# Patient Record
Sex: MALE | Race: BLACK OR AFRICAN AMERICAN | NOT HISPANIC OR LATINO | Employment: OTHER | URBAN - METROPOLITAN AREA
[De-identification: names, ages, dates, MRNs, and addresses within clinical notes are randomized per-mention and may not be internally consistent; named-entity substitution may affect disease eponyms.]

---

## 2023-04-08 ENCOUNTER — OFFICE VISIT (OUTPATIENT)
Dept: URGENT CARE | Facility: CLINIC | Age: 78
End: 2023-04-08
Payer: MEDICARE

## 2023-04-08 ENCOUNTER — TELEPHONE (OUTPATIENT)
Dept: URGENT CARE | Facility: CLINIC | Age: 78
End: 2023-04-08

## 2023-04-08 VITALS
WEIGHT: 208 LBS | OXYGEN SATURATION: 96 % | DIASTOLIC BLOOD PRESSURE: 78 MMHG | SYSTOLIC BLOOD PRESSURE: 167 MMHG | HEART RATE: 88 BPM | BODY MASS INDEX: 27.57 KG/M2 | TEMPERATURE: 98 F | RESPIRATION RATE: 20 BRPM | HEIGHT: 73 IN

## 2023-04-08 DIAGNOSIS — J30.1 SEASONAL ALLERGIC RHINITIS DUE TO POLLEN: Primary | ICD-10-CM

## 2023-04-08 DIAGNOSIS — J06.9 URI, ACUTE: ICD-10-CM

## 2023-04-08 LAB
CTP QC/QA: YES
SARS-COV-2 AG RESP QL IA.RAPID: NEGATIVE

## 2023-04-08 PROCEDURE — 99203 OFFICE O/P NEW LOW 30 MIN: CPT | Mod: S$GLB,,, | Performed by: FAMILY MEDICINE

## 2023-04-08 PROCEDURE — 87811 SARS CORONAVIRUS 2 ANTIGEN POCT, MANUAL READ: ICD-10-PCS | Mod: QW,S$GLB,, | Performed by: FAMILY MEDICINE

## 2023-04-08 PROCEDURE — 87811 SARS-COV-2 COVID19 W/OPTIC: CPT | Mod: QW,S$GLB,, | Performed by: FAMILY MEDICINE

## 2023-04-08 PROCEDURE — 99203 PR OFFICE/OUTPT VISIT, NEW, LEVL III, 30-44 MIN: ICD-10-PCS | Mod: S$GLB,,, | Performed by: FAMILY MEDICINE

## 2023-04-08 RX ORDER — AZITHROMYCIN 250 MG/1
TABLET, FILM COATED ORAL
Qty: 6 TABLET | Refills: 0 | Status: SHIPPED | OUTPATIENT
Start: 2023-04-08

## 2023-04-08 RX ORDER — FINASTERIDE 5 MG/1
TABLET, FILM COATED ORAL
COMMUNITY
Start: 2023-03-22

## 2023-04-08 RX ORDER — LORATADINE 10 MG/1
10 TABLET ORAL DAILY
Qty: 30 TABLET | Refills: 2 | Status: SHIPPED | OUTPATIENT
Start: 2023-04-08 | End: 2024-04-07

## 2023-04-08 RX ORDER — TAMSULOSIN HYDROCHLORIDE 0.4 MG/1
CAPSULE ORAL
COMMUNITY
Start: 2023-03-22

## 2023-04-08 RX ORDER — LOSARTAN POTASSIUM 100 MG/1
TABLET ORAL
COMMUNITY
Start: 2023-03-22

## 2023-04-08 RX ORDER — ROSUVASTATIN CALCIUM 40 MG/1
TABLET, COATED ORAL
COMMUNITY
Start: 2023-02-14

## 2023-04-08 RX ORDER — CALCIUM CITRATE/VITAMIN D3 200MG-6.25
1 TABLET ORAL 2 TIMES DAILY
COMMUNITY
Start: 2023-03-29

## 2023-04-08 RX ORDER — METFORMIN HYDROCHLORIDE 1000 MG/1
TABLET ORAL
COMMUNITY
Start: 2023-03-22

## 2023-04-08 RX ORDER — MELOXICAM 15 MG/1
15 TABLET ORAL NIGHTLY
COMMUNITY
Start: 2023-01-11 | End: 2023-05-27 | Stop reason: ALTCHOICE

## 2023-04-08 RX ORDER — AMLODIPINE BESYLATE 10 MG/1
TABLET ORAL
COMMUNITY
Start: 2023-03-22

## 2023-04-08 RX ORDER — GLIPIZIDE 10 MG/1
TABLET ORAL
COMMUNITY
Start: 2023-03-22

## 2023-04-08 RX ORDER — FLUTICASONE PROPIONATE 50 MCG
SPRAY, SUSPENSION (ML) NASAL
COMMUNITY
Start: 2023-03-29

## 2023-04-08 RX ORDER — CARVEDILOL 6.25 MG/1
TABLET ORAL
COMMUNITY
Start: 2023-01-26

## 2023-04-08 NOTE — PROGRESS NOTES
"Subjective:      Patient ID: Robi Donovan is a 77 y.o. male.    Vitals:  height is 6' 1" (1.854 m) and weight is 94.3 kg (208 lb). His temperature is 98.4 °F (36.9 °C). His blood pressure is 167/78 (abnormal) and his pulse is 88. His respiration is 20 and oxygen saturation is 96%.     Chief Complaint: Cough    77 year old male presents today with sinuses, nasal drainage, cough. Symptoms started 04/06/2023. Treatments at home include Mucinex DM with no relief. No known exposure to anything. Never had COVID before that he knows of. COVID vaccinated. Denies fever or chills      Cough  This is a new problem. The current episode started in the past 7 days. The problem has been unchanged. The cough is Productive of purulent sputum (yellow). Associated symptoms include postnasal drip and rhinorrhea. Pertinent negatives include no chest pain, chills, ear congestion, ear pain, fever, headaches, heartburn, hemoptysis, myalgias, nasal congestion, rash, sore throat, shortness of breath, sweats, weight loss or wheezing. He has tried OTC cough suppressant for the symptoms. There is no history of asthma, bronchiectasis, bronchitis, COPD, emphysema, environmental allergies or pneumonia.     Constitution: Negative for chills and fever.   HENT:  Positive for postnasal drip. Negative for ear pain and sore throat.    Cardiovascular:  Negative for chest pain.   Respiratory:  Positive for cough. Negative for bloody sputum, shortness of breath and wheezing.    Gastrointestinal:  Negative for heartburn.   Musculoskeletal:  Negative for muscle ache.   Skin:  Negative for rash.   Allergic/Immunologic: Negative for environmental allergies.   Neurological:  Negative for headaches.    Objective:     Physical Exam   Constitutional: He is oriented to person, place, and time. He appears well-developed. He is cooperative.   HENT:   Head: Normocephalic and atraumatic.   Ears:   Right Ear: Hearing, tympanic membrane, external ear and ear canal normal. "   Left Ear: Hearing, tympanic membrane, external ear and ear canal normal.   Nose: Nose normal. No mucosal edema or nasal deformity. No epistaxis. Right sinus exhibits no maxillary sinus tenderness and no frontal sinus tenderness. Left sinus exhibits no maxillary sinus tenderness and no frontal sinus tenderness.   Mouth/Throat: Uvula is midline, oropharynx is clear and moist and mucous membranes are normal. Mucous membranes are moist. No trismus in the jaw. Normal dentition. No uvula swelling. No oropharyngeal exudate or posterior oropharyngeal erythema. Oropharynx is clear.   Eyes: Conjunctivae and lids are normal. Pupils are equal, round, and reactive to light. Extraocular movement intact   Neck: Trachea normal and phonation normal. Neck supple.   Cardiovascular: Normal rate, regular rhythm, normal heart sounds and normal pulses.   Pulmonary/Chest: Effort normal and breath sounds normal.   Abdominal: Normal appearance and bowel sounds are normal. Soft.   Musculoskeletal: Normal range of motion.         General: Normal range of motion.   Neurological: He is alert and oriented to person, place, and time. He exhibits normal muscle tone.   Skin: Skin is warm, dry and intact.   Psychiatric: His speech is normal and behavior is normal. Judgment and thought content normal.   Nursing note and vitals reviewed.    Assessment:     1. Seasonal allergic rhinitis due to pollen    2. URI, acute        Plan:       Seasonal allergic rhinitis due to pollen  -     SARS Coronavirus 2 Antigen, POCT Manual Read    URI, acute    Other orders  -     loratadine (CLARITIN) 10 mg tablet; Take 1 tablet (10 mg total) by mouth once daily.  Dispense: 30 tablet; Refill: 2       Please delete the message regarding Pneumonia on cxr            Results for orders placed or performed in visit on 04/08/23   SARS Coronavirus 2 Antigen, POCT Manual Read   Result Value Ref Range    SARS Coronavirus 2 Antigen Negative Negative      Acceptable Yes

## 2023-05-27 ENCOUNTER — HOSPITAL ENCOUNTER (EMERGENCY)
Facility: HOSPITAL | Age: 78
Discharge: HOME | End: 2023-05-27
Attending: EMERGENCY MEDICINE
Payer: MEDICARE

## 2023-05-27 VITALS
WEIGHT: 190 LBS | DIASTOLIC BLOOD PRESSURE: 80 MMHG | RESPIRATION RATE: 18 BRPM | BODY MASS INDEX: 25.07 KG/M2 | SYSTOLIC BLOOD PRESSURE: 154 MMHG | HEART RATE: 82 BPM | OXYGEN SATURATION: 99 %

## 2023-05-27 DIAGNOSIS — S32.020A CLOSED COMPRESSION FRACTURE OF L2 LUMBAR VERTEBRA, INITIAL ENCOUNTER: ICD-10-CM
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
## 2023-05-27 PROCEDURE — 99285 EMERGENCY DEPT VISIT HI MDM: CPT | Mod: 25

## 2023-05-27 PROCEDURE — 25000003 PHARM REV CODE 250: Performed by: EMERGENCY MEDICINE

## 2023-05-27 PROCEDURE — 96372 THER/PROPH/DIAG INJ SC/IM: CPT | Performed by: EMERGENCY MEDICINE

## 2023-05-27 PROCEDURE — 63600175 PHARM REV CODE 636 W HCPCS: Performed by: EMERGENCY MEDICINE

## 2023-05-27 RX ORDER — ACETAMINOPHEN 500 MG
1000 TABLET ORAL
Status: DISCONTINUED | OUTPATIENT
Start: 2023-05-27 | End: 2023-05-27

## 2023-05-27 RX ORDER — HYDROCODONE BITARTRATE AND ACETAMINOPHEN 5; 325 MG/1; MG/1
1 TABLET ORAL EVERY 8 HOURS PRN
Qty: 9 TABLET | Refills: 0 | Status: SHIPPED | OUTPATIENT
Start: 2023-05-27

## 2023-05-27 RX ORDER — MORPHINE SULFATE 4 MG/ML
4 INJECTION, SOLUTION INTRAMUSCULAR; INTRAVENOUS
Status: COMPLETED | OUTPATIENT
Start: 2023-05-27 | End: 2023-05-27

## 2023-05-27 RX ORDER — ONDANSETRON 2 MG/ML
4 INJECTION INTRAMUSCULAR; INTRAVENOUS
Status: DISCONTINUED | OUTPATIENT
Start: 2023-05-27 | End: 2023-05-27

## 2023-05-27 RX ORDER — ONDANSETRON 4 MG/1
4 TABLET, ORALLY DISINTEGRATING ORAL
Status: COMPLETED | OUTPATIENT
Start: 2023-05-27 | End: 2023-05-27

## 2023-05-27 RX ORDER — IBUPROFEN 400 MG/1
800 TABLET ORAL
Status: COMPLETED | OUTPATIENT
Start: 2023-05-27 | End: 2023-05-27

## 2023-05-27 RX ORDER — NAPROXEN 500 MG/1
500 TABLET ORAL 2 TIMES DAILY WITH MEALS
Qty: 12 TABLET | Refills: 0 | Status: SHIPPED | OUTPATIENT
Start: 2023-05-27

## 2023-05-27 RX ADMIN — MORPHINE SULFATE 4 MG: 4 INJECTION INTRAVENOUS at 08:05

## 2023-05-27 RX ADMIN — ONDANSETRON 4 MG: 4 TABLET, ORALLY DISINTEGRATING ORAL at 08:05

## 2023-05-27 RX ADMIN — IBUPROFEN 800 MG: 400 TABLET ORAL at 07:05

## 2023-05-28 NOTE — ED PROVIDER NOTES
Emergency Department Encounter  Provider Note  Encounter Date: 2023    Patient Name: Robi Donovan  MRN: [id number]    History of Present Illness   HPI  History of Present Illness:    Chief Complaint:   Chief Complaint   Patient presents with    Fall     Pt was picking cucumbers and fell on his lower back . Pt is complaining of lumbar back pain.         77 year old male presenting with lower back pain after falling earlier today.  Patient is on Plavix. Pt was pulling on a cucumber and fell over backwards onto his bottom. Unable to get up after, was helped into a chair. No hip pain.     The following PMH/PSH/SocHx/FamHx has been reviewed by myself:    Past Medical History:   Diagnosis Date    Diabetes mellitus type I     Hypertension     Stroke      No past surgical history on file.  Social History     Tobacco Use    Smoking status: Former     Types: Cigarettes     Start date:      Quit date:      Years since quittin.4    Smokeless tobacco: Never   Substance Use Topics    Alcohol use: Not Currently     No family history on file.    Allergies reviewed:  Review of patient's allergies indicates:  No Known Allergies    Medications reviewed:  Discharge Medication List as of 2023 10:28 PM        START taking these medications    Details   HYDROcodone-acetaminophen (NORCO) 5-325 mg per tablet Take 1 tablet by mouth every 8 (eight) hours as needed for Pain., Starting Sat 2023, Normal      naproxen (NAPROSYN) 500 MG tablet Take 1 tablet (500 mg total) by mouth 2 (two) times daily with meals., Starting Sat 2023, Normal           CONTINUE these medications which have NOT CHANGED    Details   amLODIPine (NORVASC) 10 MG tablet Take by mouth., Starting Wed 3/22/2023, Historical Med      azithromycin (ZITHROMAX Z-MIESHA) 250 MG tablet Take 2 tablets (500 mg) on  Day 1,  followed by 1 tablet (250 mg) once daily on Days 2 through 5., Normal      carvediloL (COREG) 6.25 MG tablet Take by mouth., Starting  Thu 1/26/2023, Historical Med      finasteride (PROSCAR) 5 mg tablet Take by mouth., Starting Wed 3/22/2023, Historical Med      fluticasone propionate (FLONASE) 50 mcg/actuation nasal spray by Each Nostril route., Starting Wed 3/29/2023, Historical Med      glipiZIDE (GLUCOTROL) 10 MG tablet Take by mouth., Starting Wed 3/22/2023, Historical Med      loratadine (CLARITIN) 10 mg tablet Take 1 tablet (10 mg total) by mouth once daily., Starting Sat 4/8/2023, Until Sun 4/7/2024, Normal      losartan (COZAAR) 100 MG tablet Take by mouth., Starting Wed 3/22/2023, Historical Med      metFORMIN (GLUCOPHAGE) 1000 MG tablet Take by mouth., Starting Wed 3/22/2023, Historical Med      rosuvastatin (CRESTOR) 40 MG Tab Take by mouth., Starting Tue 2/14/2023, Historical Med      tamsulosin (FLOMAX) 0.4 mg Cap Take by mouth., Starting Wed 3/22/2023, Historical Med      TRUE METRIX GLUCOSE TEST STRIP Strp 1 strip 2 (two) times daily., Starting Wed 3/29/2023, Historical Med             ROS  Review of Systems:    Constitutional:  Negative for fever.   HENT:  Negative for sore throat.    Eyes:  Negative for redness.   Respiratory:  Negative for shortness of breath.    Cardiovascular:  Negative for chest pain.   Gastrointestinal:  Negative for nausea.   Genitourinary:  Negative for dysuria.   Musculoskeletal:  + for back pain.   Skin:  Negative for rash.   Neurological:  Negative for weakness.   Hematological:  Does not bruise/bleed easily.   Psychiatric/Behavioral:  The patient is not nervous/anxious.      Physical Exam   Physical Exam    Initial Vitals [05/27/23 1839]   BP Pulse Resp Temp SpO2   (!) 154/80 82 14 -- 99 %      MAP       --           Triage vital signs reviewed.    Constitutional: Well-nourished, well-developed. Not in acute distress.  HENT: Normocephalic, atraumatic. Moist mucous membranes.  Eyes: No conjunctival injection.  Resp: Normal respiratory effort, breathing unlabored.  Cardio: Regular rate and rhythm.  GI:  Abdomen non-distended.   MSK: midline lumbar spinal ttp. No hip pain. Full ROM LLE.   Skin: Warm and dry. No rashes or lesions noted.  Neuro: Awake and alert. Moves all extremities.    ED Course   Procedures    Medical Decision Making    History Acquisition     The history is provided by the patient.     Review of prior external/non ED notes:  notes    Differential Diagnoses   Based on available information and initial assessment, the working Differential Diagnosis includes, but is not limited to:  Fracture, dislocation, compartment syndrome, nerve injury/palsy, vascular injury, DVT, rhabdomyolysis, hemarthrosis, septic joint, cellulitis, bursitis, muscle strain, ligament tear/sprain, laceration, foreign body, abrasion, soft tissue contusion, osteoarthritis.  .    EKG   EKG ordered and independently reviewed by me:       Labs   Lab tests ordered and independently reviewed by me:    Labs Reviewed - No data to display      Imaging   Imaging ordered and independently reviewed by me:   Imaging Results               CT Lumbar Spine Without Contrast (Final result)  Result time 05/27/23 19:55:29      Final result by Arnoldo Powers MD (05/27/23 19:55:29)                   Impression:      Acute compression fracture of the anterior and superior endplate of L2 without retropulsion or involvement of the posterior cortex.    Severe stenosis in the central canal at L4-5 and L5-S1.    Severe subarticular and foraminal stenosis on the left at L4-5 and on the right at L5-S1.    This report was flagged in Epic as abnormal.      Electronically signed by: Arnoldo Powers  Date:    05/27/2023  Time:    19:55               Narrative:    EXAMINATION:  CT LUMBAR SPINE WITHOUT CONTRAST    CLINICAL HISTORY:  Low back pain, trauma;    TECHNIQUE:  Low-dose axial, sagittal and coronal reformations are obtained through the lumbar spine.  Contrast was not administered.    COMPARISON:  None.    FINDINGS:  Alignment is anatomic.    There is  a compression fracture of the anterior and superior cortex of L2 with mild compression with 10-15% loss of anterior height.  No extension through the posterior cortex is evident.  No extension into the lateral masses.    Generalized spondylosis is most severe at L4-5 and L5-S1 with disc space narrowing and vacuum phenomenon at these 2 levels.    At the L1-L2 level, there is no significant central canal stenosis.  No retropulsion is.    At L2-L3, concentric disc bulge and facet arthropathy with short pedicles cause mild central and lateral recess encroachment without significant stenosis.    At L3-L4, concentric disc bulge, short pedicles and hypertrophic posterior elements result in moderate central and lateral recess stenosis.    At the L4-L5 level there is abnormal soft tissue density in the left lateral recess and foramen suggesting disc protrusion with severe left lateral recess and foraminal stenosis with likely impingement of the left L4 nerve root.  The central canal is similarly narrowed with likely constriction of the cauda equina in the midline.    At the L5-S1 level, concentric disc bulge is asymmetric toward the right causing subarticular and extraforaminal soft tissue density in the region of the exiting right.  L5 nerve root                                           Additional Consideration   Will Zion  presents to the emergency Department today with LBP sp fall. Will obtain CT scan given age and midline ttp. Ibuprofen for pain.     Additional testing considered but not indicated during the course of this workup: further imaging and labwork, not indicated  Co-morbidities/chronic illness/exacerbation of chronic illness considered which impacted clinical decision making: CVA, DM, htn  Procedures done in the ED or plan for the OR:   Social determinants of care considered during development of treatment plan include: Decreased medical literacy  Discussion of management or test interpretation with  external provider:   DNR discussion: No    The patient's list of active medications and allergies as documented per RN staff has been reviewed.  Medications given in the ED and/or prescribed:   Medications   ibuprofen tablet 800 mg (800 mg Oral Given 5/27/23 1915)   morphine injection 4 mg (4 mg Intramuscular Given 5/27/23 2041)   ondansetron disintegrating tablet 4 mg (4 mg Oral Given 5/27/23 2041)             ED Course as of 05/29/23 0602   Sat May 27, 2023   2014 CT Lumbar Spine Without Contrast(!)  Scan is reviewed shows an L2 compression fracture with 10-15% loss of height.  Incidentally it shows severe arthritis and degenerative disc disease.  The patient states that in the past he has had low back pain but denied any bowel or bladder incontinence, saddle anesthesia, radicular symptoms, or any problems walking before falling down while picking cucumbers. [CD]   2208 Patient states that he is not want to stay in the hospital.  A TLSO brace has been placed.  He is able to get up and ambulate slowly while in the emergency department.  He does have a chronic issue with his right lower extremity ever since a CVA in the past.  Otherwise his ambulation is a little slower but he seems steady. [CD]   2218 I will provide patient with a walker.  While he is able to ambulate without it I feel that it will be safer for him to go home with it.  Both pt and wife comfortable with dc. [CD]      ED Course User Index  [CD] Nishant Alva DO       Explanation of disposition: Disposition pending CT results and oncoming physician discretion.     Clinical Impression:     1. Fall, initial encounter    2. Closed compression fracture of L2 lumbar vertebra, initial encounter       ED Disposition Condition    Discharge Stable               Alessandra Castro MD  05/27/23 2004       Alessandra Castro MD  05/29/23 0602

## 2023-05-28 NOTE — ED NOTES
Pt has cervical brade in place, walker provided, pt ambulated and educated. Pt voices no complaints.

## 2023-06-21 ENCOUNTER — TELEPHONE (OUTPATIENT)
Dept: EMERGENCY MEDICINE | Facility: HOSPITAL | Age: 78
End: 2023-06-21
Payer: MEDICARE

## 2023-07-24 ENCOUNTER — PATIENT MESSAGE (OUTPATIENT)
Dept: RESEARCH | Facility: HOSPITAL | Age: 78
End: 2023-07-24
Payer: MEDICARE

## 2023-10-01 ENCOUNTER — OFFICE VISIT (OUTPATIENT)
Dept: URGENT CARE | Facility: CLINIC | Age: 78
End: 2023-10-01
Payer: MEDICARE

## 2023-10-01 VITALS
TEMPERATURE: 100 F | HEIGHT: 73 IN | BODY MASS INDEX: 26.77 KG/M2 | WEIGHT: 202 LBS | RESPIRATION RATE: 19 BRPM | OXYGEN SATURATION: 95 % | DIASTOLIC BLOOD PRESSURE: 82 MMHG | SYSTOLIC BLOOD PRESSURE: 173 MMHG | HEART RATE: 105 BPM

## 2023-10-01 DIAGNOSIS — J06.9 UPPER RESPIRATORY TRACT INFECTION, UNSPECIFIED TYPE: Primary | ICD-10-CM

## 2023-10-01 LAB
CTP QC/QA: YES
CTP QC/QA: YES
POC MOLECULAR INFLUENZA A AGN: NEGATIVE
POC MOLECULAR INFLUENZA B AGN: NEGATIVE
SARS-COV-2 AG RESP QL IA.RAPID: NEGATIVE

## 2023-10-01 PROCEDURE — 87502 POCT INFLUENZA A/B MOLECULAR: ICD-10-PCS | Mod: QW,S$GLB,, | Performed by: NURSE PRACTITIONER

## 2023-10-01 PROCEDURE — 99213 OFFICE O/P EST LOW 20 MIN: CPT | Mod: S$GLB,,, | Performed by: NURSE PRACTITIONER

## 2023-10-01 PROCEDURE — 99213 PR OFFICE/OUTPT VISIT, EST, LEVL III, 20-29 MIN: ICD-10-PCS | Mod: S$GLB,,, | Performed by: NURSE PRACTITIONER

## 2023-10-01 PROCEDURE — 87502 INFLUENZA DNA AMP PROBE: CPT | Mod: QW,S$GLB,, | Performed by: NURSE PRACTITIONER

## 2023-10-01 PROCEDURE — 87811 SARS-COV-2 COVID19 W/OPTIC: CPT | Mod: QW,S$GLB,, | Performed by: NURSE PRACTITIONER

## 2023-10-01 PROCEDURE — 87811 SARS CORONAVIRUS 2 ANTIGEN POCT, MANUAL READ: ICD-10-PCS | Mod: QW,S$GLB,, | Performed by: NURSE PRACTITIONER

## 2023-10-01 RX ORDER — MONTELUKAST SODIUM 10 MG/1
10 TABLET ORAL NIGHTLY
Qty: 30 TABLET | Refills: 0 | Status: SHIPPED | OUTPATIENT
Start: 2023-10-01 | End: 2023-10-31

## 2023-10-01 NOTE — PROGRESS NOTES
"Subjective:      Patient ID: Robi Donovan is a 78 y.o. male.    Vitals:  height is 6' 1" (1.854 m) and weight is 91.6 kg (202 lb). His temperature is 100.3 °F (37.9 °C). His blood pressure is 173/82 (abnormal) and his pulse is 105. His respiration is 19 and oxygen saturation is 95%.     Chief Complaint: Cough    Pt present with symptoms of- Cough, Sneezing, Feeling Cold, Runny Nose and Congestion that started yesterday.     Cough  This is a new problem. The current episode started yesterday. The problem has been gradually worsening. The problem occurs hourly. The cough is Non-productive. Associated symptoms include chills, a fever and nasal congestion. Pertinent negatives include no ear pain, headaches, sore throat or shortness of breath. Nothing aggravates the symptoms. He has tried nothing for the symptoms. There is no history of asthma, bronchitis or COPD.       Constitution: Positive for chills and fever.   HENT:  Negative for ear pain, sinus pain, sinus pressure and sore throat.    Respiratory:  Positive for cough. Negative for shortness of breath.    Neurological:  Negative for headaches.      Objective:     Physical Exam   HENT:   Head: Normocephalic.   Ears:   Right Ear: Tympanic membrane and ear canal normal.   Left Ear: Tympanic membrane and ear canal normal.   Nose: Nose normal. No rhinorrhea.   Mouth/Throat: Mucous membranes are moist. Oropharynx is clear.   Cardiovascular: Normal rate and regular rhythm.   Pulmonary/Chest: Effort normal and breath sounds normal.   Abdominal: Normal appearance.   Neurological: He is alert.       Assessment:     1. Upper respiratory tract infection, unspecified type        Plan:       Upper respiratory tract infection, unspecified type  -     SARS Coronavirus 2 Antigen, POCT Manual Read  -     POCT Influenza A/B MOLECULAR  -     montelukast (SINGULAIR) 10 mg tablet; Take 1 tablet (10 mg total) by mouth every evening.  Dispense: 30 tablet; Refill: 0      Results for orders " placed or performed in visit on 10/01/23   SARS Coronavirus 2 Antigen, POCT Manual Read   Result Value Ref Range    SARS Coronavirus 2 Antigen Negative Negative     Acceptable Yes    POCT Influenza A/B MOLECULAR   Result Value Ref Range    POC Molecular Influenza A Ag Negative Negative, Not Reported    POC Molecular Influenza B Ag Negative Negative, Not Reported     Acceptable Yes

## 2024-03-01 ENCOUNTER — OFFICE VISIT (OUTPATIENT)
Dept: PAIN MEDICINE | Facility: CLINIC | Age: 79
End: 2024-03-01
Payer: MEDICARE

## 2024-03-01 VITALS
TEMPERATURE: 98 F | BODY MASS INDEX: 26 KG/M2 | HEIGHT: 73 IN | RESPIRATION RATE: 18 BRPM | DIASTOLIC BLOOD PRESSURE: 81 MMHG | WEIGHT: 196.19 LBS | OXYGEN SATURATION: 100 % | SYSTOLIC BLOOD PRESSURE: 159 MMHG | HEART RATE: 83 BPM

## 2024-03-01 DIAGNOSIS — S32.020D CLOSED COMPRESSION FRACTURE OF L2 LUMBAR VERTEBRA WITH ROUTINE HEALING, SUBSEQUENT ENCOUNTER: ICD-10-CM

## 2024-03-01 DIAGNOSIS — M48.07 SPINAL STENOSIS, LUMBOSACRAL REGION: Primary | ICD-10-CM

## 2024-03-01 PROCEDURE — 3288F FALL RISK ASSESSMENT DOCD: CPT | Mod: CPTII,S$GLB,, | Performed by: ANESTHESIOLOGY

## 2024-03-01 PROCEDURE — 99204 OFFICE O/P NEW MOD 45 MIN: CPT | Mod: S$GLB,,, | Performed by: ANESTHESIOLOGY

## 2024-03-01 PROCEDURE — 1100F PTFALLS ASSESS-DOCD GE2>/YR: CPT | Mod: CPTII,S$GLB,, | Performed by: ANESTHESIOLOGY

## 2024-03-01 PROCEDURE — 3077F SYST BP >= 140 MM HG: CPT | Mod: CPTII,S$GLB,, | Performed by: ANESTHESIOLOGY

## 2024-03-01 PROCEDURE — 3079F DIAST BP 80-89 MM HG: CPT | Mod: CPTII,S$GLB,, | Performed by: ANESTHESIOLOGY

## 2024-03-01 PROCEDURE — 99999 PR PBB SHADOW E&M-EST. PATIENT-LVL IV: CPT | Mod: PBBFAC,,, | Performed by: ANESTHESIOLOGY

## 2024-03-01 PROCEDURE — 1125F AMNT PAIN NOTED PAIN PRSNT: CPT | Mod: CPTII,S$GLB,, | Performed by: ANESTHESIOLOGY

## 2024-03-01 PROCEDURE — 1159F MED LIST DOCD IN RCRD: CPT | Mod: CPTII,S$GLB,, | Performed by: ANESTHESIOLOGY

## 2024-03-01 NOTE — PROGRESS NOTES
PCP: Yary Wright -    REFERRING PHYSICIAN: Asya Santamaria FNP    CHIEF COMPLAINT: back pain    Original HISTORY OF PRESENT ILLNESS: Robi Donovan is a 78 y.o.  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 The pain started after a fall in May 2023. He went to the emergency room and CT scan revealed L2 vertebral fracture and lumbar spinal stenosis    Original Pain Description:  The pain is located in the low back and radiates to both legs (R>L). The pain is described as aching. Exacerbating factors: Standing, Bending, Walking, Morning, and Lifting. Mitigating factors laying down, physical therapy, and sitting. Symptoms interfere with daily activity and sleeping. The patient feels like symptoms have been improving, though really, he had initial improvement, followed by a worsening, then his PCP gave him a medrol dose pack which has significantly helped. Patient denies night fever/night sweats, bowel incontinence, significant weight loss, and significant motor weakness.  He is able to walk about 1/2 block before he must stop to rest. He is able to walk much further with a shopping cart.    Mr. Donovan has had urinary urgency with incontinence occasionally since his stroke.    Original PAIN SCORES:  Best: Pain is 2  Worst: Pain is 10  Current: Pain is 6        3/1/2024     1:18 PM   Last 3 PDI Scores   Pain Disability Index (PDI) 27       INTERVAL HISTORY: (Newest visit at the bottom)   Interval History (Date):       6 weeks of Conservative therapy:(Must include dates)  PT: 6/2023 - 7/2023   Chiro: no  HEP: Patient reports HEP daily       Treatments / Medications: (Ice/Heat/NSAIDS/APAP/etc):  Medrol dose neena  Tylenol - 1g BID      Interventional Pain Procedures: (Previous injections)  none    Past Medical History:   Diagnosis Date    Diabetes mellitus type I     Hypertension     Stroke 2002     History reviewed. No pertinent surgical  history.  Social History     Socioeconomic History    Marital status:    Tobacco Use    Smoking status: Former     Current packs/day: 0.00     Types: Cigarettes     Start date:      Quit date:      Years since quittin.1    Smokeless tobacco: Never   Substance and Sexual Activity    Alcohol use: Not Currently     History reviewed. No pertinent family history.    Review of patient's allergies indicates:  No Known Allergies    Current Outpatient Medications   Medication Sig    amLODIPine (NORVASC) 10 MG tablet Take by mouth.    carvediloL (COREG) 6.25 MG tablet Take by mouth.    finasteride (PROSCAR) 5 mg tablet Take by mouth.    fluticasone propionate (FLONASE) 50 mcg/actuation nasal spray by Each Nostril route.    glipiZIDE (GLUCOTROL) 10 MG tablet Take by mouth.    HYDROcodone-acetaminophen (NORCO) 5-325 mg per tablet Take 1 tablet by mouth every 8 (eight) hours as needed for Pain.    loratadine (CLARITIN) 10 mg tablet Take 1 tablet (10 mg total) by mouth once daily.    losartan (COZAAR) 100 MG tablet Take by mouth.    metFORMIN (GLUCOPHAGE) 1000 MG tablet Take by mouth.    naproxen (NAPROSYN) 500 MG tablet Take 1 tablet (500 mg total) by mouth 2 (two) times daily with meals.    rosuvastatin (CRESTOR) 40 MG Tab Take by mouth.    tamsulosin (FLOMAX) 0.4 mg Cap Take by mouth.    TRUE METRIX GLUCOSE TEST STRIP Strp 1 strip 2 (two) times daily.    azithromycin (ZITHROMAX Z-MIESHA) 250 MG tablet Take 2 tablets (500 mg) on  Day 1,  followed by 1 tablet (250 mg) once daily on Days 2 through 5. (Patient not taking: Reported on 10/1/2023)     No current facility-administered medications for this visit.       ROS:  GENERAL: No fever. No chills. No fatigue. Denies weight loss. Denies weight gain.  HEENT: Denies headaches. Denies vision change. Denies eye pain. Denies double vision. Denies ear pain.   CV: Denies chest pain.   PULM: Denies of shortness of breath.  GI: Denies constipation. No diarrhea. No  "abdominal pain. Denies nausea. Denies vomiting. No blood in stool.  HEME: Denies bleeding problems.  : Denies urgency. No painful urination. No blood in urine.  MS: Denies joint stiffness. Denies joint swelling.  + back pain.  SKIN: Denies rash.   NEURO: Denies seizures. No weakness.  PSYCH:  Denies difficulty sleeping. No anxiety. Denies depression. No suicidal thoughts.       VITALS:   Vitals:    03/01/24 1320   BP: (!) 159/81   Pulse: 83   Resp: 18   Temp: 98 °F (36.7 °C)   SpO2: 100%   Weight: 89 kg (196 lb 3.4 oz)   Height: 6' 1" (1.854 m)   PainSc:   5   PainLoc: Back         PHYSICAL EXAM:   GENERAL: Well appearing, in no acute distress, alert and oriented x3.  PSYCH:  Mood and affect appropriate. Poor historian.   SKIN: Skin color, texture, turgor normal, no rashes or lesions.  HEENT:  Normocephalic, atraumatic. Cranial nerves grossly intact.  NECK: No pain to palpation over the cervical paraspinous muscles. No pain to palpation over facets. No pain with neck flexion, extension, or lateral flexion.   PULM: No evidence of respiratory difficulty, symmetric chest rise.  GI:  Non-distended  BACK: Reduced range of motion due to pain. Point tenderness at spinous process near L2. No pain to palpation over facet joints. There is no pain with palpation over the sacroiliac joints bilaterally. Negative FADIR, pelvic compression, femoral axial loading and femoral scours. No tenderness over bilateral piriformis and GTB.  +rene bilaterally  +facet loading  EXTREMITIES: No deformities, edema, or skin discoloration.   MUSCULOSKELETAL: Reduced motion in RUE and RLE  NEURO: Sensation is diminished in right medial calf. RLE 3/5 in hip flexion and ankle dorsiflexion, 4/5 in other muscle groups. Left leg strength 4/5 throughout. Bilateral upper and lower extremity coordination and muscle stretch reflexes are physiologic and symmetric. Straight leg raising in the supine position is negative to radicular pain. + fem stretch " "test bilaterally  GAIT: Unsteady, uses single tip cane, right leg slap gait.            LABS:    Chemistry    No results found for: "NA", "K", "CL", "CO2", "BUN", "CREATININE", "GLU" No results found for: "CALCIUM", "ALKPHOS", "AST", "ALT", "BILITOT", "ESTGFRAFRICA", "EGFRNONAA"       IMAGING:    CT LUMBAR SPINE WITHOUT CONTRAST     CLINICAL HISTORY:  Low back pain, trauma;     TECHNIQUE:  Low-dose axial, sagittal and coronal reformations are obtained through the lumbar spine.  Contrast was not administered.     COMPARISON:  None.     FINDINGS:  Alignment is anatomic.     There is a compression fracture of the anterior and superior cortex of L2 with mild compression with 10-15% loss of anterior height.  No extension through the posterior cortex is evident.  No extension into the lateral masses.     Generalized spondylosis is most severe at L4-5 and L5-S1 with disc space narrowing and vacuum phenomenon at these 2 levels.     At the L1-L2 level, there is no significant central canal stenosis.  No retropulsion is.     At L2-L3, concentric disc bulge and facet arthropathy with short pedicles cause mild central and lateral recess encroachment without significant stenosis.     At L3-L4, concentric disc bulge, short pedicles and hypertrophic posterior elements result in moderate central and lateral recess stenosis.     At the L4-L5 level there is abnormal soft tissue density in the left lateral recess and foramen suggesting disc protrusion with severe left lateral recess and foraminal stenosis with likely impingement of the left L4 nerve root.  The central canal is similarly narrowed with likely constriction of the cauda equina in the midline.     At the L5-S1 level, concentric disc bulge is asymmetric toward the right causing subarticular and extraforaminal soft tissue density in the region of the exiting right.  L5 nerve root     Impression:     Acute compression fracture of the anterior and superior endplate of L2 " without retropulsion or involvement of the posterior cortex.     Severe stenosis in the central canal at L4-5 and L5-S1.     Severe subarticular and foraminal stenosis on the left at L4-5 and on the right at L5-S1.       ASSESSMENT: 78 y.o. year old male with pain, consistent with:    Encounter Diagnoses   Name Primary?    Spinal stenosis, lumbosacral region Yes    Closed compression fracture of L2 lumbar vertebra with routine healing, subsequent encounter        DISCUSSION: Mr. Donovan has a history of stroke 20 yrs ago with residual right sided weakness. He is a poor historian who comes to us with low back pain that radiates into both thighs after a fall which is worst with walking and limits him to 1/2 block, and is alleviated with sitting or walking with a shopping cart. Imaging shows an old L2 superior endplate compression fracture, severe DDD at L4/5 and L5/S1, and severe lumbar stenosis a L4/5. Exam shows pain reproduced with lumbar extension.    PLAN:  Continue with HEP. He completed PT after his initial fall  Will order MRI of L spine  Follow up after mri  Consider DEXA scan and referral to endo on follow-up      I would like to thank Asya Santamaria, COLTEN for the opportunity to assist in the care of this patient. We had a very nice visit and I look forward to continuing their care. Please let me know if I can be of further assistance.     Aria Orozco  03/01/2024

## 2024-03-18 ENCOUNTER — HOSPITAL ENCOUNTER (OUTPATIENT)
Dept: RADIOLOGY | Facility: HOSPITAL | Age: 79
Discharge: HOME | End: 2024-03-18
Attending: STUDENT IN AN ORGANIZED HEALTH CARE EDUCATION/TRAINING PROGRAM
Payer: MEDICARE

## 2024-03-18 DIAGNOSIS — M48.07 SPINAL STENOSIS, LUMBOSACRAL REGION: ICD-10-CM

## 2024-03-18 PROCEDURE — 72148 MRI LUMBAR SPINE W/O DYE: CPT | Mod: TC

## 2024-03-18 PROCEDURE — 72148 MRI LUMBAR SPINE W/O DYE: CPT | Mod: 26,,, | Performed by: RADIOLOGY

## 2024-03-28 ENCOUNTER — OFFICE VISIT (OUTPATIENT)
Dept: PAIN MEDICINE | Facility: CLINIC | Age: 79
End: 2024-03-28
Payer: MEDICARE

## 2024-03-28 VITALS
OXYGEN SATURATION: 98 % | SYSTOLIC BLOOD PRESSURE: 170 MMHG | BODY MASS INDEX: 25.89 KG/M2 | TEMPERATURE: 98 F | HEART RATE: 79 BPM | WEIGHT: 196.19 LBS | RESPIRATION RATE: 18 BRPM | DIASTOLIC BLOOD PRESSURE: 84 MMHG

## 2024-03-28 DIAGNOSIS — S32.020D CLOSED COMPRESSION FRACTURE OF L2 LUMBAR VERTEBRA WITH ROUTINE HEALING, SUBSEQUENT ENCOUNTER: ICD-10-CM

## 2024-03-28 DIAGNOSIS — M48.07 SPINAL STENOSIS, LUMBOSACRAL REGION: Primary | ICD-10-CM

## 2024-03-28 PROCEDURE — 3077F SYST BP >= 140 MM HG: CPT | Mod: CPTII,S$GLB,, | Performed by: ANESTHESIOLOGY

## 2024-03-28 PROCEDURE — 1126F AMNT PAIN NOTED NONE PRSNT: CPT | Mod: CPTII,S$GLB,, | Performed by: ANESTHESIOLOGY

## 2024-03-28 PROCEDURE — 3079F DIAST BP 80-89 MM HG: CPT | Mod: CPTII,S$GLB,, | Performed by: ANESTHESIOLOGY

## 2024-03-28 PROCEDURE — 99214 OFFICE O/P EST MOD 30 MIN: CPT | Mod: S$GLB,,, | Performed by: ANESTHESIOLOGY

## 2024-03-28 PROCEDURE — 3288F FALL RISK ASSESSMENT DOCD: CPT | Mod: CPTII,S$GLB,, | Performed by: ANESTHESIOLOGY

## 2024-03-28 PROCEDURE — 1159F MED LIST DOCD IN RCRD: CPT | Mod: CPTII,S$GLB,, | Performed by: ANESTHESIOLOGY

## 2024-03-28 PROCEDURE — 1101F PT FALLS ASSESS-DOCD LE1/YR: CPT | Mod: CPTII,S$GLB,, | Performed by: ANESTHESIOLOGY

## 2024-03-28 PROCEDURE — 1160F RVW MEDS BY RX/DR IN RCRD: CPT | Mod: CPTII,S$GLB,, | Performed by: ANESTHESIOLOGY

## 2024-03-28 PROCEDURE — 99999 PR PBB SHADOW E&M-EST. PATIENT-LVL IV: CPT | Mod: PBBFAC,,, | Performed by: ANESTHESIOLOGY

## 2024-03-28 NOTE — PROGRESS NOTES
PCP: Yary Wright -    REFERRING PHYSICIAN: No ref. provider found    CHIEF COMPLAINT: back pain    Original HISTORY OF PRESENT ILLNESS: Robi Donovan is a 78 y.o.  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 The pain started after a fall in May 2023. He went to the emergency room and CT scan revealed L2 vertebral fracture and lumbar spinal stenosis    Original Pain Description:  The pain is located in the low back and radiates to both legs (R>L). The pain is described as aching. Exacerbating factors: Standing, Bending, Walking, Morning, and Lifting. Mitigating factors laying down, physical therapy, and sitting. Symptoms interfere with daily activity and sleeping. The patient feels like symptoms have been improving, though really, he had initial improvement, followed by a worsening, then his PCP gave him a medrol dose pack which has significantly helped. Patient denies night fever/night sweats, bowel incontinence, significant weight loss, and significant motor weakness.  He is able to walk about 1/2 block before he must stop to rest. He is able to walk much further with a shopping cart.    Mr. Donovan has had urinary urgency with incontinence occasionally since his stroke.    Original PAIN SCORES:  Best: Pain is 2  Worst: Pain is 10  Current: Pain is 6        3/1/2024     1:18 PM   Last 3 PDI Scores   Pain Disability Index (PDI) 27       INTERVAL HISTORY: (Newest visit at the bottom)   Interval History (Date):   Interval History 3/28/24: Robi Donovan returns for follow up. At our last visit he noted ongoing low back pain with walking intolerance so we ordered an MRI which he returns to evaluate today. MRI does in fact reveal mod-severe canal stenosis at L4/5      6 weeks of Conservative therapy:(Must include dates)  PT: 6/2023 - 7/2023   Chiro: no  HEP: Patient reports HEP daily       Treatments / Medications:  (Ice/Heat/NSAIDS/APAP/etc):  Medrol dose miesha  Tylenol - 1g BID      Interventional Pain Procedures: (Previous injections)  none    Past Medical History:   Diagnosis Date    Diabetes mellitus type I     Hypertension     Stroke      History reviewed. No pertinent surgical history.  Social History     Socioeconomic History    Marital status:    Tobacco Use    Smoking status: Former     Current packs/day: 0.00     Types: Cigarettes     Start date:      Quit date:      Years since quittin.2    Smokeless tobacco: Never   Substance and Sexual Activity    Alcohol use: Not Currently     History reviewed. No pertinent family history.    Review of patient's allergies indicates:  No Known Allergies    Current Outpatient Medications   Medication Sig    amLODIPine (NORVASC) 10 MG tablet Take by mouth.    azithromycin (ZITHROMAX Z-MIESHA) 250 MG tablet Take 2 tablets (500 mg) on  Day 1,  followed by 1 tablet (250 mg) once daily on Days 2 through 5. (Patient not taking: Reported on 10/1/2023)    carvediloL (COREG) 6.25 MG tablet Take by mouth.    finasteride (PROSCAR) 5 mg tablet Take by mouth.    fluticasone propionate (FLONASE) 50 mcg/actuation nasal spray by Each Nostril route.    glipiZIDE (GLUCOTROL) 10 MG tablet Take by mouth.    HYDROcodone-acetaminophen (NORCO) 5-325 mg per tablet Take 1 tablet by mouth every 8 (eight) hours as needed for Pain.    loratadine (CLARITIN) 10 mg tablet Take 1 tablet (10 mg total) by mouth once daily.    losartan (COZAAR) 100 MG tablet Take by mouth.    metFORMIN (GLUCOPHAGE) 1000 MG tablet Take by mouth.    naproxen (NAPROSYN) 500 MG tablet Take 1 tablet (500 mg total) by mouth 2 (two) times daily with meals.    rosuvastatin (CRESTOR) 40 MG Tab Take by mouth.    tamsulosin (FLOMAX) 0.4 mg Cap Take by mouth.    TRUE METRIX GLUCOSE TEST STRIP Strp 1 strip 2 (two) times daily.     No current facility-administered medications for this visit.       ROS:  GENERAL: No fever. No  chills. No fatigue. Denies weight loss. Denies weight gain.  HEENT: Denies headaches. Denies vision change. Denies eye pain. Denies double vision. Denies ear pain.   CV: Denies chest pain.   PULM: Denies of shortness of breath.  GI: Denies constipation. No diarrhea. No abdominal pain. Denies nausea. Denies vomiting. No blood in stool.  HEME: Denies bleeding problems.  : Denies urgency. No painful urination. No blood in urine.  MS: Denies joint stiffness. Denies joint swelling.  + back pain.  SKIN: Denies rash.   NEURO: Denies seizures. No weakness.  PSYCH:  Denies difficulty sleeping. No anxiety. Denies depression. No suicidal thoughts.       VITALS:   Vitals:    03/28/24 1138   BP: (!) 170/84   Pulse: 79   Resp: 18   Temp: 98 °F (36.7 °C)   SpO2: 98%   Weight: 89 kg (196 lb 3.4 oz)   PainSc: 0-No pain         PHYSICAL EXAM:   GENERAL: Well appearing, in no acute distress, alert and oriented x3.  PSYCH:  Mood and affect appropriate.   SKIN: Skin color, texture, turgor normal, no rashes or lesions.  HEENT:  Normocephalic, atraumatic. Cranial nerves grossly intact.  NECK: No pain to palpation over the cervical paraspinous muscles. No pain to palpation over facets. No pain with neck flexion, extension, or lateral flexion.   PULM: No evidence of respiratory difficulty, symmetric chest rise.  GI:  Non-distended  BACK: Reduced range of motion due to pain. Point tenderness at spinous process near L2. No pain to palpation over facet joints. There is no pain with palpation over the sacroiliac joints bilaterally. Negative FADIR, pelvic compression, femoral axial loading and femoral scours. No tenderness over bilateral piriformis and GTB.  No deformities, edema, or skin discoloration.   MUSCULOSKELETAL: Reduced motion in RUE and RLE  NEURO: Sensation is diminished in right medial calf. RLE 3/5 in hip flexion and ankle dorsiflexion, 4/5 in other muscle groups. Left leg strength 4/5 throughout. Bilateral upper and lower  "extremity coordination and muscle stretch reflexes are physiologic and symmetric. Straight leg raising in the supine position is negative to radicular pain. + fem stretch test bilaterally  GAIT: Unsteady, uses single tip cane.            LABS:    Chemistry    No results found for: "NA", "K", "CL", "CO2", "BUN", "CREATININE", "GLU" No results found for: "CALCIUM", "ALKPHOS", "AST", "ALT", "BILITOT", "ESTGFRAFRICA", "EGFRNONAA"       IMAGING:    MRI LUMBAR SPINE WITHOUT CONTRAST     CLINICAL HISTORY:  Spinal stenosis, lumbar; Spinal stenosis, lumbosacral region     TECHNIQUE:  Multiplanar, multisequence MR images were acquired from the thoracolumbar junction to the sacrum without the administration of contrast.     COMPARISON:  CT lumbar spine 05/27/2023     FINDINGS:  Alignment: Normal.     Vertebrae: 5 lumbar-type vertebral bodies. No aggressive marrow replacement process or acute fracture.Nobone marrow edema .  There is chronic compression deformity of the anterosuperior aspect of L2 without evidence for extension to the posterior cortical margins, stable in appearance from prior CT examination of 05/27/2023.  Minimal loss of stature of superior cortical endplate L3 similar in appearance.     Discs: Vacuum phenomenon present L4-L5 and L5-S1.     Cord: Normal. Conus terminates at L1.     Degenerative findings:     T12-L1: There is no focal disc herniation. No significant central canal narrowing . No significant neural foraminal narrowing.     L1-L2: There is no focal disc herniation.Mild facet hypertrophy predominantly LEFT-sided with mild encroachment upon the posterior lateral aspect of the thecal sac.  No significant central canal narrowing . Mild LEFT neural foraminal narrowing.     L2-L3: Broad based mild diffuse bulging of disc material .Mild facet hypertrophy.  Mild central canal narrowing and encroachment upon the lateral recesses.  Mild bilateral neural foraminal narrowing.     L3-L4: Broad-based disc " bulge, facet degenerative change and ligamentum flavum thickening which contribute to  mild central canal narrowing . Mild LEFT neural foraminal narrowing.     L4-L5: Broad-based disc protrusion asymmetric LEFT, facet degenerative change and ligamentum flavum thickening which contribute to  moderate-severe central canal narrowing . Severe LEFT and moderate RIGHT neural foraminal narrowing.     L5-S1: Broad-based asymmetric RIGHT disc protrusion, facet degenerative change and ligamentum flavum thickening which contribute to  moderate central canal narrowing . Severe RIGHT moderate LEFT neural foraminal narrowing.     Paraspinal muscles & soft tissues: Large LEFT renal cyst incompletely visualized.     Impression:     Moderate-severe central canal narrowing L4-L5, moderate L5-S1 with associated neural foraminal encroachment is noted above.     Chronic compression deformity of the anterior superior cortical endplate L2.  No evidence for acute fracture.     Level by level details as above.        Electronically signed by: Marvin Duque MD  Date:                                            03/18/2024  Time:                                           08:57  ASSESSMENT: 78 y.o. year old male with pain, consistent with:    Encounter Diagnoses   Name Primary?    Spinal stenosis, lumbosacral region Yes    Closed compression fracture of L2 lumbar vertebra with routine healing, subsequent encounter        DISCUSSION: Mr. Donovan has a history of stroke 20 yrs ago with residual right sided weakness. He is a poor historian who comes to us with low back pain that radiates into both thighs after a fall which is worst with walking and limits him to 1/2 block, and is alleviated with sitting or walking with a shopping cart. Imaging shows an old L2 superior endplate compression fracture, severe DDD at L4/5 and L5/S1, and mod-severe lumbar stenosis a L4/5.     PLAN:  Continue with HEP.   Evaluated MRI with patient  Referred to [facility] for  bone health  Schedule b/l L4/5 TFESI  Follow up after procedure to consider additional options. Consider Vertiflex.       Aria Orozco  03/28/2024

## 2024-04-03 ENCOUNTER — TELEPHONE (OUTPATIENT)
Dept: PAIN MEDICINE | Facility: CLINIC | Age: 79
End: 2024-04-03
Payer: MEDICARE

## 2024-04-03 NOTE — TELEPHONE ENCOUNTER
----- Message from Mabel Hurt sent at 4/3/2024  1:11 PM CDT -----  Contact: Jef  Type:  Patient Call          Who Called: patient         Does the patient know what this is regarding?: Requesting a call back to discuss arrival ; pt said that coming in at 1:00 is a long time without eating being that the pt is a diabetic ; please advise           Would the patient rather a call back or a response via [facility]? Call           Best Call Back Number:[phone] (home)             Additional Information:

## 2024-04-09 ENCOUNTER — HOSPITAL ENCOUNTER (OUTPATIENT)
Facility: OTHER | Age: 79
Discharge: HOME | End: 2024-04-09
Attending: ANESTHESIOLOGY | Admitting: ANESTHESIOLOGY
Payer: MEDICARE

## 2024-04-09 VITALS
WEIGHT: 195 LBS | SYSTOLIC BLOOD PRESSURE: 183 MMHG | HEIGHT: 73 IN | BODY MASS INDEX: 25.84 KG/M2 | OXYGEN SATURATION: 99 % | DIASTOLIC BLOOD PRESSURE: 87 MMHG | HEART RATE: 62 BPM | RESPIRATION RATE: 16 BRPM | TEMPERATURE: 98 F

## 2024-04-09 DIAGNOSIS — M48.061 SPINAL STENOSIS OF LUMBAR REGION, UNSPECIFIED WHETHER NEUROGENIC CLAUDICATION PRESENT: Primary | ICD-10-CM

## 2024-04-09 DIAGNOSIS — G89.29 CHRONIC PAIN: ICD-10-CM

## 2024-04-09 DIAGNOSIS — M51.36 DDD (DEGENERATIVE DISC DISEASE), LUMBAR: ICD-10-CM

## 2024-04-09 PROBLEM — M51.369 DDD (DEGENERATIVE DISC DISEASE), LUMBAR: Status: ACTIVE | Noted: 2024-04-09

## 2024-04-09 LAB — POCT GLUCOSE: 135 MG/DL (ref 70–110)

## 2024-04-09 PROCEDURE — 64483 NJX AA&/STRD TFRM EPI L/S 1: CPT | Mod: 50,,, | Performed by: ANESTHESIOLOGY

## 2024-04-09 PROCEDURE — 25500020 PHARM REV CODE 255: Performed by: ANESTHESIOLOGY

## 2024-04-09 PROCEDURE — 82947 ASSAY GLUCOSE BLOOD QUANT: CPT | Performed by: ANESTHESIOLOGY

## 2024-04-09 PROCEDURE — 63600175 PHARM REV CODE 636 W HCPCS: Performed by: ANESTHESIOLOGY

## 2024-04-09 PROCEDURE — 64483 NJX AA&/STRD TFRM EPI L/S 1: CPT | Mod: 50 | Performed by: ANESTHESIOLOGY

## 2024-04-09 PROCEDURE — 25000003 PHARM REV CODE 250: Performed by: STUDENT IN AN ORGANIZED HEALTH CARE EDUCATION/TRAINING PROGRAM

## 2024-04-09 PROCEDURE — 25000003 PHARM REV CODE 250: Performed by: ANESTHESIOLOGY

## 2024-04-09 PROCEDURE — 99152 MOD SED SAME PHYS/QHP 5/>YRS: CPT | Performed by: ANESTHESIOLOGY

## 2024-04-09 RX ORDER — LIDOCAINE HYDROCHLORIDE 20 MG/ML
INJECTION, SOLUTION INFILTRATION; PERINEURAL
Status: DISCONTINUED | OUTPATIENT
Start: 2024-04-09 | End: 2024-04-09 | Stop reason: HOSPADM

## 2024-04-09 RX ORDER — DEXAMETHASONE SODIUM PHOSPHATE 10 MG/ML
INJECTION INTRAMUSCULAR; INTRAVENOUS
Status: DISCONTINUED | OUTPATIENT
Start: 2024-04-09 | End: 2024-04-09 | Stop reason: HOSPADM

## 2024-04-09 RX ORDER — LIDOCAINE HYDROCHLORIDE 10 MG/ML
INJECTION, SOLUTION EPIDURAL; INFILTRATION; INTRACAUDAL; PERINEURAL
Status: DISCONTINUED | OUTPATIENT
Start: 2024-04-09 | End: 2024-04-09 | Stop reason: HOSPADM

## 2024-04-09 RX ORDER — MIDAZOLAM HYDROCHLORIDE 1 MG/ML
INJECTION INTRAMUSCULAR; INTRAVENOUS
Status: DISCONTINUED | OUTPATIENT
Start: 2024-04-09 | End: 2024-04-09 | Stop reason: HOSPADM

## 2024-04-09 RX ORDER — FENTANYL CITRATE 50 UG/ML
INJECTION, SOLUTION INTRAMUSCULAR; INTRAVENOUS
Status: DISCONTINUED | OUTPATIENT
Start: 2024-04-09 | End: 2024-04-09 | Stop reason: HOSPADM

## 2024-04-09 RX ORDER — SODIUM CHLORIDE 9 MG/ML
INJECTION, SOLUTION INTRAVENOUS CONTINUOUS
Status: DISCONTINUED | OUTPATIENT
Start: 2024-04-09 | End: 2024-04-09 | Stop reason: HOSPADM

## 2024-04-09 NOTE — INTERVAL H&P NOTE
The patient has been examined and the H&P has been reviewed:    I concur with the findings and no changes have occurred since H&P was written.    Procedure risks, benefits and alternative options discussed and understood by patient/family.          Active Hospital Problems    Diagnosis  POA    DDD (degenerative disc disease), lumbar [M51.36]  Yes    Lumbar stenosis [M48.061]  Yes      Resolved Hospital Problems   No resolved problems to display.

## 2024-04-09 NOTE — OP NOTE
Lumbar Transforaminal Epidural Steroid Injection under Fluoroscopic Guidance    The procedure, risks, benefits, and options were discussed with the patient. There are no contraindications to the procedure. The patent expressed understanding and agreed to the procedure. Informed written consent was obtained prior to the start of the procedure and can be found in the patient's chart.    PATIENT NAME: Robi Donovan   MRN: [id number]     DATE OF PROCEDURE: 04/09/2024    PROCEDURE:  Bilateral  L4/5 Lumbar Transforaminal Epidural Steroid Injection under Fluoroscopic Guidance    PRE-OP DIAGNOSIS: Spinal stenosis, lumbosacral region [M48.07] Lumbar radiculopathy [M54.16]    POST-OP DIAGNOSIS: Same    PHYSICIAN: Aria Orozco MD    ASSISTANTS: Rudy Howard M.D. ([facility] Pain Fellow), Digna West M.D. ([facility] Anesthesia Resident)     MEDICATIONS INJECTED: Preservative-free Decadron 10mg with 5cc of Lidocaine 1% MPF     LOCAL ANESTHETIC INJECTED: Xylocaine 2%     SEDATION: Versed 1mg and Fentanyl 25mcg                                                                                                                                                                                     Conscious sedation ordered by M.D. Patient re-evaluation prior to administration of conscious sedation. No changes noted in patient's status from initial evaluation. The patient's vital signs were monitored by RN and patient remained hemodynamically stable throughout the procedure.    Event Time In   Sedation Start 1419   Sedation End 1430       ESTIMATED BLOOD LOSS: None    COMPLICATIONS: None    TECHNIQUE: Time-out was performed to identify the patient and procedure to be performed. With the patient laying in a prone position, the surgical area was prepped and draped in the usual sterile fashion using ChloraPrep and a fenestrated drape.The levels were determined under fluoroscopy guidance. Skin anesthesia was achieved by injecting Lidocaine 2%  over the injection sites. The transforaminal spaces were then approached with a 25 gauge, 3.5 inch spinal quinke needle that was introduced under fluoroscopic guidance in the AP and Lateral views. Once the needle tip was in the area of the transforaminal space, and there was no blood, CSF or paraesthesias, contrast dye Omnipaque (300mg/mL) was injected to confirm placement and there was no vascular runoff. Fluoroscopic imaging in the AP and lateral views revealed a clear outline of the spinal nerve with proximal spread of agent through the neural foramen into the epidural space. 3 mL of the medication mixture listed above was injected slowly at each site. Displacement of the radio opaque contrast after injection of the medication confirmed that the medication went into the area of the transforaminal spaces. The needles were removed and bleeding was nil. A sterile dressing was applied. No specimens collected. The patient tolerated the procedure well.     The patient was monitored after the procedure in the recovery area. They were given post-procedure and discharge instructions to follow at home. The patient was discharged in a stable condition.    I reviewed and edited the fellow's note. I conducted my own interview and physical examination. I agree with the findings. I was present and supervising all critical portions of the procedure.    Rudy Howard MD

## 2024-04-09 NOTE — DISCHARGE INSTRUCTIONS

## 2024-04-09 NOTE — DISCHARGE SUMMARY
Discharge Note  Short Stay      SUMMARY     Admit Date: 4/9/2024    Attending Physician: Aria Orozco MD    Discharge Physician: Rudy Howard MD      Discharge Date: 4/9/2024 2:30 PM    Procedure(s) (LRB):  LUMBAR TRANSFORAMINAL BILATERAL L4/5 (Bilateral)    Final Diagnosis: Spinal stenosis, lumbosacral region [M48.07]    Disposition: Home or self care    Patient Instructions:   Current Discharge Medication List        CONTINUE these medications which have NOT CHANGED    Details   amLODIPine (NORVASC) 10 MG tablet Take by mouth.      azithromycin (ZITHROMAX Z-MIESHA) 250 MG tablet Take 2 tablets (500 mg) on  Day 1,  followed by 1 tablet (250 mg) once daily on Days 2 through 5.  Qty: 6 tablet, Refills: 0      carvediloL (COREG) 6.25 MG tablet Take by mouth.      finasteride (PROSCAR) 5 mg tablet Take by mouth.      fluticasone propionate (FLONASE) 50 mcg/actuation nasal spray by Each Nostril route.      glipiZIDE (GLUCOTROL) 10 MG tablet Take by mouth.      HYDROcodone-acetaminophen (NORCO) 5-325 mg per tablet Take 1 tablet by mouth every 8 (eight) hours as needed for Pain.  Qty: 9 tablet, Refills: 0    Comments: Quantity prescribed more than 7 day supply? No      loratadine (CLARITIN) 10 mg tablet Take 1 tablet (10 mg total) by mouth once daily.  Qty: 30 tablet, Refills: 2      losartan (COZAAR) 100 MG tablet Take by mouth.      metFORMIN (GLUCOPHAGE) 1000 MG tablet Take by mouth.      naproxen (NAPROSYN) 500 MG tablet Take 1 tablet (500 mg total) by mouth 2 (two) times daily with meals.  Qty: 12 tablet, Refills: 0      rosuvastatin (CRESTOR) 40 MG Tab Take by mouth.      tamsulosin (FLOMAX) 0.4 mg Cap Take by mouth.      TRUE METRIX GLUCOSE TEST STRIP Strp 1 strip 2 (two) times daily.                 Discharge Diagnosis: Spinal stenosis, lumbosacral region [M48.07]  Condition on Discharge: Stable with no complications to procedure   Diet on Discharge: Same as before.  Activity: as per instruction  sheet.  Discharge to: Home with a responsible adult.  Follow up: 2-4 weeks       Please call my office or pager at [phone] if experienced any weakness or loss of sensation, fever > 101.5, pain uncontrolled with oral medications, persistent nausea/vomiting/or diarrhea, redness or drainage from the incisions, or any other worrisome concerns. If physician on call was not reached or could not communicate with our office for any reason please go to the nearest emergency department      Rudy Howard M.D.  PGY-5  Interventional Pain Management Fellow  [facility]  Pager: [phone]    04/09/2024

## 2024-04-24 ENCOUNTER — TELEPHONE (OUTPATIENT)
Dept: PAIN MEDICINE | Facility: CLINIC | Age: 79
End: 2024-04-24
Payer: MEDICARE

## 2024-04-26 ENCOUNTER — TELEPHONE (OUTPATIENT)
Dept: PAIN MEDICINE | Facility: CLINIC | Age: 79
End: 2024-04-26
Payer: MEDICARE

## 2024-04-26 NOTE — TELEPHONE ENCOUNTER
Goal Outcome Evaluation:  Plan of Care Reviewed With: patient        Progress: improving  Outcome Evaluation: Pt seen by PT this PM for treatment. Pt sat up to EOB req CGA / SV. Pt then stood w/ rocking motion req mod / max A and use of fww. Pt reseated to use urinal then stood again. Pt amb fwd then to BR req min A and use of fww. Pt unsteady w/ fww too far from pt w/ pt cued to correct. Pt stood from toilet w/ mod A and req CGA for brief redonning w/ use of fww. Pt amb to chair w/ alarm set. PT will prog as pt kathryn.    Patient was not wearing a face mask during this therapy encounter. Therapist used appropriate personal protective equipment including mask and gloves.  Mask used was standard procedure mask. Appropriate PPE was worn during the entire therapy session. Hand hygiene was completed before and after therapy session. Patient is not in enhanced droplet precautions.     Staff spoke with patient. Patient states he couldn't log on into his virtual and wants to reschedule his appointment. Staff rescheduled his appointment for 5/1. Patient verbalized understanding.

## 2024-04-26 NOTE — TELEPHONE ENCOUNTER
----- Message from Mariluz Galaviz sent at 4/26/2024 11:05 AM CDT -----  Regarding: appt  Type:  Patient Returning Call      Name of who is calling:will        What is request in detail:Patient is requesting a call back, he had a virtual appt today and was unable to get on. He had a procedure done on 04/09 and is still in pain.         Can clinic reply by [facility]:no        What number to call back if not in [facility]: [phone]

## 2024-04-30 ENCOUNTER — TELEPHONE (OUTPATIENT)
Dept: PAIN MEDICINE | Facility: CLINIC | Age: 79
End: 2024-04-30
Payer: MEDICARE

## 2024-04-30 NOTE — TELEPHONE ENCOUNTER
----- Message from Betty Sheets sent at 4/29/2024  1:56 PM CDT -----  Regarding: call back  Type: Patient Call Back    Who called: Jeimy, daughter     What is the request in detail: requesting a call to get a copy of pt medical records, states she was told provider can give her a copy of full record     Can the clinic reply by [facility]?no    Would the patient rather a call back or a response via [facility]? call    Best call back number: [phone]    Additional Information:

## 2024-04-30 NOTE — TELEPHONE ENCOUNTER
Staff spoke with patients daughter and informed her that she would have to contact medical records or go to the medical records office for her fathers records to be released. Patients daughter is in agreement to the above and verbalized understanding.

## 2024-05-13 ENCOUNTER — OFFICE VISIT (OUTPATIENT)
Dept: PAIN MEDICINE | Facility: CLINIC | Age: 79
End: 2024-05-13
Payer: MEDICARE

## 2024-05-13 DIAGNOSIS — M48.061 SPINAL STENOSIS OF LUMBAR REGION, UNSPECIFIED WHETHER NEUROGENIC CLAUDICATION PRESENT: ICD-10-CM

## 2024-05-13 DIAGNOSIS — M48.07 SPINAL STENOSIS, LUMBOSACRAL REGION: ICD-10-CM

## 2024-05-13 DIAGNOSIS — S32.020D CLOSED COMPRESSION FRACTURE OF L2 LUMBAR VERTEBRA WITH ROUTINE HEALING, SUBSEQUENT ENCOUNTER: ICD-10-CM

## 2024-05-13 DIAGNOSIS — G89.4 CHRONIC PAIN SYNDROME: ICD-10-CM

## 2024-05-13 DIAGNOSIS — M51.36 DDD (DEGENERATIVE DISC DISEASE), LUMBAR: Primary | ICD-10-CM

## 2024-05-13 PROCEDURE — 1160F RVW MEDS BY RX/DR IN RCRD: CPT | Mod: CPTII,95,,

## 2024-05-13 PROCEDURE — 1159F MED LIST DOCD IN RCRD: CPT | Mod: CPTII,95,,

## 2024-05-13 PROCEDURE — 99213 OFFICE O/P EST LOW 20 MIN: CPT | Mod: 95,,,

## 2024-05-13 PROCEDURE — 1125F AMNT PAIN NOTED PAIN PRSNT: CPT | Mod: CPTII,95,,

## 2024-05-13 NOTE — PROGRESS NOTES
Chronic Pain-Tele-Medicine-Established Note (Follow up visit)        The patient location is: Home  The chief complaint leading to consultation is: lower back pain  Visit type: Virtual visit with synchronous audio and video  Total time spent with patient: 18 min  Each patient to whom he or she provides medical services by telemedicine is:  (1) informed of the relationship between the physician and patient and the respective role of any other health care provider with respect to management of the patient; and (2) notified that he or she may decline to receive medical services by telemedicine and may withdraw from such care at any time.      PCP: Yary Wright -    REFERRING PHYSICIAN: No ref. provider found    CHIEF COMPLAINT: back pain    Original HISTORY OF PRESENT ILLNESS: Robi Donovan is a 78 y.o.  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 The pain started after a fall in May 2023. He went to the emergency room and CT scan revealed L2 vertebral fracture and lumbar spinal stenosis    Original Pain Description:  The pain is located in the low back and radiates to both legs (R>L). The pain is described as aching. Exacerbating factors: Standing, Bending, Walking, Morning, and Lifting. Mitigating factors laying down, physical therapy, and sitting. Symptoms interfere with daily activity and sleeping. The patient feels like symptoms have been improving, though really, he had initial improvement, followed by a worsening, then his PCP gave him a medrol dose pack which has significantly helped. Patient denies night fever/night sweats, bowel incontinence, significant weight loss, and significant motor weakness.  He is able to walk about 1/2 block before he must stop to rest. He is able to walk much further with a shopping cart.    Mr. Donovan has had urinary urgency with incontinence occasionally since his stroke.    Original PAIN  SCORES:  Best: Pain is 2  Worst: Pain is 10  Current: Pain is 6        3/1/2024     1:18 PM   Last 3 PDI Scores   Pain Disability Index (PDI) 27       INTERVAL HISTORY: (Newest visit at the bottom)   Interval History (Date):   Interval History 3/28/24: Robi Donovan returns for follow up. At our last visit he noted ongoing low back pain with walking intolerance so we ordered an MRI which he returns to evaluate today. MRI does in fact reveal mod-severe canal stenosis at L4/5    Interval History 5/13/2024:  Robi Donovan returns virtually s/p Bilateral L4/5 TFESI on 4/9/2024.  He reports minimal relief of leg pain from this procedure. He reports his symptoms remain unchanged from his last visit.  He reports lower back pain with radiation to the right ankle to the top of his foot. He states the pain in his left leg radiates from his left hip to below his knee into the calf.  He continues tylenol as needed with good relied.   He has not seen endocrinology as this point to be evaluated for osteoporosis. The patient denies fever/night sweats, urinary incontinence, bowel incontinence, significant weight changes, significant motor weakness or changes, or loss of sensations. His pain is 9/10.        6 weeks of Conservative therapy:(Must include dates)  PT: 6/2023 - 7/2023   Chiro: no  HEP: Patient reports HEP daily       Treatments / Medications: (Ice/Heat/NSAIDS/APAP/etc):  Medrol dose neena  Tylenol - 1g BID      Interventional Pain Procedures: (Previous injections)  4/9/2024 - Bilateral L4/5 TFESI - no relief    Past Medical History:   Diagnosis Date    Diabetes mellitus type I     Hypertension     Stroke 2002     Past Surgical History:   Procedure Laterality Date    TRANSFORAMINAL EPIDURAL INJECTION OF STEROID Bilateral 4/9/2024    Procedure: LUMBAR TRANSFORAMINAL BILATERAL L4/5;  Surgeon: Aria Orozco MD;  Location: [facility] [place];  Service: Pain Management;  Laterality: Bilateral;  [phone]  2 WK F/U LM     Social  History     Socioeconomic History    Marital status:    Tobacco Use    Smoking status: Former     Current packs/day: 0.00     Types: Cigarettes     Start date:      Quit date:      Years since quittin.3    Smokeless tobacco: Never   Substance and Sexual Activity    Alcohol use: Not Currently     No family history on file.    Review of patient's allergies indicates:  No Known Allergies    Current Outpatient Medications   Medication Sig    amLODIPine (NORVASC) 10 MG tablet Take by mouth.    azithromycin (ZITHROMAX Z-MIESHA) 250 MG tablet Take 2 tablets (500 mg) on  Day 1,  followed by 1 tablet (250 mg) once daily on Days 2 through 5. (Patient not taking: Reported on 10/1/2023)    carvediloL (COREG) 6.25 MG tablet Take by mouth.    finasteride (PROSCAR) 5 mg tablet Take by mouth.    fluticasone propionate (FLONASE) 50 mcg/actuation nasal spray by Each Nostril route.    glipiZIDE (GLUCOTROL) 10 MG tablet Take by mouth.    HYDROcodone-acetaminophen (NORCO) 5-325 mg per tablet Take 1 tablet by mouth every 8 (eight) hours as needed for Pain.    loratadine (CLARITIN) 10 mg tablet Take 1 tablet (10 mg total) by mouth once daily.    losartan (COZAAR) 100 MG tablet Take by mouth.    metFORMIN (GLUCOPHAGE) 1000 MG tablet Take by mouth.    naproxen (NAPROSYN) 500 MG tablet Take 1 tablet (500 mg total) by mouth 2 (two) times daily with meals.    rosuvastatin (CRESTOR) 40 MG Tab Take by mouth.    tamsulosin (FLOMAX) 0.4 mg Cap Take by mouth.    TRUE METRIX GLUCOSE TEST STRIP Strp 1 strip 2 (two) times daily.     No current facility-administered medications for this visit.       ROS:  GENERAL: No fever. No chills. No fatigue. Denies weight loss. Denies weight gain.  HEENT: Denies headaches. Denies vision change. Denies eye pain. Denies double vision. Denies ear pain.   CV: Denies chest pain.   PULM: Denies of shortness of breath.  GI: Denies constipation. No diarrhea. No abdominal pain. Denies nausea. Denies  vomiting. No blood in stool.  HEME: Denies bleeding problems.  : Denies urgency. No painful urination. No blood in urine.  MS: Denies joint stiffness. Denies joint swelling.  + back pain.  SKIN: Denies rash.   NEURO: Denies seizures. No weakness.  PSYCH:  Denies difficulty sleeping. No anxiety. Denies depression. No suicidal thoughts.       VITALS:   Vitals:    05/13/24 1328   PainSc:   9   PainLoc: Back      VIRTUAL PHYSICAL EXAMINATION:    GENERAL APPEARANCE: Well appearing, in no acute distress.  PSYCH:  Mood and affect appropriate.  SKIN: Skin color, texture, turgor normal, no rashes or lesions to visible areas.  HEAD/FACE:  Normocephalic, atraumatic.  PULM: Bilateral chest rise. No apparent respiratory distress.         PREVIOUS PHYSICAL EXAM 3/28/2024:  GENERAL: Well appearing, in no acute distress, alert and oriented x3.  PSYCH:  Mood and affect appropriate.   SKIN: Skin color, texture, turgor normal, no rashes or lesions.  HEENT:  Normocephalic, atraumatic. Cranial nerves grossly intact.  NECK: No pain to palpation over the cervical paraspinous muscles. No pain to palpation over facets. No pain with neck flexion, extension, or lateral flexion.   PULM: No evidence of respiratory difficulty, symmetric chest rise.  GI:  Non-distended  BACK: Reduced range of motion due to pain. Point tenderness at spinous process near L2. No pain to palpation over facet joints. There is no pain with palpation over the sacroiliac joints bilaterally. Negative FADIR, pelvic compression, femoral axial loading and femoral scours. No tenderness over bilateral piriformis and GTB.  No deformities, edema, or skin discoloration.   MUSCULOSKELETAL: Reduced motion in RUE and RLE  NEURO: Sensation is diminished in right medial calf. RLE 3/5 in hip flexion and ankle dorsiflexion, 4/5 in other muscle groups. Left leg strength 4/5 throughout. Bilateral upper and lower extremity coordination and muscle stretch reflexes are physiologic and  "symmetric. Straight leg raising in the supine position is negative to radicular pain. + fem stretch test bilaterally  GAIT: Unsteady, uses single tip cane.            LABS:    Chemistry    No results found for: "NA", "K", "CL", "CO2", "BUN", "CREATININE", "GLU" No results found for: "CALCIUM", "ALKPHOS", "AST", "ALT", "BILITOT", "ESTGFRAFRICA", "EGFRNONAA"       IMAGING:    MRI LUMBAR SPINE WITHOUT CONTRAST     CLINICAL HISTORY:  Spinal stenosis, lumbar; Spinal stenosis, lumbosacral region     TECHNIQUE:  Multiplanar, multisequence MR images were acquired from the thoracolumbar junction to the sacrum without the administration of contrast.     COMPARISON:  CT lumbar spine 05/27/2023     FINDINGS:  Alignment: Normal.     Vertebrae: 5 lumbar-type vertebral bodies. No aggressive marrow replacement process or acute fracture.Nobone marrow edema .  There is chronic compression deformity of the anterosuperior aspect of L2 without evidence for extension to the posterior cortical margins, stable in appearance from prior CT examination of 05/27/2023.  Minimal loss of stature of superior cortical endplate L3 similar in appearance.     Discs: Vacuum phenomenon present L4-L5 and L5-S1.     Cord: Normal. Conus terminates at L1.     Degenerative findings:     T12-L1: There is no focal disc herniation. No significant central canal narrowing . No significant neural foraminal narrowing.     L1-L2: There is no focal disc herniation.Mild facet hypertrophy predominantly LEFT-sided with mild encroachment upon the posterior lateral aspect of the thecal sac.  No significant central canal narrowing . Mild LEFT neural foraminal narrowing.     L2-L3: Broad based mild diffuse bulging of disc material .Mild facet hypertrophy.  Mild central canal narrowing and encroachment upon the lateral recesses.  Mild bilateral neural foraminal narrowing.     L3-L4: Broad-based disc bulge, facet degenerative change and ligamentum flavum thickening which " contribute to  mild central canal narrowing . Mild LEFT neural foraminal narrowing.     L4-L5: Broad-based disc protrusion asymmetric LEFT, facet degenerative change and ligamentum flavum thickening which contribute to  moderate-severe central canal narrowing . Severe LEFT and moderate RIGHT neural foraminal narrowing.     L5-S1: Broad-based asymmetric RIGHT disc protrusion, facet degenerative change and ligamentum flavum thickening which contribute to  moderate central canal narrowing . Severe RIGHT moderate LEFT neural foraminal narrowing.     Paraspinal muscles & soft tissues: Large LEFT renal cyst incompletely visualized.     Impression:     Moderate-severe central canal narrowing L4-L5, moderate L5-S1 with associated neural foraminal encroachment is noted above.     Chronic compression deformity of the anterior superior cortical endplate L2.  No evidence for acute fracture.     Level by level details as above.        Electronically signed by: Marvin Duque MD  Date:                                            03/18/2024  Time:                                           08:57    ASSESSMENT: 78 y.o. year old male with pain, consistent with:    Encounter Diagnoses   Name Primary?    DDD (degenerative disc disease), lumbar Yes    Spinal stenosis of lumbar region, unspecified whether neurogenic claudication present     Chronic pain syndrome     Spinal stenosis, lumbosacral region     Closed compression fracture of L2 lumbar vertebra with routine healing, subsequent encounter          DISCUSSION: Mr. Donovan has a history of stroke 20 yrs ago with residual right sided weakness. He is a poor historian who comes to us with low back pain that radiates into both thighs after a fall which is worst with walking and limits him to 1/2 block, and is alleviated with sitting or walking with a shopping cart. Imaging shows an old L2 superior endplate compression fracture, severe DDD at L4/5 and L5/S1, and mod-severe lumbar  stenosis a L4/5.     PLAN:  Previous imaging was reviewed.  Continue with HEP.   Referred to Endo for bone health-resent referral today and asked patient to look out for phone call to schedule first visit  He is s/p b/l L4/5 TFESI with limited relief.   Exam limited by Virtual Visit-would like to see patient in-person to perform more thorough physical exam.   He can continue Tylenol at night when needed.  Follow up after endocrinology visit and testing to consider additional options. Consider Vertiflex.       Jossy Siegel NP   05/13/2024

## 2024-05-15 ENCOUNTER — OFFICE VISIT (OUTPATIENT)
Dept: ENDOCRINOLOGY | Facility: CLINIC | Age: 79
End: 2024-05-15
Payer: MEDICARE

## 2024-05-15 VITALS
WEIGHT: 194.44 LBS | HEART RATE: 88 BPM | DIASTOLIC BLOOD PRESSURE: 90 MMHG | HEIGHT: 73 IN | SYSTOLIC BLOOD PRESSURE: 160 MMHG | BODY MASS INDEX: 25.77 KG/M2

## 2024-05-15 DIAGNOSIS — E55.9 VITAMIN D DEFICIENCY: ICD-10-CM

## 2024-05-15 DIAGNOSIS — R93.89 ABNORMAL FINDINGS ON DIAGNOSTIC IMAGING OF OTHER SPECIFIED BODY STRUCTURES: ICD-10-CM

## 2024-05-15 DIAGNOSIS — M81.0 OSTEOPOROSIS, UNSPECIFIED OSTEOPOROSIS TYPE, UNSPECIFIED PATHOLOGICAL FRACTURE PRESENCE: Primary | ICD-10-CM

## 2024-05-15 DIAGNOSIS — S32.020D CLOSED COMPRESSION FRACTURE OF L2 LUMBAR VERTEBRA WITH ROUTINE HEALING, SUBSEQUENT ENCOUNTER: ICD-10-CM

## 2024-05-15 DIAGNOSIS — E11.36 TYPE 2 DIABETES MELLITUS WITH DIABETIC CATARACT, WITHOUT LONG-TERM CURRENT USE OF INSULIN: ICD-10-CM

## 2024-05-15 DIAGNOSIS — E11.65 TYPE 2 DIABETES MELLITUS WITH HYPERGLYCEMIA, WITHOUT LONG-TERM CURRENT USE OF INSULIN: ICD-10-CM

## 2024-05-15 PROBLEM — S32.020A CLOSED COMPRESSION FRACTURE OF SECOND LUMBAR VERTEBRA: Status: ACTIVE | Noted: 2024-05-15

## 2024-05-15 PROCEDURE — 1159F MED LIST DOCD IN RCRD: CPT | Mod: CPTII,S$GLB,, | Performed by: INTERNAL MEDICINE

## 2024-05-15 PROCEDURE — 1125F AMNT PAIN NOTED PAIN PRSNT: CPT | Mod: CPTII,S$GLB,, | Performed by: INTERNAL MEDICINE

## 2024-05-15 PROCEDURE — 3288F FALL RISK ASSESSMENT DOCD: CPT | Mod: CPTII,S$GLB,, | Performed by: INTERNAL MEDICINE

## 2024-05-15 PROCEDURE — 99999 PR PBB SHADOW E&M-EST. PATIENT-LVL V: CPT | Mod: PBBFAC,,, | Performed by: INTERNAL MEDICINE

## 2024-05-15 PROCEDURE — 1101F PT FALLS ASSESS-DOCD LE1/YR: CPT | Mod: CPTII,S$GLB,, | Performed by: INTERNAL MEDICINE

## 2024-05-15 PROCEDURE — 3080F DIAST BP >= 90 MM HG: CPT | Mod: CPTII,S$GLB,, | Performed by: INTERNAL MEDICINE

## 2024-05-15 PROCEDURE — 3077F SYST BP >= 140 MM HG: CPT | Mod: CPTII,S$GLB,, | Performed by: INTERNAL MEDICINE

## 2024-05-15 PROCEDURE — G2211 COMPLEX E/M VISIT ADD ON: HCPCS | Mod: S$GLB,,, | Performed by: INTERNAL MEDICINE

## 2024-05-15 PROCEDURE — 1160F RVW MEDS BY RX/DR IN RCRD: CPT | Mod: CPTII,S$GLB,, | Performed by: INTERNAL MEDICINE

## 2024-05-15 PROCEDURE — 99204 OFFICE O/P NEW MOD 45 MIN: CPT | Mod: S$GLB,,, | Performed by: INTERNAL MEDICINE

## 2024-05-15 NOTE — ASSESSMENT & PLAN NOTE
Check DXA and will perform secondary work-up under assumption he has osteoporosis on basis of compression fracture  Discussed plan for treatment pending results  Given compression fracture would use anabolic ideally, either forteo or tymlos  If unable to use anabolic recommend reclast  Side effects including post-infusion myalgias, ONJ and association with subtrochanteric fractures reviewed.  Advise to see the dentist regularly, notify dentist of using this medication and avoid non-emergent dental implants and extractions.  Also advise to contact us if develops new, persistent thigh or groin pain.    Full dentures, no planned procedures  Reviewed fall precautions    Discussed that treatment of osteoporosis is intended to reduce risk of fracture but would not be expected to help pain he is currently having so should continue to follow with pain management

## 2024-05-15 NOTE — PATIENT INSTRUCTIONS
Osteoporosis Treatment    Regardless if you are on a prescription medication for osteoporosis or osteopenia, one should still do all of the following. All of these things combined help to reduce your fracture risk. The goal of all these treatments is to reduce your risk of fracture.     Take Calcium and Vitamin D- It is important to get a minimum amount of 1200 mg of calcium daily. That can be in the diet or in the form of a supplement. Also a minimum of 800 IU of Vitamin D should be taken a day. Taking a prescription medication does not take the place of taking Calcium plus vitamin D. Some trials show a reduced fracture risk with taking calcium and vitamin D.   Weight bearing exercise- this is extremely important to reduce fracture risk. Trials have shown that weight bearing exercise can reduce the risk of a hip fracture. It may also help with your bone density. At minimum one should do 30 minutes of weight bearing exercise 3 times a week. Yoga and Vincenzo Chi can often also help with balance.   Fall Precautions- the 1st goal in preventing a fracture is not falling. Always wear good shoes and avoid heals. Make sure you check your house to make sure there is nothing that could be a fall risk (sukhi, cords). Make sure if you get up at night that there is some lighting. Be mindful with pets as they can be common reasons for a fall. We often times feel safe in our own home, but that is a common area that one can have a fracture. If you usually use a walker or a cane, make sure you use it in the home as well.     Bone Density- once a prescription medication is started, we check your bone density every 2 years. It usually does not need to be checked more than that as your bone changes very slowly. Always keep in mind the goal of therapy is to reduce your fracture risk and not normalize your bone density. Sometimes you may see a slight improvement in your bone density with treatment or no change at all. That is ok. One of the  main reasons to do a bone density is to make sure there is not a decrease in your bone density      Risks of Medications for Osteoporosis  Most patients tolerate these medications without any problems. The following do not include all the side effects of these medications  Rarely patients can develop pains with these medications. They usually will go away. However, if they are severe you should let us know immediately  Osteonecrosis of the Jaw (ONJ)- this is a rare complication of many bone medications. It is diagnosed by a dentist or oral surgeon. If you develop pain in your jaw let us know and we have you see your dentist for an evaluation. Most cases are in patients with cancer who get much larger doses of these medications. The overall risk is 1 in 10,000 to 1 in 100,000 patient years. It is always important to get regular dental cleanings. If you are planning an invasive dental procedure we may ask that you complete that prior to starting treatment.   Atypical Femur Fractures- This is also a rare side effect of these medications. The risk increases the longer you are on these medications. This is one reason we sometimes do drug holidays. This can usually present with thigh or groin pain. The overall risk is 3.2 to 50 cases per 100,000 patient years

## 2024-05-15 NOTE — PROGRESS NOTES
Robi Donovan is a 78 y.o. male with T2DM referred by Dr. Aria Orozco for evaluation of compression fracture    History of Present Illness  Found to have compression fracture on imaging in 5/2023. Has been seen by pain management and referred here to discuss possible treatment osteoporosis  Has not had DXA    Treatment history:  none    Fractures:    L2 compression fracture 5/2023    DXA: none      Diet:   Calcium intake: lots of cheese; some vegetables    Supplements:   Calcium: denies  Vitamin D: 500 units daily  MVI: denies    Exercise: a few minutes of stationary bike      Glucocorticoid History: denies  Personal history of kidney stones: denies  Family history of bone disease or fracture: denies    T2DM  Managed by PCP  No recent A1c  Taking metformin and glipizide  Checking sometimes and usually 116-130  Some lows in 60's          Current Outpatient Medications:     amLODIPine (NORVASC) 10 MG tablet, Take by mouth., Disp: , Rfl:     carvediloL (COREG) 6.25 MG tablet, Take by mouth., Disp: , Rfl:     finasteride (PROSCAR) 5 mg tablet, Take by mouth., Disp: , Rfl:     fluticasone propionate (FLONASE) 50 mcg/actuation nasal spray, by Each Nostril route., Disp: , Rfl:     glipiZIDE (GLUCOTROL) 10 MG tablet, Take by mouth., Disp: , Rfl:     losartan (COZAAR) 100 MG tablet, Take by mouth., Disp: , Rfl:     metFORMIN (GLUCOPHAGE) 1000 MG tablet, Take by mouth., Disp: , Rfl:     rosuvastatin (CRESTOR) 40 MG Tab, Take by mouth., Disp: , Rfl:     tamsulosin (FLOMAX) 0.4 mg Cap, Take by mouth., Disp: , Rfl:     TRUE METRIX GLUCOSE TEST STRIP Strp, 1 strip 2 (two) times daily., Disp: , Rfl:     azithromycin (ZITHROMAX Z-MIESHA) 250 MG tablet, Take 2 tablets (500 mg) on  Day 1,  followed by 1 tablet (250 mg) once daily on Days 2 through 5. (Patient not taking: Reported on 5/15/2024), Disp: 6 tablet, Rfl: 0    HYDROcodone-acetaminophen (NORCO) 5-325 mg per tablet, Take 1 tablet by mouth every 8 (eight) hours as needed  "for Pain. (Patient not taking: Reported on 5/15/2024), Disp: 9 tablet, Rfl: 0    loratadine (CLARITIN) 10 mg tablet, Take 1 tablet (10 mg total) by mouth once daily., Disp: 30 tablet, Rfl: 2    naproxen (NAPROSYN) 500 MG tablet, Take 1 tablet (500 mg total) by mouth 2 (two) times daily with meals. (Patient not taking: Reported on 5/15/2024), Disp: 12 tablet, Rfl: 0    ROS as above    Objective:     Vitals:    05/15/24 1450   BP: (!) 160/90   Pulse: 88     Wt Readings from Last 3 Encounters:   05/15/24 1450 88.2 kg (194 lb 7.1 oz)   04/09/24 1302 88.5 kg (195 lb)   03/28/24 1138 89 kg (196 lb 3.4 oz)     Body mass index is 25.65 kg/m².  Physical Exam  Constitutional:       Appearance: He is well-developed.   HENT:      Head: Normocephalic.   Eyes:      Conjunctiva/sclera: Conjunctivae normal.   Pulmonary:      Effort: Pulmonary effort is normal.   Musculoskeletal:         General: Normal range of motion.   Skin:     General: Skin is warm.      Findings: No rash.   Neurological:      Mental Status: He is alert and oriented to person, place, and time.         Labs    Chemistry    No results found for: "NA", "K", "CL", "CO2", "BUN", "CREATININE", "GLU" No results found for: "CALCIUM", "ALKPHOS", "AST", "ALT", "BILITOT", "ESTGFRAFRICA", "EGFRNONAA"           Assessment and Plan     Closed compression fracture of second lumbar vertebra  Check DXA and will perform secondary work-up under assumption he has osteoporosis on basis of compression fracture  Discussed plan for treatment pending results  Given compression fracture would use anabolic ideally, either forteo or tymlos  If unable to use anabolic recommend reclast  Side effects including post-infusion myalgias, ONJ and association with subtrochanteric fractures reviewed.  Advise to see the dentist regularly, notify dentist of using this medication and avoid non-emergent dental implants and extractions.  Also advise to contact us if develops new, persistent thigh or " groin pain.    Full dentures, no planned procedures  Reviewed fall precautions    Discussed that treatment of osteoporosis is intended to reduce risk of fracture but would not be expected to help pain he is currently having so should continue to follow with pain management    Type 2 diabetes mellitus with diabetic cataract, without long-term current use of insulin  Check A1c    Vitamin D deficiency  Check level with labs, recommendations pending result        RTC 6 months        Danielle Ellison MD    Visit today included increased complexity associated with the care of the problems addressed and managing the longitudinal care of the patient due to the serious and/or complex managed problems

## 2024-06-05 ENCOUNTER — HOSPITAL ENCOUNTER (OUTPATIENT)
Dept: RADIOLOGY | Facility: HOSPITAL | Age: 79
Discharge: HOME | End: 2024-06-05
Attending: INTERNAL MEDICINE
Payer: MEDICARE

## 2024-06-05 DIAGNOSIS — M81.0 OSTEOPOROSIS, UNSPECIFIED OSTEOPOROSIS TYPE, UNSPECIFIED PATHOLOGICAL FRACTURE PRESENCE: ICD-10-CM

## 2024-06-05 DIAGNOSIS — S32.020D CLOSED COMPRESSION FRACTURE OF L2 LUMBAR VERTEBRA WITH ROUTINE HEALING, SUBSEQUENT ENCOUNTER: ICD-10-CM

## 2024-06-05 PROCEDURE — 77080 DXA BONE DENSITY AXIAL: CPT | Mod: 26,,, | Performed by: RADIOLOGY

## 2024-06-05 PROCEDURE — 77080 DXA BONE DENSITY AXIAL: CPT | Mod: TC

## 2024-06-12 ENCOUNTER — OFFICE VISIT (OUTPATIENT)
Dept: PAIN MEDICINE | Facility: CLINIC | Age: 79
End: 2024-06-12
Payer: MEDICARE

## 2024-06-12 VITALS
WEIGHT: 195.31 LBS | RESPIRATION RATE: 18 BRPM | OXYGEN SATURATION: 98 % | SYSTOLIC BLOOD PRESSURE: 160 MMHG | DIASTOLIC BLOOD PRESSURE: 80 MMHG | BODY MASS INDEX: 25.77 KG/M2 | TEMPERATURE: 99 F | HEART RATE: 86 BPM

## 2024-06-12 DIAGNOSIS — M54.16 LUMBAR RADICULOPATHY: ICD-10-CM

## 2024-06-12 DIAGNOSIS — M48.062 SPINAL STENOSIS OF LUMBAR REGION WITH NEUROGENIC CLAUDICATION: Primary | ICD-10-CM

## 2024-06-12 PROCEDURE — 99214 OFFICE O/P EST MOD 30 MIN: CPT | Mod: S$GLB,,, | Performed by: ANESTHESIOLOGY

## 2024-06-12 PROCEDURE — 1125F AMNT PAIN NOTED PAIN PRSNT: CPT | Mod: CPTII,S$GLB,, | Performed by: ANESTHESIOLOGY

## 2024-06-12 PROCEDURE — 3077F SYST BP >= 140 MM HG: CPT | Mod: CPTII,S$GLB,, | Performed by: ANESTHESIOLOGY

## 2024-06-12 PROCEDURE — 99999 PR PBB SHADOW E&M-EST. PATIENT-LVL IV: CPT | Mod: PBBFAC,,, | Performed by: ANESTHESIOLOGY

## 2024-06-12 PROCEDURE — 3288F FALL RISK ASSESSMENT DOCD: CPT | Mod: CPTII,S$GLB,, | Performed by: ANESTHESIOLOGY

## 2024-06-12 PROCEDURE — 3079F DIAST BP 80-89 MM HG: CPT | Mod: CPTII,S$GLB,, | Performed by: ANESTHESIOLOGY

## 2024-06-12 PROCEDURE — 1159F MED LIST DOCD IN RCRD: CPT | Mod: CPTII,S$GLB,, | Performed by: ANESTHESIOLOGY

## 2024-06-12 PROCEDURE — 1101F PT FALLS ASSESS-DOCD LE1/YR: CPT | Mod: CPTII,S$GLB,, | Performed by: ANESTHESIOLOGY

## 2024-06-12 RX ORDER — PREGABALIN 50 MG/1
50 CAPSULE ORAL 2 TIMES DAILY
Qty: 60 CAPSULE | Refills: 1 | Status: CANCELLED | OUTPATIENT
Start: 2024-06-12 | End: 2024-12-11

## 2024-06-20 DIAGNOSIS — M48.061 SPINAL STENOSIS OF LUMBAR REGION, UNSPECIFIED WHETHER NEUROGENIC CLAUDICATION PRESENT: Primary | ICD-10-CM

## 2024-07-08 ENCOUNTER — TELEPHONE (OUTPATIENT)
Dept: NEUROSURGERY | Facility: CLINIC | Age: 79
End: 2024-07-08
Payer: MEDICARE

## 2024-07-08 NOTE — TELEPHONE ENCOUNTER
----- Message from Taylor Byrne RN sent at 7/5/2024  9:06 AM CDT -----  Contact: [phone]    ----- Message -----  From: Justin Muro  Sent: 7/2/2024  10:30 AM CDT  To: Rae HORTA Staff    Will Zion calling regarding Appointment Access  (message) Daughter asking for a call back she states her dad needs to be seen sooner then 07/18 Pt daughter states her father is getting worst she states he had a sooner appt and it was cancel by the provider Daughter states she would like to speak with the provider.

## 2024-07-12 ENCOUNTER — TELEPHONE (OUTPATIENT)
Dept: ENDOCRINOLOGY | Facility: CLINIC | Age: 79
End: 2024-07-12
Payer: MEDICARE

## 2024-07-12 DIAGNOSIS — R79.89 LOW TESTOSTERONE: Primary | ICD-10-CM

## 2024-07-12 NOTE — TELEPHONE ENCOUNTER
Call attempted several times. Unable to reach pt. Phone lines are currently not dialing out.     Msg from pt has been documented and dr love has been informed. Dr love states that the alendronate medication is what she was going to recommend anyway so pt should just continue to take that medication.

## 2024-07-12 NOTE — TELEPHONE ENCOUNTER
----- Message from Danielle Ellison MD sent at 7/12/2024  1:56 PM CDT -----  Regarding: RE: Call requested  Contact: [phone]  Perfect, please let him know that is what I was going to recommend so he should continue that  ----- Message -----  From: Rosanne Blackwood MA  Sent: 7/12/2024   1:53 PM CDT  To: Danielle Ellison MD  Subject: FW: Call requested                                 ----- Message -----  From: Nasir Ayon  Sent: 7/12/2024   1:47 PM CDT  To: Terra Santos Staff  Subject: Call requested                                   Hi, pt called back to request a call to let the doctor know the name of the medication he is taking for his back. Alendronate Sodium 70mg once a week .  Pls call the pt at [phone] to discuss.  Thank you.

## 2024-07-12 NOTE — TELEPHONE ENCOUNTER
Spoke with patient about results of labs and bone density scan.  The labs are largely normal with mild elevation in PTH but low with urine calcium, normal serum calcium.  Testosterone is also low.  Bone density scan is normal but suspect spine falsely elevated due to arthritis changes.  When I spoke with him today he tells me that he has been taking a once weekly pill for bone density for the past year.  This is prescribed outside our system and I do not have record of it but he thinks that maybe alendronate.  Recommend he continue this but asked that he let me know if that is not the medication he is taking  Will also further evaluate for hypogonadism and he would be interested in treatment if confirmed

## 2024-07-15 ENCOUNTER — LAB VISIT (OUTPATIENT)
Dept: LAB | Facility: HOSPITAL | Age: 79
End: 2024-07-15
Attending: INTERNAL MEDICINE
Payer: MEDICARE

## 2024-07-15 DIAGNOSIS — R79.89 LOW TESTOSTERONE: ICD-10-CM

## 2024-07-15 LAB
BASOPHILS # BLD AUTO: 0.02 K/UL (ref 0–0.2)
BASOPHILS NFR BLD: 0.3 % (ref 0–1.9)
DIFFERENTIAL METHOD BLD: ABNORMAL
EOSINOPHIL # BLD AUTO: 0.1 K/UL (ref 0–0.5)
EOSINOPHIL NFR BLD: 2.1 % (ref 0–8)
ERYTHROCYTE [DISTWIDTH] IN BLOOD BY AUTOMATED COUNT: 13.1 % (ref 11.5–14.5)
FSH SERPL-ACNC: 6.22 MIU/ML (ref 0.95–11.95)
HCT VFR BLD AUTO: 41.2 % (ref 40–54)
HGB BLD-MCNC: 14.2 G/DL (ref 14–18)
IMM GRANULOCYTES # BLD AUTO: 0.01 K/UL (ref 0–0.04)
IMM GRANULOCYTES NFR BLD AUTO: 0.2 % (ref 0–0.5)
LH SERPL-ACNC: 3.7 MIU/ML (ref 0.6–12.1)
LYMPHOCYTES # BLD AUTO: 2.3 K/UL (ref 1–4.8)
LYMPHOCYTES NFR BLD: 35.7 % (ref 18–48)
MCH RBC QN AUTO: 31.1 PG (ref 27–31)
MCHC RBC AUTO-ENTMCNC: 34.5 G/DL (ref 32–36)
MCV RBC AUTO: 90 FL (ref 82–98)
MONOCYTES # BLD AUTO: 0.5 K/UL (ref 0.3–1)
MONOCYTES NFR BLD: 8.6 % (ref 4–15)
NEUTROPHILS # BLD AUTO: 3.4 K/UL (ref 1.8–7.7)
NEUTROPHILS NFR BLD: 53.1 % (ref 38–73)
NRBC BLD-RTO: 0 /100 WBC
PLATELET # BLD AUTO: 184 K/UL (ref 150–450)
PMV BLD AUTO: 10.6 FL (ref 9.2–12.9)
RBC # BLD AUTO: 4.57 M/UL (ref 4.6–6.2)
WBC # BLD AUTO: 6.3 K/UL (ref 3.9–12.7)

## 2024-07-15 PROCEDURE — 36415 COLL VENOUS BLD VENIPUNCTURE: CPT | Performed by: INTERNAL MEDICINE

## 2024-07-15 PROCEDURE — 83001 ASSAY OF GONADOTROPIN (FSH): CPT | Performed by: INTERNAL MEDICINE

## 2024-07-15 PROCEDURE — 84403 ASSAY OF TOTAL TESTOSTERONE: CPT | Performed by: INTERNAL MEDICINE

## 2024-07-15 PROCEDURE — 83002 ASSAY OF GONADOTROPIN (LH): CPT | Performed by: INTERNAL MEDICINE

## 2024-07-15 PROCEDURE — 85025 COMPLETE CBC W/AUTO DIFF WBC: CPT | Performed by: INTERNAL MEDICINE

## 2024-07-18 ENCOUNTER — OFFICE VISIT (OUTPATIENT)
Dept: NEUROSURGERY | Facility: CLINIC | Age: 79
End: 2024-07-18
Payer: MEDICARE

## 2024-07-18 ENCOUNTER — HOSPITAL ENCOUNTER (OUTPATIENT)
Dept: RADIOLOGY | Facility: HOSPITAL | Age: 79
Discharge: HOME | End: 2024-07-18
Attending: STUDENT IN AN ORGANIZED HEALTH CARE EDUCATION/TRAINING PROGRAM
Payer: MEDICARE

## 2024-07-18 VITALS — DIASTOLIC BLOOD PRESSURE: 75 MMHG | HEART RATE: 81 BPM | SYSTOLIC BLOOD PRESSURE: 159 MMHG

## 2024-07-18 DIAGNOSIS — M48.061 SPINAL STENOSIS OF LUMBAR REGION, UNSPECIFIED WHETHER NEUROGENIC CLAUDICATION PRESENT: Primary | ICD-10-CM

## 2024-07-18 DIAGNOSIS — M48.061 SPINAL STENOSIS OF LUMBAR REGION, UNSPECIFIED WHETHER NEUROGENIC CLAUDICATION PRESENT: ICD-10-CM

## 2024-07-18 DIAGNOSIS — R53.1 WEAKNESS: ICD-10-CM

## 2024-07-18 PROCEDURE — 72114 X-RAY EXAM L-S SPINE BENDING: CPT | Mod: 26,,, | Performed by: RADIOLOGY

## 2024-07-18 PROCEDURE — 1125F AMNT PAIN NOTED PAIN PRSNT: CPT | Mod: CPTII,S$GLB,, | Performed by: STUDENT IN AN ORGANIZED HEALTH CARE EDUCATION/TRAINING PROGRAM

## 2024-07-18 PROCEDURE — 99203 OFFICE O/P NEW LOW 30 MIN: CPT | Mod: S$GLB,,, | Performed by: STUDENT IN AN ORGANIZED HEALTH CARE EDUCATION/TRAINING PROGRAM

## 2024-07-18 PROCEDURE — 3288F FALL RISK ASSESSMENT DOCD: CPT | Mod: CPTII,S$GLB,, | Performed by: STUDENT IN AN ORGANIZED HEALTH CARE EDUCATION/TRAINING PROGRAM

## 2024-07-18 PROCEDURE — 3077F SYST BP >= 140 MM HG: CPT | Mod: CPTII,S$GLB,, | Performed by: STUDENT IN AN ORGANIZED HEALTH CARE EDUCATION/TRAINING PROGRAM

## 2024-07-18 PROCEDURE — 1101F PT FALLS ASSESS-DOCD LE1/YR: CPT | Mod: CPTII,S$GLB,, | Performed by: STUDENT IN AN ORGANIZED HEALTH CARE EDUCATION/TRAINING PROGRAM

## 2024-07-18 PROCEDURE — 3078F DIAST BP <80 MM HG: CPT | Mod: CPTII,S$GLB,, | Performed by: STUDENT IN AN ORGANIZED HEALTH CARE EDUCATION/TRAINING PROGRAM

## 2024-07-18 PROCEDURE — 72114 X-RAY EXAM L-S SPINE BENDING: CPT | Mod: TC

## 2024-07-18 PROCEDURE — 72082 X-RAY EXAM ENTIRE SPI 2/3 VW: CPT | Mod: TC

## 2024-07-18 PROCEDURE — 1159F MED LIST DOCD IN RCRD: CPT | Mod: CPTII,S$GLB,, | Performed by: STUDENT IN AN ORGANIZED HEALTH CARE EDUCATION/TRAINING PROGRAM

## 2024-07-18 PROCEDURE — 72082 X-RAY EXAM ENTIRE SPI 2/3 VW: CPT | Mod: 26,,, | Performed by: RADIOLOGY

## 2024-07-18 NOTE — PROGRESS NOTES
Neurosurgery  History & Physical    SUBJECTIVE:     Chief Complaint: Low back pain, BLE radicular pain, right foot drop    History of Present Illness:  78 M with hx of prior CVA with residual right sided weakness presents for eval of worsening low back pain and BLE radicular leg pain.  He endorses pain which starts in upper lumbar area and then radiates down to lower lumbar area down his legs right greater than left into his feet.  He endorses right foot weakness and has a right foot drop which has been present for many months.  He has difficulty walking.  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  He has been to pain mgmt for injections without significant relief.    Review of patient's allergies indicates:  No Known Allergies    Current Outpatient Medications   Medication Sig Dispense Refill    amLODIPine (NORVASC) 10 MG tablet Take by mouth.      azithromycin (ZITHROMAX Z-MIESHA) 250 MG tablet Take 2 tablets (500 mg) on  Day 1,  followed by 1 tablet (250 mg) once daily on Days 2 through 5. 6 tablet 0    carvediloL (COREG) 6.25 MG tablet Take by mouth.      finasteride (PROSCAR) 5 mg tablet Take by mouth.      fluticasone propionate (FLONASE) 50 mcg/actuation nasal spray by Each Nostril route.      glipiZIDE (GLUCOTROL) 10 MG tablet Take by mouth.      HYDROcodone-acetaminophen (NORCO) 5-325 mg per tablet Take 1 tablet by mouth every 8 (eight) hours as needed for Pain. 9 tablet 0    losartan (COZAAR) 100 MG tablet Take by mouth.      metFORMIN (GLUCOPHAGE) 1000 MG tablet Take by mouth.      naproxen (NAPROSYN) 500 MG tablet Take 1 tablet (500 mg total) by mouth 2 (two) times daily with meals. 12 tablet 0    rosuvastatin (CRESTOR) 40 MG Tab Take by mouth.      tamsulosin (FLOMAX) 0.4 mg Cap Take by mouth.      TRUE METRIX GLUCOSE TEST STRIP Strp 1 strip 2 (two) times daily.      loratadine (CLARITIN) 10 mg tablet Take 1 tablet (10 mg total) by mouth  once daily. 30 tablet 2     No current facility-administered medications for this visit.       Past Medical History:   Diagnosis Date    Diabetes mellitus type I     Hypertension     Stroke      Past Surgical History:   Procedure Laterality Date    TRANSFORAMINAL EPIDURAL INJECTION OF STEROID Bilateral 2024    Procedure: LUMBAR TRANSFORAMINAL BILATERAL L4/5;  Surgeon: Aria Orozco MD;  Location: [facility] PAIN [facility];  Service: Pain Management;  Laterality: Bilateral;  [phone]  2 WK F/U LM     Family History    None       Social History     Socioeconomic History    Marital status:    Tobacco Use    Smoking status: Former     Current packs/day: 0.00     Types: Cigarettes     Start date:      Quit date:      Years since quittin.5    Smokeless tobacco: Never   Substance and Sexual Activity    Alcohol use: Not Currently     Social Determinants of Health     Financial Resource Strain: Medium Risk (2024)    Overall Financial Resource Strain (CARDIA)     Difficulty of Paying Living Expenses: Somewhat hard   Food Insecurity: Food Insecurity Present (2024)    Hunger Vital Sign     Worried About Running Out of Food in the Last Year: Sometimes true     Ran Out of Food in the Last Year: Never true   Transportation Needs: No Transportation Needs (2024)    PRAPARE - Transportation     Lack of Transportation (Medical): No     Lack of Transportation (Non-Medical): No   Physical Activity: Unknown (2024)    Exercise Vital Sign     Days of Exercise per Week: 0 days   Stress: No Stress Concern Present (2024)    [employer] - Occupational Stress Questionnaire     Feeling of Stress : Not at all       Review of Systems  14 point ROS was negative    OBJECTIVE:     Vital Signs  Pulse: 81  BP: (!) 159/75  Pain Score:   5  There is no height or weight on file to calculate BMI.      Physical Exam:    Constitutional: He appears well-developed and  well-nourished.     Eyes: Pupils are equal, round, and reactive to light.     Cardiovascular: Normal rate and regular rhythm.     Abdominal: Soft.     Psych/Behavior: He is alert. He is oriented to person, place, and time. He has a normal mood and affect.     Musculoskeletal: Gait is abnormal.        Neck: Range of motion is limited.        Back: Range of motion is limited.     Neurological:        Coordination: He has an abnormal Romberg Test and abnormal tandem walking coordination.        Sensory: There is no sensory deficit in the trunk. There is no sensory deficit in the extremities.        DTRs: DTRs are DTRS NORMAL AND SYMMETRICnormal and symmetric.        Cranial nerves: Cranial nerve(s) II, III, IV, V, VI, VII, VIII, IX, X, XI and XII are intact.     4/5 in right hemibody except 1/5 in right DF/EHL/PF  5/5 in LUE except 4/5 in left HI, difficulty with left hand dexterity  5/5 in LLE    Unable to elicit bear's on either side d/t contractions of his hands.    +TTP at upper/lower lumbar spine    Diagnostic Results:  CT L spine 5/2023: L2 compression deformity without significant height loss, no extension into posterior elements.  Vacuum disc at L4/5, 5/S1.  Multilevel facet arthropathy L2/3-5/S1.  MRI L spine 3/2024: chronic L2 compression deformity.  Grade I spondy at L4/5.  Severe stenosis at L4/5 centrally with bilateral facet effusions.  Right sided L5/S1 facet effusion with right greater than left severe foraminal stenosis.  Flex/ex L spine: instability at L4/5  Scoli: no sagittal plane imbalance or mismatch.  DEXA: normal BMD.  Reviewed    ASSESSMENT/PLAN:     78 M with hx of prior CVA and residual right sided weakness, chronic L2 compression fracture and multilevel lumbar stenosis in the setting of dynamic instability at L4/5.  I discussed that his foot drop is unlikely to improve significantly as it has been present for many months.  I believe that he is mostly symptomatic from the instability and  severe lumbar stenosis from L4-S1.  I question the utility of a kyphoplasty at L2 as he is so far out from the initial injury and it appears healed on updated imaging.  In addition, he has no kyphosis associated from the fracture.  I do plan to get MRI c spine to assess for any significant stenosis d/t his difficulty with left hand dexterity and gait difficulties.  Will plan to get updated CT L spine as well prior to making final surgical recommendations but I feel that he would ultimately benefit from L4-S1 decompression/fusion for his low back pain and radiculopathy.

## 2024-07-23 LAB
ALBUMIN SERPL-MCNC: 4.1 G/DL (ref 3.6–5.1)
SHBG SERPL-SCNC: 17 NMOL/L (ref 22–77)
TESTOST SERPL-MCNC: 205 NG/DL (ref 250–1100)

## 2024-07-25 ENCOUNTER — TELEPHONE (OUTPATIENT)
Dept: NEUROSURGERY | Facility: CLINIC | Age: 79
End: 2024-07-25
Payer: MEDICARE

## 2024-07-25 NOTE — TELEPHONE ENCOUNTER
----- Message from Seble Dutton MA sent at 7/24/2024  3:51 PM CDT -----  Regarding: FW: appt  Contact: darrel Bah @[phone]    ----- Message -----  From: Elizabeth Edge  Sent: 7/24/2024  12:54 PM CDT  To: Rae HORTA Staff  Subject: appt                                             Caller asking to speak to Seble in regards to some test that pt needs prior to surgery. She said someone was supposed to contact them but didn't. Pls call to discuss.

## 2024-07-26 ENCOUNTER — TELEPHONE (OUTPATIENT)
Dept: ENDOCRINOLOGY | Facility: CLINIC | Age: 79
End: 2024-07-26
Payer: MEDICARE

## 2024-07-26 ENCOUNTER — HOSPITAL ENCOUNTER (OUTPATIENT)
Dept: RADIOLOGY | Facility: HOSPITAL | Age: 79
Discharge: HOME | End: 2024-07-26
Attending: STUDENT IN AN ORGANIZED HEALTH CARE EDUCATION/TRAINING PROGRAM
Payer: MEDICARE

## 2024-07-26 DIAGNOSIS — R53.1 WEAKNESS: ICD-10-CM

## 2024-07-26 PROCEDURE — 72141 MRI NECK SPINE W/O DYE: CPT | Mod: TC

## 2024-07-26 PROCEDURE — 72141 MRI NECK SPINE W/O DYE: CPT | Mod: 26,,, | Performed by: RADIOLOGY

## 2024-07-26 NOTE — TELEPHONE ENCOUNTER
----- Message from Danielle Ellison MD sent at 7/26/2024  9:46 AM CDT -----  Please let him know that the repeat testosterone test shows that his body has enough testosterone and he does not need to be on treatment. He should continue treatment with fosamax for his osteoporosis

## 2024-08-08 ENCOUNTER — HOSPITAL ENCOUNTER (OUTPATIENT)
Dept: RADIOLOGY | Facility: HOSPITAL | Age: 79
Discharge: HOME | End: 2024-08-08
Attending: STUDENT IN AN ORGANIZED HEALTH CARE EDUCATION/TRAINING PROGRAM
Payer: MEDICARE

## 2024-08-08 ENCOUNTER — OFFICE VISIT (OUTPATIENT)
Dept: NEUROSURGERY | Facility: CLINIC | Age: 79
End: 2024-08-08
Payer: MEDICARE

## 2024-08-08 DIAGNOSIS — R26.81 GAIT INSTABILITY: Primary | ICD-10-CM

## 2024-08-08 DIAGNOSIS — R26.81 GAIT INSTABILITY: ICD-10-CM

## 2024-08-08 DIAGNOSIS — M48.061 SPINAL STENOSIS OF LUMBAR REGION, UNSPECIFIED WHETHER NEUROGENIC CLAUDICATION PRESENT: ICD-10-CM

## 2024-08-08 PROCEDURE — 1159F MED LIST DOCD IN RCRD: CPT | Mod: CPTII,S$GLB,, | Performed by: STUDENT IN AN ORGANIZED HEALTH CARE EDUCATION/TRAINING PROGRAM

## 2024-08-08 PROCEDURE — 1125F AMNT PAIN NOTED PAIN PRSNT: CPT | Mod: CPTII,S$GLB,, | Performed by: STUDENT IN AN ORGANIZED HEALTH CARE EDUCATION/TRAINING PROGRAM

## 2024-08-08 PROCEDURE — 72131 CT LUMBAR SPINE W/O DYE: CPT | Mod: 26,,, | Performed by: RADIOLOGY

## 2024-08-08 PROCEDURE — 1101F PT FALLS ASSESS-DOCD LE1/YR: CPT | Mod: CPTII,S$GLB,, | Performed by: STUDENT IN AN ORGANIZED HEALTH CARE EDUCATION/TRAINING PROGRAM

## 2024-08-08 PROCEDURE — 99214 OFFICE O/P EST MOD 30 MIN: CPT | Mod: S$GLB,,, | Performed by: STUDENT IN AN ORGANIZED HEALTH CARE EDUCATION/TRAINING PROGRAM

## 2024-08-08 PROCEDURE — 72131 CT LUMBAR SPINE W/O DYE: CPT | Mod: TC

## 2024-08-08 PROCEDURE — 3288F FALL RISK ASSESSMENT DOCD: CPT | Mod: CPTII,S$GLB,, | Performed by: STUDENT IN AN ORGANIZED HEALTH CARE EDUCATION/TRAINING PROGRAM

## 2024-08-14 ENCOUNTER — HOSPITAL ENCOUNTER (OUTPATIENT)
Dept: RADIOLOGY | Facility: HOSPITAL | Age: 79
Discharge: HOME | End: 2024-08-14
Attending: STUDENT IN AN ORGANIZED HEALTH CARE EDUCATION/TRAINING PROGRAM
Payer: MEDICARE

## 2024-08-14 DIAGNOSIS — R26.81 GAIT INSTABILITY: ICD-10-CM

## 2024-08-14 PROCEDURE — 72146 MRI CHEST SPINE W/O DYE: CPT | Mod: 26,,, | Performed by: RADIOLOGY

## 2024-08-14 PROCEDURE — 72146 MRI CHEST SPINE W/O DYE: CPT | Mod: TC

## 2024-08-29 ENCOUNTER — OFFICE VISIT (OUTPATIENT)
Dept: NEUROSURGERY | Facility: CLINIC | Age: 79
End: 2024-08-29
Payer: MEDICARE

## 2024-08-29 ENCOUNTER — TELEPHONE (OUTPATIENT)
Dept: NEUROSURGERY | Facility: CLINIC | Age: 79
End: 2024-08-29
Payer: MEDICARE

## 2024-08-29 DIAGNOSIS — M48.061 SPINAL STENOSIS OF LUMBAR REGION, UNSPECIFIED WHETHER NEUROGENIC CLAUDICATION PRESENT: Primary | ICD-10-CM

## 2024-08-29 DIAGNOSIS — R26.81 GAIT INSTABILITY: ICD-10-CM

## 2024-08-29 PROCEDURE — 99213 OFFICE O/P EST LOW 20 MIN: CPT | Mod: S$GLB,,, | Performed by: STUDENT IN AN ORGANIZED HEALTH CARE EDUCATION/TRAINING PROGRAM

## 2024-08-29 PROCEDURE — 1159F MED LIST DOCD IN RCRD: CPT | Mod: CPTII,S$GLB,, | Performed by: STUDENT IN AN ORGANIZED HEALTH CARE EDUCATION/TRAINING PROGRAM

## 2024-08-29 PROCEDURE — 1101F PT FALLS ASSESS-DOCD LE1/YR: CPT | Mod: CPTII,S$GLB,, | Performed by: STUDENT IN AN ORGANIZED HEALTH CARE EDUCATION/TRAINING PROGRAM

## 2024-08-29 PROCEDURE — 3288F FALL RISK ASSESSMENT DOCD: CPT | Mod: CPTII,S$GLB,, | Performed by: STUDENT IN AN ORGANIZED HEALTH CARE EDUCATION/TRAINING PROGRAM

## 2024-08-29 PROCEDURE — 1125F AMNT PAIN NOTED PAIN PRSNT: CPT | Mod: CPTII,S$GLB,, | Performed by: STUDENT IN AN ORGANIZED HEALTH CARE EDUCATION/TRAINING PROGRAM

## 2024-08-29 NOTE — H&P (VIEW-ONLY)
Neurosurgery  Established Patient    SUBJECTIVE:     History of Present Illness:  78 M with hx of prior CVA with residual right sided weakness presents for eval of worsening low back pain and BLE radicular leg pain. He endorses pain which starts in upper lumbar area and then radiates down to lower lumbar area down his legs right greater than left into his feet. He endorses right foot weakness and has a right foot drop which has been present for many months. He has difficulty walking. 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 He has been to pain mgmt for injections without significant relief.      Interval fu 8/8/24:  Pt presents in fu.  No significant changes in symptoms.  Today he is most concerned with his balance difficulties.    Interval fu 8/29/24: No changes in symptoms.    Review of patient's allergies indicates:  No Known Allergies    Current Outpatient Medications   Medication Sig Dispense Refill    amLODIPine (NORVASC) 10 MG tablet Take by mouth.      azithromycin (ZITHROMAX Z-MIESHA) 250 MG tablet Take 2 tablets (500 mg) on  Day 1,  followed by 1 tablet (250 mg) once daily on Days 2 through 5. 6 tablet 0    carvediloL (COREG) 6.25 MG tablet Take by mouth.      finasteride (PROSCAR) 5 mg tablet Take by mouth.      fluticasone propionate (FLONASE) 50 mcg/actuation nasal spray by Each Nostril route.      glipiZIDE (GLUCOTROL) 10 MG tablet Take by mouth.      HYDROcodone-acetaminophen (NORCO) 5-325 mg per tablet Take 1 tablet by mouth every 8 (eight) hours as needed for Pain. 9 tablet 0    losartan (COZAAR) 100 MG tablet Take by mouth.      metFORMIN (GLUCOPHAGE) 1000 MG tablet Take by mouth.      naproxen (NAPROSYN) 500 MG tablet Take 1 tablet (500 mg total) by mouth 2 (two) times daily with meals. 12 tablet 0    rosuvastatin (CRESTOR) 40 MG Tab Take by mouth.      tamsulosin (FLOMAX) 0.4 mg Cap Take by mouth.      TRUE METRIX GLUCOSE TEST  STRIP Strp 1 strip 2 (two) times daily.      loratadine (CLARITIN) 10 mg tablet Take 1 tablet (10 mg total) by mouth once daily. 30 tablet 2     No current facility-administered medications for this visit.       Past Medical History:   Diagnosis Date    Diabetes mellitus type I     Hypertension     Stroke      Past Surgical History:   Procedure Laterality Date    TRANSFORAMINAL EPIDURAL INJECTION OF STEROID Bilateral 2024    Procedure: LUMBAR TRANSFORAMINAL BILATERAL L4/5;  Surgeon: Aria Orozco MD;  Location: [facility] PAIN [facility];  Service: Pain Management;  Laterality: Bilateral;  [phone]  2 WK F/U LM     Family History    None       Social History     Socioeconomic History    Marital status:    Tobacco Use    Smoking status: Former     Current packs/day: 0.00     Types: Cigarettes     Start date:      Quit date:      Years since quittin.6    Smokeless tobacco: Never   Substance and Sexual Activity    Alcohol use: Not Currently     Social Determinants of Health     Financial Resource Strain: Medium Risk (2024)    Overall Financial Resource Strain (CARDIA)     Difficulty of Paying Living Expenses: Somewhat hard   Food Insecurity: Food Insecurity Present (2024)    Hunger Vital Sign     Worried About Running Out of Food in the Last Year: Sometimes true     Ran Out of Food in the Last Year: Never true   Transportation Needs: No Transportation Needs (2024)    PRAPARE - Transportation     Lack of Transportation (Medical): No     Lack of Transportation (Non-Medical): No   Physical Activity: Unknown (2024)    Exercise Vital Sign     Days of Exercise per Week: 0 days   Stress: No Stress Concern Present (2024)    [employer] - Occupational Stress Questionnaire     Feeling of Stress : Not at all       Review of Systems  14 point ROS was negative    OBJECTIVE:     Vital Signs  Pain Score:   4  There is no height or weight on file to  calculate BMI.    Neurosurgery Physical Exam  Constitutional: He appears well-developed and well-nourished.      Eyes: Pupils are equal, round, and reactive to light.      Cardiovascular: Normal rate and regular rhythm.      Abdominal: Soft.      Psych/Behavior: He is alert. He is oriented to person, place, and time. He has a normal mood and affect.      Musculoskeletal: Gait is abnormal.        Neck: Range of motion is limited.        Back: Range of motion is limited.      Neurological:        Coordination: He has an abnormal Romberg Test and abnormal tandem walking coordination.        Sensory: There is no sensory deficit in the trunk. There is no sensory deficit in the extremities.        DTRs: DTRs are DTRS NORMAL AND SYMMETRICnormal and symmetric.        Cranial nerves: Cranial nerve(s) II, III, IV, V, VI, VII, VIII, IX, X, XI and XII are intact.      4/5 in right hemibody except 1/5 in right DF/EHL/PF  5/5 in LUE except 4/5 in left HI, difficulty with left hand dexterity  5/5 in LLE     Unable to elicit bear's on either side d/t contractions of his hands.     +TTP at upper/lower lumbar spine    Diagnostic Results:  Flex/ex c spine: no instability  MRI T spine: no high grade central stenosis  Reviewed    Scoli: no mismatch or imbalance.  Stepwise anterolisthesis of C3/4, 4/5.  Flex/ex L: instability at L4/5  MRI c spine: multilevel degenerative changes without high grade central canal stenosis or cord signal change.  CT L: multilevel degenerative changes with severe vacuum disc at L4/5, 5/S1.  Multilevel facet arthropathy.  Chronic L2,3 compression deformities.    ASSESSMENT/PLAN:     78 M with hx of prior CVA and residual right sided weakness, chronic L2 compression fracture and multilevel lumbar stenosis in the setting of dynamic instability at L4/5. I discussed that his foot drop is unlikely to improve significantly as it has been present for many months. I believe that he is mostly symptomatic from the  instability and severe lumbar stenosis from L4-S1. I question the utility of a kyphoplasty at L2 as he is so far out from the initial injury and it appears healed on updated imaging. In addition, he has no kyphosis associated from the fracture.  Will plan for L4-S1 robotic TLIFs 9/20/24.

## 2024-08-29 NOTE — PROGRESS NOTES
Neurosurgery  Established Patient    SUBJECTIVE:     History of Present Illness:  78 M with hx of prior CVA with residual right sided weakness presents for eval of worsening low back pain and BLE radicular leg pain. He endorses pain which starts in upper lumbar area and then radiates down to lower lumbar area down his legs right greater than left into his feet. He endorses right foot weakness and has a right foot drop which has been present for many months. He has difficulty walking. 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 He has been to pain mgmt for injections without significant relief.      Interval fu 8/8/24:  Pt presents in fu.  No significant changes in symptoms.  Today he is most concerned with his balance difficulties.    Interval fu 8/29/24: No changes in symptoms.    Review of patient's allergies indicates:  No Known Allergies    Current Outpatient Medications   Medication Sig Dispense Refill    amLODIPine (NORVASC) 10 MG tablet Take by mouth.      azithromycin (ZITHROMAX Z-MIESHA) 250 MG tablet Take 2 tablets (500 mg) on  Day 1,  followed by 1 tablet (250 mg) once daily on Days 2 through 5. 6 tablet 0    carvediloL (COREG) 6.25 MG tablet Take by mouth.      finasteride (PROSCAR) 5 mg tablet Take by mouth.      fluticasone propionate (FLONASE) 50 mcg/actuation nasal spray by Each Nostril route.      glipiZIDE (GLUCOTROL) 10 MG tablet Take by mouth.      HYDROcodone-acetaminophen (NORCO) 5-325 mg per tablet Take 1 tablet by mouth every 8 (eight) hours as needed for Pain. 9 tablet 0    losartan (COZAAR) 100 MG tablet Take by mouth.      metFORMIN (GLUCOPHAGE) 1000 MG tablet Take by mouth.      naproxen (NAPROSYN) 500 MG tablet Take 1 tablet (500 mg total) by mouth 2 (two) times daily with meals. 12 tablet 0    rosuvastatin (CRESTOR) 40 MG Tab Take by mouth.      tamsulosin (FLOMAX) 0.4 mg Cap Take by mouth.      TRUE METRIX GLUCOSE TEST  STRIP Strp 1 strip 2 (two) times daily.      loratadine (CLARITIN) 10 mg tablet Take 1 tablet (10 mg total) by mouth once daily. 30 tablet 2     No current facility-administered medications for this visit.       Past Medical History:   Diagnosis Date    Diabetes mellitus type I     Hypertension     Stroke      Past Surgical History:   Procedure Laterality Date    TRANSFORAMINAL EPIDURAL INJECTION OF STEROID Bilateral 2024    Procedure: LUMBAR TRANSFORAMINAL BILATERAL L4/5;  Surgeon: Aria Orozco MD;  Location: [facility];  Service: Pain Management;  Laterality: Bilateral;  [phone]  2 WK F/U LM     Family History    None       Social History     Socioeconomic History    Marital status:    Tobacco Use    Smoking status: Former     Current packs/day: 0.00     Types: Cigarettes     Start date:      Quit date:      Years since quittin.6    Smokeless tobacco: Never   Substance and Sexual Activity    Alcohol use: Not Currently     Social Determinants of Health     Financial Resource Strain: Medium Risk (2024)    Overall Financial Resource Strain (CARDIA)     Difficulty of Paying Living Expenses: Somewhat hard   Food Insecurity: Food Insecurity Present (2024)    Hunger Vital Sign     Worried About Running Out of Food in the Last Year: Sometimes true     Ran Out of Food in the Last Year: Never true   Transportation Needs: No Transportation Needs (2024)    PRAPARE - Transportation     Lack of Transportation (Medical): No     Lack of Transportation (Non-Medical): No   Physical Activity: Unknown (2024)    Exercise Vital Sign     Days of Exercise per Week: 0 days   Stress: No Stress Concern Present (2024)    [place] citizen [place] [employer] - Occupational Stress Questionnaire     Feeling of Stress : Not at all       Review of Systems  14 point ROS was negative    OBJECTIVE:     Vital Signs  Pain Score:   4  There is no height or weight on file to  calculate BMI.    Neurosurgery Physical Exam  Constitutional: He appears well-developed and well-nourished.      Eyes: Pupils are equal, round, and reactive to light.      Cardiovascular: Normal rate and regular rhythm.      Abdominal: Soft.      Psych/Behavior: He is alert. He is oriented to person, place, and time. He has a normal mood and affect.      Musculoskeletal: Gait is abnormal.        Neck: Range of motion is limited.        Back: Range of motion is limited.      Neurological:        Coordination: He has an abnormal Romberg Test and abnormal tandem walking coordination.        Sensory: There is no sensory deficit in the trunk. There is no sensory deficit in the extremities.        DTRs: DTRs are DTRS NORMAL AND SYMMETRICnormal and symmetric.        Cranial nerves: Cranial nerve(s) II, III, IV, V, VI, VII, VIII, IX, X, XI and XII are intact.      4/5 in right hemibody except 1/5 in right DF/EHL/PF  5/5 in LUE except 4/5 in left HI, difficulty with left hand dexterity  5/5 in LLE     Unable to elicit bear's on either side d/t contractions of his hands.     +TTP at upper/lower lumbar spine    Diagnostic Results:  Flex/ex c spine: no instability  MRI T spine: no high grade central stenosis  Reviewed    Scoli: no mismatch or imbalance.  Stepwise anterolisthesis of C3/4, 4/5.  Flex/ex L: instability at L4/5  MRI c spine: multilevel degenerative changes without high grade central canal stenosis or cord signal change.  CT L: multilevel degenerative changes with severe vacuum disc at L4/5, 5/S1.  Multilevel facet arthropathy.  Chronic L2,3 compression deformities.    ASSESSMENT/PLAN:     78 M with hx of prior CVA and residual right sided weakness, chronic L2 compression fracture and multilevel lumbar stenosis in the setting of dynamic instability at L4/5. I discussed that his foot drop is unlikely to improve significantly as it has been present for many months. I believe that he is mostly symptomatic from the  instability and severe lumbar stenosis from L4-S1. I question the utility of a kyphoplasty at L2 as he is so far out from the initial injury and it appears healed on updated imaging. In addition, he has no kyphosis associated from the fracture.  Will plan for L4-S1 robotic TLIFs 9/20/24.

## 2024-09-03 ENCOUNTER — TELEPHONE (OUTPATIENT)
Dept: PREADMISSION TESTING | Facility: HOSPITAL | Age: 79
End: 2024-09-03

## 2024-09-03 DIAGNOSIS — Z01.818 PREOPERATIVE TESTING: Primary | ICD-10-CM

## 2024-09-03 DIAGNOSIS — M79.604 PAIN IN BOTH LOWER EXTREMITIES: ICD-10-CM

## 2024-09-03 DIAGNOSIS — M79.605 PAIN IN BOTH LOWER EXTREMITIES: ICD-10-CM

## 2024-09-03 DIAGNOSIS — E11.9 DIABETES MELLITUS WITHOUT COMPLICATION: ICD-10-CM

## 2024-09-03 RX ORDER — TRAMADOL HYDROCHLORIDE 50 MG/1
50 TABLET ORAL EVERY 12 HOURS PRN
COMMUNITY

## 2024-09-03 RX ORDER — ASPIRIN 81 MG/1
81 TABLET ORAL DAILY
COMMUNITY

## 2024-09-03 NOTE — TELEPHONE ENCOUNTER
----- Message from Sarah Arriaza RN sent at 9/3/2024 10:14 AM CDT -----  Surgery 9/20  Please schedule poc, labs, ua, and EKG.  Thanks!

## 2024-09-03 NOTE — PRE-PROCEDURE INSTRUCTIONS
Patient stated has not had any problem with anesthesia in the past but has only had teeth extracted and colonoscopy. This will be his first surgery, general anesthesia. Will need medical clearance from your PCP, Asya Santamaria NP at [facility] in [place]. He has an appt. On 9/6 with her. I will ask NANCY Santamaria for ASA 81 instructions. Will need poc appt, labs, ua, and EKG. Our  will call to set up these appts.       Preop instructions given. Hold other aspirin containing products, nsaids( Aleve, Advil, Motrin, Ibuprofen, Naprosyn, Naproxen, Voltaren, Diclofenac, Mobic, Meloxicam, Celebrex, Celecoxib), vitamins and supplements one week prior to surgery. May take Tylenol. Await instructions for Aspirin 81 mg from Asya Santamaria NP.( Also mailed graham patient)   Verbalizes understanding.

## 2024-09-03 NOTE — ANESTHESIA PAT ROS NOTE
9/8/2024   Robi Donovan is a 79 y.o., male, with   DDD (degenerative disc disease), lumbar  Lumbar stenosis  Closed compression fracture of second lumbar vertebra  Arrives for anesthesia assessment and preop instructions.       Pre-op Assessment    I have reviewed the Patient Summary Reports.     I have reviewed the Nursing Notes. I have reviewed the NPO Status.      Review of Systems  Anesthesia Hx:    **THIS WILL BE HIS FIRST SURGERY**  Had cataract surgery    **PATIENT SAID HE HAS HAD TEETH EXTRACTED AND COLONOSCOPY WITH NO PROBLEM WITH THE ANESTHESIA*           Denies Family Hx of Anesthesia complications.     Social:  Former Smoker Smoking Status  Former  Started  1983 - 2003 Quit  Types  Cigarettes from 1983 to 2003  Pack Year History  0 packs/day, Started 2003; 1983 - 2003 (20.0 years)        Hematology/Oncology:  Hematology Normal   Oncology Normal                                   EENT/Dental:  chronic allergic rhinitis  Eyes: Visual Impairment   Has Left and S/P Extraction - Left Catarract                  Cardiovascular:  Exercise tolerance: poor   Denies Pacemaker. Hypertension, well controlled       Denies Angina.          Functional Capacity 3 METS                         Pulmonary:    Denies COPD.  Denies Asthma.   Denies Shortness of breath.  Denies Recent URI.  Denies Sleep Apnea.                Renal/:   Denies Chronic Renal Disease. no renal calculi               Hepatic/GI:   Denies PUD.   Denies GERD. Denies Liver Disease.  Denies Hepatitis.           Musculoskeletal:  Arthritis    Musculoskeletal General/Symptoms: low back pain, sciatica.    Joint Disease:  Arthritis, Osteoarthritis, spine     Spine Disorders: lumbar Disc disease, Degenerative disease and Chronic Pain Degenerative Joint Disease        Lumbar Spine Disorders, Lumbar Disc Disease 4/9/2024  Transforaminal epidural  injection of steroid (Bilateral)     Neurological:   CVA, residual symptoms Neuromuscular Disease,   Denies Headaches. Denies Seizures.     Neuro Symptoms of pain, tingling      Chronic Pain Syndrome Pain Etiology/Diagnosis, Arthritis, Osteoarthritis, spine, Low Back Pain, Lumbar Radiculopathy  Sciatica          Neurofibromatosis: 2003.   CVA - Cerebrovasular Accident , Most recent CVA was on 2003  , residual deficits are hemiparesis - left.              Endocrine:  Diabetes, type 2 Denies Hypothyroidism.  Denies Hyperthyroidism.       Denies Obesity / BMI > 30, Denies Morbid Obesity / BMI > 40  Dermatological:  Skin Normal    Psych:  Psychiatric Normal                    Physical Exam  General: Well nourished, Cooperative, Alert and Oriented    Airway:  Mouth Opening: Normal  Tongue: Large  Neck ROM: Normal ROM    Dental:  Dentures  Upper and lower denture  Chest/Lungs:  Clear to auscultation, Normal Respiratory Rate, Respiratory Distress    Heart:  Rate: Normal  Rhythm: Regular Rhythm  Sounds: Normal          Anesthesia Assessment: Preoperative EQUATION    Planned Procedure: Procedure(s) (LRB):  OPEN ROBOTIC L5 -S1 TILF (N/A)  Requested Anesthesia Type:General  Surgeon: Juan Luis Mcbride DO  Service: Neurosurgery  Known or anticipated Date of Surgery:9/20/2024    Surgeon notes: reviewed    Electronic QUestionnaire Assessment completed via nurse interview with patient.        Triage considerations:       Previous anesthesia records:Not available  **THIS WILL BE HIS FIRST SURGERY**    **PATIENT SAID HE HAS HAD TEETH EXTRACTED AND COLONOSCOPY WITH NO PROBLEM WITH THE ANESTHESIA**    4/9/2024   L4/5   Transforaminal epidural injection of steroid (Bilateral)      [facility]   Subspecialty notes: Endocrinology, Neurosurgery, Pain Management    Other important co-morbidities: PER EPIC: DM2, HTN, and LUMBAR SPINAL STENOSIS, H/O STROKE, OSTEOPOROSIS       Tests already available:  Available tests,   within 3 months , 3-6 months ago , 6-12 months ago , within [facility] .   8/8/2024 CT LUMBAR SPINE W/O CONTRAST, 7/18/2024 XRAY LUMBAR COMPLETE INCLUDING F&E, XRAY SCOLIOSIS COMPLETE, 7/15/2024 CBC, 6/5/2024 TSH, 3/18/2024 MRI LUMBAR SPINE  W/O CONTRAST           Instructions given. (See in Nurse's note)    Optimization:  Anesthesia Preop Clinic Assessment  Indicated    Medical Opinion Indicated          Plan:    Testing:  A1C, BMP, EKG, PT/INR, PTT, T&S, and UA   Pre-anesthesia  visit       Visit focus: concerns in complex and/or prolonged anesthesia, position other than supine, COMORBIDITIES     Consultation:Patient's PCP for re-evaluation     Patient  has previously scheduled Medical Appointment:NONE    Navigation: Tests Scheduled.TBD              Consults scheduled.TBD             Results will be tracked by Preop Clinic.  Sarah Arriaza RN BSN

## 2024-09-06 NOTE — DISCHARGE INSTRUCTIONS
Your surgery has been scheduled for:_9/20/2024_    You should report to:  X____[facility], located on the [facility] side of the  Second floor of the [facility] ([phone])    Please Note   Tell your doctor if you take Aspirin, products containing Aspirin, herbal medications  or blood thinners, such as Coumadin, Ticlid, or Plavix.  (Consult your provider regarding holding or stopping before surgery).  Arrange for someone to drive you home following surgery.  You will not be allowed to leave the surgical facility alone or drive yourself home following sedation and anesthesia.    Before Surgery  Stop taking all herbal medications, vitamins, and supplements 7 days prior to surgery  No Motrin/Advil (Ibuprofen) 7 days before surgery  No Aleve (Naproxen) 7 days before surgery  Stop Taking Asprin, products containing Asprin 5 days before surgery: LAST DOSE 9/14/2024   Stop taking blood thinners_______days before surgery  No Goody's/BC  Powder 7 days before surgery  Refrain from drinking alcoholic beverages for 24hours before and after surgery  Stop or limit smoking _________days before surgery  You may take Tylenol for pain    Night before Surgery  Do not eat or drink after midnight  Take a shower or bath (shower is recommended).  Bathe with Hibiclens soap or an antibacterial soap from the neck down.  If not supplied by your surgeon, hibiclens soap will need to be purchased over the counter in pharmacy.  Rinse soap off thoroughly.  Shampoo your hair with your regular shampoo    The Day of Surgery  Take another bath or shower with hibiclens or any antibacterial soap, to reduce the chance of infection.  Take heart and blood pressure medications with a small sip of water, as advised by the perioperative team.  Do not take fluid pills  You may brush your teeth and rinse your mouth, but do not swall any additional water.   Do not apply perfumes, powder, body lotions or deodorant on the  day of surgery.  Nail polish should be removed.  Do not wear makeup or moisturizer  Wear comfortable clothes, such as a button front shirt and loose fitting pants.  Leave all jewelry, including body piercings, and valuables at home.    Bring any devices you will neeed after surgery such as crutches or canes.  If you have sleep apnea, please bring your CPAP machine  In the event that your physical condition changes including the onset of a cold or respiratory illness, or if you have to delay or cancel your surgery, please notify your surgeon.       Anesthesia: General Anesthesia     You are watched continuously during your procedure by your anesthesia provider.     Youre due to have surgery. During surgery, youll be given medicine called anesthesia or anesthetic. This will keep you comfortable and pain-free. Your anesthesia provider will use general anesthesia.  What is general anesthesia?  General anesthesia puts you into a state like deep sleep. It goes into the bloodstream (IV anesthetics), into the lungs (gas anesthetics), or both. You feel nothing during the procedure. You will not remember it. During the procedure, the anesthesia provider monitors you continuously. He or she checks your heart rate and rhythm, blood pressure, breathing, and blood oxygen.  IV anesthetics. IV anesthetics are given through an IV line in your arm. Theyre often given first. This is so you are asleep before a gas anesthetic is started. Some kinds of IV anesthetics relieve pain. Others relax you. Your doctor will decide which kind is best in your case.  Gas anesthetics. Gas anesthetics are breathed into the lungs. They are often used to keep you asleep. They can be given through a facemask or a tube placed in your larynx or trachea (breathing tube).  If you have a facemask, your anesthesia provider will most likely place it over your nose and mouth while youre still awake. Youll breathe oxygen through the mask as your IV  anesthetic is started. Gas anesthetic may be added through the mask.  If you have a tube in the larynx or trachea, it will be inserted into your throat after youre asleep.  Anesthesia tools and medicines  You will likely have:  IV anesthetics. These are put into an IV line into your bloodstream.  Gas anesthetics. You breathe these anesthetics into your lungs, where they pass into your bloodstream.  Pulse oximeter. This is a small clip that is attached to the end of your finger. This measures your blood oxygen level.  Electrocardiography leads (electrodes). These are small sticky pads that are placed on your chest. They record your heart rate and rhythm.  Blood pressure cuff. This reads your blood pressure.  Risks and possible complications  General anesthesia has some risks. These include:  Breathing problems  Nausea and vomiting  Sore throat or hoarseness (usually temporary)  Allergic reaction to the anesthetic  Irregular heartbeat (rare)  Cardiac arrest (rare)   Anesthesia safety  Follow all instructions you are given for how long not to eat or drink before your procedure.  Be sure your doctor knows what medicines and drugs you take. This includes over-the-counter medicines, herbs, supplements, alcohol or other drugs. You will be asked when those were last taken.  Have an adult family member or friend drive you home after the procedure.  For the first 24 hours after your surgery:  Do not drive or use heavy equipment.  Do not make important decisions or sign legal documents. If important decisions or signing legal documents is necessary during the first 24 hours after surgery, have a trusted family member or spouse act on your behalf.  Avoid alcohol.  Have a responsible adult stay with you. He or she can watch for problems and help keep you safe.  Date Last Reviewed: 12/1/2016 © 2000-2017 [employer]. [address] [place] [address]. All rights reserved. This information is not intended as  a substitute for professional medical care. Always follow your healthcare professional's instructions.

## 2024-09-09 ENCOUNTER — ANESTHESIA EVENT (OUTPATIENT)
Dept: SURGERY | Facility: HOSPITAL | Age: 79
End: 2024-09-09
Payer: MEDICARE

## 2024-09-09 ENCOUNTER — HOSPITAL ENCOUNTER (OUTPATIENT)
Dept: PREADMISSION TESTING | Facility: HOSPITAL | Age: 79
Discharge: HOME | End: 2024-09-09
Attending: STUDENT IN AN ORGANIZED HEALTH CARE EDUCATION/TRAINING PROGRAM
Payer: MEDICARE

## 2024-09-09 ENCOUNTER — HOSPITAL ENCOUNTER (OUTPATIENT)
Dept: CARDIOLOGY | Facility: CLINIC | Age: 79
Discharge: HOME | End: 2024-09-09
Payer: MEDICARE

## 2024-09-09 VITALS
HEIGHT: 71 IN | DIASTOLIC BLOOD PRESSURE: 78 MMHG | BODY MASS INDEX: 27.3 KG/M2 | SYSTOLIC BLOOD PRESSURE: 145 MMHG | WEIGHT: 195 LBS | HEART RATE: 79 BPM | OXYGEN SATURATION: 97 % | TEMPERATURE: 98 F

## 2024-09-09 DIAGNOSIS — Z01.818 PREOPERATIVE TESTING: ICD-10-CM

## 2024-09-09 LAB
OHS QRS DURATION: 96 MS
OHS QTC CALCULATION: 447 MS

## 2024-09-09 PROCEDURE — 93005 ELECTROCARDIOGRAM TRACING: CPT | Mod: S$GLB,,, | Performed by: ANESTHESIOLOGY

## 2024-09-09 PROCEDURE — 93010 ELECTROCARDIOGRAM REPORT: CPT | Mod: S$GLB,,, | Performed by: INTERNAL MEDICINE

## 2024-09-19 ENCOUNTER — TELEPHONE (OUTPATIENT)
Dept: NEUROSURGERY | Facility: CLINIC | Age: 79
End: 2024-09-19
Payer: MEDICARE

## 2024-09-20 ENCOUNTER — HOSPITAL ENCOUNTER (INPATIENT)
Facility: HOSPITAL | Age: 79
LOS: 11 days | Discharge: HOME | DRG: 426 | End: 2024-10-01
Attending: STUDENT IN AN ORGANIZED HEALTH CARE EDUCATION/TRAINING PROGRAM | Admitting: STUDENT IN AN ORGANIZED HEALTH CARE EDUCATION/TRAINING PROGRAM
Payer: MEDICARE

## 2024-09-20 ENCOUNTER — ANESTHESIA (OUTPATIENT)
Dept: SURGERY | Facility: HOSPITAL | Age: 79
End: 2024-09-20
Payer: MEDICARE

## 2024-09-20 DIAGNOSIS — E11.36 TYPE 2 DIABETES MELLITUS WITH DIABETIC CATARACT, WITHOUT LONG-TERM CURRENT USE OF INSULIN: ICD-10-CM

## 2024-09-20 DIAGNOSIS — Z98.890 STATUS POST LUMBAR SURGERY: ICD-10-CM

## 2024-09-20 DIAGNOSIS — Z98.890 STATUS POST LUMBAR SURGERY: Primary | ICD-10-CM

## 2024-09-20 DIAGNOSIS — R94.31 PROLONGED QT INTERVAL: ICD-10-CM

## 2024-09-20 DIAGNOSIS — M48.061 SPINAL STENOSIS, LUMBAR: ICD-10-CM

## 2024-09-20 DIAGNOSIS — T14.8XXA SURGICAL WOUND PRESENT: ICD-10-CM

## 2024-09-20 LAB
ABO + RH BLD: NORMAL
BLD GP AB SCN CELLS X3 SERPL QL: NORMAL
POCT GLUCOSE: 178 MG/DL (ref 70–110)
POCT GLUCOSE: 189 MG/DL (ref 70–110)
SPECIMEN OUTDATE: NORMAL

## 2024-09-20 PROCEDURE — 63600175 PHARM REV CODE 636 W HCPCS: Performed by: ANESTHESIOLOGY

## 2024-09-20 PROCEDURE — 27201423 OPTIME MED/SURG SUP & DEVICES STERILE SUPPLY: Performed by: STUDENT IN AN ORGANIZED HEALTH CARE EDUCATION/TRAINING PROGRAM

## 2024-09-20 PROCEDURE — 20936 SP BONE AGRFT LOCAL ADD-ON: CPT | Mod: ,,, | Performed by: STUDENT IN AN ORGANIZED HEALTH CARE EDUCATION/TRAINING PROGRAM

## 2024-09-20 PROCEDURE — 22842 INSERT SPINE FIXATION DEVICE: CPT | Mod: ,,, | Performed by: STUDENT IN AN ORGANIZED HEALTH CARE EDUCATION/TRAINING PROGRAM

## 2024-09-20 PROCEDURE — 71000033 HC RECOVERY, INTIAL HOUR: Performed by: STUDENT IN AN ORGANIZED HEALTH CARE EDUCATION/TRAINING PROGRAM

## 2024-09-20 PROCEDURE — 27800903 OPTIME MED/SURG SUP & DEVICES OTHER IMPLANTS: Performed by: STUDENT IN AN ORGANIZED HEALTH CARE EDUCATION/TRAINING PROGRAM

## 2024-09-20 PROCEDURE — 36000713 HC OR TIME LEV V EA ADD 15 MIN: Performed by: STUDENT IN AN ORGANIZED HEALTH CARE EDUCATION/TRAINING PROGRAM

## 2024-09-20 PROCEDURE — 63600175 PHARM REV CODE 636 W HCPCS: Performed by: NURSE ANESTHETIST, CERTIFIED REGISTERED

## 2024-09-20 PROCEDURE — 11000001 HC ACUTE MED/SURG PRIVATE ROOM

## 2024-09-20 PROCEDURE — 86850 RBC ANTIBODY SCREEN: CPT | Performed by: STUDENT IN AN ORGANIZED HEALTH CARE EDUCATION/TRAINING PROGRAM

## 2024-09-20 PROCEDURE — 71000015 HC POSTOP RECOV 1ST HR: Performed by: STUDENT IN AN ORGANIZED HEALTH CARE EDUCATION/TRAINING PROGRAM

## 2024-09-20 PROCEDURE — 61783 SCAN PROC SPINAL: CPT | Mod: ,,, | Performed by: STUDENT IN AN ORGANIZED HEALTH CARE EDUCATION/TRAINING PROGRAM

## 2024-09-20 PROCEDURE — 37000009 HC ANESTHESIA EA ADD 15 MINS: Performed by: STUDENT IN AN ORGANIZED HEALTH CARE EDUCATION/TRAINING PROGRAM

## 2024-09-20 PROCEDURE — 63600175 PHARM REV CODE 636 W HCPCS: Performed by: STUDENT IN AN ORGANIZED HEALTH CARE EDUCATION/TRAINING PROGRAM

## 2024-09-20 PROCEDURE — 01NR0ZZ RELEASE SACRAL NERVE, OPEN APPROACH: ICD-10-PCS | Performed by: STUDENT IN AN ORGANIZED HEALTH CARE EDUCATION/TRAINING PROGRAM

## 2024-09-20 PROCEDURE — 20930 SP BONE ALGRFT MORSEL ADD-ON: CPT | Mod: ,,, | Performed by: STUDENT IN AN ORGANIZED HEALTH CARE EDUCATION/TRAINING PROGRAM

## 2024-09-20 PROCEDURE — 82962 GLUCOSE BLOOD TEST: CPT | Performed by: STUDENT IN AN ORGANIZED HEALTH CARE EDUCATION/TRAINING PROGRAM

## 2024-09-20 PROCEDURE — 25000003 PHARM REV CODE 250

## 2024-09-20 PROCEDURE — 71000016 HC POSTOP RECOV ADDL HR: Performed by: STUDENT IN AN ORGANIZED HEALTH CARE EDUCATION/TRAINING PROGRAM

## 2024-09-20 PROCEDURE — 0SG3071 FUSION OF LUMBOSACRAL JOINT WITH AUTOLOGOUS TISSUE SUBSTITUTE, POSTERIOR APPROACH, POSTERIOR COLUMN, OPEN APPROACH: ICD-10-PCS | Performed by: STUDENT IN AN ORGANIZED HEALTH CARE EDUCATION/TRAINING PROGRAM

## 2024-09-20 PROCEDURE — 0SG00A0 FUSION OF LUMBAR VERTEBRAL JOINT WITH INTERBODY FUSION DEVICE, ANTERIOR APPROACH, ANTERIOR COLUMN, OPEN APPROACH: ICD-10-PCS | Performed by: STUDENT IN AN ORGANIZED HEALTH CARE EDUCATION/TRAINING PROGRAM

## 2024-09-20 PROCEDURE — 86901 BLOOD TYPING SEROLOGIC RH(D): CPT | Performed by: STUDENT IN AN ORGANIZED HEALTH CARE EDUCATION/TRAINING PROGRAM

## 2024-09-20 PROCEDURE — 8E0W0CZ ROBOTIC ASSISTED PROCEDURE OF TRUNK REGION, OPEN APPROACH: ICD-10-PCS | Performed by: STUDENT IN AN ORGANIZED HEALTH CARE EDUCATION/TRAINING PROGRAM

## 2024-09-20 PROCEDURE — 8E0WXBF COMPUTER ASSISTED PROCEDURE OF TRUNK REGION, WITH FLUOROSCOPY: ICD-10-PCS | Performed by: STUDENT IN AN ORGANIZED HEALTH CARE EDUCATION/TRAINING PROGRAM

## 2024-09-20 PROCEDURE — 22853 INSJ BIOMECHANICAL DEVICE: CPT | Mod: ,,, | Performed by: STUDENT IN AN ORGANIZED HEALTH CARE EDUCATION/TRAINING PROGRAM

## 2024-09-20 PROCEDURE — 25000003 PHARM REV CODE 250: Performed by: STUDENT IN AN ORGANIZED HEALTH CARE EDUCATION/TRAINING PROGRAM

## 2024-09-20 PROCEDURE — 01NB0ZZ RELEASE LUMBAR NERVE, OPEN APPROACH: ICD-10-PCS | Performed by: STUDENT IN AN ORGANIZED HEALTH CARE EDUCATION/TRAINING PROGRAM

## 2024-09-20 PROCEDURE — 37000008 HC ANESTHESIA 1ST 15 MINUTES: Performed by: STUDENT IN AN ORGANIZED HEALTH CARE EDUCATION/TRAINING PROGRAM

## 2024-09-20 PROCEDURE — C1729 CATH, DRAINAGE: HCPCS | Performed by: STUDENT IN AN ORGANIZED HEALTH CARE EDUCATION/TRAINING PROGRAM

## 2024-09-20 PROCEDURE — C1713 ANCHOR/SCREW BN/BN,TIS/BN: HCPCS | Performed by: STUDENT IN AN ORGANIZED HEALTH CARE EDUCATION/TRAINING PROGRAM

## 2024-09-20 PROCEDURE — 22214 INCIS 1 VERTEBRAL SEG LUMBAR: CPT | Mod: 51,,, | Performed by: STUDENT IN AN ORGANIZED HEALTH CARE EDUCATION/TRAINING PROGRAM

## 2024-09-20 PROCEDURE — 86920 COMPATIBILITY TEST SPIN: CPT | Performed by: STUDENT IN AN ORGANIZED HEALTH CARE EDUCATION/TRAINING PROGRAM

## 2024-09-20 PROCEDURE — 0SG0071 FUSION OF LUMBAR VERTEBRAL JOINT WITH AUTOLOGOUS TISSUE SUBSTITUTE, POSTERIOR APPROACH, POSTERIOR COLUMN, OPEN APPROACH: ICD-10-PCS | Performed by: STUDENT IN AN ORGANIZED HEALTH CARE EDUCATION/TRAINING PROGRAM

## 2024-09-20 PROCEDURE — 36000712 HC OR TIME LEV V 1ST 15 MIN: Performed by: STUDENT IN AN ORGANIZED HEALTH CARE EDUCATION/TRAINING PROGRAM

## 2024-09-20 PROCEDURE — 22216 INCIS ADDL SPINE SEGMENT: CPT | Mod: ,,, | Performed by: STUDENT IN AN ORGANIZED HEALTH CARE EDUCATION/TRAINING PROGRAM

## 2024-09-20 PROCEDURE — 22633 ARTHRD CMBN 1NTRSPC LUMBAR: CPT | Mod: ,,, | Performed by: STUDENT IN AN ORGANIZED HEALTH CARE EDUCATION/TRAINING PROGRAM

## 2024-09-20 PROCEDURE — 22634 ARTHRD CMBN 1NTRSPC EA ADDL: CPT | Mod: ,,, | Performed by: STUDENT IN AN ORGANIZED HEALTH CARE EDUCATION/TRAINING PROGRAM

## 2024-09-20 PROCEDURE — 00NY0ZZ RELEASE LUMBAR SPINAL CORD, OPEN APPROACH: ICD-10-PCS | Performed by: STUDENT IN AN ORGANIZED HEALTH CARE EDUCATION/TRAINING PROGRAM

## 2024-09-20 PROCEDURE — 0SG30A0 FUSION OF LUMBOSACRAL JOINT WITH INTERBODY FUSION DEVICE, ANTERIOR APPROACH, ANTERIOR COLUMN, OPEN APPROACH: ICD-10-PCS | Performed by: STUDENT IN AN ORGANIZED HEALTH CARE EDUCATION/TRAINING PROGRAM

## 2024-09-20 PROCEDURE — 86900 BLOOD TYPING SEROLOGIC ABO: CPT | Performed by: STUDENT IN AN ORGANIZED HEALTH CARE EDUCATION/TRAINING PROGRAM

## 2024-09-20 DEVICE — SCREW BONE SPINAL CREO 6.5X45: Type: IMPLANTABLE DEVICE | Site: SPINE LUMBAR | Status: FUNCTIONAL

## 2024-09-20 DEVICE — CAP SPINAL LOCK THRD CREO 5.5: Type: IMPLANTABLE DEVICE | Site: SPINE LUMBAR | Status: FUNCTIONAL

## 2024-09-20 DEVICE — SCREW BONE SPINAL 5.5X45MM: Type: IMPLANTABLE DEVICE | Site: SPINE LUMBAR | Status: FUNCTIONAL

## 2024-09-20 DEVICE — IMPLANTABLE DEVICE: Type: IMPLANTABLE DEVICE | Site: SPINE LUMBAR | Status: FUNCTIONAL

## 2024-09-20 DEVICE — SCREW BONE CREO 5.5X50: Type: IMPLANTABLE DEVICE | Site: SPINE LUMBAR | Status: FUNCTIONAL

## 2024-09-20 DEVICE — PUTTY OSTEOSELECT DBM SYR 10CC: Type: IMPLANTABLE DEVICE | Site: SPINE LUMBAR | Status: FUNCTIONAL

## 2024-09-20 DEVICE — SCREW BONE SPINAL 6.5X40MM: Type: IMPLANTABLE DEVICE | Site: SPINE LUMBAR | Status: FUNCTIONAL

## 2024-09-20 RX ORDER — FENTANYL CITRATE 50 UG/ML
INJECTION, SOLUTION INTRAMUSCULAR; INTRAVENOUS
Status: DISCONTINUED | OUTPATIENT
Start: 2024-09-20 | End: 2024-09-20

## 2024-09-20 RX ORDER — PROPOFOL 10 MG/ML
VIAL (ML) INTRAVENOUS
Status: DISCONTINUED | OUTPATIENT
Start: 2024-09-20 | End: 2024-09-20

## 2024-09-20 RX ORDER — ALUMINUM HYDROXIDE, MAGNESIUM HYDROXIDE, AND SIMETHICONE 1200; 120; 1200 MG/30ML; MG/30ML; MG/30ML
30 SUSPENSION ORAL EVERY 4 HOURS PRN
Status: DISCONTINUED | OUTPATIENT
Start: 2024-09-20 | End: 2024-10-01 | Stop reason: HOSPADM

## 2024-09-20 RX ORDER — CALCIUM CARBONATE 200(500)MG
500 TABLET,CHEWABLE ORAL 2 TIMES DAILY
Status: DISCONTINUED | OUTPATIENT
Start: 2024-09-20 | End: 2024-10-01 | Stop reason: HOSPADM

## 2024-09-20 RX ORDER — SODIUM CHLORIDE 9 MG/ML
INJECTION, SOLUTION INTRAVENOUS CONTINUOUS
Status: ACTIVE | OUTPATIENT
Start: 2024-09-20 | End: 2024-09-22

## 2024-09-20 RX ORDER — LOSARTAN POTASSIUM 25 MG/1
100 TABLET ORAL DAILY
Status: DISCONTINUED | OUTPATIENT
Start: 2024-09-21 | End: 2024-10-01 | Stop reason: HOSPADM

## 2024-09-20 RX ORDER — VANCOMYCIN HYDROCHLORIDE 1 G/20ML
INJECTION, POWDER, LYOPHILIZED, FOR SOLUTION INTRAVENOUS
Status: DISCONTINUED | OUTPATIENT
Start: 2024-09-20 | End: 2024-09-20 | Stop reason: HOSPADM

## 2024-09-20 RX ORDER — LIDOCAINE HYDROCHLORIDE 20 MG/ML
INJECTION, SOLUTION EPIDURAL; INFILTRATION; INTRACAUDAL; PERINEURAL
Status: DISCONTINUED | OUTPATIENT
Start: 2024-09-20 | End: 2024-09-20

## 2024-09-20 RX ORDER — INSULIN ASPART 100 [IU]/ML
0-5 INJECTION, SOLUTION INTRAVENOUS; SUBCUTANEOUS
Status: DISCONTINUED | OUTPATIENT
Start: 2024-09-20 | End: 2024-09-28

## 2024-09-20 RX ORDER — IBUPROFEN 200 MG
16 TABLET ORAL
Status: DISCONTINUED | OUTPATIENT
Start: 2024-09-20 | End: 2024-10-01 | Stop reason: HOSPADM

## 2024-09-20 RX ORDER — AMLODIPINE BESYLATE 10 MG/1
10 TABLET ORAL DAILY
Status: DISCONTINUED | OUTPATIENT
Start: 2024-09-21 | End: 2024-10-01 | Stop reason: HOSPADM

## 2024-09-20 RX ORDER — SODIUM CHLORIDE 9 MG/ML
INJECTION, SOLUTION INTRAVENOUS CONTINUOUS
Status: DISCONTINUED | OUTPATIENT
Start: 2024-09-20 | End: 2024-09-24

## 2024-09-20 RX ORDER — OXYCODONE HYDROCHLORIDE 5 MG/1
5 TABLET ORAL EVERY 4 HOURS PRN
Status: DISCONTINUED | OUTPATIENT
Start: 2024-09-20 | End: 2024-09-30

## 2024-09-20 RX ORDER — GLUCAGON 1 MG
1 KIT INJECTION
Status: DISCONTINUED | OUTPATIENT
Start: 2024-09-20 | End: 2024-10-01 | Stop reason: HOSPADM

## 2024-09-20 RX ORDER — HYDROMORPHONE HYDROCHLORIDE 1 MG/ML
0.2 INJECTION, SOLUTION INTRAMUSCULAR; INTRAVENOUS; SUBCUTANEOUS EVERY 5 MIN PRN
Status: DISCONTINUED | OUTPATIENT
Start: 2024-09-20 | End: 2024-09-21 | Stop reason: HOSPADM

## 2024-09-20 RX ORDER — HYDROMORPHONE HYDROCHLORIDE 1 MG/ML
1 INJECTION, SOLUTION INTRAMUSCULAR; INTRAVENOUS; SUBCUTANEOUS EVERY 4 HOURS PRN
Status: DISCONTINUED | OUTPATIENT
Start: 2024-09-20 | End: 2024-09-23

## 2024-09-20 RX ORDER — MUPIROCIN 20 MG/G
OINTMENT TOPICAL
Status: COMPLETED
Start: 2024-09-20 | End: 2024-09-20

## 2024-09-20 RX ORDER — ONDANSETRON HYDROCHLORIDE 2 MG/ML
4 INJECTION, SOLUTION INTRAVENOUS ONCE AS NEEDED
Status: DISCONTINUED | OUTPATIENT
Start: 2024-09-20 | End: 2024-09-21 | Stop reason: HOSPADM

## 2024-09-20 RX ORDER — SUCCINYLCHOLINE CHLORIDE 20 MG/ML
INJECTION INTRAMUSCULAR; INTRAVENOUS
Status: DISCONTINUED | OUTPATIENT
Start: 2024-09-20 | End: 2024-09-20

## 2024-09-20 RX ORDER — PRAVASTATIN SODIUM 40 MG/1
40 TABLET ORAL NIGHTLY
Status: DISCONTINUED | OUTPATIENT
Start: 2024-09-20 | End: 2024-10-01 | Stop reason: HOSPADM

## 2024-09-20 RX ORDER — TALC
6 POWDER (GRAM) TOPICAL NIGHTLY PRN
Status: DISCONTINUED | OUTPATIENT
Start: 2024-09-20 | End: 2024-10-01 | Stop reason: HOSPADM

## 2024-09-20 RX ORDER — DEXAMETHASONE SODIUM PHOSPHATE 4 MG/ML
INJECTION, SOLUTION INTRA-ARTICULAR; INTRALESIONAL; INTRAMUSCULAR; INTRAVENOUS; SOFT TISSUE
Status: DISCONTINUED | OUTPATIENT
Start: 2024-09-20 | End: 2024-09-20

## 2024-09-20 RX ORDER — FENTANYL CITRATE 50 UG/ML
25 INJECTION, SOLUTION INTRAMUSCULAR; INTRAVENOUS EVERY 5 MIN PRN
Status: COMPLETED | OUTPATIENT
Start: 2024-09-20 | End: 2024-09-21

## 2024-09-20 RX ORDER — DIPHENHYDRAMINE HYDROCHLORIDE 50 MG/ML
25 INJECTION INTRAMUSCULAR; INTRAVENOUS EVERY 6 HOURS PRN
Status: DISCONTINUED | OUTPATIENT
Start: 2024-09-20 | End: 2024-09-21 | Stop reason: HOSPADM

## 2024-09-20 RX ORDER — MUPIROCIN 20 MG/G
OINTMENT TOPICAL 2 TIMES DAILY
Status: DISPENSED | OUTPATIENT
Start: 2024-09-20 | End: 2024-09-23

## 2024-09-20 RX ORDER — KETAMINE HCL IN 0.9 % NACL 50 MG/5 ML
SYRINGE (ML) INTRAVENOUS
Status: DISCONTINUED | OUTPATIENT
Start: 2024-09-20 | End: 2024-09-20

## 2024-09-20 RX ORDER — METHOCARBAMOL 750 MG/1
750 TABLET, FILM COATED ORAL 4 TIMES DAILY
Status: DISCONTINUED | OUTPATIENT
Start: 2024-09-21 | End: 2024-09-26

## 2024-09-20 RX ORDER — MUPIROCIN 20 MG/G
OINTMENT TOPICAL
Status: DISCONTINUED | OUTPATIENT
Start: 2024-09-20 | End: 2024-09-21 | Stop reason: HOSPADM

## 2024-09-20 RX ORDER — OXYCODONE HYDROCHLORIDE 10 MG/1
10 TABLET ORAL EVERY 4 HOURS PRN
Status: DISCONTINUED | OUTPATIENT
Start: 2024-09-20 | End: 2024-09-30

## 2024-09-20 RX ORDER — ONDANSETRON 8 MG/1
8 TABLET, ORALLY DISINTEGRATING ORAL EVERY 6 HOURS PRN
Status: DISCONTINUED | OUTPATIENT
Start: 2024-09-20 | End: 2024-10-01 | Stop reason: HOSPADM

## 2024-09-20 RX ORDER — PROCHLORPERAZINE EDISYLATE 5 MG/ML
5 INJECTION INTRAMUSCULAR; INTRAVENOUS EVERY 30 MIN PRN
Status: DISCONTINUED | OUTPATIENT
Start: 2024-09-20 | End: 2024-09-21 | Stop reason: HOSPADM

## 2024-09-20 RX ORDER — LIDOCAINE HYDROCHLORIDE AND EPINEPHRINE 10; 10 MG/ML; UG/ML
INJECTION, SOLUTION INFILTRATION; PERINEURAL
Status: DISCONTINUED | OUTPATIENT
Start: 2024-09-20 | End: 2024-09-20 | Stop reason: HOSPADM

## 2024-09-20 RX ORDER — DEXMEDETOMIDINE HYDROCHLORIDE 100 UG/ML
INJECTION, SOLUTION INTRAVENOUS
Status: DISCONTINUED | OUTPATIENT
Start: 2024-09-20 | End: 2024-09-20

## 2024-09-20 RX ORDER — AMOXICILLIN 250 MG
2 CAPSULE ORAL 2 TIMES DAILY
Status: DISCONTINUED | OUTPATIENT
Start: 2024-09-20 | End: 2024-10-01 | Stop reason: HOSPADM

## 2024-09-20 RX ORDER — ONDANSETRON HYDROCHLORIDE 2 MG/ML
INJECTION, SOLUTION INTRAVENOUS
Status: DISCONTINUED | OUTPATIENT
Start: 2024-09-20 | End: 2024-09-20

## 2024-09-20 RX ORDER — ROCURONIUM BROMIDE 10 MG/ML
INJECTION, SOLUTION INTRAVENOUS
Status: DISCONTINUED | OUTPATIENT
Start: 2024-09-20 | End: 2024-09-20

## 2024-09-20 RX ORDER — MUPIROCIN 20 MG/G
1 OINTMENT TOPICAL 2 TIMES DAILY
Status: ACTIVE | OUTPATIENT
Start: 2024-09-20 | End: 2024-09-21

## 2024-09-20 RX ORDER — IBUPROFEN 200 MG
24 TABLET ORAL
Status: DISCONTINUED | OUTPATIENT
Start: 2024-09-20 | End: 2024-10-01 | Stop reason: HOSPADM

## 2024-09-20 RX ORDER — PROCHLORPERAZINE EDISYLATE 5 MG/ML
5 INJECTION INTRAMUSCULAR; INTRAVENOUS EVERY 6 HOURS PRN
Status: DISCONTINUED | OUTPATIENT
Start: 2024-09-20 | End: 2024-09-23

## 2024-09-20 RX ORDER — ACETAMINOPHEN 500 MG
1000 TABLET ORAL EVERY 8 HOURS
Status: DISCONTINUED | OUTPATIENT
Start: 2024-09-20 | End: 2024-09-24

## 2024-09-20 RX ORDER — CARVEDILOL 6.25 MG/1
6.25 TABLET ORAL 2 TIMES DAILY
Status: DISCONTINUED | OUTPATIENT
Start: 2024-09-20 | End: 2024-10-01 | Stop reason: HOSPADM

## 2024-09-20 RX ORDER — TAMSULOSIN HYDROCHLORIDE 0.4 MG/1
0.4 CAPSULE ORAL DAILY
Status: DISCONTINUED | OUTPATIENT
Start: 2024-09-21 | End: 2024-10-01 | Stop reason: HOSPADM

## 2024-09-20 RX ADMIN — ACETAMINOPHEN 1000 MG: 500 TABLET ORAL at 11:09

## 2024-09-20 RX ADMIN — DEXMEDETOMIDINE 8 MCG: 100 INJECTION, SOLUTION, CONCENTRATE INTRAVENOUS at 07:09

## 2024-09-20 RX ADMIN — SODIUM CHLORIDE: 9 INJECTION, SOLUTION INTRAVENOUS at 11:09

## 2024-09-20 RX ADMIN — SODIUM CHLORIDE: 0.9 INJECTION, SOLUTION INTRAVENOUS at 06:09

## 2024-09-20 RX ADMIN — Medication 10 MG: at 09:09

## 2024-09-20 RX ADMIN — PRAVASTATIN SODIUM 40 MG: 20 TABLET ORAL at 11:09

## 2024-09-20 RX ADMIN — Medication 10 MG: at 08:09

## 2024-09-20 RX ADMIN — LIDOCAINE HYDROCHLORIDE 100 MG: 20 INJECTION, SOLUTION EPIDURAL; INFILTRATION; INTRACAUDAL; PERINEURAL at 06:09

## 2024-09-20 RX ADMIN — CARVEDILOL 6.25 MG: 6.25 TABLET, FILM COATED ORAL at 11:09

## 2024-09-20 RX ADMIN — SODIUM CHLORIDE 0.2 MCG/KG/MIN: 9 INJECTION, SOLUTION INTRAVENOUS at 06:09

## 2024-09-20 RX ADMIN — PHENYLEPHRINE HYDROCHLORIDE 0.2 MCG/KG/MIN: 10 INJECTION INTRAVENOUS at 06:09

## 2024-09-20 RX ADMIN — PROPOFOL 100 MG: 10 INJECTION, EMULSION INTRAVENOUS at 06:09

## 2024-09-20 RX ADMIN — MUPIROCIN: 20 OINTMENT TOPICAL at 11:09

## 2024-09-20 RX ADMIN — SUGAMMADEX 200 MG: 100 INJECTION, SOLUTION INTRAVENOUS at 07:09

## 2024-09-20 RX ADMIN — OXYCODONE HYDROCHLORIDE 10 MG: 10 TABLET ORAL at 11:09

## 2024-09-20 RX ADMIN — FENTANYL CITRATE 100 MCG: 50 INJECTION, SOLUTION INTRAMUSCULAR; INTRAVENOUS at 06:09

## 2024-09-20 RX ADMIN — SENNOSIDES AND DOCUSATE SODIUM 2 TABLET: 50; 8.6 TABLET ORAL at 11:09

## 2024-09-20 RX ADMIN — Medication 30 MG: at 07:09

## 2024-09-20 RX ADMIN — FENTANYL CITRATE 25 MCG: 50 INJECTION INTRAMUSCULAR; INTRAVENOUS at 11:09

## 2024-09-20 RX ADMIN — ROCURONIUM BROMIDE 5 MG: 10 INJECTION, SOLUTION INTRAVENOUS at 06:09

## 2024-09-20 RX ADMIN — DEXAMETHASONE SODIUM PHOSPHATE 4 MG: 4 INJECTION, SOLUTION INTRAMUSCULAR; INTRAVENOUS at 06:09

## 2024-09-20 RX ADMIN — SUCCINYLCHOLINE 100 MG: 20 INJECTION, SOLUTION INTRAMUSCULAR; INTRAVENOUS at 06:09

## 2024-09-20 RX ADMIN — ONDANSETRON 4 MG: 2 INJECTION INTRAMUSCULAR; INTRAVENOUS at 08:09

## 2024-09-20 RX ADMIN — CEFAZOLIN 2 G: 2 INJECTION, POWDER, FOR SOLUTION INTRAMUSCULAR; INTRAVENOUS at 07:09

## 2024-09-20 RX ADMIN — ROCURONIUM BROMIDE 30 MG: 10 INJECTION, SOLUTION INTRAVENOUS at 07:09

## 2024-09-20 RX ADMIN — CALCIUM CARBONATE (ANTACID) CHEW TAB 500 MG 500 MG: 500 CHEW TAB at 11:09

## 2024-09-20 RX ADMIN — PROPOFOL 180 MG: 10 INJECTION, EMULSION INTRAVENOUS at 06:09

## 2024-09-20 RX ADMIN — SODIUM CHLORIDE, SODIUM GLUCONATE, SODIUM ACETATE, POTASSIUM CHLORIDE, MAGNESIUM CHLORIDE, SODIUM PHOSPHATE, DIBASIC, AND POTASSIUM PHOSPHATE: .53; .5; .37; .037; .03; .012; .00082 INJECTION, SOLUTION INTRAVENOUS at 06:09

## 2024-09-20 RX ADMIN — PROPOFOL 50 MG: 10 INJECTION, EMULSION INTRAVENOUS at 06:09

## 2024-09-20 NOTE — PROGRESS NOTES
PreOp complete. Anesthesia consent needed. Type and screen and ABO sent to blood bank. Belongings given to daughter, Lyla.

## 2024-09-21 PROBLEM — Z98.890 STATUS POST LUMBAR SURGERY: Status: ACTIVE | Noted: 2024-09-21

## 2024-09-21 LAB
ANION GAP SERPL CALC-SCNC: 10 MMOL/L (ref 8–16)
BASOPHILS # BLD AUTO: 0.01 K/UL (ref 0–0.2)
BASOPHILS NFR BLD: 0.1 % (ref 0–1.9)
BUN SERPL-MCNC: 11 MG/DL (ref 8–23)
CALCIUM SERPL-MCNC: 8.2 MG/DL (ref 8.7–10.5)
CHLORIDE SERPL-SCNC: 105 MMOL/L (ref 95–110)
CO2 SERPL-SCNC: 22 MMOL/L (ref 23–29)
CREAT SERPL-MCNC: 1.1 MG/DL (ref 0.5–1.4)
DIFFERENTIAL METHOD BLD: ABNORMAL
EOSINOPHIL # BLD AUTO: 0 K/UL (ref 0–0.5)
EOSINOPHIL NFR BLD: 0 % (ref 0–8)
ERYTHROCYTE [DISTWIDTH] IN BLOOD BY AUTOMATED COUNT: 13.1 % (ref 11.5–14.5)
EST. GFR  (NO RACE VARIABLE): >60 ML/MIN/1.73 M^2
GLUCOSE SERPL-MCNC: 268 MG/DL (ref 70–110)
HCT VFR BLD AUTO: 38.1 % (ref 40–54)
HGB BLD-MCNC: 12.8 G/DL (ref 14–18)
IMM GRANULOCYTES # BLD AUTO: 0.05 K/UL (ref 0–0.04)
IMM GRANULOCYTES NFR BLD AUTO: 0.5 % (ref 0–0.5)
LYMPHOCYTES # BLD AUTO: 0.8 K/UL (ref 1–4.8)
LYMPHOCYTES NFR BLD: 8.9 % (ref 18–48)
MCH RBC QN AUTO: 30 PG (ref 27–31)
MCHC RBC AUTO-ENTMCNC: 33.6 G/DL (ref 32–36)
MCV RBC AUTO: 89 FL (ref 82–98)
MONOCYTES # BLD AUTO: 0.5 K/UL (ref 0.3–1)
MONOCYTES NFR BLD: 5.4 % (ref 4–15)
NEUTROPHILS # BLD AUTO: 8 K/UL (ref 1.8–7.7)
NEUTROPHILS NFR BLD: 85.1 % (ref 38–73)
NRBC BLD-RTO: 0 /100 WBC
PLATELET # BLD AUTO: 158 K/UL (ref 150–450)
PMV BLD AUTO: 11.1 FL (ref 9.2–12.9)
POCT GLUCOSE: 239 MG/DL (ref 70–110)
POCT GLUCOSE: 263 MG/DL (ref 70–110)
POCT GLUCOSE: 284 MG/DL (ref 70–110)
POCT GLUCOSE: 324 MG/DL (ref 70–110)
POTASSIUM SERPL-SCNC: 3.7 MMOL/L (ref 3.5–5.1)
RBC # BLD AUTO: 4.27 M/UL (ref 4.6–6.2)
SODIUM SERPL-SCNC: 137 MMOL/L (ref 136–145)
WBC # BLD AUTO: 9.34 K/UL (ref 3.9–12.7)

## 2024-09-21 PROCEDURE — 36415 COLL VENOUS BLD VENIPUNCTURE: CPT | Performed by: STUDENT IN AN ORGANIZED HEALTH CARE EDUCATION/TRAINING PROGRAM

## 2024-09-21 PROCEDURE — 25000003 PHARM REV CODE 250: Performed by: STUDENT IN AN ORGANIZED HEALTH CARE EDUCATION/TRAINING PROGRAM

## 2024-09-21 PROCEDURE — 11000001 HC ACUTE MED/SURG PRIVATE ROOM

## 2024-09-21 PROCEDURE — 80048 BASIC METABOLIC PNL TOTAL CA: CPT | Performed by: STUDENT IN AN ORGANIZED HEALTH CARE EDUCATION/TRAINING PROGRAM

## 2024-09-21 PROCEDURE — 63600175 PHARM REV CODE 636 W HCPCS: Performed by: STUDENT IN AN ORGANIZED HEALTH CARE EDUCATION/TRAINING PROGRAM

## 2024-09-21 PROCEDURE — 97112 NEUROMUSCULAR REEDUCATION: CPT

## 2024-09-21 PROCEDURE — 97165 OT EVAL LOW COMPLEX 30 MIN: CPT

## 2024-09-21 PROCEDURE — 97530 THERAPEUTIC ACTIVITIES: CPT

## 2024-09-21 PROCEDURE — 97535 SELF CARE MNGMENT TRAINING: CPT

## 2024-09-21 PROCEDURE — 63600175 PHARM REV CODE 636 W HCPCS: Performed by: ANESTHESIOLOGY

## 2024-09-21 PROCEDURE — 97162 PT EVAL MOD COMPLEX 30 MIN: CPT

## 2024-09-21 PROCEDURE — 97116 GAIT TRAINING THERAPY: CPT

## 2024-09-21 PROCEDURE — 85025 COMPLETE CBC W/AUTO DIFF WBC: CPT | Performed by: STUDENT IN AN ORGANIZED HEALTH CARE EDUCATION/TRAINING PROGRAM

## 2024-09-21 RX ADMIN — INSULIN ASPART 3 UNITS: 100 INJECTION, SOLUTION INTRAVENOUS; SUBCUTANEOUS at 05:09

## 2024-09-21 RX ADMIN — SENNOSIDES AND DOCUSATE SODIUM 2 TABLET: 50; 8.6 TABLET ORAL at 08:09

## 2024-09-21 RX ADMIN — FENTANYL CITRATE 25 MCG: 50 INJECTION INTRAMUSCULAR; INTRAVENOUS at 12:09

## 2024-09-21 RX ADMIN — SENNOSIDES AND DOCUSATE SODIUM 2 TABLET: 50; 8.6 TABLET ORAL at 09:09

## 2024-09-21 RX ADMIN — CEFAZOLIN 2 G: 2 INJECTION, POWDER, FOR SOLUTION INTRAMUSCULAR; INTRAVENOUS at 06:09

## 2024-09-21 RX ADMIN — MUPIROCIN: 20 OINTMENT TOPICAL at 09:09

## 2024-09-21 RX ADMIN — SODIUM CHLORIDE: 9 INJECTION, SOLUTION INTRAVENOUS at 10:09

## 2024-09-21 RX ADMIN — OXYCODONE HYDROCHLORIDE 10 MG: 10 TABLET ORAL at 03:09

## 2024-09-21 RX ADMIN — CARVEDILOL 6.25 MG: 6.25 TABLET, FILM COATED ORAL at 08:09

## 2024-09-21 RX ADMIN — ACETAMINOPHEN 1000 MG: 500 TABLET ORAL at 02:09

## 2024-09-21 RX ADMIN — HYDROMORPHONE HYDROCHLORIDE 1 MG: 1 INJECTION, SOLUTION INTRAMUSCULAR; INTRAVENOUS; SUBCUTANEOUS at 02:09

## 2024-09-21 RX ADMIN — OXYCODONE HYDROCHLORIDE 10 MG: 10 TABLET ORAL at 10:09

## 2024-09-21 RX ADMIN — CALCIUM CARBONATE (ANTACID) CHEW TAB 500 MG 500 MG: 500 CHEW TAB at 09:09

## 2024-09-21 RX ADMIN — OXYCODONE HYDROCHLORIDE 10 MG: 10 TABLET ORAL at 08:09

## 2024-09-21 RX ADMIN — CARVEDILOL 6.25 MG: 6.25 TABLET, FILM COATED ORAL at 09:09

## 2024-09-21 RX ADMIN — LOSARTAN POTASSIUM 100 MG: 25 TABLET, FILM COATED ORAL at 09:09

## 2024-09-21 RX ADMIN — AMLODIPINE BESYLATE 10 MG: 10 TABLET ORAL at 09:09

## 2024-09-21 RX ADMIN — METHOCARBAMOL 750 MG: 750 TABLET ORAL at 08:09

## 2024-09-21 RX ADMIN — CALCIUM CARBONATE (ANTACID) CHEW TAB 500 MG 500 MG: 500 CHEW TAB at 08:09

## 2024-09-21 RX ADMIN — ACETAMINOPHEN 1000 MG: 500 TABLET ORAL at 08:09

## 2024-09-21 RX ADMIN — METHOCARBAMOL 750 MG: 750 TABLET ORAL at 02:09

## 2024-09-21 RX ADMIN — CEFAZOLIN 2 G: 2 INJECTION, POWDER, FOR SOLUTION INTRAMUSCULAR; INTRAVENOUS at 10:09

## 2024-09-21 RX ADMIN — TAMSULOSIN HYDROCHLORIDE 0.4 MG: 0.4 CAPSULE ORAL at 09:09

## 2024-09-21 RX ADMIN — ACETAMINOPHEN 1000 MG: 500 TABLET ORAL at 05:09

## 2024-09-21 RX ADMIN — METHOCARBAMOL 750 MG: 750 TABLET ORAL at 09:09

## 2024-09-21 RX ADMIN — CEFAZOLIN 2 G: 2 INJECTION, POWDER, FOR SOLUTION INTRAMUSCULAR; INTRAVENOUS at 03:09

## 2024-09-21 RX ADMIN — METHOCARBAMOL 750 MG: 750 TABLET ORAL at 05:09

## 2024-09-21 RX ADMIN — INSULIN ASPART 2 UNITS: 100 INJECTION, SOLUTION INTRAVENOUS; SUBCUTANEOUS at 08:09

## 2024-09-21 RX ADMIN — INSULIN ASPART 2 UNITS: 100 INJECTION, SOLUTION INTRAVENOUS; SUBCUTANEOUS at 09:09

## 2024-09-21 RX ADMIN — PRAVASTATIN SODIUM 40 MG: 20 TABLET ORAL at 08:09

## 2024-09-21 NOTE — NURSING
Nurses Note -- 4 Eyes      9/21/2024   7:58 AM      Skin assessed during: Daily Assessment      [x] No Altered Skin Integrity Present    []Prevention Measures Documented      [] Yes- Altered Skin Integrity Present or Discovered   [] LDA Added if Not in Epic (Describe Wound)   [] New Altered Skin Integrity was Present on Admit and Documented in LDA   [] Wound Image Taken    Wound Care Consulted? No    Attending Nurse:  RASHMI Fink    Second RN/Staff Member:   KATHY Harley

## 2024-09-21 NOTE — TRANSFER OF CARE
"Anesthesia Transfer of Care Note    Patient: Robi Donovan    Procedure(s) Performed: Procedure(s) (LRB):  OPEN ROBOTIC L4 -S1 TLIF (N/A)    Patient location: PACU    Anesthesia Type: general    Transport from OR: Transported from OR on 6-10 L/min O2 by face mask with adequate spontaneous ventilation    Post pain: adequate analgesia    Post assessment: no apparent anesthetic complications    Post vital signs: stable    Level of consciousness: awake    Nausea/Vomiting: no nausea/vomiting    Complications: none    Transfer of care protocol was followed      Last vitals: Visit Vitals  BP (!) 173/87 (BP Location: Right arm, Patient Position: Lying)   Pulse 70   Temp 36.6 °C (97.9 °F) (Temporal)   Resp 17   Ht 6' 1" (1.854 m)   Wt 88.5 kg (195 lb)   SpO2 100%   BMI 25.73 kg/m²     "

## 2024-09-21 NOTE — PT/OT/SLP EVAL
Physical Therapy  Co-Evaluation (OT) and Treatment    Robi Donovan   [id number]    Time Tracking:     PT Received On: 09/21/24   PT Start Time: 1124   PT Stop Time: 1158   PT Total Time (min): 34 min    Billable Minutes: Evaluation 10, Gait Training 14, and Therapeutic Activity 10  minutes    *This session was completed as a co-evaluation with OT secondary to patient's first time out of bed post-op*    Recommendations:     Therapy Intensity Recommendations at Discharge: Low Intensity Therapy     Equipment Needed After Discharge: none (has a rollator, safe to use s/p lumbar fusion)    Barriers to Discharge: Inaccessible home environment (5 GETACHEW with L HR)    Patient Information:     Recent Surgery: Procedure(s) (LRB):  OPEN ROBOTIC L4 -S1 TLIF (N/A) 1 Day Post-Op    Length of Stay: 1 days    General Precautions: Standard, fall  Orthopedic Precautions: spinal precautions  Brace: R AFO, LSO (spoke with neurosurgery after session; encourage wearing LSO when out of bed moving forward)    Assessment:     Robi Donovan is a 79 y.o. male admitted to [facility] on 9/20/2024 for scheduled L4-S1 robotic TLIF. Robi Donovan tolerated evaluation well today. Educated pt and daughter on post-op orthopedic precautions (avoid bending, lifting, twisting). Of note: at time of PT/OT evaluation, no documentation in Epic re: needing an LSO brace for mobility so today's session took place without brace. Spoke with neurosurgery after session, moving forward encourage wearing his LSO brace for out of bed activity. 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 He does not have his R AFO present today but family to bring for tomorrow. He's able to stand from edge of bed with min (A) using a rolling walker. Ambulates 50 ft in hallways with rolling walker and contact-guard assistance at gait belt; ambulates with 3-pt gait sequence leading with weaker RLE, obvious R drop foot (foot slapping  "floor, sock falling off often). Reports 7-8/10 lower back pain with activity. Sitting up in chair at end of session, comfortable, feeding himself lunch. Patient currently demonstrates a need for low intensity therapy on a scheduled basis secondary to a decline in functional status due to surgical procedure. Discussed PT role, POC, and goals with patient and family; verbalized understanding. Will [place] would benefit from acute PT services to promote mobility during this admission and improve return to PLOF.    Problem List: weakness, impaired endurance, impaired self care skills, impaired functional mobility, gait instability, impaired balance, pain, decreased lower extremity function, decreased upper extremity function, decreased ROM, orthopedic precautions, impaired cardiopulmonary response to activity    Rehab Prognosis: Good; patient would benefit from acute skilled PT services to address these deficits and reach maximum level of function.    Plan:     Patient to be seen 5 x/week to address the above listed problems via gait training, therapeutic activities, therapeutic exercises, neuromuscular re-education    Plan of Care Expires: 10/21/24  Plan of Care reviewed with: patient, daughter    Subjective:     Communicated with RASHMI Fink prior to evaluation, appropriate to see for evaluation.    Pt found supine in bed (HOB elevated) upon PT entry to room, agreeable to evaluation.    Patient commenting: "I had a stroke in 2003 and I didn't get enough therapy. So I have right foot drop, I call my right foot 'slappy'."    Does this patient have any cultural, spiritual, Religion conflicts given the current situation? Patient has no barriers to learning. Patient verbalizes understanding of his/her program and goals and demonstrates them correctly. No cultural, spiritual, or educational needs identified.    Past Medical History:   Diagnosis Date    Diabetes mellitus type I     Hypertension     Stroke 2002      Past " Surgical History:   Procedure Laterality Date    TRANSFORAMINAL EPIDURAL INJECTION OF STEROID Bilateral 4/9/2024    Procedure: LUMBAR TRANSFORAMINAL BILATERAL L4/5;  Surgeon: Aria Orozco MD;  Location: [facility];  Service: Pain Management;  Laterality: Bilateral;  [phone]  2 WK F/U LM       Living Environment:  Pt lives with his son in a 1  with 5 GETACHEW, L HR available; uses a tub/shower combo with grab bar present (on L side) but no seat/bench.    PLOF:  Prior to admission, patient was ambulatory for household distances without a device but reports often holding onto furniture or walls within his home. Uses a rollator when leaving his home, wears his R AFO when leaving the house. Requires (A) for lower body dressing but otherwise can shower, toilet, dress (upper body) independently. Does not drive or work.    DME:  Patient owns or has access to the following DME: rollator, cane, straight, raised toilet, grab bar    Upon discharge, patient will have assistance from family.    Objective:     Patient found with: bed alarm, peripheral IV, hemovac    Pain:  Pain Rating 1: 7/10  Location - Orientation 1: lower  Location 1: back  Pain Addressed 1: Pre-medicate for activity, Reposition, Distraction, Cessation of Activity  Pain Rating Post-Intervention 1: 8/10    Cognitive Exam:  Patient is oriented to Person, Place, Time, and Situation.  Patient follows 100% of single-step commands.    Sensation:   Intact at BLE to light touch    Lower Extremity Range of Motion:  Right Lower Extremity: WFL actively except absent ROM at ankle (intact passive)  Left Lower Extremity: WFL actively    Lower Extremity Strength:  Right Lower Extremity:  hip flex 4-/5, knee ext 4-/5, ankle DF and PF 0/5  Left Lower Extremity: grossly 4/5 via MMT    Functional Mobility:    Bed Mobility:  Rolling to L: Min Assist  Supine to Sitting: Mod Assist via L SL  Scooting towards EOB in sitting: Contact-Guard Assist    Transfers:  Sit to  Stand: Min Assist from edge of bed with Rolling walker x 1 trial(s)    Gait:  50 feet in hallways with rolling walker and contact-guard assistance at gait belt; ambulates with 3-pt gait sequence leading with weaker RLE, obvious R drop foot (foot slapping floor, sock falling off often).  Reports 7-8/10 lower back pain with activity    Assist level: Contact-Guard Assist  Device: Rolling walker, no LSO or R AFO available today    Balance:  Static Sit: Supervision at EOB    Static Stand:  SBA-CGA  with Rolling walker    Additional Therapeutic Activity/Exercises:     1. Educated pt and daughter on post-op orthopedic precautions (avoid bending, lifting, twisting). Of note: at time of PT/OT evaluation, no documentation in Epic re: needing an LSO brace for mobility so today's session took place without brace. Spoke with neurosurgery after session, moving forward encourage wearing his LSO brace for out of bed activity.    2. 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 He does not have his R AFO present today but family to bring for tomorrow.    3. He's able to stand from edge of bed with min (A) using a rolling walker. Ambulates 50 ft in hallways with rolling walker and contact-guard assistance at gait belt; ambulates with 3-pt gait sequence leading with weaker RLE, obvious R drop foot (foot slapping floor, sock falling off often). Reports 7-8/10 lower back pain with activity.    4. Sitting up in chair at end of session, comfortable, feeding himself lunch. Patient currently demonstrates a need for low intensity therapy on a scheduled basis secondary to a decline in functional status due to surgical procedure.    5. Discussed PT role, POC, and goals with patient and family; verbalized understanding    AM-PAC 6 CLICK MOBILITY  Turning over in bed (including adjusting bedclothes, sheets and blankets)?: 3  Sitting down on and standing up from a chair with arms  (e.g., wheelchair, bedside commode, etc.): 3  Moving from lying on back to sitting on the side of the bed?: 3  Moving to and from a bed to a chair (including a wheelchair)?: 3  Need to walk in hospital room?: 3  Climbing 3-5 steps with a railing?: 2  Basic Mobility Total Score: 17    Patient was left reclined in bedside chair with all lines intact, call button in reach, RN notified, and family present.    Clinical Decision Making for Evaluation Complexity:  1. Body System(s) Examination: 3  2. Clinical Presentation: Evolving  3. Evaluation Complexity: Moderate    GOALS:   Multidisciplinary Problems       Physical Therapy Goals          Problem: Physical Therapy    Goal Priority Disciplines Outcome Goal Variances Interventions   Physical Therapy Goal     PT, PT/OT      Description: Goals to be met by: 10/5/24     Patient will increase functional independence with mobility by performin. Supine to sit with Stand-by Assistance - Not met  2. Rolling to Left or Right with Modified [place] - Not met  3. Sit to stand transfer with Stand-by Assistance with RW - Not met  4. Gait  x 125 feet with Stand-by Assistance using Rolling Walker - Not met  5. Ascend/descend 5 stair with left Handrail with Contact Guard Assistance using No Assistive Device - Not met   6. Pt will verbalize 3/3 spinal precautions without cueing - Not met                     Nadir Noland, PT  2024

## 2024-09-21 NOTE — PT/OT/SLP EVAL
Occupational Therapy   Co-Evaluation  Co-treatment performed due to patient's multiple deficits requiring two skilled therapists to appropriately and safely assess patient's strength and endurance while facilitating functional tasks in addition to accommodating for patient's activity tolerance.      Name: Robi Donovan  MRN: [id number]  Admitting Diagnosis: <principal problem not specified>  Recent Surgery: Procedure(s) (LRB):  OPEN ROBOTIC L4 -S1 TLIF (N/A) 1 Day Post-Op    Recommendations:     Discharge Recommendations: Low Intensity Therapy  Discharge Equipment Recommendations:  walker, rolling  Barriers to discharge:  None    Assessment:     Robi Donovan is a 79 y.o. male with a medical diagnosis of <principal problem not specified>.  He presents with the following performance deficits affecting function: weakness, impaired endurance, impaired self care skills, impaired functional mobility, gait instability, impaired balance, orthopedic precautions, impaired cardiopulmonary response to activity, decreased lower extremity function, pain, impaired coordination, decreased safety awareness, decreased upper extremity function.  Pt presents s/p TLIF L4-S1 POD1. PTA, pt was Mod (I) with ADLs and fx'l mobility using rollator for community mobility. He reports a history of falls as well as residual RSW d/t CVA at baseline. Pt required moderate-stand by (A) for all functional activity and mobility performed this day without incident. Pt is currently limited in ADLs, functional mobility, and functional transfers and is not performing tasks at PLOF. Pt would continue to benefit from skilled OT services to maximize functional independence with ADLs and functional mobility, reduce caregiver burden, and facilitate safe discharge in the least restrictive environment.   Patient continues to demonstrate the need for low intensity therapy on a scheduled basis exhibited by decreased independence with self-care and functional mobility  "    Rehab Prognosis: Good; patient would benefit from acute skilled OT services to address these deficits and reach maximum level of function.       Plan:     Patient to be seen 4 x/week to address the above listed problems via self-care/home management, therapeutic activities, therapeutic exercises, neuromuscular re-education  Plan of Care Expires: 10/21/24  Plan of Care Reviewed with: patient, significant other, family    Subjective     Chief Complaint: pain  Patient/Family Comments/goals: to return to [facility]    Occupational Profile:  Living Environment: Pt lives with his son in a H with 5 GETACHEW and (L) HR. T/s combo with grab bar and raised toilet.  Previous level of function: Mod (I) with ADLs and fx'l mobility using no AD but "wall surfing" for HH mobility and rollator for community mobility  Roles and Routines: hx of falls reported  Equipment Used at Home: grab bar, raised toilet, rollator  Assistance upon Discharge: family    Pain/Comfort:  Pain Rating 1: 7/10  Location - Orientation 1: lower  Location 1: back  Pain Addressed 1: Pre-medicate for activity, Reposition, Distraction  Pain Rating Post-Intervention 1: 8/10    Patients cultural, spiritual, Mosque conflicts given the current situation: no    Objective:   Additional staff present:  MUSA Schmitz    Communicated with: RN prior to session.  Patient found HOB elevated with bed alarm, hemovac, peripheral IV upon OT entry to room.    General Precautions: Standard, fall  Orthopedic Precautions: spinal precautions  Braces: AFO (R AFO worn at baseline-  not present in room)  Respiratory Status: Room air    Occupational Performance:    Bed Mobility:    Patient completed Rolling/Turning to Left with  contact guard assistance  Patient completed Supine to Sit with moderate assistance    Functional Mobility/Transfers:  Patient completed Sit <> Stand Transfer with minimum assistance  with  rolling walker   Functional Mobility: Pt engaged in functional mobility to " simulate household/community distances ~50 ft with CGA and RW in order to maximize functional endurance and standing balance required for engagement in occupations of choice   No LOB or SOB noted    Activities of Daily Living:  Feeding:  independence eating lunch sitting upright in bed  Upper Body Dressing: set up (A) to don hospital gown over back and (B) UE sitting edge of bed  Lower Body Dressing: moderate assistance required to don (R) sock; pt able to pull (L) sock above heel EOB with (L) hip/knee flexion    Cognitive/Visual Perceptual:  Cognitive/Psychosocial Skills:     -       Oriented to: Person, Place, Time, and Situation   -       Follows Commands/attention:Follows multistep  commands  -       Safety awareness/insight to disability: impaired   -       Mood/Affect/Coping skills/emotional control: Cooperative    Physical Exam:  Sensation:    -       Intact  Upper Extremity Range of Motion:     -       Right Upper Extremity: WFL  -       Left Upper Extremity: WFL  Upper Extremity Strength:    -       Right Upper Extremity: WFL  -       Left Upper Extremity: WFL   Strength:    -       Right Upper Extremity: mildly decreased  -       Left Upper Extremity: WFL  Fine Motor Coordination:    -       Impaired  Right hand, manipulation of objects mildly decreased- hx LSW    AMPAC 6 Click ADL:  AMPAC Total Score: 16    Treatment & Education:  -Education provided regarding spinal precautions for use during functional tasks/mobility/transfers   -Education on energy conservation and task modification to maximize safety and (I) during ADLs and mobility  -Education on importance of OOB activity to improve overall activity tolerance and promote recovery  -Pt educated to call for assistance and to transfer with hospital staff only  -Provided education regarding role of OT, POC, & discharge recommendations with pt and family verbalizing understanding.  Pt had no further questions & when asked whether there were any  concerns pt reported none.     Patient left up in chair with all lines intact, call button in reach, RN notified, and family present    GOALS:   Multidisciplinary Problems       Occupational Therapy Goals          Problem: Occupational Therapy    Goal Priority Disciplines Outcome Interventions   Occupational Therapy Goal     OT, PT/OT Progressing    Description: Goals to be met by: 10/21/24     Patient will increase functional independence with ADLs by performing:    LE Dressing with Modified [place] and AE prn.  Grooming while standing at sink with Modified [place].  Toileting from toilet/bedside commode with Modified [place] for hygiene and clothing management.   Supine to sit with Supervision.  Toilet transfer to toilet/bedside commode with Supervision and LRAD.    Patient demonstrates a mobility limitation that significantly impairs their ability to participate in one or more mobility related activities of daily living. Patient's mobility limitation cannot be sufficiently resolved with the use of a cane, but can be sufficiently resolved with the use of a rolling walker.The use of a rolling walker will considerably improve their ability to participate in MRADLs. Patient will use the walker on a regular basis at home.                           History:     Past Medical History:   Diagnosis Date    Diabetes mellitus type I     Hypertension     Stroke 2002         Past Surgical History:   Procedure Laterality Date    TRANSFORAMINAL EPIDURAL INJECTION OF STEROID Bilateral 4/9/2024    Procedure: LUMBAR TRANSFORAMINAL BILATERAL L4/5;  Surgeon: Aria Orozco MD;  Location: [facility];  Service: Pain Management;  Laterality: Bilateral;  [phone]  2 WK F/U LM       Time Tracking:     OT Date of Treatment: 09/21/24  OT Start Time: 1124  OT Stop Time: 1158  OT Total Time (min): 34 min    Billable Minutes:Evaluation 11  Self Care/Home Management 10  Therapeutic Activity 13    9/21/2024

## 2024-09-21 NOTE — NURSING TRANSFER
Nursing Transfer Note      9/21/2024   1:25 AM    Nurse giving handoff:RASHMI Jones PACU  Nurse receiving handoff:RASHMI Benton POSS    Reason patient is being transferred: post op    Transfer To: 543 from PACU    Transfer via bed    Transfer with IV pole    Transported by Transport    Transfer Vital Signs: see epic  Additional Lines: Fung Catheter    Medicines sent: NS infusing    Any special needs or follow-up needed: per orders    Patient belongings transferred with patient: None in PACU    Chart send with patient: Yes    Notified: daughter    Patient reassessed at: 9/21/24 01:00

## 2024-09-21 NOTE — SUBJECTIVE & OBJECTIVE
Interval History: POD1. NAEON. Pt tolerating diet. Reports new onset R great toe pain, otherwise pain well controlled with no numbness/tingling. No bowel movement but passing gas. Fung removed this morning. Incision c/d/I. Drains putting out 340 and 33 SS. Postop x-rays pending.    Medications:  Continuous Infusions:   0.9% NaCl   Intravenous Continuous        0.9% NaCl   Intravenous Continuous 100 mL/hr at 09/20/24 2341 New Bag at 09/20/24 2341     Scheduled Meds:   acetaminophen  1,000 mg Oral Q8H    amLODIPine  10 mg Oral Daily    calcium carbonate  500 mg Oral BID    carvediloL  6.25 mg Oral BID    ceFAZolin (Ancef) IV (PEDS and ADULTS)  2 g Intravenous Q8H    losartan  100 mg Oral Daily    methocarbamoL  750 mg Oral QID    mupirocin  1 g Nasal BID    mupirocin   Nasal BID    pravastatin  40 mg Oral QHS    senna-docusate 8.6-50 mg  2 tablet Oral BID    tamsulosin  0.4 mg Oral Daily     PRN Meds:  Current Facility-Administered Medications:     aluminum-magnesium hydroxide-simethicone, 30 mL, Oral, Q4H PRN    dextrose 10%, 12.5 g, Intravenous, PRN    dextrose 10%, 25 g, Intravenous, PRN    glucagon (human recombinant), 1 mg, Intramuscular, PRN    glucose, 16 g, Oral, PRN    glucose, 24 g, Oral, PRN    HYDROmorphone, 1 mg, Intravenous, Q4H PRN    insulin aspart U-100, 0-5 Units, Subcutaneous, QID (AC + HS) PRN    melatonin, 6 mg, Oral, Nightly PRN    ondansetron, 8 mg, Oral, Q6H PRN    oxyCODONE, 5 mg, Oral, Q4H PRN    oxyCODONE, 10 mg, Oral, Q4H PRN    prochlorperazine, 5 mg, Intravenous, Q6H PRN     Review of Systems  Objective:     Weight: 86.9 kg (191 lb 9.3 oz)  Body mass index is 25.28 kg/m².  Vital Signs (Most Recent):  Temp: 98.3 °F (36.8 °C) (09/21/24 0758)  Pulse: 92 (09/21/24 0758)  Resp: 14 (09/21/24 0758)  BP: (!) 164/77 (09/21/24 0758)  SpO2: 97 % (09/21/24 0758) Vital Signs (24h Range):  Temp:  [97.9 °F (36.6 °C)-98.8 °F (37.1 °C)] 98.3 °F (36.8 °C)  Pulse:  [70-92] 92  Resp:  [14-23] 14  SpO2:  [96  "%-100 %] 97 %  BP: (137-173)/(64-87) 164/77                              Closed/Suction Drain 09/20/24 2140 Left;Posterior Back Accordion 10 Fr. (Active)   Site Description Healing 09/21/24 0130   Dressing Type No CHG impregnated dressing/sponge (patient < 2 mos) 09/21/24 0130   Drainage Bloody 09/21/24 0130   Status To bulb suction 09/21/24 0130   Output (mL) 210 mL 09/21/24 0533            Closed/Suction Drain 09/20/24 2206 Right;Posterior Back Accordion 10 Fr. (Active)   Site Description Healing 09/21/24 0130   Dressing Type No CHG impregnated dressing/sponge (patient < 2 mos) 09/21/24 0130   Drainage Serosanguineous 09/21/24 0130   Status To bulb suction 09/21/24 0130   Output (mL) 25 mL 09/21/24 0533            Urethral Catheter 09/20/24 1900 Non-latex;Straight-tip;Silicone 16 Fr. (Active)   Site Assessment Clean;Intact;Dry 09/21/24 0130   Collection Container Urimeter 09/21/24 0130   Securement Method secured to top of thigh w/ adhesive device 09/21/24 0130   Reason for Continuing Urinary Catheterization Post operative 09/21/24 0130   CAUTI Prevention Bundle Securement Device in place with 1" slack 09/21/24 0130   Output (mL) 550 mL 09/21/24 0533          Physical Exam         Neurosurgery Physical Exam  GENERAL: resting comfortably  HEENT: NCAT, PERRL, mucous membranes moist  NECK: supple, trachea midline  CV: normal capillary refill  PULM: aerating well, symmetric expansion, no distress  ABD: soft, NT, ND  EXT: no c/c/e  2 HV drains to FS  Incision c/d/i     NEURO:     GCS 15 E4V5M6  AAO x 3  CN II-XII grossly intact  Fc x 4 antigravity  RUE 4  RLE 4 prox, 1 df/pf/ehl  LUE 5 except 4 in HI  LLE 5  SILT         Significant Labs:  Recent Labs   Lab 09/21/24  0344   *      K 3.7      CO2 22*   BUN 11   CREATININE 1.1   CALCIUM 8.2*     Recent Labs   Lab 09/21/24  0344   WBC 9.34   HGB 12.8*   HCT 38.1*        No results for input(s): "LABPT", "INR", "APTT" in the last 48 " hours.  Microbiology Results (last 7 days)       ** No results found for the last 168 hours. **          Recent Lab Results         09/21/24  0344   09/20/24  2311   09/20/24  1113   09/20/24  1100        Unit Blood Type Code       6200  [P]              6200  [P]       Unit Expiration       [id number]  [P]              [id number]  [P]       Unit Blood Type       A POS  [P]              A POS  [P]       Anion Gap 10             Antibody Screen       NEG       Baso # 0.01             Basophil % 0.1             BUN 11             Calcium 8.2             Chloride 105             CO2 22             CODING SYSTEM       [id number]  [P]              [id number]  [P]       Creatinine 1.1             Crossmatch Interpretation       Compatible  [P]              Compatible  [P]       Differential Method Automated             DISPENSE STATUS       CROSSMATCHED  [P]              CROSSMATCHED  [P]       eGFR >60.0             Eos # 0.0             Eos % 0.0             Glucose 268             Gran # (ANC) 8.0             Gran % 85.1             Group & Rh       A POS       Hematocrit 38.1             Hemoglobin 12.8             Immature Grans (Abs) 0.05  Comment: Mild elevation in immature granulocytes is non specific and   can be seen in a variety of conditions including stress response,   acute inflammation, trauma and pregnancy. Correlation with other   laboratory and clinical findings is essential.               Immature Granulocytes 0.5             Lymph # 0.8             Lymph % 8.9             MCH 30.0             MCHC 33.6             MCV 89             Mono # 0.5             Mono % 5.4             MPV 11.1             nRBC 0             Platelet Count 158             POCT Glucose   178   189         Potassium 3.7             Product Code       [id number]  [P]              [id number]  [P]       RBC 4.27             RDW 13.1             Sodium 137             Specimen Outdate       09/23/2024 23:59       UNIT NUMBER        [id number]  [P]              [id number]  [P]       WBC 9.34                      [P] - Preliminary Result             All pertinent labs from the last 24 hours have been reviewed.    Significant Diagnostics:  CT: No results found in the last 24 hours.  MRI: No results found in the last 24 hours.  I have reviewed all pertinent imaging results/findings within the past 24 hours.  I have reviewed and interpreted all pertinent imaging results/findings within the past 24 hours.

## 2024-09-21 NOTE — PLAN OF CARE
OT eval complete. OT POC and goals established.   Problem: Occupational Therapy  Goal: Occupational Therapy Goal  Description: Goals to be met by: 10/21/24     Patient will increase functional independence with ADLs by performing:    LE Dressing with Modified [place] and AE prn.  Grooming while standing at sink with Modified [place].  Toileting from toilet/bedside commode with Modified [place] for hygiene and clothing management.   Supine to sit with Supervision.  Toilet transfer to toilet/bedside commode with Supervision and LRAD.    Patient demonstrates a mobility limitation that significantly impairs their ability to participate in one or more mobility related activities of daily living. Patient's mobility limitation cannot be sufficiently resolved with the use of a cane, but can be sufficiently resolved with the use of a rolling walker.The use of a rolling walker will considerably improve their ability to participate in MRADLs. Patient will use the walker on a regular basis at home.      Outcome: Progressing

## 2024-09-21 NOTE — HOSPITAL COURSE
09/21/2024: POD1. NAEON. Pt tolerating diet. Reports new onset R great toe pain, otherwise pain well controlled with no numbness/tingling. No bowel movement but passing gas. Ross removed this morning. Incision c/d/I. Drains putting out 340 and 33 SS. Postop x-rays pending.  9/22: doing well POD 2. More back pain today. Drains still high output but slowing down overnight. Xrays pending. Brace in place. Patient with improvement in leg pain, eating, passing gas, and voiding fine.   9/23: POD 3. Febrile to 101.1 yesterday afternoon. CXR, UA, BLED all within normal limits. No subsequent episodes of fever. Patient asymptomatic. Will continue to monitor. HV x 2 with output of 100, will plan to leave in place today. Exam stable.   9/24: NAEON. Tmax 100.4 at 7 pm yesterday. Bowel regimen advanced. Continue HV drains. Postop XR today. Home pending drain removal, fever resolution.   9/25: NAEON. Neuro stable. Pain controlled. OOB/up in chair and ambulating down hallways with therapy. Febrile again to 101 at 1935 yesterday. No SOB or infectious symptoms. UA negative. Patient still having difficulties urinating. Continue bladder scans, possible ross placement today. HV drains with low output, removed. +BM yesterday. Endocrine following for hypergylcemia.  9/26: Continued fevers to 102 last night and 100.5 this AM. Denies SOB, chest pain. Does report intermittent cough. Other vitals stable, no white count. Flu/covid and blood cultures ordered. Encouraged IS use. Inhalation tx ordered. Medicine consulted for assistance, repeat CXR ordered. Remains neuro stable. Ross placed yesterday for urinary retention. Will arrange for voiding trial in 1 week.   9/27: Febrile to 101.6 overnight with subsequent afebrile readings. Discussed with Hospital Medicine in the setting of possible consolidation on CXR. Medicine considering starting abx. Recs to follow. Denies any new complaints. Exam stable.   9/28: NAEO. Afebrile. Reports abdominal  "discomfort, describes "fullness" but has been passing gas and had a documented BM yesterday. KUB ordered given distention on exam as well as NPO and Mg/Phos.  9/29: NAEO. Afebrile. WBC 7.5. Had large bowel movement and is no longer distended. Fung remains in. Pending SNF.  9/30: NAEON. Fever to 100.5 overnight. Medicine repeating BLE US and CXR. On Levoquin PO. Incision is c/d/I. Fung in place. SNF accepted patient, pending afebrile x 24 hrs.  10/1: NAEON. Remained afebrile overnight. BLE US w/o DVT. CXR w/o significant change from prior, shows atelectasis. Fung removed this AM, will f/u voiding. Pending SNF.  "

## 2024-09-21 NOTE — BRIEF OP NOTE
[facility] - [facility] (2nd Fl)  Brief Operative Note    SUMMARY     Surgery Date: 9/20/2024     Surgeons and Role:     * Juan Luis Mcbride, DO - Primary     * Chuyita Davison MD - Resident - Assisting        Pre-op Diagnosis:  Gait instability [R26.81]  Spinal stenosis of lumbar region, unspecified whether neurogenic claudication present [M48.061]    Post-op Diagnosis:  Post-Op Diagnosis Codes:     * Gait instability [R26.81]     * Spinal stenosis of lumbar region, unspecified whether neurogenic claudication present [M48.061]    Procedure(s) (LRB):  OPEN ROBOTIC L4 -S1 TLIF (N/A)    Anesthesia: General    Implants:  Implant Name Type Inv. Item Serial No.  Lot No. LRB No. Used Action   SCREW BONE CREO 5.5X50 - [id number]  SCREW BONE CREO 5.5X50  GLOBUS  N/A 1 Implanted   SCREW BONE SPINAL 5.5X45MM - [id number]  SCREW BONE SPINAL 5.5X45MM  GLOBUS  N/A 1 Implanted   SCREW BONE SPINAL 6.5X40MM - [id number]  SCREW BONE SPINAL 6.5X40MM  GLOBUS  N/A 2 Implanted   SCREW BONE SPINAL CREO 6.5X45 - [id number]  SCREW BONE SPINAL CREO 6.5X45  GLOBUS  N/A 2 Implanted   CAP SPINAL LOCK THRD CREO 5.5 - [id number]  CAP SPINAL LOCK THRD CREO 5.5  GLOBUS  N/A 6 Implanted   5.5mm CURVED KIMBERLY, TITANIUM ALLOY, 70MM LENGTH    GLOBUS  N/A 2 Implanted   SPACER CHRISTOPH [id number] 9-13MM - [id number]  SPACER CHRISTOPH [id number] 9-13MM  GLOBUS  N/A 2 Implanted   PUTTY OSTEOSELECT DBM SYR 10CC - [id number]  PUTTY OSTEOSELECT DBM SYR 10CC [id number] BACTERIN  N/A 1 Implanted       Operative Findings: L4-S1 TLIF    Estimated Blood Loss: * No values recorded between 9/20/2024  7:20 PM and 9/20/2024 10:33 PM *    Estimated Blood Loss has been documented.         Specimens:   Specimen (24h ago, onward)      None            [id number]

## 2024-09-21 NOTE — PROGRESS NOTES
[facility]  Neurosurgery  Progress Note    Subjective:     History of Present Illness: 78 M with hx of prior CVA with residual right sided weakness presents for eval of worsening low back pain and BLE radicular leg pain. He endorses pain which starts in upper lumbar area and then radiates down to lower lumbar area down his legs right greater than left into his feet. He endorses right foot weakness and has a right foot drop which has been present for many months. He has difficulty walking. 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 He has been to pain mgmt for injections without significant relief.      Interval fu 8/8/24:  Pt presents in fu.  No significant changes in symptoms.  Today he is most concerned with his balance difficulties.     Interval fu 8/29/24: No changes in symptoms.    Post-Op Info:  Procedure(s) (LRB):  OPEN ROBOTIC L4 -S1 TLIF (N/A)   1 Day Post-Op   Interval History: POD1. NAEON. Pt tolerating diet. Reports new onset R great toe pain, otherwise pain well controlled with no numbness/tingling. No bowel movement but passing gas. Fung removed this morning. Incision c/d/I. Drains putting out 340 and 33 SS. Postop x-rays pending.    Medications:  Continuous Infusions:   0.9% NaCl   Intravenous Continuous        0.9% NaCl   Intravenous Continuous 100 mL/hr at 09/20/24 2341 New Bag at 09/20/24 2341     Scheduled Meds:   acetaminophen  1,000 mg Oral Q8H    amLODIPine  10 mg Oral Daily    calcium carbonate  500 mg Oral BID    carvediloL  6.25 mg Oral BID    ceFAZolin (Ancef) IV (PEDS and ADULTS)  2 g Intravenous Q8H    losartan  100 mg Oral Daily    methocarbamoL  750 mg Oral QID    mupirocin  1 g Nasal BID    mupirocin   Nasal BID    pravastatin  40 mg Oral QHS    senna-docusate 8.6-50 mg  2 tablet Oral BID    tamsulosin  0.4 mg Oral Daily     PRN Meds:  Current Facility-Administered Medications:     aluminum-magnesium  hydroxide-simethicone, 30 mL, Oral, Q4H PRN    dextrose 10%, 12.5 g, Intravenous, PRN    dextrose 10%, 25 g, Intravenous, PRN    glucagon (human recombinant), 1 mg, Intramuscular, PRN    glucose, 16 g, Oral, PRN    glucose, 24 g, Oral, PRN    HYDROmorphone, 1 mg, Intravenous, Q4H PRN    insulin aspart U-100, 0-5 Units, Subcutaneous, QID (AC + HS) PRN    melatonin, 6 mg, Oral, Nightly PRN    ondansetron, 8 mg, Oral, Q6H PRN    oxyCODONE, 5 mg, Oral, Q4H PRN    oxyCODONE, 10 mg, Oral, Q4H PRN    prochlorperazine, 5 mg, Intravenous, Q6H PRN     Review of Systems  Objective:     Weight: 86.9 kg (191 lb 9.3 oz)  Body mass index is 25.28 kg/m².  Vital Signs (Most Recent):  Temp: 98.3 °F (36.8 °C) (09/21/24 0758)  Pulse: 92 (09/21/24 0758)  Resp: 14 (09/21/24 0758)  BP: (!) 164/77 (09/21/24 0758)  SpO2: 97 % (09/21/24 0758) Vital Signs (24h Range):  Temp:  [97.9 °F (36.6 °C)-98.8 °F (37.1 °C)] 98.3 °F (36.8 °C)  Pulse:  [70-92] 92  Resp:  [14-23] 14  SpO2:  [96 %-100 %] 97 %  BP: (137-173)/(64-87) 164/77                              Closed/Suction Drain 09/20/24 2140 Left;Posterior Back Accordion 10 Fr. (Active)   Site Description Healing 09/21/24 0130   Dressing Type No CHG impregnated dressing/sponge (patient < 2 mos) 09/21/24 0130   Drainage Bloody 09/21/24 0130   Status To bulb suction 09/21/24 0130   Output (mL) 210 mL 09/21/24 0533            Closed/Suction Drain 09/20/24 2206 Right;Posterior Back Accordion 10 Fr. (Active)   Site Description Healing 09/21/24 0130   Dressing Type No CHG impregnated dressing/sponge (patient < 2 mos) 09/21/24 0130   Drainage Serosanguineous 09/21/24 0130   Status To bulb suction 09/21/24 0130   Output (mL) 25 mL 09/21/24 0533            Urethral Catheter 09/20/24 1900 Non-latex;Straight-tip;Silicone 16 Fr. (Active)   Site Assessment Clean;Intact;Dry 09/21/24 0130   Collection Container Urimeter 09/21/24 0130   Securement Method secured to top of thigh w/ adhesive device 09/21/24 0130  "  Reason for Continuing Urinary Catheterization Post operative 09/21/24 0130   CAUTI Prevention Bundle Securement Device in place with 1" slack 09/21/24 0130   Output (mL) 550 mL 09/21/24 0533          Physical Exam         Neurosurgery Physical Exam  GENERAL: resting comfortably  HEENT: NCAT, PERRL, mucous membranes moist  NECK: supple, trachea midline  CV: normal capillary refill  PULM: aerating well, symmetric expansion, no distress  ABD: soft, NT, ND  EXT: no c/c/e  2 HV drains to FS  Incision c/d/i     NEURO:     GCS 15 E4V5M6  AAO x 3  CN II-XII grossly intact  Fc x 4 antigravity  RUE 4  RLE 4 prox, 1 df/pf/ehl  LUE 5 except 4 in HI  LLE 5  SILT         Significant Labs:  Recent Labs   Lab 09/21/24  0344   *      K 3.7      CO2 22*   BUN 11   CREATININE 1.1   CALCIUM 8.2*     Recent Labs   Lab 09/21/24  0344   WBC 9.34   HGB 12.8*   HCT 38.1*        No results for input(s): "LABPT", "INR", "APTT" in the last 48 hours.  Microbiology Results (last 7 days)       ** No results found for the last 168 hours. **          Recent Lab Results         09/21/24  0344   09/20/24  2311   09/20/24  1113   09/20/24  1100        Unit Blood Type Code       6200  [P]              6200  [P]       Unit Expiration       [id number]  [P]              [id number]  [P]       Unit Blood Type       A POS  [P]              A POS  [P]       Anion Gap 10             Antibody Screen       NEG       Baso # 0.01             Basophil % 0.1             BUN 11             Calcium 8.2             Chloride 105             CO2 22             CODING SYSTEM       [id number]  [P]              [id number]  [P]       Creatinine 1.1             Crossmatch Interpretation       Compatible  [P]              Compatible  [P]       Differential Method Automated             DISPENSE STATUS       CROSSMATCHED  [P]              CROSSMATCHED  [P]       eGFR >60.0             Eos # 0.0             Eos % 0.0             Glucose 268             " Gran # (ANC) 8.0             Gran % 85.1             Group & Rh       A POS       Hematocrit 38.1             Hemoglobin 12.8             Immature Grans (Abs) 0.05  Comment: Mild elevation in immature granulocytes is non specific and   can be seen in a variety of conditions including stress response,   acute inflammation, trauma and pregnancy. Correlation with other   laboratory and clinical findings is essential.               Immature Granulocytes 0.5             Lymph # 0.8             Lymph % 8.9             MCH 30.0             MCHC 33.6             MCV 89             Mono # 0.5             Mono % 5.4             MPV 11.1             nRBC 0             Platelet Count 158             POCT Glucose   178   189         Potassium 3.7             Product Code       [id number]  [P]              [id number]  [P]       RBC 4.27             RDW 13.1             Sodium 137             Specimen Outdate       09/23/2024 23:59       UNIT NUMBER       [id number]  [P]              [id number]  [P]       WBC 9.34                      [P] - Preliminary Result             All pertinent labs from the last 24 hours have been reviewed.    Significant Diagnostics:  CT: No results found in the last 24 hours.  MRI: No results found in the last 24 hours.  I have reviewed all pertinent imaging results/findings within the past 24 hours.  I have reviewed and interpreted all pertinent imaging results/findings within the past 24 hours.  Assessment/Plan:     Status post lumbar surgery (L4-S1 TLIF)  78 M with hx of prior CVA with residual right sided weakness presents for elective surgery for worsening low back pain and BLE radicular leg pain now s/p L4-S1 TLIF on 9/20/2024:    Plan:  Patient admitted to [facility]  All labs and diagnostics reviewed  Follow-up post-op XRs  2x HV drains to remain at this time on full suction; please record hourly outputs  PT/OT/OOB  ADAT  Multi-Modal Pain Control  Maintain LSO brace while out of bed for  comfort  TEDs/SCDs/SQH  Recommend weekly BLEUS for DVT surveillance  Continue to monitor clinically, notify NSGY immediately with any changes in neuro status    Dispo: ongoing        Juan Luis Laurent MD  Neurosurgery  [facility] - Surgery

## 2024-09-21 NOTE — NURSING
Nurses Note -- 4 Eyes      9/21/2024   1:45 AM      Skin assessed during: Admit      [x] No Altered Skin Integrity Present    []Prevention Measures Documented      [] Yes- Altered Skin Integrity Present or Discovered   [] LDA Added if Not in Epic (Describe Wound)   [] New Altered Skin Integrity was Present on Admit and Documented in LDA   [] Wound Image Taken    Wound Care Consulted? No    Attending Nurse:  Milagros Dawkins RN/Staff Member:  Yesol

## 2024-09-21 NOTE — PLAN OF CARE
Robi Donovan is a 79 y.o. male admitted to [facility] on 2024 for scheduled L4-S1 robotic TLIF. Robi Donovan tolerated evaluation well today. Educated pt and daughter on post-op orthopedic precautions (avoid bending, lifting, twisting). Of note: at time of PT/OT evaluation, no documentation in Epic re: needing an LSO brace for mobility so today's session took place without brace. Spoke with neurosurgery after session, moving forward encourage wearing his LSO brace for out of bed activity. Pt with old CVA () resulting in R sided distal weakness at hand/foot (baseline R drop foot, R hand weakness). He does not have his R AFO present today but family to bring for tomorrow. He's able to stand from edge of bed with min (A) using a rolling walker. Ambulates 50 ft in hallways with rolling walker and contact-guard assistance at gait belt; ambulates with 3-pt gait sequence leading with weaker RLE, obvious R drop foot (foot slapping floor, sock falling off often). Reports 7-8/10 lower back pain with activity. Sitting up in chair at end of session, comfortable, feeding himself lunch. Patient currently demonstrates a need for low intensity therapy on a scheduled basis secondary to a decline in functional status due to surgical procedure. Discussed PT role, POC, and goals with patient and family; verbalized understanding. Robi Donovan would benefit from acute PT services to promote mobility during this admission and improve return to PLOF.    *Left C&D rolling walker in room. Safe for patient to be up in room with nursing assistance, using walker, encourage LSO brace on when out of bed*    Problem: Physical Therapy  Goal: Physical Therapy Goal  Description: Goals to be met by: 10/5/24     Patient will increase functional independence with mobility by performin. Supine to sit with Stand-by Assistance - Not met  2. Rolling to Left or Right with Modified [place] - Not met  3. Sit to stand transfer with Stand-by  Assistance with RW - Not met  4. Gait  x 125 feet with Stand-by Assistance using Rolling Walker - Not met  5. Ascend/descend 5 stair with left Handrail with Contact Guard Assistance using No Assistive Device - Not met   6. Pt will verbalize 3/3 spinal precautions without cueing - Not met  Outcome: Progressing    Nadir Noland, PT  9/21/2024

## 2024-09-21 NOTE — ANESTHESIA PROCEDURE NOTES
Intubation    Date/Time: 9/20/2024 6:34 PM    Performed by: Zac Sher CRNA  Authorized by: Pacheco Guerrero MD    Intubation:     Induction:  Intravenous    Intubated:  Postinduction    Mask Ventilation:  Easy with oral airway    Attempts:  2    Attempted By:  Student    Method of Intubation:  Direct    Blade:  [place] 3    Laryngeal View Grade: Grade IIb - only the arytenoids and epiglottis seen      Attempted By (2nd Attempt):  Student    Method of Intubation (2nd Attempt):  Video laryngoscopy    Blade (2nd Attempt):  [place] 3    Laryngeal View Grade (2nd Attempt): Grade I - full view of cords      Difficult Airway Encountered?: No      Complications:  None    Airway Device:  Oral endotracheal tube    Airway Device Size:  7.5    Style/Cuff Inflation:  Cuffed (inflated to minimal occlusive pressure)    Tube secured:  22    Secured at:  The lips    Placement Verified By:  Capnometry    Complicating Factors:  None    Findings Post-Intubation:  BS equal bilateral

## 2024-09-21 NOTE — HPI
78 M with hx of prior CVA with residual right sided weakness presents for eval of worsening low back pain and BLE radicular leg pain. He endorses pain which starts in upper lumbar area and then radiates down to lower lumbar area down his legs right greater than left into his feet. He endorses right foot weakness and has a right foot drop which has been present for many months. He has difficulty walking. 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 He has been to pain mgmt for injections without significant relief.      Interval fu 8/8/24:  Pt presents in fu.  No significant changes in symptoms.  Today he is most concerned with his balance difficulties.     Interval fu 8/29/24: No changes in symptoms.

## 2024-09-21 NOTE — PLAN OF CARE
Problem: Adult Inpatient Plan of Care  Goal: Plan of Care Review  Outcome: Progressing  Goal: Patient-Specific Goal (Individualized)  Outcome: Progressing  Goal: Absence of Hospital-Acquired Illness or Injury  Outcome: Progressing  Goal: Optimal Comfort and Wellbeing  Outcome: Progressing  Goal: Readiness for Transition of Care  Outcome: Progressing     Problem: Diabetes Comorbidity  Goal: Blood Glucose Level Within Targeted Range  Outcome: Progressing     Problem: Infection  Goal: Absence of Infection Signs and Symptoms  Outcome: Progressing     Problem: Wound  Goal: Optimal Coping  Outcome: Progressing  Goal: Optimal Functional Ability  Outcome: Progressing  Goal: Absence of Infection Signs and Symptoms  Outcome: Progressing  Goal: Improved Oral Intake  Outcome: Progressing  Goal: Optimal Pain Control and Function  Outcome: Progressing  Goal: Skin Health and Integrity  Outcome: Progressing  Goal: Optimal Wound Healing  Outcome: Progressing     Problem: Skin Injury Risk Increased  Goal: Skin Health and Integrity  Outcome: Progressing   Pt aaox4, Pt ambulated  in  the hallways with PT/OT and the use of a walker. Pt reported pain 8/10 and chills after working with PT/OT, with relief achieved through PRN pain medications. Pt able to void via urinal after ross catheter removal. Pt is currently resting with no signs of injury or distress. Left Hemovac drain is draining between 55-60 cc's of bloody drainage and Right Hemovac drain is draining between 10-20 cc's. Safety measures remain in place, bed in the lowest position with wheels locked, side rails up x2, call light and personal belongings within reach. Wife present at the bedside. Continue plan of care.

## 2024-09-21 NOTE — NURSING
Pt arrived to unit via bed. AAOx4.Vital charted in  flowsheets. NAD. Able to answer admission questions appropriately Fung in place. Incision site clean,dry ,and intact. Daughter at bedside. 4 eyes done.

## 2024-09-21 NOTE — ASSESSMENT & PLAN NOTE
78 M with hx of prior CVA with residual right sided weakness presents for elective surgery for worsening low back pain and BLE radicular leg pain now s/p L4-S1 TLIF on 9/20/2024:    Plan:  Patient admitted to POSS  All labs and diagnostics reviewed  Follow-up post-op XRs  2x HV drains to remain at this time on full suction; please record hourly outputs  PT/OT/OOB  ADAT  Multi-Modal Pain Control  Maintain LSO brace while out of bed for comfort  TEDs/SCDs/SQH  Recommend weekly BLEUS for DVT surveillance  Continue to monitor clinically, notify NSGY immediately with any changes in neuro status    Dispo: ongoing

## 2024-09-22 LAB
ANION GAP SERPL CALC-SCNC: 10 MMOL/L (ref 8–16)
BACTERIA #/AREA URNS AUTO: ABNORMAL /HPF
BASOPHILS # BLD AUTO: 0.02 K/UL (ref 0–0.2)
BASOPHILS NFR BLD: 0.2 % (ref 0–1.9)
BILIRUB UR QL STRIP: NEGATIVE
BUN SERPL-MCNC: 14 MG/DL (ref 8–23)
CALCIUM SERPL-MCNC: 8.1 MG/DL (ref 8.7–10.5)
CHLORIDE SERPL-SCNC: 104 MMOL/L (ref 95–110)
CLARITY UR REFRACT.AUTO: CLEAR
CO2 SERPL-SCNC: 21 MMOL/L (ref 23–29)
COLOR UR AUTO: YELLOW
CREAT SERPL-MCNC: 1.2 MG/DL (ref 0.5–1.4)
DIFFERENTIAL METHOD BLD: ABNORMAL
EOSINOPHIL # BLD AUTO: 0.1 K/UL (ref 0–0.5)
EOSINOPHIL NFR BLD: 1.4 % (ref 0–8)
ERYTHROCYTE [DISTWIDTH] IN BLOOD BY AUTOMATED COUNT: 13.4 % (ref 11.5–14.5)
EST. GFR  (NO RACE VARIABLE): >60 ML/MIN/1.73 M^2
GLUCOSE SERPL-MCNC: 261 MG/DL (ref 70–110)
GLUCOSE UR QL STRIP: ABNORMAL
HCT VFR BLD AUTO: 30.7 % (ref 40–54)
HGB BLD-MCNC: 10.7 G/DL (ref 14–18)
HGB UR QL STRIP: ABNORMAL
HYALINE CASTS UR QL AUTO: 0 /LPF
IMM GRANULOCYTES # BLD AUTO: 0.03 K/UL (ref 0–0.04)
IMM GRANULOCYTES NFR BLD AUTO: 0.4 % (ref 0–0.5)
KETONES UR QL STRIP: ABNORMAL
LEUKOCYTE ESTERASE UR QL STRIP: NEGATIVE
LYMPHOCYTES # BLD AUTO: 2 K/UL (ref 1–4.8)
LYMPHOCYTES NFR BLD: 24.8 % (ref 18–48)
MCH RBC QN AUTO: 30.7 PG (ref 27–31)
MCHC RBC AUTO-ENTMCNC: 34.9 G/DL (ref 32–36)
MCV RBC AUTO: 88 FL (ref 82–98)
MICROSCOPIC COMMENT: ABNORMAL
MONOCYTES # BLD AUTO: 1.1 K/UL (ref 0.3–1)
MONOCYTES NFR BLD: 12.9 % (ref 4–15)
NEUTROPHILS # BLD AUTO: 4.9 K/UL (ref 1.8–7.7)
NEUTROPHILS NFR BLD: 60.3 % (ref 38–73)
NITRITE UR QL STRIP: NEGATIVE
NRBC BLD-RTO: 0 /100 WBC
PH UR STRIP: 6 [PH] (ref 5–8)
PLATELET # BLD AUTO: 152 K/UL (ref 150–450)
PMV BLD AUTO: 10.7 FL (ref 9.2–12.9)
POCT GLUCOSE: 123 MG/DL (ref 70–110)
POCT GLUCOSE: 207 MG/DL (ref 70–110)
POCT GLUCOSE: 264 MG/DL (ref 70–110)
POCT GLUCOSE: 301 MG/DL (ref 70–110)
POTASSIUM SERPL-SCNC: 3.5 MMOL/L (ref 3.5–5.1)
PROT UR QL STRIP: ABNORMAL
RBC # BLD AUTO: 3.48 M/UL (ref 4.6–6.2)
RBC #/AREA URNS AUTO: 6 /HPF (ref 0–4)
SODIUM SERPL-SCNC: 135 MMOL/L (ref 136–145)
SP GR UR STRIP: 1.03 (ref 1–1.03)
SQUAMOUS #/AREA URNS AUTO: 1 /HPF
URN SPEC COLLECT METH UR: ABNORMAL
WBC # BLD AUTO: 8.11 K/UL (ref 3.9–12.7)
WBC #/AREA URNS AUTO: 3 /HPF (ref 0–5)
YEAST UR QL AUTO: ABNORMAL

## 2024-09-22 PROCEDURE — 63600175 PHARM REV CODE 636 W HCPCS: Performed by: STUDENT IN AN ORGANIZED HEALTH CARE EDUCATION/TRAINING PROGRAM

## 2024-09-22 PROCEDURE — 81001 URINALYSIS AUTO W/SCOPE: CPT | Performed by: STUDENT IN AN ORGANIZED HEALTH CARE EDUCATION/TRAINING PROGRAM

## 2024-09-22 PROCEDURE — 25000003 PHARM REV CODE 250: Performed by: STUDENT IN AN ORGANIZED HEALTH CARE EDUCATION/TRAINING PROGRAM

## 2024-09-22 PROCEDURE — 36415 COLL VENOUS BLD VENIPUNCTURE: CPT | Performed by: STUDENT IN AN ORGANIZED HEALTH CARE EDUCATION/TRAINING PROGRAM

## 2024-09-22 PROCEDURE — 11000001 HC ACUTE MED/SURG PRIVATE ROOM

## 2024-09-22 PROCEDURE — 97530 THERAPEUTIC ACTIVITIES: CPT | Mod: CQ

## 2024-09-22 PROCEDURE — 85025 COMPLETE CBC W/AUTO DIFF WBC: CPT | Performed by: STUDENT IN AN ORGANIZED HEALTH CARE EDUCATION/TRAINING PROGRAM

## 2024-09-22 PROCEDURE — 80048 BASIC METABOLIC PNL TOTAL CA: CPT | Performed by: STUDENT IN AN ORGANIZED HEALTH CARE EDUCATION/TRAINING PROGRAM

## 2024-09-22 PROCEDURE — 97116 GAIT TRAINING THERAPY: CPT | Mod: CQ

## 2024-09-22 RX ORDER — HEPARIN SODIUM 5000 [USP'U]/ML
5000 INJECTION, SOLUTION INTRAVENOUS; SUBCUTANEOUS EVERY 8 HOURS
Status: DISCONTINUED | OUTPATIENT
Start: 2024-09-22 | End: 2024-10-01 | Stop reason: HOSPADM

## 2024-09-22 RX ADMIN — INSULIN ASPART 4 UNITS: 100 INJECTION, SOLUTION INTRAVENOUS; SUBCUTANEOUS at 04:09

## 2024-09-22 RX ADMIN — CEFAZOLIN 2 G: 2 INJECTION, POWDER, FOR SOLUTION INTRAMUSCULAR; INTRAVENOUS at 06:09

## 2024-09-22 RX ADMIN — METHOCARBAMOL 750 MG: 750 TABLET ORAL at 08:09

## 2024-09-22 RX ADMIN — OXYCODONE HYDROCHLORIDE 10 MG: 10 TABLET ORAL at 05:09

## 2024-09-22 RX ADMIN — OXYCODONE HYDROCHLORIDE 10 MG: 10 TABLET ORAL at 12:09

## 2024-09-22 RX ADMIN — INSULIN ASPART 2 UNITS: 100 INJECTION, SOLUTION INTRAVENOUS; SUBCUTANEOUS at 07:09

## 2024-09-22 RX ADMIN — SENNOSIDES AND DOCUSATE SODIUM 2 TABLET: 50; 8.6 TABLET ORAL at 08:09

## 2024-09-22 RX ADMIN — OXYCODONE HYDROCHLORIDE 10 MG: 10 TABLET ORAL at 07:09

## 2024-09-22 RX ADMIN — OXYCODONE HYDROCHLORIDE 5 MG: 5 TABLET ORAL at 09:09

## 2024-09-22 RX ADMIN — HYDROMORPHONE HYDROCHLORIDE 1 MG: 1 INJECTION, SOLUTION INTRAMUSCULAR; INTRAVENOUS; SUBCUTANEOUS at 10:09

## 2024-09-22 RX ADMIN — MUPIROCIN: 20 OINTMENT TOPICAL at 08:09

## 2024-09-22 RX ADMIN — OXYCODONE HYDROCHLORIDE 10 MG: 10 TABLET ORAL at 02:09

## 2024-09-22 RX ADMIN — ACETAMINOPHEN 1000 MG: 500 TABLET ORAL at 02:09

## 2024-09-22 RX ADMIN — METHOCARBAMOL 750 MG: 750 TABLET ORAL at 04:09

## 2024-09-22 RX ADMIN — ACETAMINOPHEN 1000 MG: 500 TABLET ORAL at 09:09

## 2024-09-22 RX ADMIN — CEFAZOLIN 2 G: 2 INJECTION, POWDER, FOR SOLUTION INTRAMUSCULAR; INTRAVENOUS at 02:09

## 2024-09-22 RX ADMIN — CEFAZOLIN 2 G: 2 INJECTION, POWDER, FOR SOLUTION INTRAMUSCULAR; INTRAVENOUS at 11:09

## 2024-09-22 RX ADMIN — CALCIUM CARBONATE (ANTACID) CHEW TAB 500 MG 500 MG: 500 CHEW TAB at 08:09

## 2024-09-22 RX ADMIN — AMLODIPINE BESYLATE 10 MG: 10 TABLET ORAL at 08:09

## 2024-09-22 RX ADMIN — HEPARIN SODIUM 5000 UNITS: 5000 INJECTION INTRAVENOUS; SUBCUTANEOUS at 02:09

## 2024-09-22 RX ADMIN — CARVEDILOL 6.25 MG: 6.25 TABLET, FILM COATED ORAL at 08:09

## 2024-09-22 RX ADMIN — ACETAMINOPHEN 1000 MG: 500 TABLET ORAL at 05:09

## 2024-09-22 RX ADMIN — HEPARIN SODIUM 5000 UNITS: 5000 INJECTION INTRAVENOUS; SUBCUTANEOUS at 09:09

## 2024-09-22 RX ADMIN — INSULIN ASPART 1 UNITS: 100 INJECTION, SOLUTION INTRAVENOUS; SUBCUTANEOUS at 09:09

## 2024-09-22 RX ADMIN — TAMSULOSIN HYDROCHLORIDE 0.4 MG: 0.4 CAPSULE ORAL at 08:09

## 2024-09-22 RX ADMIN — PRAVASTATIN SODIUM 40 MG: 20 TABLET ORAL at 08:09

## 2024-09-22 RX ADMIN — METHOCARBAMOL 750 MG: 750 TABLET ORAL at 12:09

## 2024-09-22 NOTE — ASSESSMENT & PLAN NOTE
78 M with hx of prior CVA with residual right sided weakness presents for elective surgery for worsening low back pain and BLE radicular leg pain now s/p L4-S1 TLIF on 9/20/2024:    Plan:  Admitted to nsgy, q4h neuro vital checks   Follow-up post-op XRs  2x HV drains to remain at this time on full suction; please record hourly outputs  LSO brace when OOB  Voiding spontaneously  Passing gas, no BM yet  PT/OT/OOB  TEDs/SCDs/SQH  Recommend weekly BLEUS for DVT surveillance  Continue to monitor clinically, notify NSGY immediately with any changes in neuro status    Dispo: ongoing, anticipate IPR

## 2024-09-22 NOTE — ANESTHESIA POSTPROCEDURE EVALUATION
Anesthesia Post Evaluation    Patient: Robi Donovan    Procedure(s) Performed: Procedure(s) (LRB):  OPEN ROBOTIC L4 -S1 TLIF (N/A)    Final Anesthesia Type: general      Patient location during evaluation: PACU  Patient participation: Yes- Able to Participate  Level of consciousness: awake and alert  Post-procedure vital signs: reviewed and stable  Pain management: adequate  Airway patency: patent    PONV status at discharge: No PONV  Anesthetic complications: no      Cardiovascular status: hemodynamically stable  Respiratory status: unassisted  Hydration status: euvolemic  Follow-up not needed.              Vitals Value Taken Time   /71 09/21/24 1619   Temp 37.5 °C (99.5 °F) 09/21/24 1619   Pulse 83 09/21/24 1619   Resp 18 09/21/24 1619   SpO2 95 % 09/21/24 1619         Event Time   Out of Recovery 09/20/2024 23:15:00         Pain/Rachell Score: Pain Rating Prior to Med Admin: 10 (9/21/2024  2:35 PM)  Pain Rating Post Med Admin: 5 (9/21/2024  3:05 PM)  Rachell Score: 9 (9/20/2024 11:15 PM)

## 2024-09-22 NOTE — PT/OT/SLP PROGRESS
Physical Therapy Treatment    Patient Name:  Robi Donovan   MRN:  [id number]    Recommendations:     Discharge Recommendations: Low Intensity Therapy  Discharge Equipment Recommendations: none  Barriers to discharge: Inaccessible home    Assessment:     Robi Donovan is a 79 y.o. male admitted with a medical diagnosis of <principal problem not specified>.  He presents with the following impairments/functional limitations: weakness, impaired endurance, impaired functional mobility, gait instability, decreased coordination, pain, decreased lower extremity function.  Pt was agreeable and tolerated fairly today. Pt reported feeling more tired and more pain requiring more assistance with activities, but tolerated 2 gait trials with Sugey and RW. Pt continues to require re-education on spinal precautions. Patient continues to demonstrate the need for low intensity therapy on a scheduled basis exhibited by decreased independence with functional mobility.    Rehab Prognosis: Good; patient would benefit from acute skilled PT services to address these deficits and reach maximum level of function.    Recent Surgery: Procedure(s) (LRB):  OPEN ROBOTIC L4 -S1 TLIF (N/A) 2 Days Post-Op    Plan:     During this hospitalization, patient to be seen 5 x/week to address the identified rehab impairments via gait training, therapeutic activities, therapeutic exercises, neuromuscular re-education and progress toward the following goals:    Plan of Care Expires:  10/21/24    Subjective     Chief Complaint: fatigue and pain   Patient/Family Comments/goals: to improve   Pain/Comfort:  Pain Rating 1: 8/10  Location - Orientation 1: lower  Location 1: back  Pain Addressed 1: Reposition, Pre-medicate for activity, Distraction  Pain Rating Post-Intervention 1: 9/10      Objective:     Communicated with nurse prior to session.  Patient found HOB elevated with peripheral IV, hemovac upon PT entry to room.     General Precautions: Standard,  fall  Orthopedic Precautions: spinal precautions  Braces: AFO, LSO  Respiratory Status: Room air    Functional Mobility:  Additional staff present: N/A  Bed Mobility:   Rolling/Turning to Left/Right: moderate assistance  Scooting to EOB: moderate assistance  Supine to Sit: moderate assistance; HOB flat and bed rails  Completed via log rolling. Max cues for sequencing   Transfers:   Sit <> Stand Transfer: moderate assistance with rolling walker   Cues to scoot to edge and push up from bed/chair   1x from EOB and 1x from bedside chair   Gait:  Pt ambulated 2x ~50 ft with moderate assistance and rolling walker. Family following with bedside chair   1 seated rest break and no overt LOB  All lines remained intact throughout ambulation trial, gait belt utilized.  LSO and R AFO donned   Gait Deviation(s): step-to gait with decrease alondra, decrease step length, downward gaze, increase swing phase on RLE,  Verbal/tactile cues for more upright posture, keep RW close and keep LE within RW especially when turning  Stairs:  Pt decline  Balance:   Static/Dynamic sitting EOB balance: CGA-SBA  Donned LSO and shoes+R AFO    AM-PAC 6 CLICK MOBILITY  Turning over in bed (including adjusting bedclothes, sheets and blankets)?: 3  Sitting down on and standing up from a chair with arms (e.g., wheelchair, bedside commode, etc.): 2  Moving from lying on back to sitting on the side of the bed?: 3  Moving to and from a bed to a chair (including a wheelchair)?: 3  Need to walk in hospital room?: 3  Climbing 3-5 steps with a railing?: 2  Basic Mobility Total Score: 16       Treatment & Education:  Pt able to recall 0/3 spinal precautions. Pt required re-educated to maintain.   Patient educated on role of therapy, goals of session, and benefits of out of bed mobility.   Instructed on use of call button and importance of calling nursing staff for assistance with mobility   Questions/concerns addressed within PTA scope of practice  Pt verbalized  understanding.  Whiteboard Updated      Patient left up in chair with all lines intact, call button in reach, and nurse and family present..    GOALS:   Multidisciplinary Problems       Physical Therapy Goals          Problem: Physical Therapy    Goal Priority Disciplines Outcome Goal Variances Interventions   Physical Therapy Goal     PT, PT/OT Progressing     Description: Goals to be met by: 10/5/24     Patient will increase functional independence with mobility by performin. Supine to sit with Stand-by Assistance - Not met  2. Rolling to Left or Right with Modified [place] - Not met  3. Sit to stand transfer with Stand-by Assistance with RW - Not met  4. Gait  x 125 feet with Stand-by Assistance using Rolling Walker - Not met  5. Ascend/descend 5 stair with left Handrail with Contact Guard Assistance using No Assistive Device - Not met   6. Pt will verbalize 3/3 spinal precautions without cueing - Not met                       Time Tracking:     PT Received On: 24  PT Start Time: 1123     PT Stop Time: 1150  PT Total Time (min): 27 min     Billable Minutes: Gait Training 17 and Therapeutic Activity 10    Treatment Type: Treatment  PT/PTA: PTA     Number of PTA visits since last PT visit: 2024

## 2024-09-22 NOTE — OP NOTE
DATE OF SURGERY: 9/20/24              PREOPERATIVE DIAGNOSIS:  1. Severe stenosis L4-S1  2. 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 L4-S1 facet arthropathy     POSTOPERATIVE DIAGNOSIS:  Same.     PROCEDURE PERFORMED:  1. Posterior spinal fusion, L4-S1  2. Posterior nonsegmental spinal fixation, L4-S1 (Globus)  3. Transforaminal lumbar interbody fusion L4/5, L5/S1  4.  Application of PEEK expandable interbody spacer L4/5, L5/S1 Globus  5.  Decompression of thecal sac and nerve roots via laminectomy, bilateral foraminotomies, and medial facetectomies, L4-S1  6. Sumeet osteotomies, L4-S1  7.  Use of robotic navigation   8.  Use of intraoperative fluoroscopy  9. Use of intraoperative neuro-monitoring with stimulation  10. Synthetic and autologous bone grafting     PRIMARY SURGEON: Juan Luis Mcbride DO     ASSISTANT: Chuyita Davison MD     ANESTHESIA: GETA     ESTIMATED BLOOD LOSS: 200mL     COMPLICATIONS: None     DRAINS: Deep Hemovac x 1, superficial HV     SPECIMENS SENT: None     FINDINGS: None     INDICATIONS:     79 M with hx of prior CVA with residual right sided weakness presents for eval of worsening low back pain and BLE radicular leg pain. He endorses pain which starts in upper lumbar area and then radiates down to lower lumbar area down his legs right greater than left into his feet. He endorses right foot weakness and has a right foot drop which has been present for many months. He has difficulty walking. 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 He has been to pain mgmt for injections without significant relief.     Imaging showed severe stenosis from L4-S1 in the setting of dynamic instability at L4/5.  In addition, there was L4-S1 facet arthropathy.  He was offered a L4-S1 decompression/fusion in order to decompress his neural elements and to stabilize the spine following.       Risks, benefits, alternatives, indications and methods were reviewed  in detail and the patient wished to proceed. All questions were answered. No guarantees about the results of the procedure were made.      PROCEDURE:     The patient was brought to the operating room where he was intubated and placed under general anesthesia without difficulty.  All lines were placed.  GW tongs were placed bitemporally and he was placed prone onto a Gideon table with appropriate padding of all pressure points with his head suspended in 15 lbs of traction.  The lumbar region was marked, prepped and draped in the usual fashion. Lateral xrays were used for localization.  A timeout was performed prior to the procedure.  10 mL of lidocaine with epinephrine were injected into the skin.     A linear midline incision was made from L3-S1 using a #10 blade.  Midline suprafascial dissection was carried out with Bovie electrocautery.     A midline fascial incision was made with Bovie electrocautery.  Subperiosteal dissection was carried out with Bovie electrocautery and Soriano elevators to expose the posterior elements from L4-S1.   AP fluoro confirmed levels.  The robotic navigation array was then placed just lateral into the right PSIS.  We then took AP/Lateral images to merge onto the robotic navigation platform where we had preplanned our pedicle screws.   The merge was satisfactory.  The robotic navigation arm was brought into the field and we placed bilateral L4-S1 pedicle screws.  AP and lateral x-ray confirmed excellent position of the hardware. The screws were also stimulated with the neuro monitoring probe, and found to stimulate above a threshold of 20 milliamperes.  We then removed the navigation array from the right PSIS.     Attention was turned to performance of decompressive laminectomies, medial facetectomies and foraminotomies from L4-S1 using a combination of the Le[place]ell rongeur, Kerrison punches, and curettes. Sumeet osteotomies were performed from L4-S1 in standard fashion to widen the neural  decompression and to promote lumbar lordosis. At the end of these maneuvers the thecal sac and nerve roots from L4-S1 were found to be well decompressed using a [place] probe.  The amount of bone and ligamentum resected is greater than normal in order to adequately access the disc space for interbody arthrodesis.     Attention was turned to performance of a transforaminal lumbar interbody fusion at L5/S1 from a right-sided approach.  A nerve root retractor was used to retract the thecal sac medially and expose the L5/S1 disc space.  Epidural veins were cauterized and divided.  An annulotomy was performed with a 15 blade.  A pituitary rongeur was used to remove disc material.  The endplates were prepared with disc villa, rasps, and curettes.  An expandable trial was then placed into the disc space.  The disc space was then packed with autograft.  We then countersunk our expandable PEEK cage packed with synthetic bone into the defect and expanded it until it was flush with the endplates and had a snug fit.  AP and lateral x-ray showed excellent position of all hardware.  Surgiflo was then used for hemostasis.    Attention was turned to performance of a transforaminal lumbar interbody fusion at L4/5 from a left-sided approach.  A nerve root retractor was used to retract the thecal sac medially and expose the L4/5 disc space.  Epidural veins were cauterized and divided.  An annulotomy was performed with a 15 blade.  A pituitary rongeur was used to remove disc material.  The endplates were prepared with disc villa, rasps, and curettes.  An expandable trial was then placed into the disc space.  The disc space was then packed with autograft.  We then countersunk our expandable PEEK cage packed with synthetic bone into the defect and expanded it until it was flush with the endplates and had a snug fit.  AP and lateral x-ray showed excellent position of all hardware.  Surgiflo was then used for hemostasis.     Titanium  rods were then sized, contoured and reduced into the tulip heads. Set screws were placed. Gentle screw compression across the Sumeet osteotomy was performed to promote lordosis, and the set screws were tightened.  The wound was copiously irrigated with sterile normal saline and a dilute Betadine solution.  The posterior spinal elements from L4-S1 were decorticated bilaterally with a high-speed drill.  Autologous and synthetic bone was placed posteriorly for arthrodesis from L4-S1. One deep Hemovac drain was placed and tunneled out of the wound superiorly where it was secured with vicryl suture.  2 g of vancomycin powder was placed in the subfascial space.  A watertight fascial closure was achieved with interrupted 0 Vicryl sutures and a running Stratafix suture.  The suprafascial layer was then undermined with bovie to create a tension free closure and to enhance wound healing.  A superficial HV drain was left in this space and tunneled out of the wound superiorly where it was secured with vicryl suture.  The soft tissues were closed in layers with 2/0 vicryl and 4/0 running monocryl for the skin.  Preneo tape covered with dermabond was then placed over the incision.     The patient appeared to tolerate the procedure well from a hemodynamic and neuro monitoring standpoint.  I was present for all critical portions of the case, and at the end of the case all counts are correct.  He was removed from  tongs and repositioned supine onto the hospital bed where he was extubated and allowed to emerge from anesthesia without difficulty. He was sent to the PACU in stable condition for recovery.

## 2024-09-22 NOTE — NURSING
Nurses Note -- 4 Eyes      9/22/2024   2:02 PM      Skin assessed during: Q Shift Change      [x] No Altered Skin Integrity Present    []Prevention Measures Documented      [] Yes- Altered Skin Integrity Present or Discovered   [] LDA Added if Not in Epic (Describe Wound)   [] New Altered Skin Integrity was Present on Admit and Documented in LDA   [] Wound Image Taken    Wound Care Consulted? No    Attending Nurse:  Carola Jeffrey lpn    Second RN/Staff Member:   gregory CARABALLO

## 2024-09-22 NOTE — NURSING
Nurses Note -- 4 Eyes      9/21/2024   10:42 PM      Skin assessed during: Q Shift Change      [x] No Altered Skin Integrity Present    []Prevention Measures Documented      [] Yes- Altered Skin Integrity Present or Discovered   [] LDA Added if Not in Epic (Describe Wound)   [] New Altered Skin Integrity was Present on Admit and Documented in LDA   [] Wound Image Taken    Wound Care Consulted? No    Attending Nurse:  RASHMI Linares       Second RN/Staff Member:  RASHMI Fink

## 2024-09-22 NOTE — SUBJECTIVE & OBJECTIVE
Interval History: 9/21: doing well POD 2. More back pain today. Drains still high output but slowing down overnight. Xrays pending. Brace in place. Patient with improvement in leg pain, eating, passing gas, and voiding fine.     Medications:  Continuous Infusions:   0.9% NaCl   Intravenous Continuous        0.9% NaCl   Intravenous Continuous 100 mL/hr at 09/21/24 1053 New Bag at 09/21/24 1053     Scheduled Meds:   acetaminophen  1,000 mg Oral Q8H    amLODIPine  10 mg Oral Daily    calcium carbonate  500 mg Oral BID    carvediloL  6.25 mg Oral BID    ceFAZolin (Ancef) IV (PEDS and ADULTS)  2 g Intravenous Q8H    losartan  100 mg Oral Daily    methocarbamoL  750 mg Oral QID    mupirocin   Nasal BID    pravastatin  40 mg Oral QHS    senna-docusate 8.6-50 mg  2 tablet Oral BID    tamsulosin  0.4 mg Oral Daily     PRN Meds:  Current Facility-Administered Medications:     aluminum-magnesium hydroxide-simethicone, 30 mL, Oral, Q4H PRN    dextrose 10%, 12.5 g, Intravenous, PRN    dextrose 10%, 25 g, Intravenous, PRN    glucagon (human recombinant), 1 mg, Intramuscular, PRN    glucose, 16 g, Oral, PRN    glucose, 24 g, Oral, PRN    HYDROmorphone, 1 mg, Intravenous, Q4H PRN    insulin aspart U-100, 0-5 Units, Subcutaneous, QID (AC + HS) PRN    melatonin, 6 mg, Oral, Nightly PRN    ondansetron, 8 mg, Oral, Q6H PRN    oxyCODONE, 5 mg, Oral, Q4H PRN    oxyCODONE, 10 mg, Oral, Q4H PRN    prochlorperazine, 5 mg, Intravenous, Q6H PRN     Review of Systems  Objective:     Weight: 86.9 kg (191 lb 9.3 oz)  Body mass index is 25.28 kg/m².  Vital Signs (Most Recent):  Temp: 99.2 °F (37.3 °C) (09/22/24 1103)  Pulse: 84 (09/22/24 1103)  Resp: 17 (09/22/24 1209)  BP: (!) 160/74 (09/22/24 1103)  SpO2: (!) 92 % (09/22/24 1103) Vital Signs (24h Range):  Temp:  [99 °F (37.2 °C)-99.8 °F (37.7 °C)] 99.2 °F (37.3 °C)  Pulse:  [78-91] 84  Resp:  [16-18] 17  SpO2:  [92 %-96 %] 92 %  BP: (111-160)/(58-74) 160/74     Date 09/22/24 0700 - 09/23/24  "0659   Shift 3156-2429 6287-6358 6797-2822 24 Hour Total   INTAKE   Shift Total(mL/kg)       OUTPUT   Drains 130   130   Shift Total(mL/kg) 130(1.5)   130(1.5)   Weight (kg) 86.9 86.9 86.9 86.9                            Closed/Suction Drain 09/20/24 2140 Left;Posterior Back Accordion 10 Fr. (Active)   Site Description Healing 09/21/24 0130   Dressing Type No CHG impregnated dressing/sponge (patient < 2 mos) 09/21/24 0130   Drainage Bloody 09/21/24 0130   Status To bulb suction 09/21/24 0130   Output (mL) 210 mL 09/21/24 0533            Closed/Suction Drain 09/20/24 2206 Right;Posterior Back Accordion 10 Fr. (Active)   Site Description Healing 09/21/24 0130   Dressing Type No CHG impregnated dressing/sponge (patient < 2 mos) 09/21/24 0130   Drainage Serosanguineous 09/21/24 0130   Status To bulb suction 09/21/24 0130   Output (mL) 25 mL 09/21/24 0533            Urethral Catheter 09/20/24 1900 Non-latex;Straight-tip;Silicone 16 Fr. (Active)   Site Assessment Clean;Intact;Dry 09/21/24 0130   Collection Container Urimeter 09/21/24 0130   Securement Method secured to top of thigh w/ adhesive device 09/21/24 0130   Reason for Continuing Urinary Catheterization Post operative 09/21/24 0130   CAUTI Prevention Bundle Securement Device in place with 1" slack 09/21/24 0130   Output (mL) 550 mL 09/21/24 0533          Physical Exam         Neurosurgery Physical Exam  GENERAL: resting comfortably  HEENT: NCAT, PERRL, mucous membranes moist  NECK: supple, trachea midline  CV: normal capillary refill  PULM: aerating well, symmetric expansion, no distress  ABD: soft, NT, ND  EXT: no c/c/e  2 HV drains to FS  Incision c/d/i     NEURO:     GCS 15 E4V5M6  AAO x 3  CN II-XII grossly intact  Fc x 4 antigravity  RUE 4  RLE 4 prox, 1 df/pf/ehl  LUE 5 except 4 in HI  LLE 5  Some pain limitation in hip flexion bilaterally  SILT         Significant Labs:  Recent Labs   Lab 09/21/24  0344 09/22/24  0212   * 261*    135*   K 3.7 " "3.5    104   CO2 22* 21*   BUN 11 14   CREATININE 1.1 1.2   CALCIUM 8.2* 8.1*     Recent Labs   Lab 09/21/24  0344 09/22/24  0212   WBC 9.34 8.11   HGB 12.8* 10.7*   HCT 38.1* 30.7*    152     No results for input(s): "LABPT", "INR", "APTT" in the last 48 hours.  Microbiology Results (last 7 days)       ** No results found for the last 168 hours. **          Recent Lab Results  (Last 5 results in the past 24 hours)        09/22/24  1106   09/22/24  0740   09/22/24  0212   09/21/24  2101   09/21/24  1612        Anion Gap     10           Baso #     0.02           Basophil %     0.2           BUN     14           Calcium     8.1           Chloride     104           CO2     21           Creatinine     1.2           Differential Method     Automated           eGFR     >60.0           Eos #     0.1           Eos %     1.4           Glucose     261           Gran # (ANC)     4.9           Gran %     60.3           Hematocrit     30.7           Hemoglobin     10.7           Immature Grans (Abs)     0.03  Comment: Mild elevation in immature granulocytes is non specific and   can be seen in a variety of conditions including stress response,   acute inflammation, trauma and pregnancy. Correlation with other   laboratory and clinical findings is essential.             Immature Granulocytes     0.4           Lymph #     2.0           Lymph %     24.8           MCH     30.7           MCHC     34.9           MCV     88           Mono #     1.1           Mono %     12.9           MPV     10.7           nRBC     0           Platelet Count     152           POCT Glucose 123   207     324   263       Potassium     3.5           RBC     3.48           RDW     13.4           Sodium     135           WBC     8.11                                All pertinent labs from the last 24 hours have been reviewed.    Significant Diagnostics:  CT: No results found in the last 24 hours.  MRI: No results found in the last 24 hours.  I " have reviewed all pertinent imaging results/findings within the past 24 hours.  I have reviewed and interpreted all pertinent imaging results/findings within the past 24 hours.

## 2024-09-22 NOTE — PROGRESS NOTES
[facility]  Neurosurgery  Progress Note    Subjective:     History of Present Illness: 78 M with hx of prior CVA with residual right sided weakness presents for eval of worsening low back pain and BLE radicular leg pain. He endorses pain which starts in upper lumbar area and then radiates down to lower lumbar area down his legs right greater than left into his feet. He endorses right foot weakness and has a right foot drop which has been present for many months. He has difficulty walking. 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 He has been to pain mgmt for injections without significant relief.      Interval fu 8/8/24:  Pt presents in fu.  No significant changes in symptoms.  Today he is most concerned with his balance difficulties.     Interval fu 8/29/24: No changes in symptoms.    Post-Op Info:  Procedure(s) (LRB):  OPEN ROBOTIC L4 -S1 TLIF (N/A)   2 Days Post-Op   Interval History: 9/21: doing well POD 2. More back pain today. Drains still high output but slowing down overnight. Xrays pending. Brace in place. Patient with improvement in leg pain, eating, passing gas, and voiding fine.     Medications:  Continuous Infusions:   0.9% NaCl   Intravenous Continuous        0.9% NaCl   Intravenous Continuous 100 mL/hr at 09/21/24 1053 New Bag at 09/21/24 1053     Scheduled Meds:   acetaminophen  1,000 mg Oral Q8H    amLODIPine  10 mg Oral Daily    calcium carbonate  500 mg Oral BID    carvediloL  6.25 mg Oral BID    ceFAZolin (Ancef) IV (PEDS and ADULTS)  2 g Intravenous Q8H    losartan  100 mg Oral Daily    methocarbamoL  750 mg Oral QID    mupirocin   Nasal BID    pravastatin  40 mg Oral QHS    senna-docusate 8.6-50 mg  2 tablet Oral BID    tamsulosin  0.4 mg Oral Daily     PRN Meds:  Current Facility-Administered Medications:     aluminum-magnesium hydroxide-simethicone, 30 mL, Oral, Q4H PRN    dextrose 10%, 12.5 g, Intravenous, PRN     dextrose 10%, 25 g, Intravenous, PRN    glucagon (human recombinant), 1 mg, Intramuscular, PRN    glucose, 16 g, Oral, PRN    glucose, 24 g, Oral, PRN    HYDROmorphone, 1 mg, Intravenous, Q4H PRN    insulin aspart U-100, 0-5 Units, Subcutaneous, QID (AC + HS) PRN    melatonin, 6 mg, Oral, Nightly PRN    ondansetron, 8 mg, Oral, Q6H PRN    oxyCODONE, 5 mg, Oral, Q4H PRN    oxyCODONE, 10 mg, Oral, Q4H PRN    prochlorperazine, 5 mg, Intravenous, Q6H PRN     Review of Systems  Objective:     Weight: 86.9 kg (191 lb 9.3 oz)  Body mass index is 25.28 kg/m².  Vital Signs (Most Recent):  Temp: 99.2 °F (37.3 °C) (09/22/24 1103)  Pulse: 84 (09/22/24 1103)  Resp: 17 (09/22/24 1209)  BP: (!) 160/74 (09/22/24 1103)  SpO2: (!) 92 % (09/22/24 1103) Vital Signs (24h Range):  Temp:  [99 °F (37.2 °C)-99.8 °F (37.7 °C)] 99.2 °F (37.3 °C)  Pulse:  [78-91] 84  Resp:  [16-18] 17  SpO2:  [92 %-96 %] 92 %  BP: (111-160)/(58-74) 160/74     Date 09/22/24 0700 - 09/23/24 0659   Shift 3349-4369 4348-0421 2706-0109 24 Hour Total   INTAKE   Shift Total(mL/kg)       OUTPUT   Drains 130   130   Shift Total(mL/kg) 130(1.5)   130(1.5)   Weight (kg) 86.9 86.9 86.9 86.9                            Closed/Suction Drain 09/20/24 2140 Left;Posterior Back Accordion 10 Fr. (Active)   Site Description Healing 09/21/24 0130   Dressing Type No CHG impregnated dressing/sponge (patient < 2 mos) 09/21/24 0130   Drainage Bloody 09/21/24 0130   Status To bulb suction 09/21/24 0130   Output (mL) 210 mL 09/21/24 0533            Closed/Suction Drain 09/20/24 2206 Right;Posterior Back Accordion 10 Fr. (Active)   Site Description Healing 09/21/24 0130   Dressing Type No CHG impregnated dressing/sponge (patient < 2 mos) 09/21/24 0130   Drainage Serosanguineous 09/21/24 0130   Status To bulb suction 09/21/24 0130   Output (mL) 25 mL 09/21/24 0533            Urethral Catheter 09/20/24 1900 Non-latex;Straight-tip;Silicone 16 Fr. (Active)   Site Assessment Clean;Intact;Dry  "09/21/24 0130   Collection Container Urimeter 09/21/24 0130   Securement Method secured to top of thigh w/ adhesive device 09/21/24 0130   Reason for Continuing Urinary Catheterization Post operative 09/21/24 0130   CAUTI Prevention Bundle Securement Device in place with 1" slack 09/21/24 0130   Output (mL) 550 mL 09/21/24 0533          Physical Exam         Neurosurgery Physical Exam  GENERAL: resting comfortably  HEENT: NCAT, PERRL, mucous membranes moist  NECK: supple, trachea midline  CV: normal capillary refill  PULM: aerating well, symmetric expansion, no distress  ABD: soft, NT, ND  EXT: no c/c/e  2 HV drains to FS  Incision c/d/i     NEURO:     GCS 15 E4V5M6  AAO x 3  CN II-XII grossly intact  Fc x 4 antigravity  RUE 4  RLE 4 prox, 1 df/pf/ehl  LUE 5 except 4 in HI  LLE 5  Some pain limitation in hip flexion bilaterally  SILT         Significant Labs:  Recent Labs   Lab 09/21/24  0344 09/22/24  0212   * 261*    135*   K 3.7 3.5    104   CO2 22* 21*   BUN 11 14   CREATININE 1.1 1.2   CALCIUM 8.2* 8.1*     Recent Labs   Lab 09/21/24  0344 09/22/24  0212   WBC 9.34 8.11   HGB 12.8* 10.7*   HCT 38.1* 30.7*    152     No results for input(s): "LABPT", "INR", "APTT" in the last 48 hours.  Microbiology Results (last 7 days)       ** No results found for the last 168 hours. **          Recent Lab Results  (Last 5 results in the past 24 hours)        09/22/24  1106   09/22/24  0740   09/22/24  0212   09/21/24  2101   09/21/24  1612        Anion Gap     10           Baso #     0.02           Basophil %     0.2           BUN     14           Calcium     8.1           Chloride     104           CO2     21           Creatinine     1.2           Differential Method     Automated           eGFR     >60.0           Eos #     0.1           Eos %     1.4           Glucose     261           Gran # (ANC)     4.9           Gran %     60.3           Hematocrit     30.7           Hemoglobin     10.7       "     Immature Grans (Abs)     0.03  Comment: Mild elevation in immature granulocytes is non specific and   can be seen in a variety of conditions including stress response,   acute inflammation, trauma and pregnancy. Correlation with other   laboratory and clinical findings is essential.             Immature Granulocytes     0.4           Lymph #     2.0           Lymph %     24.8           MCH     30.7           MCHC     34.9           MCV     88           Mono #     1.1           Mono %     12.9           MPV     10.7           nRBC     0           Platelet Count     152           POCT Glucose 123   207     324   263       Potassium     3.5           RBC     3.48           RDW     13.4           Sodium     135           WBC     8.11                                All pertinent labs from the last 24 hours have been reviewed.    Significant Diagnostics:  CT: No results found in the last 24 hours.  MRI: No results found in the last 24 hours.  I have reviewed all pertinent imaging results/findings within the past 24 hours.  I have reviewed and interpreted all pertinent imaging results/findings within the past 24 hours.  Assessment/Plan:     Status post lumbar surgery (L4-S1 TLIF)  78 M with hx of prior CVA with residual right sided weakness presents for elective surgery for worsening low back pain and BLE radicular leg pain now s/p L4-S1 TLIF on 9/20/2024:    Plan:  Admitted to nsgy, q4h neuro vital checks   Follow-up post-op XRs  2x HV drains to remain at this time on full suction; please record hourly outputs  LSO brace when OOB  Voiding spontaneously  Passing gas, no BM yet  PT/OT/OOB  TEDs/SCDs/SQH  Recommend weekly BLEUS for DVT surveillance  Continue to monitor clinically, notify NSGY immediately with any changes in neuro status    Dispo: ongoing, anticipate IPR        Chuyita Davison MD  Neurosurgery  [facility] - Surgery

## 2024-09-23 LAB
ANION GAP SERPL CALC-SCNC: 7 MMOL/L (ref 8–16)
BASOPHILS # BLD AUTO: 0.03 K/UL (ref 0–0.2)
BASOPHILS NFR BLD: 0.4 % (ref 0–1.9)
BUN SERPL-MCNC: 12 MG/DL (ref 8–23)
CALCIUM SERPL-MCNC: 8.1 MG/DL (ref 8.7–10.5)
CHLORIDE SERPL-SCNC: 101 MMOL/L (ref 95–110)
CO2 SERPL-SCNC: 25 MMOL/L (ref 23–29)
CREAT SERPL-MCNC: 1.2 MG/DL (ref 0.5–1.4)
DIFFERENTIAL METHOD BLD: ABNORMAL
EOSINOPHIL # BLD AUTO: 0.2 K/UL (ref 0–0.5)
EOSINOPHIL NFR BLD: 2 % (ref 0–8)
ERYTHROCYTE [DISTWIDTH] IN BLOOD BY AUTOMATED COUNT: 13.5 % (ref 11.5–14.5)
EST. GFR  (NO RACE VARIABLE): >60 ML/MIN/1.73 M^2
GLUCOSE SERPL-MCNC: 222 MG/DL (ref 70–110)
HCT VFR BLD AUTO: 32.1 % (ref 40–54)
HGB BLD-MCNC: 10.8 G/DL (ref 14–18)
IMM GRANULOCYTES # BLD AUTO: 0.03 K/UL (ref 0–0.04)
IMM GRANULOCYTES NFR BLD AUTO: 0.4 % (ref 0–0.5)
LYMPHOCYTES # BLD AUTO: 2.1 K/UL (ref 1–4.8)
LYMPHOCYTES NFR BLD: 28.1 % (ref 18–48)
MCH RBC QN AUTO: 30.7 PG (ref 27–31)
MCHC RBC AUTO-ENTMCNC: 33.6 G/DL (ref 32–36)
MCV RBC AUTO: 91 FL (ref 82–98)
MONOCYTES # BLD AUTO: 0.9 K/UL (ref 0.3–1)
MONOCYTES NFR BLD: 12.1 % (ref 4–15)
NEUTROPHILS # BLD AUTO: 4.4 K/UL (ref 1.8–7.7)
NEUTROPHILS NFR BLD: 57 % (ref 38–73)
NRBC BLD-RTO: 0 /100 WBC
PLATELET # BLD AUTO: 142 K/UL (ref 150–450)
PMV BLD AUTO: 11.1 FL (ref 9.2–12.9)
POCT GLUCOSE: 213 MG/DL (ref 70–110)
POCT GLUCOSE: 251 MG/DL (ref 70–110)
POCT GLUCOSE: 257 MG/DL (ref 70–110)
POTASSIUM SERPL-SCNC: 3.7 MMOL/L (ref 3.5–5.1)
RBC # BLD AUTO: 3.52 M/UL (ref 4.6–6.2)
SODIUM SERPL-SCNC: 133 MMOL/L (ref 136–145)
WBC # BLD AUTO: 7.62 K/UL (ref 3.9–12.7)

## 2024-09-23 PROCEDURE — 85025 COMPLETE CBC W/AUTO DIFF WBC: CPT | Performed by: STUDENT IN AN ORGANIZED HEALTH CARE EDUCATION/TRAINING PROGRAM

## 2024-09-23 PROCEDURE — 80048 BASIC METABOLIC PNL TOTAL CA: CPT | Performed by: STUDENT IN AN ORGANIZED HEALTH CARE EDUCATION/TRAINING PROGRAM

## 2024-09-23 PROCEDURE — 63600175 PHARM REV CODE 636 W HCPCS: Performed by: STUDENT IN AN ORGANIZED HEALTH CARE EDUCATION/TRAINING PROGRAM

## 2024-09-23 PROCEDURE — 25000003 PHARM REV CODE 250: Performed by: STUDENT IN AN ORGANIZED HEALTH CARE EDUCATION/TRAINING PROGRAM

## 2024-09-23 PROCEDURE — 36415 COLL VENOUS BLD VENIPUNCTURE: CPT | Performed by: STUDENT IN AN ORGANIZED HEALTH CARE EDUCATION/TRAINING PROGRAM

## 2024-09-23 PROCEDURE — 11000001 HC ACUTE MED/SURG PRIVATE ROOM

## 2024-09-23 PROCEDURE — 97530 THERAPEUTIC ACTIVITIES: CPT | Mod: CO

## 2024-09-23 PROCEDURE — 97535 SELF CARE MNGMENT TRAINING: CPT | Mod: CO

## 2024-09-23 RX ORDER — POLYETHYLENE GLYCOL 3350 17 G/17G
17 POWDER, FOR SOLUTION ORAL 2 TIMES DAILY
Status: DISCONTINUED | OUTPATIENT
Start: 2024-09-23 | End: 2024-10-01 | Stop reason: HOSPADM

## 2024-09-23 RX ADMIN — CEFAZOLIN 2 G: 2 INJECTION, POWDER, FOR SOLUTION INTRAMUSCULAR; INTRAVENOUS at 03:09

## 2024-09-23 RX ADMIN — AMLODIPINE BESYLATE 10 MG: 10 TABLET ORAL at 08:09

## 2024-09-23 RX ADMIN — OXYCODONE HYDROCHLORIDE 10 MG: 10 TABLET ORAL at 10:09

## 2024-09-23 RX ADMIN — OXYCODONE HYDROCHLORIDE 10 MG: 10 TABLET ORAL at 03:09

## 2024-09-23 RX ADMIN — INSULIN ASPART 2 UNITS: 100 INJECTION, SOLUTION INTRAVENOUS; SUBCUTANEOUS at 08:09

## 2024-09-23 RX ADMIN — LOSARTAN POTASSIUM 100 MG: 25 TABLET, FILM COATED ORAL at 08:09

## 2024-09-23 RX ADMIN — CARVEDILOL 6.25 MG: 6.25 TABLET, FILM COATED ORAL at 08:09

## 2024-09-23 RX ADMIN — OXYCODONE HYDROCHLORIDE 10 MG: 10 TABLET ORAL at 02:09

## 2024-09-23 RX ADMIN — PRAVASTATIN SODIUM 40 MG: 20 TABLET ORAL at 09:09

## 2024-09-23 RX ADMIN — METHOCARBAMOL 750 MG: 750 TABLET ORAL at 04:09

## 2024-09-23 RX ADMIN — INSULIN ASPART 1 UNITS: 100 INJECTION, SOLUTION INTRAVENOUS; SUBCUTANEOUS at 09:09

## 2024-09-23 RX ADMIN — METHOCARBAMOL 750 MG: 750 TABLET ORAL at 09:09

## 2024-09-23 RX ADMIN — METHOCARBAMOL 750 MG: 750 TABLET ORAL at 08:09

## 2024-09-23 RX ADMIN — SENNOSIDES AND DOCUSATE SODIUM 2 TABLET: 50; 8.6 TABLET ORAL at 09:09

## 2024-09-23 RX ADMIN — POLYETHYLENE GLYCOL 3350 17 G: 17 POWDER, FOR SOLUTION ORAL at 08:09

## 2024-09-23 RX ADMIN — POLYETHYLENE GLYCOL 3350 17 G: 17 POWDER, FOR SOLUTION ORAL at 09:09

## 2024-09-23 RX ADMIN — ACETAMINOPHEN 1000 MG: 500 TABLET ORAL at 06:09

## 2024-09-23 RX ADMIN — METHOCARBAMOL 750 MG: 750 TABLET ORAL at 01:09

## 2024-09-23 RX ADMIN — TAMSULOSIN HYDROCHLORIDE 0.4 MG: 0.4 CAPSULE ORAL at 08:09

## 2024-09-23 RX ADMIN — ACETAMINOPHEN 1000 MG: 500 TABLET ORAL at 09:09

## 2024-09-23 RX ADMIN — HEPARIN SODIUM 5000 UNITS: 5000 INJECTION INTRAVENOUS; SUBCUTANEOUS at 06:09

## 2024-09-23 RX ADMIN — CARVEDILOL 6.25 MG: 6.25 TABLET, FILM COATED ORAL at 09:09

## 2024-09-23 RX ADMIN — CEFAZOLIN 2 G: 2 INJECTION, POWDER, FOR SOLUTION INTRAMUSCULAR; INTRAVENOUS at 07:09

## 2024-09-23 RX ADMIN — OXYCODONE HYDROCHLORIDE 10 MG: 10 TABLET ORAL at 07:09

## 2024-09-23 RX ADMIN — SENNOSIDES AND DOCUSATE SODIUM 2 TABLET: 50; 8.6 TABLET ORAL at 08:09

## 2024-09-23 RX ADMIN — HEPARIN SODIUM 5000 UNITS: 5000 INJECTION INTRAVENOUS; SUBCUTANEOUS at 09:09

## 2024-09-23 RX ADMIN — INSULIN ASPART 3 UNITS: 100 INJECTION, SOLUTION INTRAVENOUS; SUBCUTANEOUS at 04:09

## 2024-09-23 RX ADMIN — ACETAMINOPHEN 1000 MG: 500 TABLET ORAL at 01:09

## 2024-09-23 RX ADMIN — HEPARIN SODIUM 5000 UNITS: 5000 INJECTION INTRAVENOUS; SUBCUTANEOUS at 01:09

## 2024-09-23 RX ADMIN — CALCIUM CARBONATE (ANTACID) CHEW TAB 500 MG 500 MG: 500 CHEW TAB at 08:09

## 2024-09-23 RX ADMIN — CALCIUM CARBONATE (ANTACID) CHEW TAB 500 MG 500 MG: 500 CHEW TAB at 09:09

## 2024-09-23 RX ADMIN — INSULIN ASPART 3 UNITS: 100 INJECTION, SOLUTION INTRAVENOUS; SUBCUTANEOUS at 11:09

## 2024-09-23 RX ADMIN — CEFAZOLIN 2 G: 2 INJECTION, POWDER, FOR SOLUTION INTRAMUSCULAR; INTRAVENOUS at 11:09

## 2024-09-23 NOTE — ASSESSMENT & PLAN NOTE
78 M with hx of prior CVA with residual right sided weakness presents for elective surgery for worsening low back pain and BLE radicular leg pain now s/p L4-S1 TLIF on 9/20/2024:    Plan:  Admitted to nsgy, q4h neuro vital checks   Follow-up post-op XRs  2x HV drains to remain at this time on full suction; please record hourly outputs  LSO brace when OOB  Voiding spontaneously  Passing gas, no BM yet, polyethylene glycol added today  PT/OT/OOB  TEDs/SCDs/SQH  Recommend weekly BLEUS for DVT surveillance  Continue to monitor clinically, notify NSGY immediately with any changes in neuro status    Dispo: anticipate home once drains are out and patient remains afebrile

## 2024-09-23 NOTE — PROGRESS NOTES
[facility]  Neurosurgery  Progress Note    Subjective:     History of Present Illness: 78 M with hx of prior CVA with residual right sided weakness presents for eval of worsening low back pain and BLE radicular leg pain. He endorses pain which starts in upper lumbar area and then radiates down to lower lumbar area down his legs right greater than left into his feet. He endorses right foot weakness and has a right foot drop which has been present for many months. He has difficulty walking. 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 He has been to pain mgmt for injections without significant relief.      Interval fu 8/8/24:  Pt presents in fu.  No significant changes in symptoms.  Today he is most concerned with his balance difficulties.     Interval fu 8/29/24: No changes in symptoms.    Post-Op Info:  Procedure(s) (LRB):  OPEN ROBOTIC L4 -S1 TLIF (N/A)   3 Days Post-Op   Interval History: 9/23: POD 3. Febrile to 101.1 yesterday afternoon. CXR, UA, BLED all within normal limits. No subsequent episodes of fever. Patient asymptomatic. Will continue to monitor. HV x 2 with output of 100, will plan to leave in place today. Exam stable.     Medications:  Continuous Infusions:   0.9% NaCl   Intravenous Continuous         Scheduled Meds:   acetaminophen  1,000 mg Oral Q8H    amLODIPine  10 mg Oral Daily    calcium carbonate  500 mg Oral BID    carvediloL  6.25 mg Oral BID    ceFAZolin (Ancef) IV (PEDS and ADULTS)  2 g Intravenous Q8H    heparin (porcine)  5,000 Units Subcutaneous Q8H    losartan  100 mg Oral Daily    methocarbamoL  750 mg Oral QID    polyethylene glycol  17 g Oral BID    pravastatin  40 mg Oral QHS    senna-docusate 8.6-50 mg  2 tablet Oral BID    tamsulosin  0.4 mg Oral Daily     PRN Meds:  Current Facility-Administered Medications:     aluminum-magnesium hydroxide-simethicone, 30 mL, Oral, Q4H PRN    dextrose 10%, 12.5 g,  Intravenous, PRN    dextrose 10%, 25 g, Intravenous, PRN    glucagon (human recombinant), 1 mg, Intramuscular, PRN    glucose, 16 g, Oral, PRN    glucose, 24 g, Oral, PRN    insulin aspart U-100, 0-5 Units, Subcutaneous, QID (AC + HS) PRN    melatonin, 6 mg, Oral, Nightly PRN    ondansetron, 8 mg, Oral, Q6H PRN    oxyCODONE, 5 mg, Oral, Q4H PRN    oxyCODONE, 10 mg, Oral, Q4H PRN     Objective:     Weight: 86.9 kg (191 lb 9.3 oz)  Body mass index is 25.28 kg/m².  Vital Signs (Most Recent):  Temp: 98.6 °F (37 °C) (09/23/24 1137)  Pulse: 82 (09/23/24 1137)  Resp: 18 (09/23/24 1137)  BP: (!) 152/68 (09/23/24 1137)  SpO2: (!) 89 % (09/23/24 1137) Vital Signs (24h Range):  Temp:  [98.6 °F (37 °C)-101.1 °F (38.4 °C)] 98.6 °F (37 °C)  Pulse:  [62-87] 82  Resp:  [16-18] 18  SpO2:  [89 %-96 %] 89 %  BP: (134-160)/(63-75) 152/68                              Closed/Suction Drain 09/20/24 2140 Left;Posterior Back Accordion 10 Fr. (Active)   Site Description Unable to view 09/23/24 0745   Dressing Type Gauze 09/23/24 0745   Dressing Status Dry;Clean;Intact 09/23/24 0745   Drainage Serosanguineous 09/23/24 0745   Status To bulb suction 09/23/24 0745   Output (mL) 100 mL 09/23/24 0615            Closed/Suction Drain 09/20/24 2206 Right;Posterior Back Accordion 10 Fr. (Active)   Site Description Unable to view 09/23/24 0745   Dressing Type Gauze 09/23/24 0745   Dressing Status Intact;Clean;Dry 09/23/24 0745   Dressing Intervention Integrity maintained 09/23/24 0745   Drainage Serosanguineous 09/23/24 0745   Status To bulb suction 09/23/24 0745   Output (mL) 0 mL 09/23/24 0615      Physical Exam  GENERAL: resting comfortably  NECK: supple, trachea midline  PULM: aerating well, symmetric expansion, no distress  EXT: no c/c/e  2 HV drains to FS  Incision c/d/i     NEURO:  AAO x 3  CN II-XII grossly intact  RUE 4  RLE 4 prox, 1 df/pf/ehl  LUE 5 except 4 in HI  LLE 5  Some pain limitation in hip flexion bilaterally  SILT     Significant  "Labs:  Recent Labs   Lab 09/22/24  0212 09/23/24  0310   * 222*   * 133*   K 3.5 3.7    101   CO2 21* 25   BUN 14 12   CREATININE 1.2 1.2   CALCIUM 8.1* 8.1*     Recent Labs   Lab 09/22/24  0212 09/23/24  0310   WBC 8.11 7.62   HGB 10.7* 10.8*   HCT 30.7* 32.1*    142*     No results for input(s): "LABPT", "INR", "APTT" in the last 48 hours.  Microbiology Results (last 7 days)       ** No results found for the last 168 hours. **          All pertinent labs from the last 24 hours have been reviewed.    Significant Diagnostics:  I have reviewed and interpreted all pertinent imaging results/findings within the past 24 hours.  Assessment/Plan:     Status post lumbar surgery (L4-S1 TLIF)  78 M with hx of prior CVA with residual right sided weakness presents for elective surgery for worsening low back pain and BLE radicular leg pain now s/p L4-S1 TLIF on 9/20/2024:    Plan:  Admitted to nsgy, q4h neuro vital checks   Follow-up post-op XRs  2x HV drains to remain at this time on full suction; please record hourly outputs  LSO brace when OOB  Voiding spontaneously  Passing gas, no BM yet, polyethylene glycol added today  PT/OT/OOB  TEDs/SCDs/SQH  Recommend weekly BLEUS for DVT surveillance  Continue to monitor clinically, notify NSGY immediately with any changes in neuro status    Dispo: anticipate home once drains are out and patient remains afebrile      Case and plan discussed with attending neurosurgeon Dr. Rae Soler PAAlciraC  Neurosurgery  [facility] - Surgery  "

## 2024-09-23 NOTE — PT/OT/SLP PROGRESS
Occupational Therapy   Treatment    Name: Robi Donovan  MRN: [id number]  Admitting Diagnosis:  <principal problem not specified>  3 Days Post-Op    Recommendations:     Discharge Recommendations: Low Intensity Therapy  Discharge Equipment Recommendations:  none  Barriers to discharge:  None    Assessment:     Robi Donovan is a 79 y.o. male with a medical diagnosis of <principal problem not specified>.  He presents with c/o back pain. However, patient tolerated functional mobility from EOB > Doorway > Bedside chair. Patient educated on spinal precautions. Performance deficits affecting function are weakness, impaired balance, decreased safety awareness, impaired endurance, pain, impaired self care skills, impaired functional mobility, gait instability, decreased lower extremity function, orthopedic precautions.     Rehab Prognosis:  Good; patient would benefit from acute skilled OT services to address these deficits and reach maximum level of function.       Plan:     Patient to be seen 4 x/week to address the above listed problems via self-care/home management, therapeutic activities, therapeutic exercises, neuromuscular re-education  Plan of Care Expires: 10/21/24  Plan of Care Reviewed with: patient (family member present)    Subjective     Chief Complaint: Back pain  Patient/Family Comments/goals: Patient reports he wants to continue walking.  Pain/Comfort:  Pain Rating 1:  (Pain not rated)  Location - Orientation 1: lower  Location 1: back  Pain Addressed 1: Reposition, Distraction, Nurse notified  Pain Rating Post-Intervention 1: 9/10    Objective:     Communicated with: Nurse prior to and post session.  Patient found HOB elevated with hemovac upon OT entry to room.    General Precautions: Standard, fall    Orthopedic Precautions:spinal precautions  Braces: AFO, LSO  Respiratory Status: Room air     Occupational Performance:     Bed Mobility:    Patient completed Rolling/Turning to Left with  moderate  assistance  Patient completed Scooting with moderate assistance towards EOB and CGA in bedside chair.  Patient completed Supine to Sit with moderate assistance     Functional Mobility/Transfers:  Patient completed x2 Sit <> Stand Transfers from EOB and Bedside chair with moderate assistance with rolling walker   Functional Mobility: Patient engaged in functional mobility training to simulate a household distance. From EOB > Doorway > Bedside chair with moderate (A) with RW AD to maximize functional endurance and standing balance required for home/community mobility and occupational engagement. No noted LOB, however patient did have an anterior lean. Patient needed vc's for upright posture.     Activities of Daily Living:  Toileting: supervision for urinal seated at bedside chair.      [facility] 6 Click ADL: 16    Treatment & Education:  - Patient educated on Log roll technique for bed mobility to maintain spinal precautions and manage pain.   - Patient educated on OOB mobility to improve overall activity tolerance and promote recovery.  - Patient educated on role of occupational therapy, POC, and safety during ADLs and functional mobility. Patient and OT discussed importance of safe, continued mobility to optimize daily living skills. Patient verbalized understanding. Patient given instruction to call for medical staff/nurse for assistance.     Patient left up in chair with all lines intact, call button in reach, and nurse notified    GOALS:   Multidisciplinary Problems       Occupational Therapy Goals          Problem: Occupational Therapy    Goal Priority Disciplines Outcome Interventions   Occupational Therapy Goal     OT, PT/OT Progressing    Description: Goals to be met by: 10/21/24     Patient will increase functional independence with ADLs by performing:    LE Dressing with Modified [place] and AE prn.  Grooming while standing at sink with Modified [place].  Toileting from toilet/bedside commode with  Modified [place] for hygiene and clothing management.   Supine to sit with Supervision.  Toilet transfer to toilet/bedside commode with Supervision and LRAD.    Patient demonstrates a mobility limitation that significantly impairs their ability to participate in one or more mobility related activities of daily living. Patient's mobility limitation cannot be sufficiently resolved with the use of a cane, but can be sufficiently resolved with the use of a rolling walker.The use of a rolling walker will considerably improve their ability to participate in MRADLs. Patient will use the walker on a regular basis at home.                           Time Tracking:     OT Date of Treatment: 09/23/24  OT Start Time: 1406  OT Stop Time: 1444  OT Total Time (min): 38 min    Billable Minutes:Self Care/Home Management 10  Therapeutic Activity 28    OT/SHERRELL: SHERRELL     Number of SHERRELL visits since last OT visit: 1 9/23/2024

## 2024-09-23 NOTE — NURSING
Nurses Note -- 4 Eyes      9/23/2024   3:49 PM      Skin assessed during: Q Shift Change      [x] No Altered Skin Integrity Present    []Prevention Measures Documented      [] Yes- Altered Skin Integrity Present or Discovered   [] LDA Added if Not in Epic (Describe Wound)   [] New Altered Skin Integrity was Present on Admit and Documented in LDA   [] Wound Image Taken    Wound Care Consulted? No    Attending Nurse:  Carola sullivan lpn    Second RN/Staff Member:  Srinath Kent RN

## 2024-09-23 NOTE — SUBJECTIVE & OBJECTIVE
Interval History: 9/23: POD 3. Febrile to 101.1 yesterday afternoon. CXR, UA, BLED all within normal limits. No subsequent episodes of fever. Patient asymptomatic. Will continue to monitor. HV x 2 with output of 100, will plan to leave in place today. Exam stable.     Medications:  Continuous Infusions:   0.9% NaCl   Intravenous Continuous         Scheduled Meds:   acetaminophen  1,000 mg Oral Q8H    amLODIPine  10 mg Oral Daily    calcium carbonate  500 mg Oral BID    carvediloL  6.25 mg Oral BID    ceFAZolin (Ancef) IV (PEDS and ADULTS)  2 g Intravenous Q8H    heparin (porcine)  5,000 Units Subcutaneous Q8H    losartan  100 mg Oral Daily    methocarbamoL  750 mg Oral QID    polyethylene glycol  17 g Oral BID    pravastatin  40 mg Oral QHS    senna-docusate 8.6-50 mg  2 tablet Oral BID    tamsulosin  0.4 mg Oral Daily     PRN Meds:  Current Facility-Administered Medications:     aluminum-magnesium hydroxide-simethicone, 30 mL, Oral, Q4H PRN    dextrose 10%, 12.5 g, Intravenous, PRN    dextrose 10%, 25 g, Intravenous, PRN    glucagon (human recombinant), 1 mg, Intramuscular, PRN    glucose, 16 g, Oral, PRN    glucose, 24 g, Oral, PRN    insulin aspart U-100, 0-5 Units, Subcutaneous, QID (AC + HS) PRN    melatonin, 6 mg, Oral, Nightly PRN    ondansetron, 8 mg, Oral, Q6H PRN    oxyCODONE, 5 mg, Oral, Q4H PRN    oxyCODONE, 10 mg, Oral, Q4H PRN     Objective:     Weight: 86.9 kg (191 lb 9.3 oz)  Body mass index is 25.28 kg/m².  Vital Signs (Most Recent):  Temp: 98.6 °F (37 °C) (09/23/24 1137)  Pulse: 82 (09/23/24 1137)  Resp: 18 (09/23/24 1137)  BP: (!) 152/68 (09/23/24 1137)  SpO2: (!) 89 % (09/23/24 1137) Vital Signs (24h Range):  Temp:  [98.6 °F (37 °C)-101.1 °F (38.4 °C)] 98.6 °F (37 °C)  Pulse:  [62-87] 82  Resp:  [16-18] 18  SpO2:  [89 %-96 %] 89 %  BP: (134-160)/(63-75) 152/68                              Closed/Suction Drain 09/20/24 2140 Left;Posterior Back Accordion 10 Fr. (Active)   Site Description Unable to  "view 09/23/24 0745   Dressing Type Gauze 09/23/24 0745   Dressing Status Dry;Clean;Intact 09/23/24 0745   Drainage Serosanguineous 09/23/24 0745   Status To bulb suction 09/23/24 0745   Output (mL) 100 mL 09/23/24 0615            Closed/Suction Drain 09/20/24 2206 Right;Posterior Back Accordion 10 Fr. (Active)   Site Description Unable to view 09/23/24 0745   Dressing Type Gauze 09/23/24 0745   Dressing Status Intact;Clean;Dry 09/23/24 0745   Dressing Intervention Integrity maintained 09/23/24 0745   Drainage Serosanguineous 09/23/24 0745   Status To bulb suction 09/23/24 0745   Output (mL) 0 mL 09/23/24 0615      Physical Exam  GENERAL: resting comfortably  NECK: supple, trachea midline  PULM: aerating well, symmetric expansion, no distress  EXT: no c/c/e  2 HV drains to FS  Incision c/d/i     NEURO:  AAO x 3  CN II-XII grossly intact  RUE 4  RLE 4 prox, 1 df/pf/ehl  LUE 5 except 4 in HI  LLE 5  Some pain limitation in hip flexion bilaterally  SILT     Significant Labs:  Recent Labs   Lab 09/22/24 0212 09/23/24  0310   * 222*   * 133*   K 3.5 3.7    101   CO2 21* 25   BUN 14 12   CREATININE 1.2 1.2   CALCIUM 8.1* 8.1*     Recent Labs   Lab 09/22/24 0212 09/23/24  0310   WBC 8.11 7.62   HGB 10.7* 10.8*   HCT 30.7* 32.1*    142*     No results for input(s): "LABPT", "INR", "APTT" in the last 48 hours.  Microbiology Results (last 7 days)       ** No results found for the last 168 hours. **          All pertinent labs from the last 24 hours have been reviewed.    Significant Diagnostics:  I have reviewed and interpreted all pertinent imaging results/findings within the past 24 hours.  "

## 2024-09-24 PROBLEM — T14.8XXA SURGICAL WOUND PRESENT: Status: ACTIVE | Noted: 2024-09-24

## 2024-09-24 LAB
ANION GAP SERPL CALC-SCNC: 7 MMOL/L (ref 8–16)
BACTERIA #/AREA URNS AUTO: ABNORMAL /HPF
BASOPHILS # BLD AUTO: 0.02 K/UL (ref 0–0.2)
BASOPHILS NFR BLD: 0.3 % (ref 0–1.9)
BILIRUB UR QL STRIP: NEGATIVE
BLD PROD TYP BPU: NORMAL
BLD PROD TYP BPU: NORMAL
BLOOD UNIT EXPIRATION DATE: NORMAL
BLOOD UNIT EXPIRATION DATE: NORMAL
BLOOD UNIT TYPE CODE: 6200
BLOOD UNIT TYPE CODE: 6200
BLOOD UNIT TYPE: NORMAL
BLOOD UNIT TYPE: NORMAL
BUN SERPL-MCNC: 12 MG/DL (ref 8–23)
CALCIUM SERPL-MCNC: 8.3 MG/DL (ref 8.7–10.5)
CHLORIDE SERPL-SCNC: 101 MMOL/L (ref 95–110)
CLARITY UR REFRACT.AUTO: CLEAR
CO2 SERPL-SCNC: 23 MMOL/L (ref 23–29)
CODING SYSTEM: NORMAL
CODING SYSTEM: NORMAL
COLOR UR AUTO: YELLOW
CREAT SERPL-MCNC: 1.1 MG/DL (ref 0.5–1.4)
CROSSMATCH INTERPRETATION: NORMAL
CROSSMATCH INTERPRETATION: NORMAL
DIFFERENTIAL METHOD BLD: ABNORMAL
DISPENSE STATUS: NORMAL
DISPENSE STATUS: NORMAL
EOSINOPHIL # BLD AUTO: 0.2 K/UL (ref 0–0.5)
EOSINOPHIL NFR BLD: 2.5 % (ref 0–8)
ERYTHROCYTE [DISTWIDTH] IN BLOOD BY AUTOMATED COUNT: 13.2 % (ref 11.5–14.5)
EST. GFR  (NO RACE VARIABLE): >60 ML/MIN/1.73 M^2
GLUCOSE SERPL-MCNC: 247 MG/DL (ref 70–110)
GLUCOSE UR QL STRIP: ABNORMAL
HCT VFR BLD AUTO: 30.3 % (ref 40–54)
HGB BLD-MCNC: 10.3 G/DL (ref 14–18)
HGB UR QL STRIP: ABNORMAL
HYALINE CASTS UR QL AUTO: 0 /LPF
IMM GRANULOCYTES # BLD AUTO: 0.02 K/UL (ref 0–0.04)
IMM GRANULOCYTES NFR BLD AUTO: 0.3 % (ref 0–0.5)
KETONES UR QL STRIP: ABNORMAL
LEUKOCYTE ESTERASE UR QL STRIP: NEGATIVE
LYMPHOCYTES # BLD AUTO: 1.7 K/UL (ref 1–4.8)
LYMPHOCYTES NFR BLD: 27.3 % (ref 18–48)
MCH RBC QN AUTO: 30.3 PG (ref 27–31)
MCHC RBC AUTO-ENTMCNC: 34 G/DL (ref 32–36)
MCV RBC AUTO: 89 FL (ref 82–98)
MICROSCOPIC COMMENT: ABNORMAL
MONOCYTES # BLD AUTO: 0.8 K/UL (ref 0.3–1)
MONOCYTES NFR BLD: 11.8 % (ref 4–15)
NEUTROPHILS # BLD AUTO: 3.7 K/UL (ref 1.8–7.7)
NEUTROPHILS NFR BLD: 57.8 % (ref 38–73)
NITRITE UR QL STRIP: NEGATIVE
NRBC BLD-RTO: 0 /100 WBC
PH UR STRIP: 6 [PH] (ref 5–8)
PLATELET # BLD AUTO: 149 K/UL (ref 150–450)
PMV BLD AUTO: 10.6 FL (ref 9.2–12.9)
POCT GLUCOSE: 227 MG/DL (ref 70–110)
POCT GLUCOSE: 251 MG/DL (ref 70–110)
POCT GLUCOSE: 267 MG/DL (ref 70–110)
POCT GLUCOSE: 269 MG/DL (ref 70–110)
POCT GLUCOSE: 313 MG/DL (ref 70–110)
POTASSIUM SERPL-SCNC: 3.7 MMOL/L (ref 3.5–5.1)
PROT UR QL STRIP: ABNORMAL
RBC # BLD AUTO: 3.4 M/UL (ref 4.6–6.2)
RBC #/AREA URNS AUTO: 79 /HPF (ref 0–4)
SODIUM SERPL-SCNC: 131 MMOL/L (ref 136–145)
SP GR UR STRIP: 1.01 (ref 1–1.03)
SQUAMOUS #/AREA URNS AUTO: 0 /HPF
TRANS ERYTHROCYTES VOL PATIENT: NORMAL ML
TRANS ERYTHROCYTES VOL PATIENT: NORMAL ML
URN SPEC COLLECT METH UR: ABNORMAL
WBC # BLD AUTO: 6.37 K/UL (ref 3.9–12.7)
WBC #/AREA URNS AUTO: 1 /HPF (ref 0–5)
YEAST UR QL AUTO: ABNORMAL

## 2024-09-24 PROCEDURE — 63600175 PHARM REV CODE 636 W HCPCS: Performed by: STUDENT IN AN ORGANIZED HEALTH CARE EDUCATION/TRAINING PROGRAM

## 2024-09-24 PROCEDURE — 81001 URINALYSIS AUTO W/SCOPE: CPT | Performed by: PHYSICIAN ASSISTANT

## 2024-09-24 PROCEDURE — 25000003 PHARM REV CODE 250: Performed by: STUDENT IN AN ORGANIZED HEALTH CARE EDUCATION/TRAINING PROGRAM

## 2024-09-24 PROCEDURE — 97530 THERAPEUTIC ACTIVITIES: CPT

## 2024-09-24 PROCEDURE — 11000001 HC ACUTE MED/SURG PRIVATE ROOM

## 2024-09-24 PROCEDURE — 36415 COLL VENOUS BLD VENIPUNCTURE: CPT | Performed by: STUDENT IN AN ORGANIZED HEALTH CARE EDUCATION/TRAINING PROGRAM

## 2024-09-24 PROCEDURE — 25000003 PHARM REV CODE 250: Performed by: PHYSICIAN ASSISTANT

## 2024-09-24 PROCEDURE — 51798 US URINE CAPACITY MEASURE: CPT

## 2024-09-24 PROCEDURE — 80048 BASIC METABOLIC PNL TOTAL CA: CPT | Performed by: STUDENT IN AN ORGANIZED HEALTH CARE EDUCATION/TRAINING PROGRAM

## 2024-09-24 PROCEDURE — 63600175 PHARM REV CODE 636 W HCPCS

## 2024-09-24 PROCEDURE — 51701 INSERT BLADDER CATHETER: CPT

## 2024-09-24 PROCEDURE — 97535 SELF CARE MNGMENT TRAINING: CPT | Mod: CO

## 2024-09-24 PROCEDURE — 99024 POSTOP FOLLOW-UP VISIT: CPT | Mod: ,,, | Performed by: PHYSICIAN ASSISTANT

## 2024-09-24 PROCEDURE — 99222 1ST HOSP IP/OBS MODERATE 55: CPT | Mod: ,,,

## 2024-09-24 PROCEDURE — 97530 THERAPEUTIC ACTIVITIES: CPT | Mod: CO

## 2024-09-24 PROCEDURE — 85025 COMPLETE CBC W/AUTO DIFF WBC: CPT | Performed by: STUDENT IN AN ORGANIZED HEALTH CARE EDUCATION/TRAINING PROGRAM

## 2024-09-24 RX ORDER — INSULIN GLARGINE 100 [IU]/ML
14 INJECTION, SOLUTION SUBCUTANEOUS DAILY
Status: DISCONTINUED | OUTPATIENT
Start: 2024-09-24 | End: 2024-09-25

## 2024-09-24 RX ORDER — ACETAMINOPHEN 325 MG/1
650 TABLET ORAL EVERY 6 HOURS PRN
Status: DISCONTINUED | OUTPATIENT
Start: 2024-09-24 | End: 2024-10-01 | Stop reason: HOSPADM

## 2024-09-24 RX ORDER — LACTULOSE 10 G/15ML
20 SOLUTION ORAL 2 TIMES DAILY
Status: DISCONTINUED | OUTPATIENT
Start: 2024-09-24 | End: 2024-09-25

## 2024-09-24 RX ORDER — INSULIN ASPART 100 [IU]/ML
5 INJECTION, SOLUTION INTRAVENOUS; SUBCUTANEOUS
Status: DISCONTINUED | OUTPATIENT
Start: 2024-09-24 | End: 2024-09-24

## 2024-09-24 RX ORDER — INSULIN ASPART 100 [IU]/ML
4 INJECTION, SOLUTION INTRAVENOUS; SUBCUTANEOUS
Status: DISCONTINUED | OUTPATIENT
Start: 2024-09-24 | End: 2024-09-24

## 2024-09-24 RX ORDER — INSULIN ASPART 100 [IU]/ML
7 INJECTION, SOLUTION INTRAVENOUS; SUBCUTANEOUS
Status: DISCONTINUED | OUTPATIENT
Start: 2024-09-25 | End: 2024-09-25

## 2024-09-24 RX ADMIN — POLYETHYLENE GLYCOL 3350 17 G: 17 POWDER, FOR SOLUTION ORAL at 09:09

## 2024-09-24 RX ADMIN — HEPARIN SODIUM 5000 UNITS: 5000 INJECTION INTRAVENOUS; SUBCUTANEOUS at 09:09

## 2024-09-24 RX ADMIN — ACETAMINOPHEN 1000 MG: 500 TABLET ORAL at 02:09

## 2024-09-24 RX ADMIN — Medication 6 MG: at 08:09

## 2024-09-24 RX ADMIN — LACTULOSE 20 G: 20 SOLUTION ORAL at 08:09

## 2024-09-24 RX ADMIN — AMLODIPINE BESYLATE 10 MG: 10 TABLET ORAL at 09:09

## 2024-09-24 RX ADMIN — METHOCARBAMOL 750 MG: 750 TABLET ORAL at 08:09

## 2024-09-24 RX ADMIN — CALCIUM CARBONATE (ANTACID) CHEW TAB 500 MG 500 MG: 500 CHEW TAB at 09:09

## 2024-09-24 RX ADMIN — OXYCODONE HYDROCHLORIDE 10 MG: 10 TABLET ORAL at 12:09

## 2024-09-24 RX ADMIN — OXYCODONE HYDROCHLORIDE 10 MG: 10 TABLET ORAL at 11:09

## 2024-09-24 RX ADMIN — SENNOSIDES AND DOCUSATE SODIUM 2 TABLET: 50; 8.6 TABLET ORAL at 09:09

## 2024-09-24 RX ADMIN — INSULIN ASPART 2 UNITS: 100 INJECTION, SOLUTION INTRAVENOUS; SUBCUTANEOUS at 11:09

## 2024-09-24 RX ADMIN — INSULIN ASPART 4 UNITS: 100 INJECTION, SOLUTION INTRAVENOUS; SUBCUTANEOUS at 09:09

## 2024-09-24 RX ADMIN — PRAVASTATIN SODIUM 40 MG: 20 TABLET ORAL at 08:09

## 2024-09-24 RX ADMIN — LACTULOSE 20 G: 20 SOLUTION ORAL at 11:09

## 2024-09-24 RX ADMIN — Medication 1 ENEMA: at 06:09

## 2024-09-24 RX ADMIN — OXYCODONE HYDROCHLORIDE 10 MG: 10 TABLET ORAL at 09:09

## 2024-09-24 RX ADMIN — OXYCODONE HYDROCHLORIDE 10 MG: 10 TABLET ORAL at 02:09

## 2024-09-24 RX ADMIN — CEFAZOLIN 2 G: 2 INJECTION, POWDER, FOR SOLUTION INTRAMUSCULAR; INTRAVENOUS at 07:09

## 2024-09-24 RX ADMIN — TAMSULOSIN HYDROCHLORIDE 0.4 MG: 0.4 CAPSULE ORAL at 09:09

## 2024-09-24 RX ADMIN — OXYCODONE HYDROCHLORIDE 10 MG: 10 TABLET ORAL at 08:09

## 2024-09-24 RX ADMIN — INSULIN ASPART 2 UNITS: 100 INJECTION, SOLUTION INTRAVENOUS; SUBCUTANEOUS at 04:09

## 2024-09-24 RX ADMIN — METHOCARBAMOL 750 MG: 750 TABLET ORAL at 04:09

## 2024-09-24 RX ADMIN — ACETAMINOPHEN 1000 MG: 500 TABLET ORAL at 05:09

## 2024-09-24 RX ADMIN — HEPARIN SODIUM 5000 UNITS: 5000 INJECTION INTRAVENOUS; SUBCUTANEOUS at 02:09

## 2024-09-24 RX ADMIN — SENNOSIDES AND DOCUSATE SODIUM 2 TABLET: 50; 8.6 TABLET ORAL at 08:09

## 2024-09-24 RX ADMIN — CARVEDILOL 6.25 MG: 6.25 TABLET, FILM COATED ORAL at 08:09

## 2024-09-24 RX ADMIN — OXYCODONE HYDROCHLORIDE 10 MG: 10 TABLET ORAL at 05:09

## 2024-09-24 RX ADMIN — HEPARIN SODIUM 5000 UNITS: 5000 INJECTION INTRAVENOUS; SUBCUTANEOUS at 05:09

## 2024-09-24 RX ADMIN — CARVEDILOL 6.25 MG: 6.25 TABLET, FILM COATED ORAL at 09:09

## 2024-09-24 RX ADMIN — INSULIN ASPART 5 UNITS: 100 INJECTION, SOLUTION INTRAVENOUS; SUBCUTANEOUS at 11:09

## 2024-09-24 RX ADMIN — POLYETHYLENE GLYCOL 3350 17 G: 17 POWDER, FOR SOLUTION ORAL at 08:09

## 2024-09-24 RX ADMIN — METHOCARBAMOL 750 MG: 750 TABLET ORAL at 09:09

## 2024-09-24 RX ADMIN — METHOCARBAMOL 750 MG: 750 TABLET ORAL at 02:09

## 2024-09-24 RX ADMIN — INSULIN ASPART 5 UNITS: 100 INJECTION, SOLUTION INTRAVENOUS; SUBCUTANEOUS at 04:09

## 2024-09-24 RX ADMIN — INSULIN GLARGINE 14 UNITS: 100 INJECTION, SOLUTION SUBCUTANEOUS at 11:09

## 2024-09-24 RX ADMIN — ACETAMINOPHEN 650 MG: 325 TABLET ORAL at 08:09

## 2024-09-24 RX ADMIN — CALCIUM CARBONATE (ANTACID) CHEW TAB 500 MG 500 MG: 500 CHEW TAB at 08:09

## 2024-09-24 RX ADMIN — CEFAZOLIN 2 G: 2 INJECTION, POWDER, FOR SOLUTION INTRAMUSCULAR; INTRAVENOUS at 11:09

## 2024-09-24 RX ADMIN — LOSARTAN POTASSIUM 100 MG: 25 TABLET, FILM COATED ORAL at 09:09

## 2024-09-24 RX ADMIN — CEFAZOLIN 2 G: 2 INJECTION, POWDER, FOR SOLUTION INTRAMUSCULAR; INTRAVENOUS at 02:09

## 2024-09-24 NOTE — ASSESSMENT & PLAN NOTE
Patient's FSGs are uncontrolled due to hyperglycemia on current medication regimen.  Last A1c reviewed-   Lab Results   Component Value Date    HGBA1C 7.8 (H) 09/09/2024     Most recent fingerstick glucose reviewed-   Recent Labs   Lab 09/23/24  1553 09/23/24  2105 09/24/24  0815 09/24/24  1123   POCTGLUCOSE 257* 269* 313* 267*     Current correctional scale  Low  Endocrinology consulted.  anti-hyperglycemic dose as follows-   Antihyperglycemics (From admission, onward)      Start     Stop Route Frequency Ordered    09/24/24 1130  insulin aspart U-100 pen 5 Units         -- SubQ 3 times daily with meals 09/24/24 1029    09/24/24 1030  insulin glargine U-100 (Lantus) pen 14 Units         -- SubQ Daily 09/24/24 1029    09/20/24 2337  insulin aspart U-100 pen 0-5 Units         -- SubQ Before meals & nightly PRN 09/20/24 2237          Hold Oral hypoglycemics while patient is in the hospital.

## 2024-09-24 NOTE — SUBJECTIVE & OBJECTIVE
Interval History: NAEON. Exam stable. Tmax 100.4 at 7 pm yesterday. Bowel regimen advanced. Continue HV drains. Postop XR today. Home pending drain removal, fever resolution.     Medications:  Continuous Infusions:      Scheduled Meds:   acetaminophen  1,000 mg Oral Q8H    amLODIPine  10 mg Oral Daily    calcium carbonate  500 mg Oral BID    carvediloL  6.25 mg Oral BID    ceFAZolin (Ancef) IV (PEDS and ADULTS)  2 g Intravenous Q8H    heparin (porcine)  5,000 Units Subcutaneous Q8H    insulin aspart U-100  5 Units Subcutaneous TIDWM    insulin glargine U-100  14 Units Subcutaneous Daily    lactulose  20 g Oral BID    losartan  100 mg Oral Daily    methocarbamoL  750 mg Oral QID    polyethylene glycol  17 g Oral BID    pravastatin  40 mg Oral QHS    senna-docusate 8.6-50 mg  2 tablet Oral BID    tamsulosin  0.4 mg Oral Daily     PRN Meds:  Current Facility-Administered Medications:     aluminum-magnesium hydroxide-simethicone, 30 mL, Oral, Q4H PRN    dextrose 10%, 12.5 g, Intravenous, PRN    dextrose 10%, 25 g, Intravenous, PRN    glucagon (human recombinant), 1 mg, Intramuscular, PRN    glucose, 16 g, Oral, PRN    glucose, 24 g, Oral, PRN    insulin aspart U-100, 0-5 Units, Subcutaneous, QID (AC + HS) PRN    melatonin, 6 mg, Oral, Nightly PRN    ondansetron, 8 mg, Oral, Q6H PRN    oxyCODONE, 5 mg, Oral, Q4H PRN    oxyCODONE, 10 mg, Oral, Q4H PRN     Objective:     Weight: 86.9 kg (191 lb 9.3 oz)  Body mass index is 25.28 kg/m².  Vital Signs (Most Recent):  Temp: 98.4 °F (36.9 °C) (09/24/24 1128)  Pulse: 90 (09/24/24 1128)  Resp: 18 (09/24/24 1420)  BP: (!) 146/74 (09/24/24 1128)  SpO2: 96 % (09/24/24 1128) Vital Signs (24h Range):  Temp:  [98.4 °F (36.9 °C)-100.4 °F (38 °C)] 98.4 °F (36.9 °C)  Pulse:  [76-90] 90  Resp:  [16-18] 18  SpO2:  [92 %-96 %] 96 %  BP: (146-159)/(71-76) 146/74     Date 09/24/24 0700 - 09/25/24 0659   Shift 6576-0538 0414-0818 2544-2476 24 Hour Total   INTAKE   Shift Total(mL/kg)       OUTPUT    Drains 11   11   Shift Total(mL/kg) 11(0.1)   11(0.1)   Weight (kg) 86.9 86.9 86.9 86.9                            Closed/Suction Drain 09/20/24 2140 Left;Posterior Back Accordion 10 Fr. (Active)   Site Description Unable to view 09/23/24 0745   Dressing Type Gauze 09/23/24 0745   Dressing Status Dry;Clean;Intact 09/23/24 0745   Drainage Serosanguineous 09/23/24 0745   Status To bulb suction 09/23/24 0745   Output (mL) 100 mL 09/23/24 0615            Closed/Suction Drain 09/20/24 2206 Right;Posterior Back Accordion 10 Fr. (Active)   Site Description Unable to view 09/23/24 0745   Dressing Type Gauze 09/23/24 0745   Dressing Status Intact;Clean;Dry 09/23/24 0745   Dressing Intervention Integrity maintained 09/23/24 0745   Drainage Serosanguineous 09/23/24 0745   Status To bulb suction 09/23/24 0745   Output (mL) 0 mL 09/23/24 0615      Physical Exam  General: well developed, well nourished, no distress.   Head: normocephalic, atraumatic  Neurologic: Alert and oriented. Thought content appropriate.  GCS: Motor: 6/Verbal: 5/Eyes: 4 GCS Total: 15  Mental Status: Awake, Alert, Oriented x 4  Language: No aphasia  Speech: No dysarthria  Cranial nerves: face symmetric, tongue midline, CN II-XII grossly intact.   Eyes: pupils equal, round, reactive to light with accomodation, EOMI.  Pulmonary: normal respirations, no signs of respiratory distress  Sensory: intact to light touch throughout  Motor Strength: Moves all extremities spontaneously with good tone. No abnormal movements seen.     Strength  Deltoids Triceps Biceps Wrist Extension Wrist Flexion Hand    Upper: R 5/5 5/5 5/5 5/5 5/5 5/5    L 5/5 5/5 5/5 5/5 5/5 5/5     Iliopsoas Quadriceps Knee  Flexion Tibialis  anterior Gastro- cnemius EHL   Lower: R 4+/5 4+/5 4+/5 4-/5 4-/5 4-/5    L 5/5 5/5 5/5 5/5 5/5 5/5     Skin: Skin is warm, dry and intact.  Incision c/d/I with skin edges well approximated with dermabond/prineo. No surrounding erythema or edema. No  "drainage from incision. No palpable underlying fluid collection.        Significant Labs:  Recent Labs   Lab 09/23/24  0310 09/24/24  0249   * 247*   * 131*   K 3.7 3.7    101   CO2 25 23   BUN 12 12   CREATININE 1.2 1.1   CALCIUM 8.1* 8.3*     Recent Labs   Lab 09/23/24  0310 09/24/24  0249   WBC 7.62 6.37   HGB 10.8* 10.3*   HCT 32.1* 30.3*   * 149*     No results for input(s): "LABPT", "INR", "APTT" in the last 48 hours.  Microbiology Results (last 7 days)       ** No results found for the last 168 hours. **          All pertinent labs from the last 24 hours have been reviewed.    Significant Diagnostics:  I have reviewed and interpreted all pertinent imaging results/findings within the past 24 hours.    "

## 2024-09-24 NOTE — CONSULTS
[facility]  Endocrinology  Diabetes Consult Note    Consult Requested by: Juan Luis Mcbride DO   Reason for admit: <principal problem not specified>    HISTORY OF PRESENT ILLNESS:  Reason for Consult: Management of T2DM, Hyperglycemia     Surgical Procedure and Date:  lumbar surgery (L4-S1 TLIF) 24    Diabetes diagnosis year: > 2 years ago     Home Diabetes Medications:  Metformin 1000 mg BID per patient    How often checking glucose at home? One   BG readings on regimen: 120-160s  Hypoglycemia on the regimen?  No  Missed doses on regimen?  No    Diabetes Complications include:     Hyperglycemia    Complicating diabetes co morbidities:   Glucocorticoid use       HPI:   Patient is a 79 y.o. male with hx of prior CVA with residual right sided weakness presents for eval of worsening low back pain and BLE radicular leg pain. He endorses pain which starts in upper lumbar area and then radiates down to lower lumbar area down his legs right greater than left into his feet. He endorses right foot weakness and has a right foot drop which has been present for many months. He has difficulty walking. 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 He has been to pain mgmt for injections without significant relief. Now s/p the above procedures. Endocrine consulted for DM management.       Interval HPI:   No acute events overnight. Patient in room 543/543 A. Blood glucose stable. BG above goal on current insulin regimen (SSI ). Steroid use- None.   4 Days Post-Op  Renal function-   Lab Results   Component Value Date    CREATININE 1.1 2024        Vasopressors-  None     Diet diabetic  Calorie     Eatin%  Nausea: No  Hypoglycemia and intervention: No  Fever: No  TPN and/or TF: No    PMH, PSH, FH, SH updated and reviewed     ROS:  Review of Systems   Gastrointestinal:  Negative for constipation, diarrhea, nausea and vomiting.   Endocrine:  "Negative for polydipsia and polyuria.       Current Medications and/or Treatments Impacting Glycemic Control  Immunotherapy:    Immunosuppressants       None          Steroids:   Hormones (From admission, onward)      Start     Stop Route Frequency Ordered    09/20/24 2333  melatonin tablet 6 mg         -- Oral Nightly PRN 09/20/24 2235          Pressors:    Autonomic Drugs (From admission, onward)      None          Hyperglycemia/Diabetes Medications:   Antihyperglycemics (From admission, onward)      Start     Stop Route Frequency Ordered    09/20/24 2337  insulin aspart U-100 pen 0-5 Units         -- SubQ Before meals & nightly PRN 09/20/24 2237             PHYSICAL EXAMINATION:  Vitals:    09/24/24 0939   BP:    Pulse:    Resp: 18   Temp:      Body mass index is 25.28 kg/m².     Physical Exam  Constitutional:       General: He is not in acute distress.     Appearance: Normal appearance. He is not ill-appearing.   HENT:      Head: Normocephalic and atraumatic.      Right Ear: External ear normal.      Left Ear: External ear normal.      Nose: Nose normal.   Pulmonary:      Effort: Pulmonary effort is normal. No respiratory distress.   Neurological:      Mental Status: He is alert.   Psychiatric:         Mood and Affect: Mood normal.         Behavior: Behavior normal.            Labs Reviewed and Include   Recent Labs   Lab 09/24/24  0249   *   CALCIUM 8.3*   *   K 3.7   CO2 23      BUN 12   CREATININE 1.1     Lab Results   Component Value Date    WBC 6.37 09/24/2024    HGB 10.3 (L) 09/24/2024    HCT 30.3 (L) 09/24/2024    MCV 89 09/24/2024     (L) 09/24/2024     No results for input(s): "TSH", "FREET4" in the last 168 hours.  Lab Results   Component Value Date    HGBA1C 7.8 (H) 09/09/2024       Nutritional status:   Body mass index is 25.28 kg/m².  Lab Results   Component Value Date    ALBUMIN 4.1 07/15/2024    ALBUMIN 3.9 06/05/2024     No results found for: "PREALBUMIN"    Estimated " Creatinine Clearance: 61.5 mL/min (based on SCr of 1.1 mg/dL).    Accu-Checks  Recent Labs     09/22/24  0740 09/22/24  1106 09/22/24  1603 09/22/24  2126 09/23/24  0716 09/23/24  1141 09/23/24  1553 09/23/24  2105 09/24/24  0815 09/24/24  1123   POCTGLUCOSE 207* 123* 301* 264* 213* 251* 257* 269* 313* 267*        ASSESSMENT and PLAN    Neuro  Status post lumbar surgery (L4-S1 TLIF)  Managed per primary team        Endocrine  Type 2 diabetes mellitus with diabetic cataract, without long-term current use of insulin  BG goal: 140-180    Start 0.3 u/kg/day WBD basal/bolus regimen   - Lantus 14 units QD  - Novolog 5 units TIDWM   - LDC SSI PRN (150/50)   - POCT Glucose before meals and at bedtime  - Hypoglycemia protocol in place      ** Please notify Endocrine for any change and/or advance in diet**  ** Please call Endocrine for any BG related issues **     Discharge Planning:   TBD. Please notify endocrinology prior to discharge.        Orthopedic  Surgical wound present  Optimize BG control to improve wound healing           Plan discussed with patient, family, and RN at bedside.     Zora Aguirre PA-C  Endocrinology  [facility] - [facility]

## 2024-09-24 NOTE — PLAN OF CARE
[facility]  Initial Discharge Assessment       Primary Care Provider: Yary Cao -    Admission Diagnosis: Gait instability [R26.81]  Spinal stenosis of lumbar region, unspecified whether neurogenic claudication present [M48.061]  Spinal stenosis, lumbar [M48.061]    Admission Date: 9/20/2024  Expected Discharge Date: 9/25/2024         Payor: [email or web address] MEDICARE / Plan: [employer] MEDICARE HMO / Product Type: Capitation /     Extended Emergency Contact Information  Primary Emergency Contact: JEOVANY OSORIO  Mobile Phone: [phone]  Relation: Daughter  Secondary Emergency Contact: YUKO HOROWITZ  Mobile Phone: [phone]  Relation: Daughter  Preferred language: English   needed? No    Discharge Plan A: Skilled Nursing Facility  Discharge Plan B: Home Health, Home with family      [id number]  [address]AMIL CAO [address]  [address]  [place] [address]  Phone: [phone] Fax: [phone]      Initial Assessment (most recent)       Adult Discharge Assessment - 09/24/24 1510          Discharge Assessment    Assessment Type Discharge Planning Assessment     Confirmed/corrected address, phone number and insurance Yes     Confirmed Demographics Correct on Facesheet     Source of Information patient;family   daughter - Yuko Horowitz    Communicated SUSY with patient/caregiver Yes     People in Home child(luis), adult   Son- Robi Donovan Jr.    Do you have help at home or someone to help you manage your care at home? Yes     Who are your caregiver(s) and their phone number(s)? carmita Arita     Home Layout Able to live on 1st floor     Equipment Currently Used at Home cane, straight;rollator;glucometer;blood pressure machine     Do you currently have service(s) that help you manage your care at home? No     Do you take prescription medications? Yes     Do you have prescription coverage? Yes     Do you have any problems affording any of your prescribed medications? No     Is  the patient taking medications as prescribed? yes     How do you get to doctors appointments? family or friend will provide;car, drives self     Are you on dialysis? No     Do you take coumadin? No     Discharge Plan A Skilled Nursing Facility     Discharge Plan B Home Health;Home with family     DME Needed Upon Discharge  none     Discharge Plan discussed with: Patient;Adult children   daughter Lyla                  Patient lives with his adult son Robi Palmer in a one story home with five entry steps. Patient and daughter would like inpatient therapy placement post hospital discharge stating patient was independent prior to hospitalization and they are hopeful to get patient back to independent level.

## 2024-09-24 NOTE — HPI
Reason for Consult: Management of T2DM, Hyperglycemia     Surgical Procedure and Date:  lumbar surgery (L4-S1 TLIF) 9/20/24    Diabetes diagnosis year: > 2 years ago     Home Diabetes Medications:  Metformin 1000 mg BID per patient    How often checking glucose at home? One   BG readings on regimen: 120-160s  Hypoglycemia on the regimen?  No  Missed doses on regimen?  No    Diabetes Complications include:     Hyperglycemia    Complicating diabetes co morbidities:   Glucocorticoid use       HPI:   Patient is a 79 y.o. male with hx of prior CVA with residual right sided weakness presents for eval of worsening low back pain and BLE radicular leg pain. He endorses pain which starts in upper lumbar area and then radiates down to lower lumbar area down his legs right greater than left into his feet. He endorses right foot weakness and has a right foot drop which has been present for many months. He has difficulty walking. 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 He has been to pain mgmt for injections without significant relief. Now s/p the above procedures. Endocrine consulted for DM management.

## 2024-09-24 NOTE — ASSESSMENT & PLAN NOTE
78 M with hx of prior CVA with residual right sided weakness presents for elective surgery for worsening low back pain and BLE radicular leg pain now s/p L4-S1 TLIF on 9/20/2024:    Plan:  Admitted to nsgy, q4h neuro vital checks   Postop XR satisfactory  2x HV drains to remain at this time on full suction; please record hourly outputs  LSO brace when OOB  Voiding spontaneously  Passing gas, no BM yet, added lactulose  PT/OT/OOB  TEDs/SCDs/SQH  Recommend weekly BLEUS for DVT surveillance  Continue to monitor clinically, notify NSGY immediately with any changes in neuro status    Dispo: anticipate home once drains are out and patient remains afebrile

## 2024-09-24 NOTE — ASSESSMENT & PLAN NOTE
BG goal: 140-180    Start 0.3 u/kg/day WBD basal/bolus regimen   - Lantus 14 units QD  - Novolog 5 units TIDWM   - LDC SSI PRN (150/50)   - POCT Glucose before meals and at bedtime  - Hypoglycemia protocol in place      ** Please notify Endocrine for any change and/or advance in diet**  ** Please call Endocrine for any BG related issues **     Discharge Planning:   TBD. Please notify endocrinology prior to discharge.

## 2024-09-24 NOTE — SUBJECTIVE & OBJECTIVE
Interval HPI:   No acute events overnight. Patient in room 543/543 A. Blood glucose stable. BG above goal on current insulin regimen (SSI ). Steroid use- None.   4 Days Post-Op  Renal function-   Lab Results   Component Value Date    CREATININE 1.1 2024        Vasopressors-  None     Diet diabetic  Calorie     Eatin%  Nausea: No  Hypoglycemia and intervention: No  Fever: No  TPN and/or TF: No    PMH, PSH, FH, SH updated and reviewed     ROS:  Review of Systems   Gastrointestinal:  Negative for constipation, diarrhea, nausea and vomiting.   Endocrine: Negative for polydipsia and polyuria.       Current Medications and/or Treatments Impacting Glycemic Control  Immunotherapy:    Immunosuppressants       None          Steroids:   Hormones (From admission, onward)      Start     Stop Route Frequency Ordered    24 2333  melatonin tablet 6 mg         -- Oral Nightly PRN 24          Pressors:    Autonomic Drugs (From admission, onward)      None          Hyperglycemia/Diabetes Medications:   Antihyperglycemics (From admission, onward)      Start     Stop Route Frequency Ordered    24 2337  insulin aspart U-100 pen 0-5 Units         -- SubQ Before meals & nightly PRN 24             PHYSICAL EXAMINATION:  Vitals:    24 0939   BP:    Pulse:    Resp: 18   Temp:      Body mass index is 25.28 kg/m².     Physical Exam  Constitutional:       General: He is not in acute distress.     Appearance: Normal appearance. He is not ill-appearing.   HENT:      Head: Normocephalic and atraumatic.      Right Ear: External ear normal.      Left Ear: External ear normal.      Nose: Nose normal.   Pulmonary:      Effort: Pulmonary effort is normal. No respiratory distress.   Neurological:      Mental Status: He is alert.   Psychiatric:         Mood and Affect: Mood normal.         Behavior: Behavior normal.

## 2024-09-24 NOTE — NURSING
Nurses Note -- 4 Eyes      9/24/2024   8:15 AM      Skin assessed during: Daily Assessment      [x] No Altered Skin Integrity Present    []Prevention Measures Documented      [] Yes- Altered Skin Integrity Present or Discovered   [] LDA Added if Not in Epic (Describe Wound)   [] New Altered Skin Integrity was Present on Admit and Documented in LDA   [] Wound Image Taken    Wound Care Consulted? No    Attending Nurse:  RASHMI Fink    Second RN/Staff Member:  KATHY Harrington

## 2024-09-24 NOTE — PT/OT/SLP PROGRESS
Physical Therapy      Patient Name:  Robi Donovan   MRN:  [id number]    Patient not seen on first attempt secondary to patient fatigue after OT session this AM, sitting in chair, and then performing multiple toilet transfers. On second attempt, patient agreeable to participate in PT session.     Physical Therapy Treatment    Patient Name:  Robi Donovan   MRN:  [id number]    Recommendations:     Discharge Recommendations: Moderate Intensity Therapy  Discharge Equipment Recommendations: none  Barriers to discharge:  requires increased assistance for functional mobility    Assessment:     Robi Donovan is a 79 y.o. male admitted with a medical diagnosis of s/p L4-S1 TLIF. He presents with the following impairments/functional limitations: weakness, impaired endurance, impaired functional mobility, gait instability, impaired balance, decreased lower extremity function, pain, decreased safety awareness, orthopedic precautions, impaired self care skills. Patient required moderate assistance to stand and perform bedside commode transfer with RW. Patient currently demonstrates a need for moderate intensity therapy on a daily basis post acute secondary to a decline in functional status due to surgical procedure.     Rehab Prognosis: Good; patient would benefit from acute skilled PT services to address these deficits and reach maximum level of function.    Recent Surgery: Procedure(s) (LRB):  OPEN ROBOTIC L4 -S1 TLIF (N/A) 4 Days Post-Op    Plan:     During this hospitalization, patient to be seen 5 x/week to address the identified rehab impairments via gait training, therapeutic activities, therapeutic exercises, neuromuscular re-education and progress toward the following goals:    Plan of Care Expires:  10/21/24    Subjective     Chief Complaint: Back pain. Fatigue from performing multiple transfers to bedside commode today.   Patient/Family Comments/goals: To get back to PLOF.   Pain/Comfort:  Pain Rating 1:  (did not  rate)  Location 1: back  Pain Addressed 1: Reposition, Distraction, Pre-medicate for activity      Objective:     Communicated with RN prior to session. Patient found sitting edge of bed to use urinal with hemovac upon PT entry to room. Daughter present in room.     General Precautions: Standard, fall  Orthopedic Precautions: spinal precautions  Braces: LSO (did not put on AFO for bedside commode transfer)  Respiratory Status: Room air     Functional Mobility:  Bed Mobility:     Scooting: moderate assistance  Sit to Supine: moderate assistance  Transfers:     Sit to Stand:  moderate assistance with rolling walker x1 from bed and x1 from bedside commode  Toilet Transfer (bed to bedside commode and back): 6 steps with moderate assistance and rolling walker   Gait: Patient unable to tolerate further ambulation due to fatigue after attempting to have a bowel movement on bedside commode.     AM-PAC 6 CLICK MOBILITY  Turning over in bed (including adjusting bedclothes, sheets and blankets)?: 2  Sitting down on and standing up from a chair with arms (e.g., wheelchair, bedside commode, etc.): 2  Moving from lying on back to sitting on the side of the bed?: 2  Moving to and from a bed to a chair (including a wheelchair)?: 2  Need to walk in hospital room?: 2  Climbing 3-5 steps with a railing?: 2  Basic Mobility Total Score: 12       Treatment & Education:  Patient educated in:  -PT role and POC  -spinal precautions (no bending, lifting, or twisting)  -log rolling during bed mobility  -safety with transfers including hand placement  -gait sequence and RW management  -OOB activity to maximize recovery including ambulating with nursing staff assistance and RW while in the hospital     Patient left supine with all lines intact, call button in reach, RN notified, and daughter present.    GOALS:   Multidisciplinary Problems       Physical Therapy Goals          Problem: Physical Therapy    Goal Priority Disciplines Outcome Goal  Variances Interventions   Physical Therapy Goal     PT, PT/OT Progressing     Description: Goals to be met by: 10/5/24     Patient will increase functional independence with mobility by performin. Supine to sit with Stand-by Assistance - Not met  2. Rolling to Left or Right with Modified [place] - Not met  3. Sit to stand transfer with Stand-by Assistance with RW - Not met  4. Gait  x 125 feet with Stand-by Assistance using Rolling Walker - Not met  5. Ascend/descend 5 stair with left Handrail with Contact Guard Assistance using No Assistive Device - Not met   6. Pt will verbalize 3/3 spinal precautions without cueing - Not met                       Time Tracking:     PT Received On: 24  PT Start Time: 1516     PT Stop Time: 1533  PT Total Time (min): 17 min     Billable Minutes: Therapeutic Activity 17    Treatment Type: Treatment  PT/PTA: PT     Number of PTA visits since last PT visit: 0     2024

## 2024-09-24 NOTE — PT/OT/SLP PROGRESS
Occupational Therapy   Treatment    Name: Robi Donovan  MRN: [id number]  Admitting Diagnosis:  4 Days Post-Op    Recommendations:     Discharge Recommendations: Low Intensity Therapy  Discharge Equipment Recommendations:  none  Barriers to discharge:  None    Assessment:     Robi Donovan is a 79 y.o. male. In today's session, patient tolerated x1 sit to stands from [facility]. Patient also completed hand hygiene standing at the sink. Patient and family member educated on spinal precautions and log rolling technique. Performance deficits affecting function are weakness, impaired balance, decreased safety awareness, impaired endurance, pain, impaired self care skills, decreased ROM, impaired functional mobility, decreased upper extremity function, gait instability, decreased lower extremity function, orthopedic precautions, impaired fine motor.     Rehab Prognosis:  Good; patient would benefit from acute skilled OT services to address these deficits and reach maximum level of function.       Plan:     Patient to be seen 4 x/week to address the above listed problems via self-care/home management, therapeutic activities, therapeutic exercises, neuromuscular re-education  Plan of Care Expires: 10/21/24  Plan of Care Reviewed with: patient (family member)    Subjective     Chief Complaint: N/A  Patient/Family Comments/goals:   Pain/Comfort:  Pain Rating 1:  (pain not rated)  Location - Orientation 1: lower  Location 1: back  Pain Addressed 1: Reposition, Distraction    Objective:     Communicated with: Nurse prior to session.  Patient found  on BSC  with peripheral IV, hemovac (peripheral IV not active) upon OT entry to room.    General Precautions: Standard, fall    Orthopedic Precautions:spinal precautions  Braces: AFO, LSO  Respiratory Status: Room air       Functional Mobility/Transfers:  Patient completed Sit <> Stand Transfer from BS with moderate assistance with rolling walker   Functional Mobility: Patient engaged in  functional mobility training to simulate a household distance. From BSC > sink near bedside with mod (A) with RW to maximize functional endurance and standing balance required for home/community mobility and occupational engagement. No noted LOB and fair. Noted anterior leaning with functional mobility. Patient required vc's for upright posture.  Patient still requires moderate assistance with static/dynamic balance at this time.     Activities of Daily Living:  Grooming: moderate assistance for safety with hand hygiene standing at the sink. Patient needed moderate assistance with dynamic standing balance to complete task.  Upper Body Dressing: total assistance to yuniel LSO seated on BSC.   Toileting: Patient reports he completed nancy-care prior to session.       [facility] 6 Click ADL: 16    Treatment & Education:  - Patient educated on OOB mobility to improve overall activity tolerance and promote recovery.  - Patient and family member educated on Log roll technique for bed mobility to maintain spinal precautions and manage pain.   - Patient and family member educated on spinal precautions and was issued a handout. (BLT)  - Patient educated on the importance of wearing his LSO when OOB.  - Patient educated on role of occupational therapy, POC, and safety during ADLs and functional mobility. Patient and OT discussed importance of safe, continued mobility to optimize daily living skills. Patient verbalized understanding. Patient given instruction to call for medical staff/nurse for assistance.         Patient left up in chair with all lines intact, call button in reach, and family member present    GOALS:   Multidisciplinary Problems       Occupational Therapy Goals          Problem: Occupational Therapy    Goal Priority Disciplines Outcome Interventions   Occupational Therapy Goal     OT, PT/OT Progressing    Description: Goals to be met by: 10/21/24     Patient will increase functional independence with ADLs by  performing:    LE Dressing with Modified [place] and AE prn.  Grooming while standing at sink with Modified [place].  Toileting from toilet/bedside commode with Modified [place] for hygiene and clothing management.   Supine to sit with Supervision.  Toilet transfer to toilet/bedside commode with Supervision and LRAD.    Patient demonstrates a mobility limitation that significantly impairs their ability to participate in one or more mobility related activities of daily living. Patient's mobility limitation cannot be sufficiently resolved with the use of a cane, but can be sufficiently resolved with the use of a rolling walker.The use of a rolling walker will considerably improve their ability to participate in MRADLs. Patient will use the walker on a regular basis at home.                           Time Tracking:     OT Date of Treatment: 09/24/24  OT Start Time: 0938  OT Stop Time: 1012  OT Total Time (min): 34 min    Billable Minutes:Self Care/Home Management 20  Therapeutic Activity 14    OT/SHERRELL: SHERRELL     Number of SHERRELL visits since last OT visit: 2    9/24/2024

## 2024-09-24 NOTE — PROGRESS NOTES
[facility] - [facility]  Neurosurgery  Progress Note    Subjective:     History of Present Illness: 78 M with hx of prior CVA with residual right sided weakness presents for eval of worsening low back pain and BLE radicular leg pain. He endorses pain which starts in upper lumbar area and then radiates down to lower lumbar area down his legs right greater than left into his feet. He endorses right foot weakness and has a right foot drop which has been present for many months. He has difficulty walking. 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 He has been to pain mgmt for injections without significant relief.      Interval fu 8/8/24:  Pt presents in fu.  No significant changes in symptoms.  Today he is most concerned with his balance difficulties.     Interval fu 8/29/24: No changes in symptoms.    Post-Op Info:  Procedure(s) (LRB):  OPEN ROBOTIC L4 -S1 TLIF (N/A)   4 Days Post-Op   Interval History: NAEON. Exam stable. Tmax 100.4 at 7 pm yesterday. Bowel regimen advanced. Continue HV drains. Postop XR today. Home pending drain removal, fever resolution.     Medications:  Continuous Infusions:      Scheduled Meds:   acetaminophen  1,000 mg Oral Q8H    amLODIPine  10 mg Oral Daily    calcium carbonate  500 mg Oral BID    carvediloL  6.25 mg Oral BID    ceFAZolin (Ancef) IV (PEDS and ADULTS)  2 g Intravenous Q8H    heparin (porcine)  5,000 Units Subcutaneous Q8H    insulin aspart U-100  5 Units Subcutaneous TIDWM    insulin glargine U-100  14 Units Subcutaneous Daily    lactulose  20 g Oral BID    losartan  100 mg Oral Daily    methocarbamoL  750 mg Oral QID    polyethylene glycol  17 g Oral BID    pravastatin  40 mg Oral QHS    senna-docusate 8.6-50 mg  2 tablet Oral BID    tamsulosin  0.4 mg Oral Daily     PRN Meds:  Current Facility-Administered Medications:     aluminum-magnesium hydroxide-simethicone, 30 mL, Oral, Q4H PRN    dextrose 10%, 12.5 g,  Intravenous, PRN    dextrose 10%, 25 g, Intravenous, PRN    glucagon (human recombinant), 1 mg, Intramuscular, PRN    glucose, 16 g, Oral, PRN    glucose, 24 g, Oral, PRN    insulin aspart U-100, 0-5 Units, Subcutaneous, QID (AC + HS) PRN    melatonin, 6 mg, Oral, Nightly PRN    ondansetron, 8 mg, Oral, Q6H PRN    oxyCODONE, 5 mg, Oral, Q4H PRN    oxyCODONE, 10 mg, Oral, Q4H PRN     Objective:     Weight: 86.9 kg (191 lb 9.3 oz)  Body mass index is 25.28 kg/m².  Vital Signs (Most Recent):  Temp: 98.4 °F (36.9 °C) (09/24/24 1128)  Pulse: 90 (09/24/24 1128)  Resp: 18 (09/24/24 1420)  BP: (!) 146/74 (09/24/24 1128)  SpO2: 96 % (09/24/24 1128) Vital Signs (24h Range):  Temp:  [98.4 °F (36.9 °C)-100.4 °F (38 °C)] 98.4 °F (36.9 °C)  Pulse:  [76-90] 90  Resp:  [16-18] 18  SpO2:  [92 %-96 %] 96 %  BP: (146-159)/(71-76) 146/74     Date 09/24/24 0700 - 09/25/24 0659   Shift 4073-7703 5653-2254 3533-9064 24 Hour Total   INTAKE   Shift Total(mL/kg)       OUTPUT   Drains 11   11   Shift Total(mL/kg) 11(0.1)   11(0.1)   Weight (kg) 86.9 86.9 86.9 86.9                            Closed/Suction Drain 09/20/24 2140 Left;Posterior Back Accordion 10 Fr. (Active)   Site Description Unable to view 09/23/24 0745   Dressing Type Gauze 09/23/24 0745   Dressing Status Dry;Clean;Intact 09/23/24 0745   Drainage Serosanguineous 09/23/24 0745   Status To bulb suction 09/23/24 0745   Output (mL) 100 mL 09/23/24 0615            Closed/Suction Drain 09/20/24 2206 Right;Posterior Back Accordion 10 Fr. (Active)   Site Description Unable to view 09/23/24 0745   Dressing Type Gauze 09/23/24 0745   Dressing Status Intact;Clean;Dry 09/23/24 0745   Dressing Intervention Integrity maintained 09/23/24 0745   Drainage Serosanguineous 09/23/24 0745   Status To bulb suction 09/23/24 0745   Output (mL) 0 mL 09/23/24 0615      Physical Exam  General: well developed, well nourished, no distress.   Head: normocephalic, atraumatic  Neurologic: Alert and oriented.  "Thought content appropriate.  GCS: Motor: 6/Verbal: 5/Eyes: 4 GCS Total: 15  Mental Status: Awake, Alert, Oriented x 4  Language: No aphasia  Speech: No dysarthria  Cranial nerves: face symmetric, tongue midline, CN II-XII grossly intact.   Eyes: pupils equal, round, reactive to light with accomodation, EOMI.  Pulmonary: normal respirations, no signs of respiratory distress  Sensory: intact to light touch throughout  Motor Strength: Moves all extremities spontaneously with good tone. No abnormal movements seen.     Strength  Deltoids Triceps Biceps Wrist Extension Wrist Flexion Hand    Upper: R 5/5 5/5 5/5 5/5 5/5 5/5    L 5/5 5/5 5/5 5/5 5/5 5/5     Iliopsoas Quadriceps Knee  Flexion Tibialis  anterior Gastro- cnemius EHL   Lower: R 4+/5 4+/5 4+/5 4-/5 4-/5 4-/5    L 5/5 5/5 5/5 5/5 5/5 5/5     Skin: Skin is warm, dry and intact.  Incision c/d/I with skin edges well approximated with dermabond/prineo. No surrounding erythema or edema. No drainage from incision. No palpable underlying fluid collection.        Significant Labs:  Recent Labs   Lab 09/23/24  0310 09/24/24  0249   * 247*   * 131*   K 3.7 3.7    101   CO2 25 23   BUN 12 12   CREATININE 1.2 1.1   CALCIUM 8.1* 8.3*     Recent Labs   Lab 09/23/24  0310 09/24/24  0249   WBC 7.62 6.37   HGB 10.8* 10.3*   HCT 32.1* 30.3*   * 149*     No results for input(s): "LABPT", "INR", "APTT" in the last 48 hours.  Microbiology Results (last 7 days)       ** No results found for the last 168 hours. **          All pertinent labs from the last 24 hours have been reviewed.    Significant Diagnostics:  I have reviewed and interpreted all pertinent imaging results/findings within the past 24 hours.    Assessment/Plan:     Status post lumbar surgery (L4-S1 TLIF)  78 M with hx of prior CVA with residual right sided weakness presents for elective surgery for worsening low back pain and BLE radicular leg pain now s/p L4-S1 TLIF on " 9/20/2024:    Plan:  Admitted to ns, q4h neuro vital checks   Postop XR satisfactory  2x HV drains to remain at this time on full suction; please record hourly outputs  LSO brace when OOB  Voiding spontaneously  Passing gas, no BM yet, added lactulose  PT/OT/OOB  TEDs/SCDs/SQH  Recommend weekly BLEUS for DVT surveillance  Continue to monitor clinically, notify NSGY immediately with any changes in neuro status    Dispo: anticipate home once drains are out and patient remains afebrile    Type 2 diabetes mellitus with diabetic cataract, without long-term current use of insulin  Patient's FSGs are uncontrolled due to hyperglycemia on current medication regimen.  Last A1c reviewed-   Lab Results   Component Value Date    HGBA1C 7.8 (H) 09/09/2024     Most recent fingerstick glucose reviewed-   Recent Labs   Lab 09/23/24  1553 09/23/24  2105 09/24/24  0815 09/24/24  1123   POCTGLUCOSE 257* 269* 313* 267*     Current correctional scale  Low  Endocrinology consulted.  anti-hyperglycemic dose as follows-   Antihyperglycemics (From admission, onward)      Start     Stop Route Frequency Ordered    09/24/24 1130  insulin aspart U-100 pen 5 Units         -- SubQ 3 times daily with meals 09/24/24 1029    09/24/24 1030  insulin glargine U-100 (Lantus) pen 14 Units         -- SubQ Daily 09/24/24 1029    09/20/24 2337  insulin aspart U-100 pen 0-5 Units         -- SubQ Before meals & nightly PRN 09/20/24 2237          Hold Oral hypoglycemics while patient is in the hospital.        Lucrecia Park PA-C  Neurosurgery  [facility] - Surgery

## 2024-09-25 LAB
ANION GAP SERPL CALC-SCNC: 10 MMOL/L (ref 8–16)
BASOPHILS # BLD AUTO: 0.02 K/UL (ref 0–0.2)
BASOPHILS NFR BLD: 0.2 % (ref 0–1.9)
BUN SERPL-MCNC: 12 MG/DL (ref 8–23)
CALCIUM SERPL-MCNC: 9.1 MG/DL (ref 8.7–10.5)
CHLORIDE SERPL-SCNC: 97 MMOL/L (ref 95–110)
CO2 SERPL-SCNC: 25 MMOL/L (ref 23–29)
CREAT SERPL-MCNC: 1.3 MG/DL (ref 0.5–1.4)
DIFFERENTIAL METHOD BLD: ABNORMAL
EOSINOPHIL # BLD AUTO: 0.1 K/UL (ref 0–0.5)
EOSINOPHIL NFR BLD: 0.6 % (ref 0–8)
ERYTHROCYTE [DISTWIDTH] IN BLOOD BY AUTOMATED COUNT: 13.1 % (ref 11.5–14.5)
EST. GFR  (NO RACE VARIABLE): 55.9 ML/MIN/1.73 M^2
GLUCOSE SERPL-MCNC: 242 MG/DL (ref 70–110)
HCT VFR BLD AUTO: 33.8 % (ref 40–54)
HGB BLD-MCNC: 10.9 G/DL (ref 14–18)
IMM GRANULOCYTES # BLD AUTO: 0.04 K/UL (ref 0–0.04)
IMM GRANULOCYTES NFR BLD AUTO: 0.4 % (ref 0–0.5)
LYMPHOCYTES # BLD AUTO: 1.4 K/UL (ref 1–4.8)
LYMPHOCYTES NFR BLD: 12.8 % (ref 18–48)
MCH RBC QN AUTO: 29.5 PG (ref 27–31)
MCHC RBC AUTO-ENTMCNC: 32.2 G/DL (ref 32–36)
MCV RBC AUTO: 92 FL (ref 82–98)
MONOCYTES # BLD AUTO: 1.3 K/UL (ref 0.3–1)
MONOCYTES NFR BLD: 11.9 % (ref 4–15)
NEUTROPHILS # BLD AUTO: 8.3 K/UL (ref 1.8–7.7)
NEUTROPHILS NFR BLD: 74.1 % (ref 38–73)
NRBC BLD-RTO: 0 /100 WBC
PLATELET # BLD AUTO: 206 K/UL (ref 150–450)
PMV BLD AUTO: 10.2 FL (ref 9.2–12.9)
POCT GLUCOSE: 202 MG/DL (ref 70–110)
POCT GLUCOSE: 231 MG/DL (ref 70–110)
POCT GLUCOSE: 254 MG/DL (ref 70–110)
POCT GLUCOSE: 272 MG/DL (ref 70–110)
POTASSIUM SERPL-SCNC: 4.2 MMOL/L (ref 3.5–5.1)
RBC # BLD AUTO: 3.69 M/UL (ref 4.6–6.2)
SODIUM SERPL-SCNC: 132 MMOL/L (ref 136–145)
WBC # BLD AUTO: 11.14 K/UL (ref 3.9–12.7)

## 2024-09-25 PROCEDURE — 97116 GAIT TRAINING THERAPY: CPT

## 2024-09-25 PROCEDURE — 99024 POSTOP FOLLOW-UP VISIT: CPT | Mod: ,,,

## 2024-09-25 PROCEDURE — 25000003 PHARM REV CODE 250: Performed by: STUDENT IN AN ORGANIZED HEALTH CARE EDUCATION/TRAINING PROGRAM

## 2024-09-25 PROCEDURE — 36415 COLL VENOUS BLD VENIPUNCTURE: CPT | Performed by: STUDENT IN AN ORGANIZED HEALTH CARE EDUCATION/TRAINING PROGRAM

## 2024-09-25 PROCEDURE — 63600175 PHARM REV CODE 636 W HCPCS: Performed by: STUDENT IN AN ORGANIZED HEALTH CARE EDUCATION/TRAINING PROGRAM

## 2024-09-25 PROCEDURE — 97530 THERAPEUTIC ACTIVITIES: CPT

## 2024-09-25 PROCEDURE — 85025 COMPLETE CBC W/AUTO DIFF WBC: CPT | Performed by: STUDENT IN AN ORGANIZED HEALTH CARE EDUCATION/TRAINING PROGRAM

## 2024-09-25 PROCEDURE — 99232 SBSQ HOSP IP/OBS MODERATE 35: CPT | Mod: ,,,

## 2024-09-25 PROCEDURE — 51701 INSERT BLADDER CATHETER: CPT

## 2024-09-25 PROCEDURE — 51798 US URINE CAPACITY MEASURE: CPT

## 2024-09-25 PROCEDURE — 80048 BASIC METABOLIC PNL TOTAL CA: CPT | Performed by: STUDENT IN AN ORGANIZED HEALTH CARE EDUCATION/TRAINING PROGRAM

## 2024-09-25 PROCEDURE — 25000003 PHARM REV CODE 250: Performed by: PHYSICIAN ASSISTANT

## 2024-09-25 PROCEDURE — 11000001 HC ACUTE MED/SURG PRIVATE ROOM

## 2024-09-25 RX ORDER — INSULIN GLARGINE 100 [IU]/ML
18 INJECTION, SOLUTION SUBCUTANEOUS DAILY
Status: DISCONTINUED | OUTPATIENT
Start: 2024-09-25 | End: 2024-09-28

## 2024-09-25 RX ORDER — INSULIN ASPART 100 [IU]/ML
9 INJECTION, SOLUTION INTRAVENOUS; SUBCUTANEOUS
Status: DISCONTINUED | OUTPATIENT
Start: 2024-09-25 | End: 2024-09-27

## 2024-09-25 RX ADMIN — CALCIUM CARBONATE (ANTACID) CHEW TAB 500 MG 500 MG: 500 CHEW TAB at 09:09

## 2024-09-25 RX ADMIN — OXYCODONE HYDROCHLORIDE 10 MG: 10 TABLET ORAL at 05:09

## 2024-09-25 RX ADMIN — CEFAZOLIN 2 G: 2 INJECTION, POWDER, FOR SOLUTION INTRAMUSCULAR; INTRAVENOUS at 03:09

## 2024-09-25 RX ADMIN — INSULIN ASPART 7 UNITS: 100 INJECTION, SOLUTION INTRAVENOUS; SUBCUTANEOUS at 10:09

## 2024-09-25 RX ADMIN — OXYCODONE HYDROCHLORIDE 10 MG: 10 TABLET ORAL at 07:09

## 2024-09-25 RX ADMIN — METHOCARBAMOL 750 MG: 750 TABLET ORAL at 02:09

## 2024-09-25 RX ADMIN — CARVEDILOL 6.25 MG: 6.25 TABLET, FILM COATED ORAL at 09:09

## 2024-09-25 RX ADMIN — SENNOSIDES AND DOCUSATE SODIUM 2 TABLET: 50; 8.6 TABLET ORAL at 09:09

## 2024-09-25 RX ADMIN — PRAVASTATIN SODIUM 40 MG: 20 TABLET ORAL at 09:09

## 2024-09-25 RX ADMIN — INSULIN ASPART 7 UNITS: 100 INJECTION, SOLUTION INTRAVENOUS; SUBCUTANEOUS at 09:09

## 2024-09-25 RX ADMIN — METHOCARBAMOL 750 MG: 750 TABLET ORAL at 04:09

## 2024-09-25 RX ADMIN — Medication 6 MG: at 09:09

## 2024-09-25 RX ADMIN — ACETAMINOPHEN 650 MG: 325 TABLET ORAL at 10:09

## 2024-09-25 RX ADMIN — CEFAZOLIN 2 G: 2 INJECTION, POWDER, FOR SOLUTION INTRAMUSCULAR; INTRAVENOUS at 10:09

## 2024-09-25 RX ADMIN — INSULIN ASPART 3 UNITS: 100 INJECTION, SOLUTION INTRAVENOUS; SUBCUTANEOUS at 11:09

## 2024-09-25 RX ADMIN — INSULIN ASPART 3 UNITS: 100 INJECTION, SOLUTION INTRAVENOUS; SUBCUTANEOUS at 04:09

## 2024-09-25 RX ADMIN — POLYETHYLENE GLYCOL 3350 17 G: 17 POWDER, FOR SOLUTION ORAL at 09:09

## 2024-09-25 RX ADMIN — METHOCARBAMOL 750 MG: 750 TABLET ORAL at 09:09

## 2024-09-25 RX ADMIN — HEPARIN SODIUM 5000 UNITS: 5000 INJECTION INTRAVENOUS; SUBCUTANEOUS at 05:09

## 2024-09-25 RX ADMIN — INSULIN ASPART 9 UNITS: 100 INJECTION, SOLUTION INTRAVENOUS; SUBCUTANEOUS at 04:09

## 2024-09-25 RX ADMIN — OXYCODONE HYDROCHLORIDE 10 MG: 10 TABLET ORAL at 11:09

## 2024-09-25 RX ADMIN — INSULIN GLARGINE 18 UNITS: 100 INJECTION, SOLUTION SUBCUTANEOUS at 09:09

## 2024-09-25 RX ADMIN — LACTULOSE 20 G: 20 SOLUTION ORAL at 09:09

## 2024-09-25 RX ADMIN — TAMSULOSIN HYDROCHLORIDE 0.4 MG: 0.4 CAPSULE ORAL at 09:09

## 2024-09-25 RX ADMIN — INSULIN ASPART 2 UNITS: 100 INJECTION, SOLUTION INTRAVENOUS; SUBCUTANEOUS at 09:09

## 2024-09-25 RX ADMIN — AMLODIPINE BESYLATE 10 MG: 10 TABLET ORAL at 09:09

## 2024-09-25 RX ADMIN — HEPARIN SODIUM 5000 UNITS: 5000 INJECTION INTRAVENOUS; SUBCUTANEOUS at 10:09

## 2024-09-25 RX ADMIN — LOSARTAN POTASSIUM 100 MG: 25 TABLET, FILM COATED ORAL at 09:09

## 2024-09-25 RX ADMIN — HEPARIN SODIUM 5000 UNITS: 5000 INJECTION INTRAVENOUS; SUBCUTANEOUS at 02:09

## 2024-09-25 NOTE — ASSESSMENT & PLAN NOTE
78 M with hx of prior CVA with residual right sided weakness presents for elective surgery for worsening low back pain and BLE radicular leg pain now s/p L4-S1 TLIF on 9/20/2024:    Plan:  Admitted to nsgy, q4h neuro vital checks.  Postop XR satisfactory.  HV drains removed. Okay to dc Ancef.  LSO brace when OOB.  Bladder scan q6 and straight cath as needed. Possible ross placement today if continued urinary retention.  Bowel regimen in place. +BM 9/24.  PT/OT/OOB.  TEDs/SCDs/SQH.  Continue to monitor clinically, notify NSGY immediately with any changes in neuro status.    Dispo: Anticipate dc once urinary retention resolves or ross is placed and patient remains afebrile. Pending SNF vs home with home health.

## 2024-09-25 NOTE — PROGRESS NOTES
[facility] - Surgery  Neurosurgery  Progress Note    Subjective:     History of Present Illness: 78 M with hx of prior CVA with residual right sided weakness presents for eval of worsening low back pain and BLE radicular leg pain. He endorses pain which starts in upper lumbar area and then radiates down to lower lumbar area down his legs right greater than left into his feet. He endorses right foot weakness and has a right foot drop which has been present for many months. He has difficulty walking. 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 He has been to pain mgmt for injections without significant relief.      Interval fu 8/8/24:  Pt presents in fu.  No significant changes in symptoms.  Today he is most concerned with his balance difficulties.     Interval fu 8/29/24: No changes in symptoms.    Post-Op Info:  Procedure(s) (LRB):  OPEN ROBOTIC L4 -S1 TLIF (N/A)   5 Days Post-Op   Interval History: NAEON. Neuro stable. Pain controlled. OOB/up in chair and ambulating down hallways with therapy. Febrile again to 101 at 1935 yesterday. No SOB or infectious symptoms. UA negative. Patient still having difficulties urinating. Continue bladder scans, possible ross placement today. HV drains with low output, removed. +BM yesterday. Endocrine following for hypergylcemia.    Medications:  Continuous Infusions:  Scheduled Meds:   amLODIPine  10 mg Oral Daily    calcium carbonate  500 mg Oral BID    carvediloL  6.25 mg Oral BID    ceFAZolin (Ancef) IV (PEDS and ADULTS)  2 g Intravenous Q8H    heparin (porcine)  5,000 Units Subcutaneous Q8H    insulin aspart U-100  7 Units Subcutaneous TIDWM    insulin glargine U-100  18 Units Subcutaneous Daily    lactulose  20 g Oral BID    losartan  100 mg Oral Daily    methocarbamoL  750 mg Oral QID    polyethylene glycol  17 g Oral BID    pravastatin  40 mg Oral QHS    senna-docusate 8.6-50 mg  2 tablet Oral BID     tamsulosin  0.4 mg Oral Daily     PRN Meds:  Current Facility-Administered Medications:     acetaminophen, 650 mg, Oral, Q6H PRN    aluminum-magnesium hydroxide-simethicone, 30 mL, Oral, Q4H PRN    dextrose 10%, 12.5 g, Intravenous, PRN    dextrose 10%, 25 g, Intravenous, PRN    glucagon (human recombinant), 1 mg, Intramuscular, PRN    glucose, 16 g, Oral, PRN    glucose, 24 g, Oral, PRN    insulin aspart U-100, 0-5 Units, Subcutaneous, QID (AC + HS) PRN    melatonin, 6 mg, Oral, Nightly PRN    ondansetron, 8 mg, Oral, Q6H PRN    oxyCODONE, 5 mg, Oral, Q4H PRN    oxyCODONE, 10 mg, Oral, Q4H PRN     Review of Systems  Objective:     Weight: 86.9 kg (191 lb 9.3 oz)  Body mass index is 25.28 kg/m².  Vital Signs (Most Recent):  Temp: 99.6 °F (37.6 °C) (09/25/24 0800)  Pulse: 80 (09/25/24 0800)  Resp: 16 (09/25/24 1110)  BP: (!) 166/74 (09/25/24 0800)  SpO2: 95 % (09/25/24 0800) Vital Signs (24h Range):  Temp:  [98.4 °F (36.9 °C)-101 °F (38.3 °C)] 99.6 °F (37.6 °C)  Pulse:  [80-94] 80  Resp:  [16-18] 16  SpO2:  [94 %-96 %] 95 %  BP: (138-166)/(59-74) 166/74                              Closed/Suction Drain 09/20/24 2140 Left;Posterior Back Accordion 10 Fr. (Active)   Site Description Unable to view 09/24/24 2000   Dressing Type Gauze 09/24/24 2000   Dressing Status Clean;Dry;Intact 09/24/24 2000   Dressing Intervention Integrity maintained 09/24/24 2000   Drainage Sanguineous 09/24/24 2000   Status To bulb suction 09/24/24 0815   Output (mL) 0 mL 09/25/24 0500            Closed/Suction Drain 09/20/24 2206 Right;Posterior Back Accordion 10 Fr. (Active)   Site Description Unable to view 09/24/24 2000   Dressing Type Gauze 09/24/24 2000   Dressing Status Clean;Dry;Intact 09/24/24 2000   Dressing Intervention Integrity maintained 09/24/24 2000   Drainage Serosanguineous 09/24/24 2000   Status To bulb suction 09/24/24 2000   Output (mL) 5 mL 09/25/24 0500            Neurosurgery Physical Exam  General: well developed, well  "nourished, no distress.   Head: normocephalic, atraumatic  Neurologic: Alert and oriented. Thought content appropriate.  GCS: Motor: 6/Verbal: 5/Eyes: 4 GCS Total: 15  Mental Status: Awake, Alert, Oriented x 4  Language: No aphasia  Speech: No dysarthria  Cranial nerves: face symmetric, tongue midline, CN II-XII grossly intact.   Eyes: pupils equal, round, reactive to light with accomodation, EOMI.  Pulmonary: normal respirations, no signs of respiratory distress  Sensory: intact to light touch throughout  Motor Strength: Moves all extremities spontaneously with good tone. No abnormal movements seen.      Strength   Deltoids Triceps Biceps Wrist Extension Wrist Flexion Hand    Upper: R 5/5 5/5 5/5 5/5 5/5 5/5     L 5/5 5/5 5/5 5/5 5/5 5/5       Iliopsoas Quadriceps Knee  Flexion Tibialis  anterior Gastro- cnemius EHL   Lower: R 4+/5 4+/5 4+/5 4-/5 4-/5 4-/5     L 5/5 5/5 5/5 5/5 5/5 5/5      Skin: Skin is warm, dry and intact.    Incision c/d/I with skin edges well approximated with dermabond/prineo. No surrounding erythema or edema. No drainage from incision. No palpable underlying fluid collection.     Significant Labs:  Recent Labs   Lab 09/24/24  0249 09/25/24 0226   * 242*   * 132*   K 3.7 4.2    97   CO2 23 25   BUN 12 12   CREATININE 1.1 1.3   CALCIUM 8.3* 9.1     Recent Labs   Lab 09/24/24  0249 09/25/24 0226   WBC 6.37 11.14   HGB 10.3* 10.9*   HCT 30.3* 33.8*   * 206     No results for input(s): "LABPT", "INR", "APTT" in the last 48 hours.  Microbiology Results (last 7 days)       ** No results found for the last 168 hours. **          All pertinent labs from the last 24 hours have been reviewed.    Significant Diagnostics:  I have reviewed and interpreted all pertinent imaging results/findings within the past 24 hours.  Assessment/Plan:     Status post lumbar surgery (L4-S1 TLIF)  78 M with hx of prior CVA with residual right sided weakness presents for elective surgery for " worsening low back pain and BLE radicular leg pain now s/p L4-S1 TLIF on 9/20/2024:    Plan:  Admitted to nsgy, q4h neuro vital checks.  Postop XR satisfactory.  HV drains removed. Okay to dc Ancef.  LSO brace when OOB.  Bladder scan q6 and straight cath as needed. Possible ross placement today if continued urinary retention.  Bowel regimen in place. +BM 9/24.  PT/OT/OOB.  TEDs/SCDs/SQH.  Continue to monitor clinically, notify NSGY immediately with any changes in neuro status.    Dispo: Anticipate dc once urinary retention resolves or ross is placed and patient remains afebrile. Pending SNF vs home with home health.    Type 2 diabetes mellitus with diabetic cataract, without long-term current use of insulin  Patient's FSGs are uncontrolled due to hyperglycemia on current medication regimen.  Last A1c reviewed-   Lab Results   Component Value Date    HGBA1C 7.8 (H) 09/09/2024     Most recent fingerstick glucose reviewed-   Recent Labs   Lab 09/24/24  1123 09/24/24  1542 09/24/24  1933 09/25/24  0736   POCTGLUCOSE 267* 227* 251* 231*     Current correctional scale  Low  Endocrinology consulted.  anti-hyperglycemic dose as follows-   Antihyperglycemics (From admission, onward)      Start     Stop Route Frequency Ordered    09/25/24 0900  insulin glargine U-100 (Lantus) pen 18 Units         -- SubQ Daily 09/25/24 0706    09/25/24 0715  insulin aspart U-100 pen 7 Units         -- SubQ 3 times daily with meals 09/24/24 2130    09/20/24 2337  insulin aspart U-100 pen 0-5 Units         -- SubQ Before meals & nightly PRN 09/20/24 2237          Hold Oral hypoglycemics while patient is in the hospital.        Sandi Callaway PA-C  Neurosurgery  [facility] - Surgery

## 2024-09-25 NOTE — ASSESSMENT & PLAN NOTE
Patient's FSGs are uncontrolled due to hyperglycemia on current medication regimen.  Last A1c reviewed-   Lab Results   Component Value Date    HGBA1C 7.8 (H) 09/09/2024     Most recent fingerstick glucose reviewed-   Recent Labs   Lab 09/24/24  1123 09/24/24  1542 09/24/24  1933 09/25/24  0736   POCTGLUCOSE 267* 227* 251* 231*     Current correctional scale  Low  Endocrinology consulted.  anti-hyperglycemic dose as follows-   Antihyperglycemics (From admission, onward)      Start     Stop Route Frequency Ordered    09/25/24 0900  insulin glargine U-100 (Lantus) pen 18 Units         -- SubQ Daily 09/25/24 0706    09/25/24 0715  insulin aspart U-100 pen 7 Units         -- SubQ 3 times daily with meals 09/24/24 2130 09/20/24 2337  insulin aspart U-100 pen 0-5 Units         -- SubQ Before meals & nightly PRN 09/20/24 2237          Hold Oral hypoglycemics while patient is in the hospital.

## 2024-09-25 NOTE — PT/OT/SLP PROGRESS
Physical Therapy Treatment    Patient Name:  Robi Donovan   MRN:  [id number]    Recommendations:     Discharge Recommendations: Moderate Intensity Therapy  Discharge Equipment Recommendations: walker, rolling  Barriers to discharge:  Increased level of assist for functional mobility    Assessment:     Robi Donovan is a 79 y.o. male admitted with a medical diagnosis of  s/p L4-S1 TLIF.  He presents with the following impairments/functional limitations: weakness, impaired endurance, impaired self care skills, impaired functional mobility, gait instability, impaired balance, decreased lower extremity function, pain, decreased safety awareness, impaired coordination, orthopedic precautions Pt was able to progress with PT today with requiring less assistance during gait today.  He was able to amb 60' and then 40' with RW and Min assist, he did require seated rest break for recovery between gait trials due to fatigue and pain.  He still requires significant level of assistance for transfers, mod assist from bed, BSC and bedside chair.  He is motivated for PT and participated fully during session despite pain.  Patient continues to demonstrate the need for moderate intensity therapy on a daily basis post acute exhibited by decreased independence with self-care and functional mobility     Rehab Prognosis: Good; patient would benefit from acute skilled PT services to address these deficits and reach maximum level of function.    Recent Surgery: Procedure(s) (LRB):  OPEN ROBOTIC L4 -S1 TLIF (N/A) 5 Days Post-Op    Plan:     During this hospitalization, patient to be seen 5 x/week to address the identified rehab impairments via gait training, therapeutic activities, therapeutic exercises, neuromuscular re-education and progress toward the following goals:    Plan of Care Expires:  10/21/24    Subjective     Chief Complaint:  Pt reports he had pain meds this AM.   Pt reported urgent need to have BM after initiating gait training.     Patient/Family Comments/goals: to get stronger and go home  Pain/Comfort:  Pain Rating 1: 9/10  Location - Orientation 1: lower  Location 1: back  Pain Addressed 1: Pre-medicate for activity, Reposition, Distraction  Pain Rating Post-Intervention 1: 8/10      Objective:     Communicated with RNAleks after session due to he was with a pt when PT attempted to speak with him before session. Per chart review, pt ready for PT.   Patient found HOB elevated with hemovac, peripheral IV upon PT entry to room.  Pt's dtr present in room and for entire PT session.     General Precautions: Standard, fall  Orthopedic Precautions: spinal precautions  Braces: AFO, LSO  Respiratory Status: Room air     Functional Mobility:  Bed Mobility:     Rolling Left:  moderate assistance via log roll  Scooting: minimum assistance to scoot to EOB with pt requiring increase time for each scoot.  Supine to Sit: moderate assistance at trunk and cues for log rolling technique with increase time for task.  Transfers:     Sit to Stand:  moderate assistance with rolling walker from bed, BSC, bedside chair. Performed x 4 different trials.  Stand to sit:  Min assist to control descent and for safety with cues for posture and body mechanics for sitting on BSC and bedside chair.Performed x 3 different trials.  Toilet Transfer: moderate assistance with  rolling walker  using  bedside commode at bedside  Pt required supervision for safety while using commode  Pt required dep for personal hygiene after using commode with his dtr assisting pt with hygiene and PT providing min assist for balance with cues for upright posture and hand placement.  Gait: Pt amb 3 steps with RW and min assist for balance and then had to urgently use the BSC due to need for BM.   After using BSC, pt then amb 60' and then 40' with RW and min assist for balance with cues required for posture, placement of AD, heel strike and safety awareness. He required seated rest break x 5 min   to recover due to fatigue.    Pt amb with decrease alondra, step length, narrow LONNIE and step-to gait pattern using RW.     Balance: Sitting static: SBA  Sitting Dynamic: CGA to Min assist depending on challenges  Stand static with RW: CGA to Min assist    Stand dynamic with RW: Min to Mod assist      AM-PAC 6 CLICK MOBILITY  Turning over in bed (including adjusting bedclothes, sheets and blankets)?: 2  Sitting down on and standing up from a chair with arms (e.g., wheelchair, bedside commode, etc.): 2  Moving from lying on back to sitting on the side of the bed?: 2  Moving to and from a bed to a chair (including a wheelchair)?: 2  Need to walk in hospital room?: 3  Climbing 3-5 steps with a railing?: 2  Basic Mobility Total Score: 13       Treatment & Education:  Patient educated on role of acute care PT and PT POC, safety while in hospital including calling nurse for mobility, and call light usage.  Educated about importance of OOB mobility and remaining up in chair most of the day.  Pt with 2/3 recall of spinal precautions. PT re-ed pt on spinal precautions,( including avoidance of bending, lifting, and twisting), log rolling, posture and body mechanics during mobility and pt verbalized good understanding back to PT.    Pt and his dtr ed on TLSO management, placement, proper donning and they verbalized good understanding back to PT.   Pt required max assist to yuniel TLSO sitting EOB.  R AFO donned in sitting with max assist. Pt also donned athletic shoes prior to gait.   Transfer training and gait training as noted above  White board updated in pt's room with therapist and current mobility/transfer level and assist required  Updated Aleks abreu, on pt's mobility during session, + BM and level of mobility for assisting pt back to bed.       Patient left up in chair with all lines intact, call button in reach, RNAleks,  notified, and PCT and pt's dtr  present..    GOALS:   Multidisciplinary Problems        Physical Therapy Goals          Problem: Physical Therapy    Goal Priority Disciplines Outcome Goal Variances Interventions   Physical Therapy Goal     PT, PT/OT Progressing     Description: Goals to be met by: 10/5/24     Patient will increase functional independence with mobility by performin. Supine to sit with Stand-by Assistance - Not met  2. Rolling to Left or Right with Modified [place] - Not met  3. Sit to stand transfer with Stand-by Assistance with RW - Not met  4. Gait  x 125 feet with Stand-by Assistance using Rolling Walker - Not met  5. Ascend/descend 5 stair with left Handrail with Contact Guard Assistance using No Assistive Device - Not met   6. Pt will verbalize 3/3 spinal precautions without cueing - Not met                       Time Tracking:     PT Received On: 24  PT Start Time: 923     PT Stop Time: 100  PT Total Time (min): 42 min     Billable Minutes: Gait Training 18 and Therapeutic Activity 24    Treatment Type: Treatment  PT/PTA: PT     Number of PTA visits since last PT visit: 0     2024

## 2024-09-25 NOTE — PLAN OF CARE
Patient now requiring SNF placement post hospitalization    Met with patient and his daughter Lyla to review discharge recommendation of SNF and is agreeable to plan    Patient/family provided list of facilities in-network with patient's payor plan. Providers that are owned, operated, or affiliated with [facility] are included on the list.     Notified that referral sent to below listed facilities from in-network list based on proximity to home/family support:   1.[facility]  2.[facility]  [facility]  4.[facility]  5. [facility]  6.[facility]  7. [place]  8. [facility]  9.[place]  10. [facility]    Patient/family instructed to identify preference.    Preferred Facility: (if more than 1, listed in order of descending preference)  1.[facility]    If an additional preferred facility not listed above is identified, additional referral to be sent. If above facilities unable to accept, will send additional referrals to in-network providers.

## 2024-09-25 NOTE — PLAN OF CARE
Patient with no accepting facilities, discussed with patient and his daughter Lyla.  They verbalized understanding and would like more referrals to be sent.    -  Additional referral sent through [facility]  Matthew DND  [facility]  [facility]  [facility]    Pending accepting facility

## 2024-09-25 NOTE — PLAN OF CARE
Problem: Physical Therapy  Goal: Physical Therapy Goal  Description: Goals to be met by: 10/5/24     Patient will increase functional independence with mobility by performin. Supine to sit with Stand-by Assistance - Not met  2. Rolling to Left or Right with Modified [place] - Not met  3. Sit to stand transfer with Stand-by Assistance with RW - Not met  4. Gait  x 125 feet with Stand-by Assistance using Rolling Walker - Not met  5. Ascend/descend 5 stair with left Handrail with Contact Guard Assistance using No Assistive Device - Not met   6. Pt will verbalize 3/3 spinal precautions without cueing - Not met  Outcome: Progressing

## 2024-09-25 NOTE — SUBJECTIVE & OBJECTIVE
Interval History: NAEON. Neuro stable. Pain controlled. OOB/up in chair and ambulating down hallways with therapy. Febrile again to 101 at 1935 yesterday. No SOB or infectious symptoms. UA negative. Patient still having difficulties urinating. Continue bladder scans, possible ross placement today. HV drains with low output, removed. +BM yesterday. Endocrine following for hypergylcemia.    Medications:  Continuous Infusions:  Scheduled Meds:   amLODIPine  10 mg Oral Daily    calcium carbonate  500 mg Oral BID    carvediloL  6.25 mg Oral BID    ceFAZolin (Ancef) IV (PEDS and ADULTS)  2 g Intravenous Q8H    heparin (porcine)  5,000 Units Subcutaneous Q8H    insulin aspart U-100  7 Units Subcutaneous TIDWM    insulin glargine U-100  18 Units Subcutaneous Daily    lactulose  20 g Oral BID    losartan  100 mg Oral Daily    methocarbamoL  750 mg Oral QID    polyethylene glycol  17 g Oral BID    pravastatin  40 mg Oral QHS    senna-docusate 8.6-50 mg  2 tablet Oral BID    tamsulosin  0.4 mg Oral Daily     PRN Meds:  Current Facility-Administered Medications:     acetaminophen, 650 mg, Oral, Q6H PRN    aluminum-magnesium hydroxide-simethicone, 30 mL, Oral, Q4H PRN    dextrose 10%, 12.5 g, Intravenous, PRN    dextrose 10%, 25 g, Intravenous, PRN    glucagon (human recombinant), 1 mg, Intramuscular, PRN    glucose, 16 g, Oral, PRN    glucose, 24 g, Oral, PRN    insulin aspart U-100, 0-5 Units, Subcutaneous, QID (AC + HS) PRN    melatonin, 6 mg, Oral, Nightly PRN    ondansetron, 8 mg, Oral, Q6H PRN    oxyCODONE, 5 mg, Oral, Q4H PRN    oxyCODONE, 10 mg, Oral, Q4H PRN     Review of Systems  Objective:     Weight: 86.9 kg (191 lb 9.3 oz)  Body mass index is 25.28 kg/m².  Vital Signs (Most Recent):  Temp: 99.6 °F (37.6 °C) (09/25/24 0800)  Pulse: 80 (09/25/24 0800)  Resp: 16 (09/25/24 1110)  BP: (!) 166/74 (09/25/24 0800)  SpO2: 95 % (09/25/24 0800) Vital Signs (24h Range):  Temp:  [98.4 °F (36.9 °C)-101 °F (38.3 °C)] 99.6 °F  (37.6 °C)  Pulse:  [80-94] 80  Resp:  [16-18] 16  SpO2:  [94 %-96 %] 95 %  BP: (138-166)/(59-74) 166/74                              Closed/Suction Drain 09/20/24 2140 Left;Posterior Back Accordion 10 Fr. (Active)   Site Description Unable to view 09/24/24 2000   Dressing Type Gauze 09/24/24 2000   Dressing Status Clean;Dry;Intact 09/24/24 2000   Dressing Intervention Integrity maintained 09/24/24 2000   Drainage Sanguineous 09/24/24 2000   Status To bulb suction 09/24/24 0815   Output (mL) 0 mL 09/25/24 0500            Closed/Suction Drain 09/20/24 2206 Right;Posterior Back Accordion 10 Fr. (Active)   Site Description Unable to view 09/24/24 2000   Dressing Type Gauze 09/24/24 2000   Dressing Status Clean;Dry;Intact 09/24/24 2000   Dressing Intervention Integrity maintained 09/24/24 2000   Drainage Serosanguineous 09/24/24 2000   Status To bulb suction 09/24/24 2000   Output (mL) 5 mL 09/25/24 0500            Neurosurgery Physical Exam  General: well developed, well nourished, no distress.   Head: normocephalic, atraumatic  Neurologic: Alert and oriented. Thought content appropriate.  GCS: Motor: 6/Verbal: 5/Eyes: 4 GCS Total: 15  Mental Status: Awake, Alert, Oriented x 4  Language: No aphasia  Speech: No dysarthria  Cranial nerves: face symmetric, tongue midline, CN II-XII grossly intact.   Eyes: pupils equal, round, reactive to light with accomodation, EOMI.  Pulmonary: normal respirations, no signs of respiratory distress  Sensory: intact to light touch throughout  Motor Strength: Moves all extremities spontaneously with good tone. No abnormal movements seen.      Strength   Deltoids Triceps Biceps Wrist Extension Wrist Flexion Hand    Upper: R 5/5 5/5 5/5 5/5 5/5 5/5     L 5/5 5/5 5/5 5/5 5/5 5/5       Iliopsoas Quadriceps Knee  Flexion Tibialis  anterior Gastro- cnemius EHL   Lower: R 4+/5 4+/5 4+/5 4-/5 4-/5 4-/5     L 5/5 5/5 5/5 5/5 5/5 5/5      Skin: Skin is warm, dry and intact.    Incision c/d/I with  "skin edges well approximated with dermabond/prineo. No surrounding erythema or edema. No drainage from incision. No palpable underlying fluid collection.     Significant Labs:  Recent Labs   Lab 09/24/24 0249 09/25/24 0226   * 242*   * 132*   K 3.7 4.2    97   CO2 23 25   BUN 12 12   CREATININE 1.1 1.3   CALCIUM 8.3* 9.1     Recent Labs   Lab 09/24/24 0249 09/25/24 0226   WBC 6.37 11.14   HGB 10.3* 10.9*   HCT 30.3* 33.8*   * 206     No results for input(s): "LABPT", "INR", "APTT" in the last 48 hours.  Microbiology Results (last 7 days)       ** No results found for the last 168 hours. **          All pertinent labs from the last 24 hours have been reviewed.    Significant Diagnostics:  I have reviewed and interpreted all pertinent imaging results/findings within the past 24 hours.  "

## 2024-09-25 NOTE — NURSING
Nurses Note -- 4 Eyes      9/25/2024   8:00 AM      Skin assessed during: Daily Assessment      [x] No Altered Skin Integrity Present    []Prevention Measures Documented      [] Yes- Altered Skin Integrity Present or Discovered   [] LDA Added if Not in Epic (Describe Wound)   [] New Altered Skin Integrity was Present on Admit and Documented in LDA   [] Wound Image Taken    Wound Care Consulted? No    Attending Nurse:  RASHMI Fink    Second RN/Staff Member:  KATHY Brand

## 2024-09-25 NOTE — NURSING
Patient AAOx4, VSS,  rested well during shift with NADN. Notified MD on call that patient and family is requesting to leave a ross in patient until its figured out why he can urinate d/t it being uncomfortable for patient. MD replied he will follow up with day team and they will make an decision on weather to leave in or not. Patient and family informed, verbalizes understanding. Bed in lowest position, call light in reach along with personal items. Plan of care ongoing.

## 2024-09-25 NOTE — PROGRESS NOTES
"[facility]  Endocrinology  Progress Note    Admit Date: 2024     Reason for Consult: Management of T2DM, Hyperglycemia     Surgical Procedure and Date:  lumbar surgery (L4-S1 TLIF) 24    Diabetes diagnosis year: > 2 years ago     Home Diabetes Medications:  Metformin 1000 mg BID per patient    How often checking glucose at home? One   BG readings on regimen: 120-160s  Hypoglycemia on the regimen?  No  Missed doses on regimen?  No    Diabetes Complications include:     Hyperglycemia    Complicating diabetes co morbidities:   Glucocorticoid use       HPI:   Patient is a 79 y.o. male with hx of prior CVA with residual right sided weakness presents for eval of worsening low back pain and BLE radicular leg pain. He endorses pain which starts in upper lumbar area and then radiates down to lower lumbar area down his legs right greater than left into his feet. He endorses right foot weakness and has a right foot drop which has been present for many months. He has difficulty walking. 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 He has been to pain mgmt for injections without significant relief. Now s/p the above procedures. Endocrine consulted for DM management.       Interval HPI:   No acute events overnight. Patient in room 543/543 A. Blood glucose stable. BG above goal on current insulin regimen (SSI, basal, prandial insulin). Steroid use- None.     Renal function-   Lab Results   Component Value Date    CREATININE 1.3 2024        Vasopressors-  None      Diet diabetic  Calorie      Eatin%  Nausea: No  Hypoglycemia and intervention: No  Fever: No  TPN and/or TF: No       BP (!) 166/74 (BP Location: Left arm, Patient Position: Lying)   Pulse 80   Temp 99.6 °F (37.6 °C) (Oral)   Resp 18   Ht 6' 1" (1.854 m)   Wt 86.9 kg (191 lb 9.3 oz)   SpO2 95%   BMI 25.28 kg/m²     Labs Reviewed and Include    Recent Labs   Lab " "09/25/24  0226   *   CALCIUM 9.1   *   K 4.2   CO2 25   CL 97   BUN 12   CREATININE 1.3     Lab Results   Component Value Date    WBC 11.14 09/25/2024    HGB 10.9 (L) 09/25/2024    HCT 33.8 (L) 09/25/2024    MCV 92 09/25/2024     09/25/2024     No results for input(s): "TSH", "FREET4" in the last 168 hours.  Lab Results   Component Value Date    HGBA1C 7.8 (H) 09/09/2024       Nutritional status:   Body mass index is 25.28 kg/m².  Lab Results   Component Value Date    ALBUMIN 4.1 07/15/2024    ALBUMIN 3.9 06/05/2024     No results found for: "PREALBUMIN"    Estimated Creatinine Clearance: 52.1 mL/min (based on SCr of 1.3 mg/dL).    Accu-Checks  Recent Labs     09/22/24  2126 09/23/24  0716 09/23/24  1141 09/23/24  1553 09/23/24  2105 09/24/24  0815 09/24/24  1123 09/24/24  1542 09/24/24  1933 09/25/24  0736   POCTGLUCOSE 264* 213* 251* 257* 269* 313* 267* 227* 251* 231*       Current Medications and/or Treatments Impacting Glycemic Control  Immunotherapy:    Immunosuppressants       None          Steroids:   Hormones (From admission, onward)      Start     Stop Route Frequency Ordered    09/20/24 2333  melatonin tablet 6 mg         -- Oral Nightly PRN 09/20/24 2235          Pressors:    Autonomic Drugs (From admission, onward)      None          Hyperglycemia/Diabetes Medications:   Antihyperglycemics (From admission, onward)      Start     Stop Route Frequency Ordered    09/25/24 0900  insulin glargine U-100 (Lantus) pen 18 Units         -- SubQ Daily 09/25/24 0706    09/25/24 0715  insulin aspart U-100 pen 7 Units         -- SubQ 3 times daily with meals 09/24/24 2130 09/20/24 2337  insulin aspart U-100 pen 0-5 Units         -- SubQ Before meals & nightly PRN 09/20/24 2237            ASSESSMENT and PLAN    Neuro  Status post lumbar surgery (L4-S1 TLIF)  Managed per primary team        Endocrine  Type 2 diabetes mellitus with diabetic cataract, without long-term current use of insulin  BG " goal: 140-180    - Lantus 18 units QD (20% increase given fasting BG above goal)   - Novolog 7 units TIDWM (20% increase given post-prandial BG above goal)   - LDC SSI PRN (150/50)   - POCT Glucose before meals and at bedtime  - Hypoglycemia protocol in place      ** Please notify Endocrine for any change and/or advance in diet**  ** Please call Endocrine for any BG related issues **     Discharge Planning:   TBD. Please notify endocrinology prior to discharge.        Orthopedic  Surgical wound present  Optimize BG control to improve wound healing           Zora Aguirre PA-C  Endocrinology  [facility]

## 2024-09-25 NOTE — SUBJECTIVE & OBJECTIVE
"Interval HPI:   No acute events overnight. Patient in room 543/543 A. Blood glucose stable. BG above goal on current insulin regimen (SSI, basal, prandial insulin). Steroid use- None.     Renal function-   Lab Results   Component Value Date    CREATININE 1.3 2024        Vasopressors-  None      Diet diabetic  Calorie      Eatin%  Nausea: No  Hypoglycemia and intervention: No  Fever: No  TPN and/or TF: No       BP (!) 166/74 (BP Location: Left arm, Patient Position: Lying)   Pulse 80   Temp 99.6 °F (37.6 °C) (Oral)   Resp 18   Ht 6' 1" (1.854 m)   Wt 86.9 kg (191 lb 9.3 oz)   SpO2 95%   BMI 25.28 kg/m²     Labs Reviewed and Include    Recent Labs   Lab 24   *   CALCIUM 9.1   *   K 4.2   CO2 25   CL 97   BUN 12   CREATININE 1.3     Lab Results   Component Value Date    WBC 11.14 2024    HGB 10.9 (L) 2024    HCT 33.8 (L) 2024    MCV 92 2024     2024     No results for input(s): "TSH", "FREET4" in the last 168 hours.  Lab Results   Component Value Date    HGBA1C 7.8 (H) 2024       Nutritional status:   Body mass index is 25.28 kg/m².  Lab Results   Component Value Date    ALBUMIN 4.1 07/15/2024    ALBUMIN 3.9 2024     No results found for: "PREALBUMIN"    Estimated Creatinine Clearance: 52.1 mL/min (based on SCr of 1.3 mg/dL).    Accu-Checks  Recent Labs     24  2126 24  0716 24  1141 24  1553 24  2105 24  0815 24  1123 24  1542 24  1933 24  0736   POCTGLUCOSE 264* 213* 251* 257* 269* 313* 267* 227* 251* 231*       Current Medications and/or Treatments Impacting Glycemic Control  Immunotherapy:    Immunosuppressants       None          Steroids:   Hormones (From admission, onward)      Start     Stop Route Frequency Ordered    24 2333  melatonin tablet 6 mg         -- Oral Nightly PRN 24 2231          Pressors:    Autonomic Drugs (From admission, " onward)      None          Hyperglycemia/Diabetes Medications:   Antihyperglycemics (From admission, onward)      Start     Stop Route Frequency Ordered    09/25/24 0900  insulin glargine U-100 (Lantus) pen 18 Units         -- SubQ Daily 09/25/24 0706    09/25/24 0715  insulin aspart U-100 pen 7 Units         -- SubQ 3 times daily with meals 09/24/24 2130 09/20/24 2337  insulin aspart U-100 pen 0-5 Units         -- SubQ Before meals & nightly PRN 09/20/24 2233

## 2024-09-25 NOTE — ASSESSMENT & PLAN NOTE
BG goal: 140-180    - Lantus 18 units QD (20% increase given fasting BG above goal)   - Novolog 7 units TIDWM (20% increase given post-prandial BG above goal)   - LDC SSI PRN (150/50)   - POCT Glucose before meals and at bedtime  - Hypoglycemia protocol in place      ** Please notify Endocrine for any change and/or advance in diet**  ** Please call Endocrine for any BG related issues **     Discharge Planning:   TBD. Please notify endocrinology prior to discharge.

## 2024-09-26 PROBLEM — R50.9 FEVER: Status: ACTIVE | Noted: 2024-09-26

## 2024-09-26 PROBLEM — R33.9 URINARY RETENTION: Status: ACTIVE | Noted: 2024-09-26

## 2024-09-26 LAB
ADENOVIRUS: NOT DETECTED
ANION GAP SERPL CALC-SCNC: 9 MMOL/L (ref 8–16)
BACTERIA #/AREA URNS AUTO: ABNORMAL /HPF
BASOPHILS # BLD AUTO: 0.03 K/UL (ref 0–0.2)
BASOPHILS NFR BLD: 0.4 % (ref 0–1.9)
BILIRUB UR QL STRIP: NEGATIVE
BORDETELLA PARAPERTUSSIS (IS1001): NOT DETECTED
BORDETELLA PERTUSSIS (PTXP): NOT DETECTED
BUN SERPL-MCNC: 13 MG/DL (ref 8–23)
CALCIUM SERPL-MCNC: 8.7 MG/DL (ref 8.7–10.5)
CHLAMYDIA PNEUMONIAE: NOT DETECTED
CHLORIDE SERPL-SCNC: 97 MMOL/L (ref 95–110)
CLARITY UR REFRACT.AUTO: ABNORMAL
CO2 SERPL-SCNC: 28 MMOL/L (ref 23–29)
COLOR UR AUTO: YELLOW
CORONAVIRUS 229E, COMMON COLD VIRUS: NOT DETECTED
CORONAVIRUS HKU1, COMMON COLD VIRUS: NOT DETECTED
CORONAVIRUS NL63, COMMON COLD VIRUS: NOT DETECTED
CORONAVIRUS OC43, COMMON COLD VIRUS: NOT DETECTED
CREAT SERPL-MCNC: 1.3 MG/DL (ref 0.5–1.4)
DIFFERENTIAL METHOD BLD: ABNORMAL
EOSINOPHIL # BLD AUTO: 0.2 K/UL (ref 0–0.5)
EOSINOPHIL NFR BLD: 2.2 % (ref 0–8)
ERYTHROCYTE [DISTWIDTH] IN BLOOD BY AUTOMATED COUNT: 13.1 % (ref 11.5–14.5)
EST. GFR  (NO RACE VARIABLE): 55.9 ML/MIN/1.73 M^2
FLUBV RNA NPH QL NAA+NON-PROBE: NOT DETECTED
GLUCOSE SERPL-MCNC: 178 MG/DL (ref 70–110)
GLUCOSE UR QL STRIP: ABNORMAL
HCT VFR BLD AUTO: 28.2 % (ref 40–54)
HGB BLD-MCNC: 9.6 G/DL (ref 14–18)
HGB UR QL STRIP: ABNORMAL
HPIV1 RNA NPH QL NAA+NON-PROBE: NOT DETECTED
HPIV2 RNA NPH QL NAA+NON-PROBE: NOT DETECTED
HPIV3 RNA NPH QL NAA+NON-PROBE: NOT DETECTED
HPIV4 RNA NPH QL NAA+NON-PROBE: NOT DETECTED
HUMAN METAPNEUMOVIRUS: NOT DETECTED
HYALINE CASTS UR QL AUTO: 4 /LPF
IMM GRANULOCYTES # BLD AUTO: 0.09 K/UL (ref 0–0.04)
IMM GRANULOCYTES NFR BLD AUTO: 1.1 % (ref 0–0.5)
INFLUENZA A (SUBTYPES H1,H1-2009,H3): NOT DETECTED
INFLUENZA A, MOLECULAR: NOT DETECTED
INFLUENZA B, MOLECULAR: NOT DETECTED
KETONES UR QL STRIP: ABNORMAL
LEUKOCYTE ESTERASE UR QL STRIP: ABNORMAL
LYMPHOCYTES # BLD AUTO: 1.9 K/UL (ref 1–4.8)
LYMPHOCYTES NFR BLD: 22.8 % (ref 18–48)
MCH RBC QN AUTO: 30.2 PG (ref 27–31)
MCHC RBC AUTO-ENTMCNC: 34 G/DL (ref 32–36)
MCV RBC AUTO: 89 FL (ref 82–98)
MICROSCOPIC COMMENT: ABNORMAL
MONOCYTES # BLD AUTO: 1.2 K/UL (ref 0.3–1)
MONOCYTES NFR BLD: 14.2 % (ref 4–15)
MYCOPLASMA PNEUMONIAE: NOT DETECTED
NEUTROPHILS # BLD AUTO: 4.9 K/UL (ref 1.8–7.7)
NEUTROPHILS NFR BLD: 59.3 % (ref 38–73)
NITRITE UR QL STRIP: NEGATIVE
NRBC BLD-RTO: 0 /100 WBC
PH UR STRIP: 6 [PH] (ref 5–8)
PLATELET # BLD AUTO: 204 K/UL (ref 150–450)
PMV BLD AUTO: 10.1 FL (ref 9.2–12.9)
POCT GLUCOSE: 116 MG/DL (ref 70–110)
POCT GLUCOSE: 189 MG/DL (ref 70–110)
POCT GLUCOSE: 220 MG/DL (ref 70–110)
POCT GLUCOSE: 270 MG/DL (ref 70–110)
POTASSIUM SERPL-SCNC: 3.5 MMOL/L (ref 3.5–5.1)
PROT UR QL STRIP: ABNORMAL
RBC # BLD AUTO: 3.18 M/UL (ref 4.6–6.2)
RBC #/AREA URNS AUTO: 92 /HPF (ref 0–4)
RESPIRATORY INFECTION PANEL SOURCE: NORMAL
RSV AG BY MOLECULAR METHOD: NOT DETECTED
RSV RNA NPH QL NAA+NON-PROBE: NOT DETECTED
RV+EV RNA NPH QL NAA+NON-PROBE: NOT DETECTED
SARS-COV-2 RNA RESP QL NAA+PROBE: NOT DETECTED
SARS-COV-2 RNA RESP QL NAA+PROBE: NOT DETECTED
SODIUM SERPL-SCNC: 134 MMOL/L (ref 136–145)
SP GR UR STRIP: 1.03 (ref 1–1.03)
SQUAMOUS #/AREA URNS AUTO: 0 /HPF
URN SPEC COLLECT METH UR: ABNORMAL
WBC # BLD AUTO: 8.24 K/UL (ref 3.9–12.7)
WBC #/AREA URNS AUTO: 13 /HPF (ref 0–5)
YEAST UR QL AUTO: ABNORMAL

## 2024-09-26 PROCEDURE — 99024 POSTOP FOLLOW-UP VISIT: CPT | Mod: ,,,

## 2024-09-26 PROCEDURE — 87633 RESP VIRUS 12-25 TARGETS: CPT

## 2024-09-26 PROCEDURE — 97116 GAIT TRAINING THERAPY: CPT

## 2024-09-26 PROCEDURE — 36415 COLL VENOUS BLD VENIPUNCTURE: CPT | Performed by: STUDENT IN AN ORGANIZED HEALTH CARE EDUCATION/TRAINING PROGRAM

## 2024-09-26 PROCEDURE — 87798 DETECT AGENT NOS DNA AMP: CPT | Mod: 59

## 2024-09-26 PROCEDURE — 87040 BLOOD CULTURE FOR BACTERIA: CPT | Mod: 59

## 2024-09-26 PROCEDURE — 0241U SARS-COV2 (COVID) WITH FLU/RSV BY PCR: CPT

## 2024-09-26 PROCEDURE — 36415 COLL VENOUS BLD VENIPUNCTURE: CPT

## 2024-09-26 PROCEDURE — 87086 URINE CULTURE/COLONY COUNT: CPT

## 2024-09-26 PROCEDURE — 25000003 PHARM REV CODE 250: Performed by: STUDENT IN AN ORGANIZED HEALTH CARE EDUCATION/TRAINING PROGRAM

## 2024-09-26 PROCEDURE — 25000003 PHARM REV CODE 250: Performed by: PHYSICIAN ASSISTANT

## 2024-09-26 PROCEDURE — 87581 M.PNEUMON DNA AMP PROBE: CPT

## 2024-09-26 PROCEDURE — 87040 BLOOD CULTURE FOR BACTERIA: CPT

## 2024-09-26 PROCEDURE — 11000001 HC ACUTE MED/SURG PRIVATE ROOM

## 2024-09-26 PROCEDURE — 80048 BASIC METABOLIC PNL TOTAL CA: CPT | Performed by: STUDENT IN AN ORGANIZED HEALTH CARE EDUCATION/TRAINING PROGRAM

## 2024-09-26 PROCEDURE — 81001 URINALYSIS AUTO W/SCOPE: CPT

## 2024-09-26 PROCEDURE — 85025 COMPLETE CBC W/AUTO DIFF WBC: CPT | Performed by: STUDENT IN AN ORGANIZED HEALTH CARE EDUCATION/TRAINING PROGRAM

## 2024-09-26 PROCEDURE — 99232 SBSQ HOSP IP/OBS MODERATE 35: CPT | Mod: ,,,

## 2024-09-26 PROCEDURE — 25000003 PHARM REV CODE 250

## 2024-09-26 PROCEDURE — 63600175 PHARM REV CODE 636 W HCPCS: Performed by: STUDENT IN AN ORGANIZED HEALTH CARE EDUCATION/TRAINING PROGRAM

## 2024-09-26 PROCEDURE — 97530 THERAPEUTIC ACTIVITIES: CPT

## 2024-09-26 RX ORDER — METHOCARBAMOL 500 MG/1
1000 TABLET, FILM COATED ORAL 4 TIMES DAILY
Status: DISCONTINUED | OUTPATIENT
Start: 2024-09-26 | End: 2024-10-01 | Stop reason: HOSPADM

## 2024-09-26 RX ORDER — ALBUTEROL SULFATE 2.5 MG/.5ML
2.5 SOLUTION RESPIRATORY (INHALATION) EVERY 4 HOURS PRN
Status: DISCONTINUED | OUTPATIENT
Start: 2024-09-26 | End: 2024-10-01 | Stop reason: HOSPADM

## 2024-09-26 RX ORDER — ACETAMINOPHEN 325 MG/1
650 TABLET ORAL ONCE
Status: DISCONTINUED | OUTPATIENT
Start: 2024-09-26 | End: 2024-09-26

## 2024-09-26 RX ADMIN — OXYCODONE HYDROCHLORIDE 10 MG: 10 TABLET ORAL at 01:09

## 2024-09-26 RX ADMIN — CALCIUM CARBONATE (ANTACID) CHEW TAB 500 MG 500 MG: 500 CHEW TAB at 08:09

## 2024-09-26 RX ADMIN — METHOCARBAMOL 1000 MG: 500 TABLET ORAL at 09:09

## 2024-09-26 RX ADMIN — METHOCARBAMOL 1000 MG: 500 TABLET ORAL at 08:09

## 2024-09-26 RX ADMIN — AMLODIPINE BESYLATE 10 MG: 10 TABLET ORAL at 09:09

## 2024-09-26 RX ADMIN — HEPARIN SODIUM 5000 UNITS: 5000 INJECTION INTRAVENOUS; SUBCUTANEOUS at 05:09

## 2024-09-26 RX ADMIN — Medication 6 MG: at 08:09

## 2024-09-26 RX ADMIN — HEPARIN SODIUM 5000 UNITS: 5000 INJECTION INTRAVENOUS; SUBCUTANEOUS at 01:09

## 2024-09-26 RX ADMIN — OXYCODONE HYDROCHLORIDE 10 MG: 10 TABLET ORAL at 04:09

## 2024-09-26 RX ADMIN — OXYCODONE HYDROCHLORIDE 10 MG: 10 TABLET ORAL at 08:09

## 2024-09-26 RX ADMIN — LOSARTAN POTASSIUM 100 MG: 25 TABLET, FILM COATED ORAL at 09:09

## 2024-09-26 RX ADMIN — CALCIUM CARBONATE (ANTACID) CHEW TAB 500 MG 500 MG: 500 CHEW TAB at 09:09

## 2024-09-26 RX ADMIN — INSULIN ASPART 9 UNITS: 100 INJECTION, SOLUTION INTRAVENOUS; SUBCUTANEOUS at 05:09

## 2024-09-26 RX ADMIN — CARVEDILOL 6.25 MG: 6.25 TABLET, FILM COATED ORAL at 08:09

## 2024-09-26 RX ADMIN — TAMSULOSIN HYDROCHLORIDE 0.4 MG: 0.4 CAPSULE ORAL at 09:09

## 2024-09-26 RX ADMIN — INSULIN ASPART 3 UNITS: 100 INJECTION, SOLUTION INTRAVENOUS; SUBCUTANEOUS at 11:09

## 2024-09-26 RX ADMIN — METHOCARBAMOL 1000 MG: 500 TABLET ORAL at 01:09

## 2024-09-26 RX ADMIN — PRAVASTATIN SODIUM 40 MG: 20 TABLET ORAL at 08:09

## 2024-09-26 RX ADMIN — METHOCARBAMOL 1000 MG: 500 TABLET ORAL at 05:09

## 2024-09-26 RX ADMIN — INSULIN ASPART 9 UNITS: 100 INJECTION, SOLUTION INTRAVENOUS; SUBCUTANEOUS at 11:09

## 2024-09-26 RX ADMIN — CARVEDILOL 6.25 MG: 6.25 TABLET, FILM COATED ORAL at 09:09

## 2024-09-26 RX ADMIN — ACETAMINOPHEN 650 MG: 325 TABLET ORAL at 08:09

## 2024-09-26 RX ADMIN — SENNOSIDES AND DOCUSATE SODIUM 2 TABLET: 50; 8.6 TABLET ORAL at 09:09

## 2024-09-26 RX ADMIN — INSULIN GLARGINE 18 UNITS: 100 INJECTION, SOLUTION SUBCUTANEOUS at 09:09

## 2024-09-26 RX ADMIN — INSULIN ASPART 2 UNITS: 100 INJECTION, SOLUTION INTRAVENOUS; SUBCUTANEOUS at 05:09

## 2024-09-26 RX ADMIN — HEPARIN SODIUM 5000 UNITS: 5000 INJECTION INTRAVENOUS; SUBCUTANEOUS at 10:09

## 2024-09-26 RX ADMIN — OXYCODONE HYDROCHLORIDE 10 MG: 10 TABLET ORAL at 09:09

## 2024-09-26 NOTE — HPI
Patient is a 79 y.o. male with hx of prior CVA with residual right sided weakness presents for eval of worsening low back pain and BLE radicular leg pain. He endorses pain which starts in upper lumbar area and then radiates down to lower lumbar area down his legs right greater than left into his feet. He endorses right foot weakness and has a right foot drop which has been present for many months. He has difficulty walking. 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 He has been to pain mgmt for injections without significant relief.     Medicine consulted  (9/26) for new intermittent fevers. No leukocytosis but has complained of dysuria and non productive cough post op. Infectious workup continued.

## 2024-09-26 NOTE — PROGRESS NOTES
"[facility]  Endocrinology  Progress Note    Admit Date: 2024     Reason for Consult: Management of T2DM, Hyperglycemia     Surgical Procedure and Date:  lumbar surgery (L4-S1 TLIF) 24    Diabetes diagnosis year: > 2 years ago     Home Diabetes Medications:  Metformin 1000 mg BID per patient    How often checking glucose at home? One   BG readings on regimen: 120-160s  Hypoglycemia on the regimen?  No  Missed doses on regimen?  No    Diabetes Complications include:     Hyperglycemia    Complicating diabetes co morbidities:   Glucocorticoid use       HPI:   Patient is a 79 y.o. male with hx of prior CVA with residual right sided weakness presents for eval of worsening low back pain and BLE radicular leg pain. He endorses pain which starts in upper lumbar area and then radiates down to lower lumbar area down his legs right greater than left into his feet. He endorses right foot weakness and has a right foot drop which has been present for many months. He has difficulty walking. 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 He has been to pain mgmt for injections without significant relief. Now s/p the above procedures. Endocrine consulted for DM management.       Interval HPI:   No acute events overnight. Patient in room 543/543 A. Blood glucose improving. BG at/above goal on current insulin regimen (SSI, basal, prandial insulin). Steroid use- None.        Of note, breakfast novolog dose not given this AM despite patient reporting he ate breakfast.    Renal function-   Lab Results   Component Value Date    CREATININE 1.3 2024        Vasopressors-  None      Diet diabetic  Calorie      Eatin%  Nausea: No  Hypoglycemia and intervention: No  Fever: Yes   TPN and/or TF: No    /64 (BP Location: Left arm, Patient Position: Lying)   Pulse 85   Temp 98.4 °F (36.9 °C) (Oral)   Resp 20   Ht 6' 1" (1.854 m)   Wt 86.9 kg " "(191 lb 9.3 oz)   SpO2 95%   BMI 25.28 kg/m²     Labs Reviewed and Include    Recent Labs   Lab 09/26/24  0418   *   CALCIUM 8.7   *   K 3.5   CO2 28   CL 97   BUN 13   CREATININE 1.3     Lab Results   Component Value Date    WBC 8.24 09/26/2024    HGB 9.6 (L) 09/26/2024    HCT 28.2 (L) 09/26/2024    MCV 89 09/26/2024     09/26/2024     No results for input(s): "TSH", "FREET4" in the last 168 hours.  Lab Results   Component Value Date    HGBA1C 7.8 (H) 09/09/2024       Nutritional status:   Body mass index is 25.28 kg/m².  Lab Results   Component Value Date    ALBUMIN 4.1 07/15/2024    ALBUMIN 3.9 06/05/2024     No results found for: "PREALBUMIN"    Estimated Creatinine Clearance: 52.1 mL/min (based on SCr of 1.3 mg/dL).    Accu-Checks  Recent Labs     09/23/24  2105 09/24/24  0815 09/24/24  1123 09/24/24  1542 09/24/24  1933 09/25/24  0736 09/25/24  1127 09/25/24  1627 09/25/24  1951 09/26/24  0758   POCTGLUCOSE 269* 313* 267* 227* 251* 231* 272* 254* 202* 189*       Current Medications and/or Treatments Impacting Glycemic Control  Immunotherapy:    Immunosuppressants       None          Steroids:   Hormones (From admission, onward)      Start     Stop Route Frequency Ordered    09/20/24 2333  melatonin tablet 6 mg         -- Oral Nightly PRN 09/20/24 2235          Pressors:    Autonomic Drugs (From admission, onward)      None          Hyperglycemia/Diabetes Medications:   Antihyperglycemics (From admission, onward)      Start     Stop Route Frequency Ordered    09/25/24 1645  insulin aspart U-100 pen 9 Units         -- SubQ 3 times daily with meals 09/25/24 1151    09/25/24 0900  insulin glargine U-100 (Lantus) pen 18 Units         -- SubQ Daily 09/25/24 0706    09/20/24 2337  insulin aspart U-100 pen 0-5 Units         -- SubQ Before meals & nightly PRN 09/20/24 1796            ASSESSMENT and PLAN    Neuro  * Status post lumbar surgery (L4-S1 TLIF)  Managed per primary " team        Endocrine  Type 2 diabetes mellitus with diabetic cataract, without long-term current use of insulin  BG goal: 140-180    - Lantus 18 units QD   - Novolog 9 units TIDWM (20% increase given post-prandial BG above goal)   - LDC SSI PRN (150/50)   - POCT Glucose before meals and at bedtime  - Hypoglycemia protocol in place      ** Please notify Endocrine for any change and/or advance in diet**  ** Please call Endocrine for any BG related issues **     Discharge Planning:   TBD. Please notify endocrinology prior to discharge.        Orthopedic  Surgical wound present  Optimize BG control to improve wound healing           Zora Aguirre PA-C  Endocrinology  [facility]

## 2024-09-26 NOTE — ASSESSMENT & PLAN NOTE
78 M with hx of prior CVA with residual right sided weakness presents for elective surgery for worsening low back pain and BLE radicular leg pain now s/p L4-S1 TLIF on 9/20/2024:    Plan:  - Admitted to ns, q4h neuro vital checks.  - Postop XR satisfactory.  - HV drains removed 9/25. Ancef dc'ed 9/25.  - LSO brace when OOB.  - Fung placed 9/25 for urinary retention post op. Will arrange voiding trial in 7-10 days.  - Bowel regimen in place. +BM 9/24.  - PT/OT/OOB.  - DVT ppx: TEDs/SCDs/SQH.  - Atelectasis ppx: IS use hourly/up in chair.    Dispo: Anticipate dc once patient remains afebrile/infectious workup completed. Pending SNF vs home with home health.

## 2024-09-26 NOTE — ASSESSMENT & PLAN NOTE
Patient's FSGs are uncontrolled due to hyperglycemia on current medication regimen.  Last A1c reviewed-   Lab Results   Component Value Date    HGBA1C 7.8 (H) 09/09/2024     Most recent fingerstick glucose reviewed-   Recent Labs   Lab 09/25/24  1127 09/25/24  1627 09/25/24  1951 09/26/24  0758   POCTGLUCOSE 272* 254* 202* 189*     Current correctional scale  Low  Endocrinology consulted.  anti-hyperglycemic dose as follows-   Antihyperglycemics (From admission, onward)      Start     Stop Route Frequency Ordered    09/25/24 1645  insulin aspart U-100 pen 9 Units         -- SubQ 3 times daily with meals 09/25/24 1151    09/25/24 0900  insulin glargine U-100 (Lantus) pen 18 Units         -- SubQ Daily 09/25/24 0706    09/20/24 2337  insulin aspart U-100 pen 0-5 Units         -- SubQ Before meals & nightly PRN 09/20/24 2237          Hold Oral hypoglycemics while patient is in the hospital.

## 2024-09-26 NOTE — NURSING
Patient AAOX4, VSS. NADN.Bed in lowest position, call light in reach along with personal items. Plan of care ongoing.

## 2024-09-26 NOTE — PROGRESS NOTES
[facility]  Neurosurgery  Progress Note    Subjective:     History of Present Illness: 78 M with hx of prior CVA with residual right sided weakness presents for eval of worsening low back pain and BLE radicular leg pain. He endorses pain which starts in upper lumbar area and then radiates down to lower lumbar area down his legs right greater than left into his feet. He endorses right foot weakness and has a right foot drop which has been present for many months. He has difficulty walking. 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 He has been to pain mgmt for injections without significant relief.      Interval fu 8/8/24:  Pt presents in fu.  No significant changes in symptoms.  Today he is most concerned with his balance difficulties.     Interval fu 8/29/24: No changes in symptoms.    Post-Op Info:  Procedure(s) (LRB):  OPEN ROBOTIC L4 -S1 TLIF (N/A)   6 Days Post-Op   Interval History: Continued fevers to 102 last night and 100.5 this AM. Denies SOB, chest pain. Does report intermittent cough. Other vitals stable, no white count. Flu/covid and blood cultures ordered. Encouraged IS use. Inhalation tx ordered. Medicine consulted for assistance, repeat CXR ordered. Remains neuro stable. Fung placed yesterday for urinary retention. Will arrange for voiding trial in 1 week.     Medications:  Continuous Infusions:  Scheduled Meds:   amLODIPine  10 mg Oral Daily    calcium carbonate  500 mg Oral BID    carvediloL  6.25 mg Oral BID    heparin (porcine)  5,000 Units Subcutaneous Q8H    insulin aspart U-100  9 Units Subcutaneous TIDWM    insulin glargine U-100  18 Units Subcutaneous Daily    losartan  100 mg Oral Daily    methocarbamoL  1,000 mg Oral QID    polyethylene glycol  17 g Oral BID    pravastatin  40 mg Oral QHS    senna-docusate 8.6-50 mg  2 tablet Oral BID    tamsulosin  0.4 mg Oral Daily     PRN Meds:  Current Facility-Administered  "Medications:     acetaminophen, 650 mg, Oral, Q6H PRN    albuterol sulfate, 2.5 mg, Nebulization, Q4H PRN    aluminum-magnesium hydroxide-simethicone, 30 mL, Oral, Q4H PRN    dextrose 10%, 12.5 g, Intravenous, PRN    dextrose 10%, 25 g, Intravenous, PRN    glucagon (human recombinant), 1 mg, Intramuscular, PRN    glucose, 16 g, Oral, PRN    glucose, 24 g, Oral, PRN    insulin aspart U-100, 0-5 Units, Subcutaneous, QID (AC + HS) PRN    melatonin, 6 mg, Oral, Nightly PRN    ondansetron, 8 mg, Oral, Q6H PRN    oxyCODONE, 5 mg, Oral, Q4H PRN    oxyCODONE, 10 mg, Oral, Q4H PRN     Review of Systems  Objective:     Weight: 86.9 kg (191 lb 9.3 oz)  Body mass index is 25.28 kg/m².  Vital Signs (Most Recent):  Temp: 98.4 °F (36.9 °C) (09/26/24 0756)  Pulse: 85 (09/26/24 0756)  Resp: 20 (09/26/24 0756)  BP: 135/64 (09/26/24 0756)  SpO2: 95 % (09/26/24 0756) Vital Signs (24h Range):  Temp:  [98.4 °F (36.9 °C)-102.1 °F (38.9 °C)] 98.4 °F (36.9 °C)  Pulse:  [75-92] 85  Resp:  [16-20] 20  SpO2:  [92 %-95 %] 95 %  BP: (116-135)/(58-64) 135/64                              Urethral Catheter 09/25/24 1604 (Active)   Site Assessment Clean;Intact 09/25/24 2000   Collection Container Urimeter 09/25/24 2000   Catheter Care Performed yes 09/25/24 2000   Reason for Continuing Urinary Catheterization Urinary retention 09/25/24 2000   CAUTI Prevention Bundle Securement Device in place with 1" slack 09/25/24 2000   Output (mL) 450 mL 09/26/24 0500          Physical Exam         Neurosurgery Physical Exam  General: well developed, well nourished, no distress.   Head: normocephalic, atraumatic  Neurologic: Alert and oriented. Thought content appropriate.  GCS: Motor: 6/Verbal: 5/Eyes: 4 GCS Total: 15  Mental Status: Awake, Alert, Oriented x 4  Language: No aphasia  Speech: No dysarthria  Cranial nerves: face symmetric, tongue midline, CN II-XII grossly intact.   Eyes: pupils equal, round, reactive to light with accomodation, EOMI.  Pulmonary: " "normal respirations, no signs of respiratory distress  Sensory: intact to light touch throughout  Motor Strength: Moves all extremities spontaneously with good tone. No abnormal movements seen.      Strength   Deltoids Triceps Biceps Wrist Extension Wrist Flexion Hand    Upper: R 5/5 5/5 5/5 5/5 5/5 5/5     L 5/5 5/5 5/5 5/5 5/5 5/5       Iliopsoas Quadriceps Knee  Flexion Tibialis  anterior Gastro- cnemius EHL   Lower: R 4+/5 4+/5 4+/5 4-/5 4-/5 4-/5     L 5/5 5/5 5/5 5/5 5/5 5/5      Skin: Skin is warm, dry and intact.     Incision c/d/I with skin edges well approximated with dermabond/prineo. No surrounding erythema or edema. No drainage from incision. No palpable underlying fluid collection.     Significant Labs:  Recent Labs   Lab 09/25/24 0226 09/26/24  0418   * 178*   * 134*   K 4.2 3.5   CL 97 97   CO2 25 28   BUN 12 13   CREATININE 1.3 1.3   CALCIUM 9.1 8.7     Recent Labs   Lab 09/25/24 0226 09/26/24  0418   WBC 11.14 8.24   HGB 10.9* 9.6*   HCT 33.8* 28.2*    204     No results for input(s): "LABPT", "INR", "APTT" in the last 48 hours.  Microbiology Results (last 7 days)       Procedure Component Value Units Date/Time    Blood culture [[id number]] Collected: 09/26/24 0904    Order Status: Sent Specimen: Blood from Peripheral, Hand, Right     Blood culture [[id number]] Collected: 09/26/24 0900    Order Status: Sent Specimen: Blood from Peripheral, Antecubital, Right           All pertinent labs from the last 24 hours have been reviewed.    Significant Diagnostics:  I have reviewed and interpreted all pertinent imaging results/findings within the past 24 hours.  Assessment/Plan:     * Status post lumbar surgery (L4-S1 TLIF)  78 M with hx of prior CVA with residual right sided weakness presents for elective surgery for worsening low back pain and BLE radicular leg pain now s/p L4-S1 TLIF on 9/20/2024:    Plan:  - Admitted to nsgy, q4h neuro vital checks.  - Postop XR " satisfactory.  - HV drains removed 9/25. Ancef dc'ed 9/25.  - LSO brace when OOB.  - Ross placed 9/25 for urinary retention post op. Will arrange voiding trial in 7-10 days.  - Bowel regimen in place. +BM 9/24.  - PT/OT/OOB.  - DVT ppx: TEDs/SCDs/SQH.  - Atelectasis ppx: IS use hourly/up in chair.    Dispo: Anticipate dc once patient remains afebrile/infectious workup completed. Pending SNF vs home with home health.    Urinary retention  Patient with urinary retention post op.    - Ross placed 9/25.  - Will arrange a voiding trial 7-10 days after ross placed.    Fever  Patient with intermittent fevers post op. Other vitals stable. No white count.    - UA with +RBC, no Leuks or Nitrites.  - CXR 9/22 negative.  - Dopplers 9/22 negative.  - Blood cultures sent.  - Flu/covid sent.  - Medicine consulted -repeating CXR and UA. Appreciate assistance.       Type 2 diabetes mellitus with diabetic cataract, without long-term current use of insulin  Patient's FSGs are uncontrolled due to hyperglycemia on current medication regimen.  Last A1c reviewed-   Lab Results   Component Value Date    HGBA1C 7.8 (H) 09/09/2024     Most recent fingerstick glucose reviewed-   Recent Labs   Lab 09/25/24  1127 09/25/24  1627 09/25/24  1951 09/26/24  0758   POCTGLUCOSE 272* 254* 202* 189*     Current correctional scale  Low  Endocrinology consulted.  anti-hyperglycemic dose as follows-   Antihyperglycemics (From admission, onward)      Start     Stop Route Frequency Ordered    09/25/24 1645  insulin aspart U-100 pen 9 Units         -- SubQ 3 times daily with meals 09/25/24 1151    09/25/24 0900  insulin glargine U-100 (Lantus) pen 18 Units         -- SubQ Daily 09/25/24 0706    09/20/24 2337  insulin aspart U-100 pen 0-5 Units         -- SubQ Before meals & nightly PRN 09/20/24 2237          Hold Oral hypoglycemics while patient is in the hospital.        Sandi Callaway PA-C  Neurosurgery  [facility] - Surgery

## 2024-09-26 NOTE — PT/OT/SLP PROGRESS
Physical Therapy Treatment    Patient Name:  Robi Donovan   MRN:  [id number]    Recommendations:     Discharge Recommendations: Moderate Intensity Therapy  Discharge Equipment Recommendations: walker, rolling  Barriers to discharge:  requires increased assistance for functional mobility    Assessment:     Robi Donovan is a 79 y.o. male admitted with a medical diagnosis of Status post lumbar surgery. He presents with the following impairments/functional limitations: weakness, impaired endurance, impaired functional mobility, gait instability, impaired balance, decreased lower extremity function, pain, decreased safety awareness, impaired coordination, orthopedic precautions. Patient tolerated PT session well. He ambulated 60ft with RW and minimal assistance. He required moderate assistance for bed to bedside commode and back transfer with RW.     Rehab Prognosis: Good; patient would benefit from acute skilled PT services to address these deficits and reach maximum level of function.    Recent Surgery: Procedure(s) (LRB):  OPEN ROBOTIC L4 -S1 TLIF (N/A) 6 Days Post-Op    Plan:     During this hospitalization, patient to be seen 5 x/week to address the identified rehab impairments via gait training, therapeutic activities, therapeutic exercises, neuromuscular re-education and progress toward the following goals:    Plan of Care Expires:  10/21/24    Subjective     Chief Complaint: Low back pain.   Patient/Family Comments/goals: To get back to [facility].   Pain/Comfort:  Pain Rating 1:  (did not rate)  Location - Orientation 1: lower  Location 1: back  Pain Addressed 1: Pre-medicate for activity, Reposition, Distraction      Objective:     Communicated with RN prior to session. Patient found supine with ross catheter upon PT entry to room. Daughter present during PT session.    General Precautions: Standard, fall  Orthopedic Precautions: spinal precautions  Braces: AFO, LSO (R AFO)  Respiratory Status: Room air     Functional  Mobility:  Bed Mobility:     Rolling Left:  moderate assistance  Scooting: moderate assistance  Supine to Sit: moderate assistance  Transfers:     Sit to Stand:  moderate assistance x2 trials from elevated bed and maximal assistance x1 trial from bedside commode with rolling walker  Toilet Transfer (bed to bedside commode and back): 6 steps with moderate assistance and rolling walker    Gait: Patient ambulated 60ft with Rolling Walker and minimal assistance using 3-point gait. Patient demonstrated decreased alondra and decreased step length during gait due to impaired balance, impaired motor control, pain, and decreased strength.    AM-PAC 6 CLICK MOBILITY  Turning over in bed (including adjusting bedclothes, sheets and blankets)?: 2  Sitting down on and standing up from a chair with arms (e.g., wheelchair, bedside commode, etc.): 2  Moving from lying on back to sitting on the side of the bed?: 2  Moving to and from a bed to a chair (including a wheelchair)?: 2  Need to walk in hospital room?: 3  Climbing 3-5 steps with a railing?: 2  Basic Mobility Total Score: 13       Treatment & Education:  Patient and daughter educated in:  -PT role and POC  -LSO management and spinal precautions  -safety with transfers including hand placement  -gait sequencing and RW management  -OOB activity to maximize recovery including ambulating with nursing staff assistance and RW while in the hospital     Patient left up in chair with all lines intact, call button in reach, RN notified, and daughter present.    GOALS:   Multidisciplinary Problems       Physical Therapy Goals          Problem: Physical Therapy    Goal Priority Disciplines Outcome Goal Variances Interventions   Physical Therapy Goal     PT, PT/OT Progressing     Description: Goals to be met by: 10/5/24     Patient will increase functional independence with mobility by performin. Supine to sit with Stand-by Assistance - Not met  2. Rolling to Left or Right with  Modified [place] - Not met  3. Sit to stand transfer with Stand-by Assistance with RW - Not met  4. Gait  x 125 feet with Stand-by Assistance using Rolling Walker - Not met  5. Ascend/descend 5 stair with left Handrail with Contact Guard Assistance using No Assistive Device - Not met   6. Pt will verbalize 3/3 spinal precautions without cueing - Not met                       Time Tracking:     PT Received On: 09/26/24  PT Start Time: 1032     PT Stop Time: 1117  PT Total Time (min): 45 min     Billable Minutes: Gait Training 20 and Therapeutic Activity 25    Treatment Type: Treatment  PT/PTA: PT     Number of PTA visits since last PT visit: 0     09/26/2024

## 2024-09-26 NOTE — HOSPITAL COURSE
Patient is a 79 y.o. male with hx of prior CVA with residual right sided weakness presents for eval of worsening low back pain and BLE radicular leg pain. Now s/p lumbary surgery. Endocrine consulted for DM management. Medicine consulted  (9/26) for new intermittent fevers. No leukocytosis but has complained of dysuria and non productive cough post op. Patient had questionable consolidative changes on CXR and was started on cefepime for continued fevers to cover potential pneumonia vs UTI. Fevers resolved on cefepime, planning to treat with 7 day course of levoquin to complete coverage for HAP.

## 2024-09-26 NOTE — SUBJECTIVE & OBJECTIVE
"Interval HPI:   No acute events overnight. Patient in room 543/543 A. Blood glucose improving. BG at/above goal on current insulin regimen (SSI, basal, prandial insulin). Steroid use- None.      Renal function-   Lab Results   Component Value Date    CREATININE 1.3 2024        Vasopressors-  None      Diet diabetic  Calorie      Eatin%  Nausea: No  Hypoglycemia and intervention: No  Fever: Yes   TPN and/or TF: No    /64 (BP Location: Left arm, Patient Position: Lying)   Pulse 85   Temp 98.4 °F (36.9 °C) (Oral)   Resp 20   Ht 6' 1" (1.854 m)   Wt 86.9 kg (191 lb 9.3 oz)   SpO2 95%   BMI 25.28 kg/m²     Labs Reviewed and Include    Recent Labs   Lab 24  0418   *   CALCIUM 8.7   *   K 3.5   CO2 28   CL 97   BUN 13   CREATININE 1.3     Lab Results   Component Value Date    WBC 8.24 2024    HGB 9.6 (L) 2024    HCT 28.2 (L) 2024    MCV 89 2024     2024     No results for input(s): "TSH", "FREET4" in the last 168 hours.  Lab Results   Component Value Date    HGBA1C 7.8 (H) 2024       Nutritional status:   Body mass index is 25.28 kg/m².  Lab Results   Component Value Date    ALBUMIN 4.1 07/15/2024    ALBUMIN 3.9 2024     No results found for: "PREALBUMIN"    Estimated Creatinine Clearance: 52.1 mL/min (based on SCr of 1.3 mg/dL).    Accu-Checks  Recent Labs     24  2105 24  0815 24  1123 24  1542 24  1933 24  0736 24  1127 24  1627 24  1951 24  0758   POCTGLUCOSE 269* 313* 267* 227* 251* 231* 272* 254* 202* 189*       Current Medications and/or Treatments Impacting Glycemic Control  Immunotherapy:    Immunosuppressants       None          Steroids:   Hormones (From admission, onward)      Start     Stop Route Frequency Ordered    24 2333  melatonin tablet 6 mg         -- Oral Nightly PRN 24 2235          Pressors:    Autonomic Drugs (From admission, " onward)      None          Hyperglycemia/Diabetes Medications:   Antihyperglycemics (From admission, onward)      Start     Stop Route Frequency Ordered    09/25/24 1645  insulin aspart U-100 pen 9 Units         -- SubQ 3 times daily with meals 09/25/24 1151    09/25/24 0900  insulin glargine U-100 (Lantus) pen 18 Units         -- SubQ Daily 09/25/24 0706    09/20/24 2337  insulin aspart U-100 pen 0-5 Units         -- SubQ Before meals & nightly PRN 09/20/24 2239

## 2024-09-26 NOTE — PLAN OF CARE
No significant changes this shift. Continue with POC and monitor. Pt left lying semi fowlers bed in lowest position, with call light in reach, and all wheels locked X2 rails up.

## 2024-09-26 NOTE — SUBJECTIVE & OBJECTIVE
Past Medical History:   Diagnosis Date    Diabetes mellitus type I     Hypertension     Stroke 2002       Past Surgical History:   Procedure Laterality Date    ROBOTIC FUSION,SPINE,LUMBAR,TLIF N/A 9/20/2024    Procedure: OPEN ROBOTIC L4 -S1 TLIF;  Surgeon: Juan Luis Mcbride DO;  Location: [facility] OR [address] [place] [address];  Service: Neurosurgery;  Laterality: N/A;    TRANSFORAMINAL EPIDURAL INJECTION OF STEROID Bilateral 4/9/2024    Procedure: LUMBAR TRANSFORAMINAL BILATERAL L4/5;  Surgeon: Aria Orozco MD;  Location: [facility];  Service: Pain Management;  Laterality: Bilateral;  [phone]  2 WK F/U LM       Review of patient's allergies indicates:  No Known Allergies    No current facility-administered medications on file prior to encounter.     Current Outpatient Medications on File Prior to Encounter   Medication Sig    amLODIPine (NORVASC) 10 MG tablet Take by mouth once daily.    carvediloL (COREG) 6.25 MG tablet Take by mouth 2 (two) times daily.    finasteride (PROSCAR) 5 mg tablet Take by mouth once daily.    glipiZIDE (GLUCOTROL) 10 MG tablet Take by mouth 2 (two) times daily with meals.    losartan (COZAAR) 100 MG tablet Take by mouth.    metFORMIN (GLUCOPHAGE) 1000 MG tablet Take by mouth 2 (two) times daily with meals.    rosuvastatin (CRESTOR) 40 MG Tab Take by mouth every evening.    tamsulosin (FLOMAX) 0.4 mg Cap Take by mouth once daily.    aspirin (ECOTRIN) 81 MG EC tablet Take 81 mg by mouth once daily.    fluticasone propionate (FLONASE) 50 mcg/actuation nasal spray by Each Nostril route as needed.    naproxen (NAPROSYN) 500 MG tablet Take 1 tablet (500 mg total) by mouth 2 (two) times daily with meals.    traMADoL (ULTRAM) 50 mg tablet Take 50 mg by mouth every 12 (twelve) hours as needed for Pain.    TRUE METRIX GLUCOSE TEST STRIP Strp 1 strip 2 (two) times daily.     Family History    None       Tobacco Use    Smoking status: Former     Current packs/day: 0.00     Types: Cigarettes     Start  "date:      Quit date:      Years since quittin.7    Smokeless tobacco: Never   Substance and Sexual Activity    Alcohol use: Not Currently    Drug use: Not on file    Sexual activity: Not on file     Review of Systems   Constitutional:  Positive for chills, diaphoresis and fever.   Respiratory:  Positive for cough. Negative for shortness of breath.    Cardiovascular:  Negative for chest pain.   Gastrointestinal:  Negative for abdominal pain.   Genitourinary:  Positive for dysuria.     Objective:     Vital Signs (Most Recent):  Temp: 98.4 °F (36.9 °C) (24 075)  Pulse: 85 (24)  Resp: 18 (24)  BP: 135/64 (24)  SpO2: 95 % (24) Vital Signs (24h Range):  Temp:  [98.4 °F (36.9 °C)-102.1 °F (38.9 °C)] 98.4 °F (36.9 °C)  Pulse:  [75-92] 85  Resp:  [16-20] 18  SpO2:  [92 %-95 %] 95 %  BP: (116-135)/(58-64) 135/64     Weight: 86.9 kg (191 lb 9.3 oz)  Body mass index is 25.28 kg/m².     Physical Exam  Constitutional:       General: He is not in acute distress.     Appearance: Normal appearance.   HENT:      Head: Normocephalic and atraumatic.      Nose: Nose normal.   Cardiovascular:      Rate and Rhythm: Normal rate.   Pulmonary:      Effort: Pulmonary effort is normal. No respiratory distress.      Breath sounds: Rales present.   Abdominal:      General: There is distension.      Tenderness: There is no abdominal tenderness. There is no guarding or rebound.   Neurological:      Mental Status: He is alert.   Psychiatric:         Mood and Affect: Mood normal.         Behavior: Behavior normal.          Significant Labs: All pertinent labs within the past 24 hours have been reviewed.  Blood Culture: No results for input(s): "LABBLOO" in the last 48 hours.  CBC:   Recent Labs   Lab 24  0418   WBC 11.14 8.24   HGB 10.9* 9.6*   HCT 33.8* 28.2*    204     CMP:   Recent Labs   Lab 24  0418   * 134*   K 4.2 3.5   CL " "97 97   CO2 25 28   * 178*   BUN 12 13   CREATININE 1.3 1.3   CALCIUM 9.1 8.7   ANIONGAP 10 9     Lactic Acid: No results for input(s): "LACTATE" in the last 48 hours.  Urine Culture: No results for input(s): "LABURIN" in the last 48 hours.  Urine Studies:   Recent Labs   Lab 09/24/24  1843   COLORU Yellow   APPEARANCEUA Clear   PHUR 6.0   SPECGRAV 1.015   PROTEINUA 1+*   GLUCUA 3+*   KETONESU Trace*   BILIRUBINUA Negative   OCCULTUA 3+*   NITRITE Negative   LEUKOCYTESUR Negative   RBCUA 79*   WBCUA 1   BACTERIA None   SQUAMEPITHEL 0   HYALINECASTS 0       Significant Imaging: I have reviewed all pertinent imaging results/findings within the past 24 hours.  "

## 2024-09-26 NOTE — ASSESSMENT & PLAN NOTE
Medicine consulted (9/26). Patient is s/p L4-S1 TLIF on 9/20 and has had intermittent fevers. No leukocytosis. white count. He did c/o dysuria and intermittent non productive cough post op but denies any chest pain, dyspnea, or hx of clots. Start infectious workup.     Plan:  -Fever subsided today w/o Tylenol administration  -Monitor temp and if fever spikes again, will consider starting CTX and Azithro  -Repeat UA/Urine cx  -Blood cx pending  -Resp Infxn Panel (COVID/flu negative)  -Trend CBC and CMP  -Continue using Incentive Spirometry  -(CXR 9/26 report)- shows atelectasis changes

## 2024-09-26 NOTE — ASSESSMENT & PLAN NOTE
Patient with urinary retention post op.    - Ross placed 9/25.  - Will arrange a voiding trial 7-10 days after ross placed.

## 2024-09-26 NOTE — ASSESSMENT & PLAN NOTE
Patient with intermittent fevers post op. Other vitals stable. No white count.    - UA with +RBC, no Leuks or Nitrites.  - CXR 9/22 negative.  - Dopplers 9/22 negative.  - Blood cultures sent.  - Flu/covid sent.  - Medicine consulted -repeating CXR and UA. Appreciate assistance.

## 2024-09-26 NOTE — ASSESSMENT & PLAN NOTE
BG goal: 140-180    - Lantus 18 units QD   - Novolog 9 units TIDWM (20% increase given post-prandial BG above goal)   - LDC SSI PRN (150/50)   - POCT Glucose before meals and at bedtime  - Hypoglycemia protocol in place      ** Please notify Endocrine for any change and/or advance in diet**  ** Please call Endocrine for any BG related issues **     Discharge Planning:   TBD. Please notify endocrinology prior to discharge.

## 2024-09-26 NOTE — PT/OT/SLP PROGRESS
Occupational Therapy      Patient Name:  Robi Donovan   MRN:  [id number]    Patient not seen today secondary to Patient fatigue. Will follow-up when available.    9/26/2024

## 2024-09-26 NOTE — CONSULTS
[facility] - Surgery  [facility] Medicine  Consult Note    Patient Name: Robi Donovan  MRN: [id number]  Admission Date: 9/20/2024  Hospital Length of Stay: 6 days  Attending Physician: Juan Luis Mcbride DO   Primary Care Provider: Yary Wright -           Patient information was obtained from past medical records and primary team.     Inpatient consult to [facility] Medicine-General  Consult performed by: Joselin Dodge MD  Consult ordered by: Sandi Callaway PA-C        Subjective:     Principal Problem: Status post lumbar surgery    Chief Complaint:   Chief Complaint   Patient presents with    Fever        HPI: Patient is a 79 y.o. male with hx of prior CVA with residual right sided weakness presents for eval of worsening low back pain and BLE radicular leg pain. He endorses pain which starts in upper lumbar area and then radiates down to lower lumbar area down his legs right greater than left into his feet. He endorses right foot weakness and has a right foot drop which has been present for many months. He has difficulty walking. 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 He has been to pain mgmt for injections without significant relief.     Medicine consulted  (9/26) for new intermittent fevers. No leukocytosis but has complained of dysuria and non productive cough post op. Infectious workup continued.     Past Medical History:   Diagnosis Date    Diabetes mellitus type I     Hypertension     Stroke 2002       Past Surgical History:   Procedure Laterality Date    ROBOTIC FUSION,SPINE,LUMBAR,TLIF N/A 9/20/2024    Procedure: OPEN ROBOTIC L4 -S1 TLIF;  Surgeon: Juan Luis Mcbride DO;  Location: [facility] OR [address] [place] [address];  Service: Neurosurgery;  Laterality: N/A;    TRANSFORAMINAL EPIDURAL INJECTION OF STEROID Bilateral 4/9/2024    Procedure: LUMBAR TRANSFORAMINAL BILATERAL L4/5;  Surgeon: Aria Orozco MD;  [facility];   Service: Pain Management;  Laterality: Bilateral;  [phone]  2 WK F/U LM       Review of patient's allergies indicates:  No Known Allergies    No current facility-administered medications on file prior to encounter.     Current Outpatient Medications on File Prior to Encounter   Medication Sig    amLODIPine (NORVASC) 10 MG tablet Take by mouth once daily.    carvediloL (COREG) 6.25 MG tablet Take by mouth 2 (two) times daily.    finasteride (PROSCAR) 5 mg tablet Take by mouth once daily.    glipiZIDE (GLUCOTROL) 10 MG tablet Take by mouth 2 (two) times daily with meals.    losartan (COZAAR) 100 MG tablet Take by mouth.    metFORMIN (GLUCOPHAGE) 1000 MG tablet Take by mouth 2 (two) times daily with meals.    rosuvastatin (CRESTOR) 40 MG Tab Take by mouth every evening.    tamsulosin (FLOMAX) 0.4 mg Cap Take by mouth once daily.    aspirin (ECOTRIN) 81 MG EC tablet Take 81 mg by mouth once daily.    fluticasone propionate (FLONASE) 50 mcg/actuation nasal spray by Each Nostril route as needed.    naproxen (NAPROSYN) 500 MG tablet Take 1 tablet (500 mg total) by mouth 2 (two) times daily with meals.    traMADoL (ULTRAM) 50 mg tablet Take 50 mg by mouth every 12 (twelve) hours as needed for Pain.    TRUE METRIX GLUCOSE TEST STRIP Strp 1 strip 2 (two) times daily.     Family History    None       Tobacco Use    Smoking status: Former     Current packs/day: 0.00     Types: Cigarettes     Start date:      Quit date:      Years since quittin.7    Smokeless tobacco: Never   Substance and Sexual Activity    Alcohol use: Not Currently    Drug use: Not on file    Sexual activity: Not on file     Review of Systems   Constitutional:  Positive for chills, diaphoresis and fever.   Respiratory:  Positive for cough. Negative for shortness of breath.    Cardiovascular:  Negative for chest pain.   Gastrointestinal:  Negative for abdominal pain.   Genitourinary:  Positive for dysuria.     Objective:     Vital Signs  "(Most Recent):  Temp: 98.4 °F (36.9 °C) (09/26/24 0756)  Pulse: 85 (09/26/24 0756)  Resp: 18 (09/26/24 0933)  BP: 135/64 (09/26/24 0756)  SpO2: 95 % (09/26/24 0756) Vital Signs (24h Range):  Temp:  [98.4 °F (36.9 °C)-102.1 °F (38.9 °C)] 98.4 °F (36.9 °C)  Pulse:  [75-92] 85  Resp:  [16-20] 18  SpO2:  [92 %-95 %] 95 %  BP: (116-135)/(58-64) 135/64     Weight: 86.9 kg (191 lb 9.3 oz)  Body mass index is 25.28 kg/m².     Physical Exam  Constitutional:       General: He is not in acute distress.     Appearance: Normal appearance.   HENT:      Head: Normocephalic and atraumatic.      Nose: Nose normal.   Cardiovascular:      Rate and Rhythm: Normal rate.   Pulmonary:      Effort: Pulmonary effort is normal. No respiratory distress.      Breath sounds: Rales present.   Abdominal:      General: There is distension.      Tenderness: There is no abdominal tenderness. There is no guarding or rebound.   Neurological:      Mental Status: He is alert.   Psychiatric:         Mood and Affect: Mood normal.         Behavior: Behavior normal.          Significant Labs: All pertinent labs within the past 24 hours have been reviewed.  Blood Culture: No results for input(s): "LABBLOO" in the last 48 hours.  CBC:   Recent Labs   Lab 09/25/24 0226 09/26/24 0418   WBC 11.14 8.24   HGB 10.9* 9.6*   HCT 33.8* 28.2*    204     CMP:   Recent Labs   Lab 09/25/24 0226 09/26/24 0418   * 134*   K 4.2 3.5   CL 97 97   CO2 25 28   * 178*   BUN 12 13   CREATININE 1.3 1.3   CALCIUM 9.1 8.7   ANIONGAP 10 9     Lactic Acid: No results for input(s): "LACTATE" in the last 48 hours.  Urine Culture: No results for input(s): "LABURIN" in the last 48 hours.  Urine Studies:   Recent Labs   Lab 09/24/24  1843   COLORU Yellow   APPEARANCEUA Clear   PHUR 6.0   SPECGRAV 1.015   PROTEINUA 1+*   GLUCUA 3+*   KETONESU Trace*   BILIRUBINUA Negative   OCCULTUA 3+*   NITRITE Negative   LEUKOCYTESUR Negative   RBCUA 79*   WBCUA 1   BACTERIA None "   SQUAMEPITHEL 0   HYALINECASTS 0       Significant Imaging: I have reviewed all pertinent imaging results/findings within the past 24 hours.    Assessment/Plan:     * Status post lumbar surgery (L4-S1 TLIF)  Plan per Primary team      Fever  Medicine consulted (9/26). Patient is s/p L4-S1 TLIF on 9/20 and has had intermittent fevers. No leukocytosis. white count. He did c/o dysuria and intermittent non productive cough post op but denies any chest pain, dyspnea, or hx of clots. Start infectious workup.     Plan:  -Fever subsided today w/o Tylenol administration  -Monitor temp and if fever spikes again, will consider starting CTX and Azithro  -Repeat UA/Urine cx  -Blood cx pending  -Resp Infxn Panel (COVID/flu negative)  -Trend CBC and CMP  -Continue using Incentive Spirometry  -(CXR 9/26 report)- shows atelectasis changes       Type 2 diabetes mellitus with diabetic cataract, without long-term current use of insulin  Endocrine Following      Surgical wound present          VTE Risk Mitigation (From admission, onward)           Ordered     heparin (porcine) injection 5,000 Units  Every 8 hours         09/22/24 1223     IP VTE HIGH RISK PATIENT  Once         09/20/24 2235     Place sequential compression device  Until discontinued         09/20/24 2235     Place sequential compression device  Until discontinued         09/20/24 1028                        Thank you for your consult. I will follow-up with patient. Please contact us if you have any additional questions.    Joselin Dodge MD  Department of Hospital Medicine   [facility]

## 2024-09-26 NOTE — SUBJECTIVE & OBJECTIVE
Interval History: Continued fevers to 102 last night and 100.5 this AM. Denies SOB, chest pain. Does report intermittent cough. Other vitals stable, no white count. Flu/covid and blood cultures ordered. Encouraged IS use. Inhalation tx ordered. Medicine consulted for assistance, repeat CXR ordered. Remains neuro stable. Fung placed yesterday for urinary retention. Will arrange for voiding trial in 1 week.     Medications:  Continuous Infusions:  Scheduled Meds:   amLODIPine  10 mg Oral Daily    calcium carbonate  500 mg Oral BID    carvediloL  6.25 mg Oral BID    heparin (porcine)  5,000 Units Subcutaneous Q8H    insulin aspart U-100  9 Units Subcutaneous TIDWM    insulin glargine U-100  18 Units Subcutaneous Daily    losartan  100 mg Oral Daily    methocarbamoL  1,000 mg Oral QID    polyethylene glycol  17 g Oral BID    pravastatin  40 mg Oral QHS    senna-docusate 8.6-50 mg  2 tablet Oral BID    tamsulosin  0.4 mg Oral Daily     PRN Meds:  Current Facility-Administered Medications:     acetaminophen, 650 mg, Oral, Q6H PRN    albuterol sulfate, 2.5 mg, Nebulization, Q4H PRN    aluminum-magnesium hydroxide-simethicone, 30 mL, Oral, Q4H PRN    dextrose 10%, 12.5 g, Intravenous, PRN    dextrose 10%, 25 g, Intravenous, PRN    glucagon (human recombinant), 1 mg, Intramuscular, PRN    glucose, 16 g, Oral, PRN    glucose, 24 g, Oral, PRN    insulin aspart U-100, 0-5 Units, Subcutaneous, QID (AC + HS) PRN    melatonin, 6 mg, Oral, Nightly PRN    ondansetron, 8 mg, Oral, Q6H PRN    oxyCODONE, 5 mg, Oral, Q4H PRN    oxyCODONE, 10 mg, Oral, Q4H PRN     Review of Systems  Objective:     Weight: 86.9 kg (191 lb 9.3 oz)  Body mass index is 25.28 kg/m².  Vital Signs (Most Recent):  Temp: 98.4 °F (36.9 °C) (09/26/24 0756)  Pulse: 85 (09/26/24 0756)  Resp: 20 (09/26/24 0756)  BP: 135/64 (09/26/24 0756)  SpO2: 95 % (09/26/24 0706) Vital Signs (24h Range):  Temp:  [98.4 °F (36.9 °C)-102.1 °F (38.9 °C)] 98.4 °F (36.9 °C)  Pulse:   "[75-92] 85  Resp:  [16-20] 20  SpO2:  [92 %-95 %] 95 %  BP: (116-135)/(58-64) 135/64                              Urethral Catheter 09/25/24 1604 (Active)   Site Assessment Clean;Intact 09/25/24 2000   Collection Container Urimeter 09/25/24 2000   Catheter Care Performed yes 09/25/24 2000   Reason for Continuing Urinary Catheterization Urinary retention 09/25/24 2000   CAUTI Prevention Bundle Securement Device in place with 1" slack 09/25/24 2000   Output (mL) 450 mL 09/26/24 0500          Physical Exam         Neurosurgery Physical Exam  General: well developed, well nourished, no distress.   Head: normocephalic, atraumatic  Neurologic: Alert and oriented. Thought content appropriate.  GCS: Motor: 6/Verbal: 5/Eyes: 4 GCS Total: 15  Mental Status: Awake, Alert, Oriented x 4  Language: No aphasia  Speech: No dysarthria  Cranial nerves: face symmetric, tongue midline, CN II-XII grossly intact.   Eyes: pupils equal, round, reactive to light with accomodation, EOMI.  Pulmonary: normal respirations, no signs of respiratory distress  Sensory: intact to light touch throughout  Motor Strength: Moves all extremities spontaneously with good tone. No abnormal movements seen.      Strength   Deltoids Triceps Biceps Wrist Extension Wrist Flexion Hand    Upper: R 5/5 5/5 5/5 5/5 5/5 5/5     L 5/5 5/5 5/5 5/5 5/5 5/5       Iliopsoas Quadriceps Knee  Flexion Tibialis  anterior Gastro- cnemius EHL   Lower: R 4+/5 4+/5 4+/5 4-/5 4-/5 4-/5     L 5/5 5/5 5/5 5/5 5/5 5/5      Skin: Skin is warm, dry and intact.     Incision c/d/I with skin edges well approximated with dermabond/prineo. No surrounding erythema or edema. No drainage from incision. No palpable underlying fluid collection.     Significant Labs:  Recent Labs   Lab 09/25/24 0226 09/26/24  0418   * 178*   * 134*   K 4.2 3.5   CL 97 97   CO2 25 28   BUN 12 13   CREATININE 1.3 1.3   CALCIUM 9.1 8.7     Recent Labs   Lab 09/25/24 0226 09/26/24  0418   WBC 11.14 " "8.24   HGB 10.9* 9.6*   HCT 33.8* 28.2*    204     No results for input(s): "LABPT", "INR", "APTT" in the last 48 hours.  Microbiology Results (last 7 days)       Procedure Component Value Units Date/Time    Blood culture [[id number]] Collected: 09/26/24 0904    Order Status: Sent Specimen: Blood from Peripheral, Hand, Right     Blood culture [[id number]] Collected: 09/26/24 0900    Order Status: Sent Specimen: Blood from Peripheral, Antecubital, Right           All pertinent labs from the last 24 hours have been reviewed.    Significant Diagnostics:  I have reviewed and interpreted all pertinent imaging results/findings within the past 24 hours.  "

## 2024-09-27 LAB
ALBUMIN SERPL BCP-MCNC: 2.2 G/DL (ref 3.5–5.2)
ALP SERPL-CCNC: 63 U/L (ref 55–135)
ALT SERPL W/O P-5'-P-CCNC: 13 U/L (ref 10–44)
ANION GAP SERPL CALC-SCNC: 9 MMOL/L (ref 8–16)
AST SERPL-CCNC: 49 U/L (ref 10–40)
BACTERIA UR CULT: NO GROWTH
BASOPHILS # BLD AUTO: 0.04 K/UL (ref 0–0.2)
BASOPHILS NFR BLD: 0.4 % (ref 0–1.9)
BILIRUB SERPL-MCNC: 0.3 MG/DL (ref 0.1–1)
BUN SERPL-MCNC: 18 MG/DL (ref 8–23)
CALCIUM SERPL-MCNC: 8.2 MG/DL (ref 8.7–10.5)
CHLORIDE SERPL-SCNC: 96 MMOL/L (ref 95–110)
CO2 SERPL-SCNC: 27 MMOL/L (ref 23–29)
CREAT SERPL-MCNC: 1.3 MG/DL (ref 0.5–1.4)
DIFFERENTIAL METHOD BLD: ABNORMAL
EOSINOPHIL # BLD AUTO: 0.2 K/UL (ref 0–0.5)
EOSINOPHIL NFR BLD: 2 % (ref 0–8)
ERYTHROCYTE [DISTWIDTH] IN BLOOD BY AUTOMATED COUNT: 13.4 % (ref 11.5–14.5)
EST. GFR  (NO RACE VARIABLE): 55.9 ML/MIN/1.73 M^2
GLUCOSE SERPL-MCNC: 124 MG/DL (ref 70–110)
HCT VFR BLD AUTO: 28.6 % (ref 40–54)
HGB BLD-MCNC: 9.5 G/DL (ref 14–18)
IMM GRANULOCYTES # BLD AUTO: 0.07 K/UL (ref 0–0.04)
IMM GRANULOCYTES NFR BLD AUTO: 0.8 % (ref 0–0.5)
LYMPHOCYTES # BLD AUTO: 2 K/UL (ref 1–4.8)
LYMPHOCYTES NFR BLD: 22 % (ref 18–48)
MCH RBC QN AUTO: 30.4 PG (ref 27–31)
MCHC RBC AUTO-ENTMCNC: 33.2 G/DL (ref 32–36)
MCV RBC AUTO: 91 FL (ref 82–98)
MONOCYTES # BLD AUTO: 1.4 K/UL (ref 0.3–1)
MONOCYTES NFR BLD: 14.5 % (ref 4–15)
NEUTROPHILS # BLD AUTO: 5.6 K/UL (ref 1.8–7.7)
NEUTROPHILS NFR BLD: 60.3 % (ref 38–73)
NRBC BLD-RTO: 0 /100 WBC
PLATELET # BLD AUTO: 230 K/UL (ref 150–450)
PMV BLD AUTO: 10.1 FL (ref 9.2–12.9)
POCT GLUCOSE: 153 MG/DL (ref 70–110)
POCT GLUCOSE: 175 MG/DL (ref 70–110)
POCT GLUCOSE: 200 MG/DL (ref 70–110)
POCT GLUCOSE: 223 MG/DL (ref 70–110)
POTASSIUM SERPL-SCNC: 3.6 MMOL/L (ref 3.5–5.1)
PROCALCITONIN SERPL IA-MCNC: 0.3 NG/ML
PROT SERPL-MCNC: 5.8 G/DL (ref 6–8.4)
RBC # BLD AUTO: 3.13 M/UL (ref 4.6–6.2)
SODIUM SERPL-SCNC: 132 MMOL/L (ref 136–145)
WBC # BLD AUTO: 9.28 K/UL (ref 3.9–12.7)

## 2024-09-27 PROCEDURE — 25000003 PHARM REV CODE 250: Performed by: PHYSICIAN ASSISTANT

## 2024-09-27 PROCEDURE — 99232 SBSQ HOSP IP/OBS MODERATE 35: CPT | Mod: ,,,

## 2024-09-27 PROCEDURE — 25000003 PHARM REV CODE 250

## 2024-09-27 PROCEDURE — 63600175 PHARM REV CODE 636 W HCPCS

## 2024-09-27 PROCEDURE — 36415 COLL VENOUS BLD VENIPUNCTURE: CPT | Performed by: INTERNAL MEDICINE

## 2024-09-27 PROCEDURE — 84145 PROCALCITONIN (PCT): CPT | Performed by: INTERNAL MEDICINE

## 2024-09-27 PROCEDURE — 25000003 PHARM REV CODE 250: Performed by: STUDENT IN AN ORGANIZED HEALTH CARE EDUCATION/TRAINING PROGRAM

## 2024-09-27 PROCEDURE — 97535 SELF CARE MNGMENT TRAINING: CPT

## 2024-09-27 PROCEDURE — 97116 GAIT TRAINING THERAPY: CPT | Mod: CQ

## 2024-09-27 PROCEDURE — 11000001 HC ACUTE MED/SURG PRIVATE ROOM

## 2024-09-27 PROCEDURE — 36415 COLL VENOUS BLD VENIPUNCTURE: CPT | Performed by: STUDENT IN AN ORGANIZED HEALTH CARE EDUCATION/TRAINING PROGRAM

## 2024-09-27 PROCEDURE — 80053 COMPREHEN METABOLIC PANEL: CPT

## 2024-09-27 PROCEDURE — 63600175 PHARM REV CODE 636 W HCPCS: Performed by: STUDENT IN AN ORGANIZED HEALTH CARE EDUCATION/TRAINING PROGRAM

## 2024-09-27 PROCEDURE — 85025 COMPLETE CBC W/AUTO DIFF WBC: CPT | Performed by: STUDENT IN AN ORGANIZED HEALTH CARE EDUCATION/TRAINING PROGRAM

## 2024-09-27 RX ORDER — INSULIN ASPART 100 [IU]/ML
10 INJECTION, SOLUTION INTRAVENOUS; SUBCUTANEOUS
Status: DISCONTINUED | OUTPATIENT
Start: 2024-09-27 | End: 2024-09-27

## 2024-09-27 RX ORDER — INSULIN ASPART 100 [IU]/ML
12 INJECTION, SOLUTION INTRAVENOUS; SUBCUTANEOUS
Status: DISCONTINUED | OUTPATIENT
Start: 2024-09-27 | End: 2024-09-30

## 2024-09-27 RX ADMIN — OXYCODONE HYDROCHLORIDE 10 MG: 10 TABLET ORAL at 06:09

## 2024-09-27 RX ADMIN — INSULIN ASPART 10 UNITS: 100 INJECTION, SOLUTION INTRAVENOUS; SUBCUTANEOUS at 12:09

## 2024-09-27 RX ADMIN — INSULIN ASPART 12 UNITS: 100 INJECTION, SOLUTION INTRAVENOUS; SUBCUTANEOUS at 04:09

## 2024-09-27 RX ADMIN — METHOCARBAMOL 1000 MG: 500 TABLET ORAL at 12:09

## 2024-09-27 RX ADMIN — AMLODIPINE BESYLATE 10 MG: 10 TABLET ORAL at 08:09

## 2024-09-27 RX ADMIN — CALCIUM CARBONATE (ANTACID) CHEW TAB 500 MG 500 MG: 500 CHEW TAB at 08:09

## 2024-09-27 RX ADMIN — SENNOSIDES AND DOCUSATE SODIUM 2 TABLET: 50; 8.6 TABLET ORAL at 08:09

## 2024-09-27 RX ADMIN — METHOCARBAMOL 1000 MG: 500 TABLET ORAL at 04:09

## 2024-09-27 RX ADMIN — TAMSULOSIN HYDROCHLORIDE 0.4 MG: 0.4 CAPSULE ORAL at 08:09

## 2024-09-27 RX ADMIN — Medication 6 MG: at 11:09

## 2024-09-27 RX ADMIN — HEPARIN SODIUM 5000 UNITS: 5000 INJECTION INTRAVENOUS; SUBCUTANEOUS at 02:09

## 2024-09-27 RX ADMIN — LOSARTAN POTASSIUM 100 MG: 25 TABLET, FILM COATED ORAL at 08:09

## 2024-09-27 RX ADMIN — CEFEPIME 1 G: 1 INJECTION, POWDER, FOR SOLUTION INTRAMUSCULAR; INTRAVENOUS at 04:09

## 2024-09-27 RX ADMIN — INSULIN ASPART 9 UNITS: 100 INJECTION, SOLUTION INTRAVENOUS; SUBCUTANEOUS at 08:09

## 2024-09-27 RX ADMIN — OXYCODONE HYDROCHLORIDE 10 MG: 10 TABLET ORAL at 11:09

## 2024-09-27 RX ADMIN — CARVEDILOL 6.25 MG: 6.25 TABLET, FILM COATED ORAL at 08:09

## 2024-09-27 RX ADMIN — METHOCARBAMOL 1000 MG: 500 TABLET ORAL at 08:09

## 2024-09-27 RX ADMIN — OXYCODONE HYDROCHLORIDE 10 MG: 10 TABLET ORAL at 10:09

## 2024-09-27 RX ADMIN — INSULIN GLARGINE 18 UNITS: 100 INJECTION, SOLUTION SUBCUTANEOUS at 09:09

## 2024-09-27 RX ADMIN — HEPARIN SODIUM 5000 UNITS: 5000 INJECTION INTRAVENOUS; SUBCUTANEOUS at 10:09

## 2024-09-27 RX ADMIN — POLYETHYLENE GLYCOL 3350 17 G: 17 POWDER, FOR SOLUTION ORAL at 08:09

## 2024-09-27 RX ADMIN — OXYCODONE HYDROCHLORIDE 10 MG: 10 TABLET ORAL at 02:09

## 2024-09-27 RX ADMIN — OXYCODONE HYDROCHLORIDE 10 MG: 10 TABLET ORAL at 12:09

## 2024-09-27 RX ADMIN — HEPARIN SODIUM 5000 UNITS: 5000 INJECTION INTRAVENOUS; SUBCUTANEOUS at 06:09

## 2024-09-27 RX ADMIN — PRAVASTATIN SODIUM 40 MG: 20 TABLET ORAL at 08:09

## 2024-09-27 RX ADMIN — ACETAMINOPHEN 650 MG: 325 TABLET ORAL at 08:09

## 2024-09-27 NOTE — PT/OT/SLP PROGRESS
"Physical Therapy Treatment    Patient Name:  oRbi Donovan   MRN:  [id number]    Recommendations:     Discharge Recommendations: Moderate Intensity Therapy  Discharge Equipment Recommendations: walker, rolling  Barriers to discharge: None    Assessment:     Robi Donovan is a 79 y.o. male admitted with a medical diagnosis of Status post lumbar surgery.  He presents with the following impairments/functional limitations: weakness, impaired endurance, impaired self care skills, impaired functional mobility, gait instability, impaired balance, decreased coordination, decreased lower extremity function, pain, orthopedic precautions. Pt agreeable for therapy, family present, pt up in chair prior to treat, focused on chair and sit <> stand transfers, unsuccessful attempt at stair climbing, ambulated w/pt while focusing on safe gait technique    Rehab Prognosis: Fair; patient would benefit from acute skilled PT services to address these deficits and reach maximum level of function.    Recent Surgery: Procedure(s) (LRB):  OPEN ROBOTIC L4 -S1 TLIF (N/A) 7 Days Post-Op    Plan:     During this hospitalization, patient to be seen 5 x/week to address the identified rehab impairments via gait training, therapeutic activities, therapeutic exercises, neuromuscular re-education and progress toward the following goals:    Plan of Care Expires:  10/21/24    Subjective     Chief Complaint: pain and fatigue  Patient/Family Comments/goals: "Oh lawd"  Pain/Comfort:  Pain Rating 1: other (see comments) ("Oh lawd")  Pain Rating Post-Intervention 1: other (see comments) (not rated)      Objective:     Communicated with RN prior to session.  Patient found up in chair with ross catheter upon PT entry to room.     General Precautions: Standard, fall  Orthopedic Precautions: spinal precautions  Braces: AFO, LSO (R AFO)  Respiratory Status: Room air     Functional Mobility:      Transfers:   Sit <> Stand Transfer: moderate assistance from bedside chair " using rolling walker ; VC's for hand placement and sit <> stand technique; sit <> stand x 2, once from BSC and once again from recliner chair while in front of steps  BSC <> Chair: moderate assistance using rolling walker ; VC's for step sequencing, hand placement and some RW management assist    Balance:   Sitting balance: FAIR+: Maintains balance through MINIMAL excursions of active trunk motion  Standing balance:   FAIR: Maintains without assist but unable to take challenges  POOR: Needs MOD (moderate) assist during gait                 Gait:  Distance: 20ft   Assistive Device: RW  Assistance Level: moderate assistance  Gait Assessment: Pt ambulates 20ft w/frequent cues for step sequencing, mod A for RW management and cues to keep hands on RW  as well as to keep closer to RW, decreased stance time on RLE during midswing, pt takes step to steps             AM-PAC 6 CLICK MOBILITY  Turning over in bed (including adjusting bedclothes, sheets and blankets)?: 2  Sitting down on and standing up from a chair with arms (e.g., wheelchair, bedside commode, etc.): 2  Moving from lying on back to sitting on the side of the bed?: 2  Moving to and from a bed to a chair (including a wheelchair)?: 2  Need to walk in hospital room?: 2  Climbing 3-5 steps with a railing?: 2  Basic Mobility Total Score: 12       Treatment & Education:  Education provided regarding PT role and PoC to increase strength and stability w/transfers, safe gait technique, safe transfer technique, brought pt to steps while pt in recliner chair, pt and therapist did not feel safe at this time attempting stairs at this time    Patient left up in chair with all lines intact, call button in reach, and family present..    GOALS:   Multidisciplinary Problems       Physical Therapy Goals          Problem: Physical Therapy    Goal Priority Disciplines Outcome Goal Variances Interventions   Physical Therapy Goal     PT, PT/OT Progressing     Description: Goals  to be met by: 10/5/24     Patient will increase functional independence with mobility by performin. Supine to sit with Stand-by Assistance - Not met  2. Rolling to Left or Right with Modified [place] - Not met  3. Sit to stand transfer with Stand-by Assistance with RW - Not met  4. Gait  x 125 feet with Stand-by Assistance using Rolling Walker - Not met  5. Ascend/descend 5 stair with left Handrail with Contact Guard Assistance using No Assistive Device - Not met   6. Pt will verbalize 3/3 spinal precautions without cueing - Not met                       Time Tracking:     PT Received On: 24  PT Start Time: 1118     PT Stop Time: 1148  PT Total Time (min): 30 min     Billable Minutes: Gait Training 30min    Treatment Type: Treatment  PT/PTA: PTA     Number of PTA visits since last PT visit: 2024

## 2024-09-27 NOTE — SUBJECTIVE & OBJECTIVE
Interval History: 9/27: Febrile to 101.6 overnight with subsequent afebrile readings. Discussed with Hospital Medicine in the setting of possible consolidation on CXR. Medicine considering starting abx. Recs to follow. Denies any new complaints. Exam stable.     Medications:  Continuous Infusions:  Scheduled Meds:   amLODIPine  10 mg Oral Daily    calcium carbonate  500 mg Oral BID    carvediloL  6.25 mg Oral BID    heparin (porcine)  5,000 Units Subcutaneous Q8H    insulin aspart U-100  9 Units Subcutaneous TIDWM    insulin glargine U-100  18 Units Subcutaneous Daily    losartan  100 mg Oral Daily    methocarbamoL  1,000 mg Oral QID    polyethylene glycol  17 g Oral BID    pravastatin  40 mg Oral QHS    senna-docusate 8.6-50 mg  2 tablet Oral BID    tamsulosin  0.4 mg Oral Daily     PRN Meds:  Current Facility-Administered Medications:     acetaminophen, 650 mg, Oral, Q6H PRN    albuterol sulfate, 2.5 mg, Nebulization, Q4H PRN    aluminum-magnesium hydroxide-simethicone, 30 mL, Oral, Q4H PRN    dextrose 10%, 12.5 g, Intravenous, PRN    dextrose 10%, 25 g, Intravenous, PRN    glucagon (human recombinant), 1 mg, Intramuscular, PRN    glucose, 16 g, Oral, PRN    glucose, 24 g, Oral, PRN    insulin aspart U-100, 0-5 Units, Subcutaneous, QID (AC + HS) PRN    melatonin, 6 mg, Oral, Nightly PRN    ondansetron, 8 mg, Oral, Q6H PRN    oxyCODONE, 5 mg, Oral, Q4H PRN    oxyCODONE, 10 mg, Oral, Q4H PRN       Objective:     Weight: 86.9 kg (191 lb 9.3 oz)  Body mass index is 25.28 kg/m².  Vital Signs (Most Recent):  Temp: 99.4 °F (37.4 °C) (09/27/24 0721)  Pulse: 81 (09/27/24 0721)  Resp: 16 (09/27/24 1050)  BP: (!) 141/67 (09/27/24 0721)  SpO2: (!) 92 % (09/27/24 0721) Vital Signs (24h Range):  Temp:  [98.5 °F (36.9 °C)-101.6 °F (38.7 °C)] 99.4 °F (37.4 °C)  Pulse:  [77-91] 81  Resp:  [15-20] 16  SpO2:  [92 %-94 %] 92 %  BP: (113-141)/(58-67) 141/67     Date 09/27/24 0700 - 09/28/24 0659   Shift [id number]  "24 Hour Total   INTAKE   Shift Total(mL/kg)       OUTPUT   Urine(mL/kg/hr) 275   275   Shift Total(mL/kg) 275(3.2)   275(3.2)   Weight (kg) 86.9 86.9 86.9 86.9                            Urethral Catheter 09/25/24 1604 (Active)   Site Assessment Clean;Intact 09/26/24 2000   Collection Container Urimeter 09/26/24 2000   Securement Method secured to top of thigh w/ adhesive device 09/26/24 2000   Catheter Care Performed yes 09/26/24 2000   Reason for Continuing Urinary Catheterization Urinary retention 09/26/24 2000   CAUTI Prevention Bundle Securement Device in place with 1" slack 09/26/24 2000   Output (mL) 400 mL 09/27/24 0440     Neurosurgery Physical Exam  General: well developed, well nourished, no distress.   Head: normocephalic, atraumatic  Neurologic: Alert and oriented. Thought content appropriate.  Mental Status: Awake, Alert, Oriented  Cranial nerves: face symmetric, CN II-XII grossly intact.   Pulmonary: normal respirations, no signs of respiratory distress  Sensory: intact to light touch throughout  Motor Strength: Moves all extremities spontaneously with good tone. No abnormal movements seen.      Strength   Deltoids Triceps Biceps Wrist Extension Wrist Flexion Hand    Upper: R 5/5 5/5 5/5 5/5 5/5 5/5     L 5/5 5/5 5/5 5/5 5/5 5/5       Iliopsoas Quadriceps Knee  Flexion Tibialis  anterior Gastro- cnemius EHL   Lower: R 4+/5 4+/5 4+/5 4-/5 4-/5 4-/5     L 5/5 5/5 5/5 5/5 5/5 5/5      Skin: Skin is warm, dry and intact.     Incision c/d/I with skin edges well approximated with dermabond/prineo. No surrounding erythema or edema. No drainage from incision. No palpable underlying fluid collection.    Significant Labs:  Recent Labs   Lab 09/26/24 0418 09/27/24 0220   * 124*   * 132*   K 3.5 3.6   CL 97 96   CO2 28 27   BUN 13 18   CREATININE 1.3 1.3   CALCIUM 8.7 8.2*     Recent Labs   Lab 09/26/24 0418 09/27/24 0220   WBC 8.24 9.28   HGB 9.6* 9.5*   HCT 28.2* 28.6*    230     No " "results for input(s): "LABPT", "INR", "APTT" in the last 48 hours.  Microbiology Results (last 7 days)       Procedure Component Value Units Date/Time    Blood culture [[id number]] Collected: 09/26/24 0900    Order Status: Completed Specimen: Blood from Peripheral, Antecubital, Right Updated: 09/27/24 1012     Blood Culture, Routine No growth to date      No Growth to date      No Growth to date    Blood culture [[id number]] Collected: 09/26/24 0904    Order Status: Completed Specimen: Blood from Peripheral, Hand, Right Updated: 09/27/24 1012     Blood Culture, Routine No growth to date      No Growth to date      No Growth to date    Urine culture [[id number]] Collected: 09/26/24 1522    Order Status: No result Specimen: Urine Updated: 09/26/24 1717    Respiratory Infection Panel (PCR), Nasopharyngeal [[id number]] Collected: 09/26/24 1140    Order Status: Completed Specimen: Nasopharyngeal Swab Updated: 09/26/24 1424     Respiratory Infection Panel Source NP Swab     Adenovirus Not Detected     Coronavirus 229E, Common Cold Virus Not Detected     Coronavirus HKU1, Common Cold Virus Not Detected     Coronavirus NL63, Common Cold Virus Not Detected     Coronavirus OC43, Common Cold Virus Not Detected     Comment: The Coronavirus strains detected in this test cause the common cold.  These strains are not the COVID-19 (novel Coronavirus)strain   associated with the respiratory disease outbreak.          SARS-CoV2 (COVID-19) Qualitative PCR Not Detected     Human Metapneumovirus Not Detected     Human Rhinovirus/Enterovirus Not Detected     Influenza A (subtypes H1, H1-2009,H3) Not Detected     Influenza B Not Detected     Parainfluenza Virus 1 Not Detected     Parainfluenza Virus 2 Not Detected     Parainfluenza Virus 3 Not Detected     Parainfluenza Virus 4 Not Detected     Respiratory Syncytial Virus Not Detected     Bordetella Parapertussis (UI9129) Not Detected     Bordetella pertussis (ptxP) Not Detected     " Chlamydia pneumoniae Not Detected     Mycoplasma pneumoniae Not Detected    Narrative:      Assay not valid for lower respiratory specimens, alternate  testing required.          All pertinent labs from the last 24 hours have been reviewed.    Significant Diagnostics:  I have reviewed and interpreted all pertinent imaging results/findings within the past 24 hours.

## 2024-09-27 NOTE — SUBJECTIVE & OBJECTIVE
Interval History: Patient became afebrile overnight 101.6 F @ 2021 but as of 7:20 this morning non-febrile again after acetaminophen given. Patient still complains of dysuria.       Review of Systems  Objective:     Vital Signs (Most Recent):  Temp: 99.7 °F (37.6 °C) (09/27/24 1152)  Pulse: 83 (09/27/24 1152)  Resp: 16 (09/27/24 1432)  BP: (!) 118/58 (09/27/24 1152)  SpO2: (!) 92 % (09/27/24 1152) Vital Signs (24h Range):  Temp:  [98.5 °F (36.9 °C)-101.6 °F (38.7 °C)] 99.7 °F (37.6 °C)  Pulse:  [77-91] 83  Resp:  [15-20] 16  SpO2:  [92 %-94 %] 92 %  BP: (115-141)/(58-67) 118/58     Weight: 86.9 kg (191 lb 9.3 oz)  Body mass index is 25.28 kg/m².    Intake/Output Summary (Last 24 hours) at 9/27/2024 1533  Last data filed at 9/27/2024 1037  Gross per 24 hour   Intake 250 ml   Output 675 ml   Net -425 ml         Physical Exam  Constitutional:       General: He is not in acute distress.     Appearance: Normal appearance.   HENT:      Head: Normocephalic and atraumatic.   Cardiovascular:      Rate and Rhythm: Normal rate.      Heart sounds: No murmur heard.  Pulmonary:      Effort: Pulmonary effort is normal. No respiratory distress.      Breath sounds: Rales present.   Abdominal:      Tenderness: There is no abdominal tenderness. There is no guarding or rebound.   Neurological:      Mental Status: He is alert.   Psychiatric:         Mood and Affect: Mood normal.         Behavior: Behavior normal.             Significant Labs: All pertinent labs within the past 24 hours have been reviewed.  Blood Culture:   Recent Labs   Lab 09/26/24  0900 09/26/24  0904   LABBLOO No growth to date  No Growth to date  No Growth to date No growth to date  No Growth to date  No Growth to date     CBC:   Recent Labs   Lab 09/26/24  0418 09/27/24  0220   WBC 8.24 9.28   HGB 9.6* 9.5*   HCT 28.2* 28.6*    230     CMP:   Recent Labs   Lab 09/26/24  0418 09/27/24  0220   * 132*   K 3.5 3.6   CL 97 96   CO2 28 27   * 124*  "  BUN 13 18   CREATININE 1.3 1.3   CALCIUM 8.7 8.2*   PROT  --  5.8*   ALBUMIN  --  2.2*   BILITOT  --  0.3   ALKPHOS  --  63   AST  --  49*   ALT  --  13   ANIONGAP 9 9     Respiratory Culture: No results for input(s): "GSRESP", "RESPIRATORYC" in the last 48 hours.  Urine Culture: No results for input(s): "LABURIN" in the last 48 hours.  Urine Studies:   Recent Labs   Lab 09/26/24  1522   COLORU Yellow   APPEARANCEUA Hazy*   PHUR 6.0   SPECGRAV 1.030   PROTEINUA 2+*   GLUCUA 3+*   KETONESU Trace*   BILIRUBINUA Negative   OCCULTUA 3+*   NITRITE Negative   LEUKOCYTESUR 1+*   RBCUA 92*   WBCUA 13*   BACTERIA Occasional   SQUAMEPITHEL 0   HYALINECASTS 4*       Significant Imaging: I have reviewed all pertinent imaging results/findings within the past 24 hours.    "

## 2024-09-27 NOTE — ASSESSMENT & PLAN NOTE
78 M with hx of prior CVA with residual right sided weakness presents for elective surgery for worsening low back pain and BLE radicular leg pain now s/p L4-S1 TLIF on 9/20/2024:    Plan:  - Admitted to ns, q4h neuro vital checks.  - Postop XR satisfactory.  - HV drains removed 9/25. Ancef dc'ed 9/25.  - LSO brace when OOB.  - Fung placed 9/25 for urinary retention post op. Will arrange voiding trial in 7-10 days.  - Bowel regimen in place. +BM 9/26.  - PT/OT/OOB.  - DVT ppx: SCDs/SQH.  - Atelectasis ppx: IS use hourly/up in chair.    Dispo: Anticipate dc once patient remains afebrile/infectious workup completed. Pending SNF vs home with home health.

## 2024-09-27 NOTE — SUBJECTIVE & OBJECTIVE
"Interval HPI:   No acute events overnight. Patient in room 543/543 A. Blood glucose stable. BG at/above goal on current insulin regimen (SSI, basal, prandial insulin). Steroid use- None.      Renal function-         Lab Results   Component Value Date     CREATININE 1.3 2024         Vasopressors-  None      Diet diabetic  Calorie      Eatin%  Nausea: No  Hypoglycemia and intervention: No  Fever: Yes   TPN and/or TF: No    BP (!) 141/67 (BP Location: Left arm, Patient Position: Lying)   Pulse 81   Temp 99.4 °F (37.4 °C) (Oral)   Resp 18   Ht 6' 1" (1.854 m)   Wt 86.9 kg (191 lb 9.3 oz)   SpO2 (!) 92%   BMI 25.28 kg/m²     Labs Reviewed and Include    Recent Labs   Lab 24  0220   *   CALCIUM 8.2*   ALBUMIN 2.2*   PROT 5.8*   *   K 3.6   CO2 27   CL 96   BUN 18   CREATININE 1.3   ALKPHOS 63   ALT 13   AST 49*   BILITOT 0.3     Lab Results   Component Value Date    WBC 9.28 2024    HGB 9.5 (L) 2024    HCT 28.6 (L) 2024    MCV 91 2024     2024     No results for input(s): "TSH", "FREET4" in the last 168 hours.  Lab Results   Component Value Date    HGBA1C 7.8 (H) 2024       Nutritional status:   Body mass index is 25.28 kg/m².  Lab Results   Component Value Date    ALBUMIN 2.2 (L) 2024    ALBUMIN 4.1 07/15/2024    ALBUMIN 3.9 2024     No results found for: "PREALBUMIN"    Estimated Creatinine Clearance: 52.1 mL/min (based on SCr of 1.3 mg/dL).    Accu-Checks  Recent Labs     24  1933 24  0736 24  1127 24  1627 24  1951 24  0758 24  1131 24  1551 24  2118 24  0730   POCTGLUCOSE 251* 231* 272* 254* 202* 189* 270* 220* 116* 175*       Current Medications and/or Treatments Impacting Glycemic Control  Immunotherapy:    Immunosuppressants       None          Steroids:   Hormones (From admission, onward)      Start     Stop Route Frequency Ordered    24 2333  " melatonin tablet 6 mg         -- Oral Nightly PRN 09/20/24 2235          Pressors:    Autonomic Drugs (From admission, onward)      None          Hyperglycemia/Diabetes Medications:   Antihyperglycemics (From admission, onward)      Start     Stop Route Frequency Ordered    09/25/24 1645  insulin aspart U-100 pen 9 Units         -- SubQ 3 times daily with meals 09/25/24 1151    09/25/24 0900  insulin glargine U-100 (Lantus) pen 18 Units         -- SubQ Daily 09/25/24 0706    09/20/24 2337  insulin aspart U-100 pen 0-5 Units         -- SubQ Before meals & nightly PRN 09/20/24 2237

## 2024-09-27 NOTE — ASSESSMENT & PLAN NOTE
Alisha BEAR Denise is a 20 year old female presenting with follow up for migraines. Patient states doing better since increase in topiramate.  Denies known Latex allergy or symptoms of Latex sensitivity  Medications verified, no changes    Social History     Tobacco Use   Smoking Status Never Smoker   Smokeless Tobacco Never Used          Medicine consulted (9/26). Patient is s/p L4-S1 TLIF on 9/20 and has had intermittent fevers. No leukocytosis. white count. He did c/o dysuria and intermittent non productive cough post op but denies any chest pain, dyspnea, or hx of clots. Start infectious workup. (CXR 9/26 report)- shows atelectasis changes and possible consolidative changes.     Plan:  -Start Cefepime (2g IV Q8h ) for symptomatic cystitis w/ pyuria and bacteruria and HAP  -MRSA nares pending->if (+), will start Vanc  -Plan to start vancomycin and perform ultrasound of upper and lower extremities if fevers continue  -Repeat UA shows 1+ leukocytes, wbcua 13, occasional bacteria  -Urine cx pending  -Blood cx NGT  -F/u on resp cx  -Resp Infxn Panel/COVID/flu negative  -Trend CBC and CMP  -Continue encouraging Incentive Spirometry

## 2024-09-27 NOTE — ASSESSMENT & PLAN NOTE
BG goal: 140-180    - Lantus 18 units QD   - Novolog 9 units TIDWM  - LDC SSI PRN (150/50)   - POCT Glucose before meals and at bedtime  - Hypoglycemia protocol in place      ** Please notify Endocrine for any change and/or advance in diet**  ** Please call Endocrine for any BG related issues **     Discharge Planning:   TBD. Please notify endocrinology prior to discharge.

## 2024-09-27 NOTE — PROGRESS NOTES
[facility]  Neurosurgery  Progress Note    Subjective:     History of Present Illness: 78 M with hx of prior CVA with residual right sided weakness presents for eval of worsening low back pain and BLE radicular leg pain. He endorses pain which starts in upper lumbar area and then radiates down to lower lumbar area down his legs right greater than left into his feet. He endorses right foot weakness and has a right foot drop which has been present for many months. He has difficulty walking. 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 He has been to pain mgmt for injections without significant relief.      Interval fu 8/8/24:  Pt presents in fu.  No significant changes in symptoms.  Today he is most concerned with his balance difficulties.     Interval fu 8/29/24: No changes in symptoms.    Post-Op Info:  Procedure(s) (LRB):  OPEN ROBOTIC L4 -S1 TLIF (N/A)   7 Days Post-Op   Interval History: 9/27: Febrile to 101.6 overnight with subsequent afebrile readings. Discussed with Hospital Medicine in the setting of possible consolidation on CXR. Medicine considering starting abx. Recs to follow. Denies any new complaints. Exam stable.     Medications:  Continuous Infusions:  Scheduled Meds:   amLODIPine  10 mg Oral Daily    calcium carbonate  500 mg Oral BID    carvediloL  6.25 mg Oral BID    heparin (porcine)  5,000 Units Subcutaneous Q8H    insulin aspart U-100  9 Units Subcutaneous TIDWM    insulin glargine U-100  18 Units Subcutaneous Daily    losartan  100 mg Oral Daily    methocarbamoL  1,000 mg Oral QID    polyethylene glycol  17 g Oral BID    pravastatin  40 mg Oral QHS    senna-docusate 8.6-50 mg  2 tablet Oral BID    tamsulosin  0.4 mg Oral Daily     PRN Meds:  Current Facility-Administered Medications:     acetaminophen, 650 mg, Oral, Q6H PRN    albuterol sulfate, 2.5 mg, Nebulization, Q4H PRN    aluminum-magnesium hydroxide-simethicone, 30  "mL, Oral, Q4H PRN    dextrose 10%, 12.5 g, Intravenous, PRN    dextrose 10%, 25 g, Intravenous, PRN    glucagon (human recombinant), 1 mg, Intramuscular, PRN    glucose, 16 g, Oral, PRN    glucose, 24 g, Oral, PRN    insulin aspart U-100, 0-5 Units, Subcutaneous, QID (AC + HS) PRN    melatonin, 6 mg, Oral, Nightly PRN    ondansetron, 8 mg, Oral, Q6H PRN    oxyCODONE, 5 mg, Oral, Q4H PRN    oxyCODONE, 10 mg, Oral, Q4H PRN       Objective:     Weight: 86.9 kg (191 lb 9.3 oz)  Body mass index is 25.28 kg/m².  Vital Signs (Most Recent):  Temp: 99.4 °F (37.4 °C) (09/27/24 0721)  Pulse: 81 (09/27/24 0721)  Resp: 16 (09/27/24 1050)  BP: (!) 141/67 (09/27/24 0721)  SpO2: (!) 92 % (09/27/24 0721) Vital Signs (24h Range):  Temp:  [98.5 °F (36.9 °C)-101.6 °F (38.7 °C)] 99.4 °F (37.4 °C)  Pulse:  [77-91] 81  Resp:  [15-20] 16  SpO2:  [92 %-94 %] 92 %  BP: (113-141)/(58-67) 141/67     Date 09/27/24 0700 - 09/28/24 0659   Shift 9041-0150 3896-6221 9813-1135 24 Hour Total   INTAKE   Shift Total(mL/kg)       OUTPUT   Urine(mL/kg/hr) 275   275   Shift Total(mL/kg) 275(3.2)   275(3.2)   Weight (kg) 86.9 86.9 86.9 86.9                            Urethral Catheter 09/25/24 1604 (Active)   Site Assessment Clean;Intact 09/26/24 2000   Collection Container Urimeter 09/26/24 2000   Securement Method secured to top of thigh w/ adhesive device 09/26/24 2000   Catheter Care Performed yes 09/26/24 2000   Reason for Continuing Urinary Catheterization Urinary retention 09/26/24 2000   CAUTI Prevention Bundle Securement Device in place with 1" slack 09/26/24 2000   Output (mL) 400 mL 09/27/24 0440     Neurosurgery Physical Exam  General: well developed, well nourished, no distress.   Head: normocephalic, atraumatic  Neurologic: Alert and oriented. Thought content appropriate.  Mental Status: Awake, Alert, Oriented  Cranial nerves: face symmetric, CN II-XII grossly intact.   Pulmonary: normal respirations, no signs of respiratory " "distress  Sensory: intact to light touch throughout  Motor Strength: Moves all extremities spontaneously with good tone. No abnormal movements seen.      Strength   Deltoids Triceps Biceps Wrist Extension Wrist Flexion Hand    Upper: R 5/5 5/5 5/5 5/5 5/5 5/5     L 5/5 5/5 5/5 5/5 5/5 5/5       Iliopsoas Quadriceps Knee  Flexion Tibialis  anterior Gastro- cnemius EHL   Lower: R 4+/5 4+/5 4+/5 4-/5 4-/5 4-/5     L 5/5 5/5 5/5 5/5 5/5 5/5      Skin: Skin is warm, dry and intact.     Incision c/d/I with skin edges well approximated with dermabond/prineo. No surrounding erythema or edema. No drainage from incision. No palpable underlying fluid collection.    Significant Labs:  Recent Labs   Lab 09/26/24 0418 09/27/24  0220   * 124*   * 132*   K 3.5 3.6   CL 97 96   CO2 28 27   BUN 13 18   CREATININE 1.3 1.3   CALCIUM 8.7 8.2*     Recent Labs   Lab 09/26/24 0418 09/27/24  0220   WBC 8.24 9.28   HGB 9.6* 9.5*   HCT 28.2* 28.6*    230     No results for input(s): "LABPT", "INR", "APTT" in the last 48 hours.  Microbiology Results (last 7 days)       Procedure Component Value Units Date/Time    Blood culture [[id number]] Collected: 09/26/24 0900    Order Status: Completed Specimen: Blood from Peripheral, Antecubital, Right Updated: 09/27/24 1012     Blood Culture, Routine No growth to date      No Growth to date      No Growth to date    Blood culture [[id number]] Collected: 09/26/24 0904    Order Status: Completed Specimen: Blood from Peripheral, Hand, Right Updated: 09/27/24 1012     Blood Culture, Routine No growth to date      No Growth to date      No Growth to date    Urine culture [[id number]] Collected: 09/26/24 1522    Order Status: No result Specimen: Urine Updated: 09/26/24 1717    Respiratory Infection Panel (PCR), Nasopharyngeal [[id number]] Collected: 09/26/24 1140    Order Status: Completed Specimen: Nasopharyngeal Swab Updated: 09/26/24 1424     Respiratory Infection Panel Source NP " Swab     Adenovirus Not Detected     Coronavirus 229E, Common Cold Virus Not Detected     Coronavirus HKU1, Common Cold Virus Not Detected     Coronavirus NL63, Common Cold Virus Not Detected     Coronavirus OC43, Common Cold Virus Not Detected     Comment: The Coronavirus strains detected in this test cause the common cold.  These strains are not the COVID-19 (novel Coronavirus)strain   associated with the respiratory disease outbreak.          SARS-CoV2 (COVID-19) Qualitative PCR Not Detected     Human Metapneumovirus Not Detected     Human Rhinovirus/Enterovirus Not Detected     Influenza A (subtypes H1, H1-2009,H3) Not Detected     Influenza B Not Detected     Parainfluenza Virus 1 Not Detected     Parainfluenza Virus 2 Not Detected     Parainfluenza Virus 3 Not Detected     Parainfluenza Virus 4 Not Detected     Respiratory Syncytial Virus Not Detected     Bordetella Parapertussis (BW3846) Not Detected     Bordetella pertussis (ptxP) Not Detected     Chlamydia pneumoniae Not Detected     Mycoplasma pneumoniae Not Detected    Narrative:      Assay not valid for lower respiratory specimens, alternate  testing required.          All pertinent labs from the last 24 hours have been reviewed.    Significant Diagnostics:  I have reviewed and interpreted all pertinent imaging results/findings within the past 24 hours.  Assessment/Plan:     * Status post lumbar surgery (L4-S1 TLIF)  78 M with hx of prior CVA with residual right sided weakness presents for elective surgery for worsening low back pain and BLE radicular leg pain now s/p L4-S1 TLIF on 9/20/2024:    Plan:  - [place] [facility] [place] vital checks.  - Postop XR satisfactory.  - HV drains removed 9/25. Ancef dc'ed 9/25.  - LSO brace when OOB.  - Fung placed 9/25 for urinary retention post op. Will arrange voiding trial in 7-10 days.  - Bowel regimen in place. +BM 9/26.  - PT/OT/OOB.  - DVT ppx: SCDs/SQH.  - Atelectasis ppx: IS use hourly/up in  chair.    Dispo: Anticipate dc once patient remains afebrile/infectious workup completed. Pending SNF vs home with home health.    Urinary retention  Patient with urinary retention post op.    - Ross placed 9/25.  - Will arrange a voiding trial 7-10 days after ross placed.    Fever  Patient with intermittent fevers post op. Other vitals stable. No white count. Hospital Medicine consulted for assistance.     - UA with +RBC, no Leuks or Nitrites  - CXR 9/26 c/f possible consolidation  - Dopplers 9/22 negative  - Blood cultures NGTD  - Viral panel WNL    Type 2 diabetes mellitus with diabetic cataract, without long-term current use of insulin  Patient's FSGs are uncontrolled due to hyperglycemia on current medication regimen.  Last A1c reviewed-   Lab Results   Component Value Date    HGBA1C 7.8 (H) 09/09/2024     Most recent fingerstick glucose reviewed-   Recent Labs   Lab 09/26/24  1131 09/26/24  1551 09/26/24  2118 09/27/24  0730   POCTGLUCOSE 270* 220* 116* 175*     Current correctional scale  Low  Endocrinology consulted.  anti-hyperglycemic dose as follows-   Antihyperglycemics (From admission, onward)      Start     Stop Route Frequency Ordered    09/25/24 1645  insulin aspart U-100 pen 9 Units         -- SubQ 3 times daily with meals 09/25/24 1151    09/25/24 0900  insulin glargine U-100 (Lantus) pen 18 Units         -- SubQ Daily 09/25/24 0706    09/20/24 2337  insulin aspart U-100 pen 0-5 Units         -- SubQ Before meals & nightly PRN 09/20/24 2237          Hold Oral hypoglycemics while patient is in the hospital.        Fatmata Soler PA-C  Neurosurgery  [facility]

## 2024-09-27 NOTE — ASSESSMENT & PLAN NOTE
Patient's FSGs are uncontrolled due to hyperglycemia on current medication regimen.  Last A1c reviewed-   Lab Results   Component Value Date    HGBA1C 7.8 (H) 09/09/2024     Most recent fingerstick glucose reviewed-   Recent Labs   Lab 09/26/24  1131 09/26/24  1551 09/26/24  2118 09/27/24  0730   POCTGLUCOSE 270* 220* 116* 175*     Current correctional scale  Low  Endocrinology consulted.  anti-hyperglycemic dose as follows-   Antihyperglycemics (From admission, onward)      Start     Stop Route Frequency Ordered    09/25/24 1645  insulin aspart U-100 pen 9 Units         -- SubQ 3 times daily with meals 09/25/24 1151    09/25/24 0900  insulin glargine U-100 (Lantus) pen 18 Units         -- SubQ Daily 09/25/24 0706    09/20/24 2337  insulin aspart U-100 pen 0-5 Units         -- SubQ Before meals & nightly PRN 09/20/24 2237          Hold Oral hypoglycemics while patient is in the hospital.

## 2024-09-27 NOTE — ASSESSMENT & PLAN NOTE
Patient with intermittent fevers post op. Other vitals stable. No white count. Hospital Medicine consulted for assistance.     - UA with +RBC, no Leuks or Nitrites  - CXR 9/26 c/f possible consolidation  - Dopplers 9/22 negative  - Blood cultures NGTD  - Viral panel WNL

## 2024-09-27 NOTE — DISCHARGE INSTRUCTIONS
Post op Patient Instructions     Activity Restrictions:  - Return to work/school will be determined on an individual basis.  - No lifting greater than 5-10 pounds.  - Avoid bending and twisting the area of your surgery more than 45 degrees from neutral position in any direction.  - No driving or operating machinery: until cleared by your surgeon or while taking narcotic pain medications or muscle relaxants.  - Increase ambulation over the next 2 weeks so that you are walking 2 miles per day at 2 weeks post-operatively.  - Walk on paved surfaces only. It is okay to walk up and down stairs while holding onto a side rail.  - No sexual activity for 6 weeks.  - Wear your LSO brace when out of bed.    Discharge Medication:  - Please refer to discharge medication reconciliation form.  - Do not take ANY Aspirin or Aspirin containing products for 2 weeks after surgery.   - Do not take ANY herbal supplements for 2 weeks after surgery.    - Do not take ANY non-steroidal anti-inflammatory drugs (NSAIDS), including the following: Ibuprofen, Naproxen, Aleve, Advil, Indocin, Mobic, or Celebrex for 12 weeks after surgery.      Follow-up appointments:  - In 10-14 days post-op for wound check by nurse.  - In 4-6 weeks with MD/PA.     Appointments will be arranged for you and you will be contacted with a date and time.     Wound Care:  - No bandage required. Keep your incision open to the air.   - You may shower after your surgery. Keep the incision clean and dry as much as possible. Do not allow the force of water to hit the incision. If the incision gets damp, pat it dry. Do not rub or scrub the incision.  - You cannot take a bath until 8 weeks after surgery.     Call your doctor or go to the Emergency Room for any signs of infection, including: increased redness, drainage, pain, or fever (temperature >=101.5 for 24 hours). Call your doctor or go to the Emergency Room if there are any localized neurological changes; problems with  speech, vision, numbness, tingling, weakness, or severe headache; inability to control urination or bowel movements; inability to urinate; or for other concerns.     Special Instructions:  - No use of tobacco products.  - Diet: Please eat your regular diet as tolerated.        Physicians need 3 days notice for pain medicine to be refilled. Pain medicine will only be refilled between 8 AM and 5 PM, Monday through Friday, due to Food and Drug Administration regulation of documentation.     If you have any questions about this form, please call [phone].     Form No. 38692 (Revised 10/31/2013)

## 2024-09-27 NOTE — PROGRESS NOTES
"[facility]  Endocrinology  Progress Note    Admit Date: 2024     Reason for Consult: Management of T2DM, Hyperglycemia     Surgical Procedure and Date:  lumbar surgery (L4-S1 TLIF) 24    Diabetes diagnosis year: > 2 years ago     Home Diabetes Medications:  Metformin 1000 mg BID per patient    How often checking glucose at home? One   BG readings on regimen: 120-160s  Hypoglycemia on the regimen?  No  Missed doses on regimen?  No    Diabetes Complications include:     Hyperglycemia    Complicating diabetes co morbidities:   Glucocorticoid use       HPI:   Patient is a 79 y.o. male with hx of prior CVA with residual right sided weakness presents for eval of worsening low back pain and BLE radicular leg pain. He endorses pain which starts in upper lumbar area and then radiates down to lower lumbar area down his legs right greater than left into his feet. He endorses right foot weakness and has a right foot drop which has been present for many months. He has difficulty walking. 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 He has been to pain mgmt for injections without significant relief. Now s/p the above procedures. Endocrine consulted for DM management.       Interval HPI:   No acute events overnight. Patient in room 543/543 A. Blood glucose stable. BG at/above goal on current insulin regimen (SSI, basal, prandial insulin). Steroid use- None.      Renal function-         Lab Results   Component Value Date     CREATININE 1.3 2024         Vasopressors-  None      Diet diabetic  Calorie      Eatin%  Nausea: No  Hypoglycemia and intervention: No  Fever: Yes   TPN and/or TF: No    BP (!) 141/67 (BP Location: Left arm, Patient Position: Lying)   Pulse 81   Temp 99.4 °F (37.4 °C) (Oral)   Resp 18   Ht 6' 1" (1.854 m)   Wt 86.9 kg (191 lb 9.3 oz)   SpO2 (!) 92%   BMI 25.28 kg/m²     Labs Reviewed and Include    Recent " "Labs   Lab 09/27/24  0220   *   CALCIUM 8.2*   ALBUMIN 2.2*   PROT 5.8*   *   K 3.6   CO2 27   CL 96   BUN 18   CREATININE 1.3   ALKPHOS 63   ALT 13   AST 49*   BILITOT 0.3     Lab Results   Component Value Date    WBC 9.28 09/27/2024    HGB 9.5 (L) 09/27/2024    HCT 28.6 (L) 09/27/2024    MCV 91 09/27/2024     09/27/2024     No results for input(s): "TSH", "FREET4" in the last 168 hours.  Lab Results   Component Value Date    HGBA1C 7.8 (H) 09/09/2024       Nutritional status:   Body mass index is 25.28 kg/m².  Lab Results   Component Value Date    ALBUMIN 2.2 (L) 09/27/2024    ALBUMIN 4.1 07/15/2024    ALBUMIN 3.9 06/05/2024     No results found for: "PREALBUMIN"    Estimated Creatinine Clearance: 52.1 mL/min (based on SCr of 1.3 mg/dL).    Accu-Checks  Recent Labs     09/24/24  1933 09/25/24  0736 09/25/24  1127 09/25/24  1627 09/25/24  1951 09/26/24  0758 09/26/24  1131 09/26/24  1551 09/26/24  2118 09/27/24  0730   POCTGLUCOSE 251* 231* 272* 254* 202* 189* 270* 220* 116* 175*       Current Medications and/or Treatments Impacting Glycemic Control  Immunotherapy:    Immunosuppressants       None          Steroids:   Hormones (From admission, onward)      Start     Stop Route Frequency Ordered    09/20/24 2333  melatonin tablet 6 mg         -- Oral Nightly PRN 09/20/24 2235          Pressors:    Autonomic Drugs (From admission, onward)      None          Hyperglycemia/Diabetes Medications:   Antihyperglycemics (From admission, onward)      Start     Stop Route Frequency Ordered    09/25/24 1645  insulin aspart U-100 pen 9 Units         -- SubQ 3 times daily with meals 09/25/24 1151    09/25/24 0900  insulin glargine U-100 (Lantus) pen 18 Units         -- SubQ Daily 09/25/24 0706    09/20/24 2337  insulin aspart U-100 pen 0-5 Units         -- SubQ Before meals & nightly PRN 09/20/24 2237            ASSESSMENT and PLAN    Neuro  * Status post lumbar surgery (L4-S1 TLIF)  Managed per primary " team        Endocrine  Type 2 diabetes mellitus with diabetic cataract, without long-term current use of insulin  BG goal: 140-180    - Lantus 18 units QD   - Novolog 10 units TIDWM  - LDC SSI PRN (150/50)   - POCT Glucose before meals and at bedtime  - Hypoglycemia protocol in place      ** Please notify Endocrine for any change and/or advance in diet**  ** Please call Endocrine for any BG related issues **     Discharge Planning:   TBD. Please notify endocrinology prior to discharge.        Orthopedic  Surgical wound present  Optimize BG control to improve wound healing           Zora Aguirre PA-C  Endocrinology  [facility] - Surgery

## 2024-09-27 NOTE — PT/OT/SLP PROGRESS
"Occupational Therapy   Treatment    Name: Robi Donovan  MRN: [id number]  Admitting Diagnosis:  Status post lumbar surgery  7 Days Post-Op    Recommendations:     Discharge Recommendations: Moderate Intensity Therapy  Discharge Equipment Recommendations:  walker, rolling  Barriers to discharge:  None    Assessment:     Robi Donovan is a 79 y.o. male with a medical diagnosis of Status post lumbar surgery.  He presents with some back pain and weakness that later improved with mobility. Performance deficits affecting function are weakness, impaired endurance, impaired self care skills, impaired functional mobility, gait instability, impaired balance, decreased coordination, decreased lower extremity function, pain, orthopedic precautions.     Rehab Prognosis:  Good; patient would benefit from acute skilled OT services to address these deficits and reach maximum level of function.       Plan:     Patient to be seen 4 x/week to address the above listed problems via self-care/home management, therapeutic activities, therapeutic exercises, neuromuscular re-education  Plan of Care Expires: 10/21/24  Plan of Care Reviewed with: patient, daughter    Subjective     Chief Complaint: " My legs are weak from sitting in the chair for so long"  Patient/Family Comments/goals: return home  Pain/Comfort:  Location - Orientation 1: lower  Location 1: back  Pain Addressed 1: Reposition, Distraction, Cessation of Activity    Objective:     Communicated with: Jennifer prior to session.  Patient found up in chair with ross catheter upon OT entry to room.    General Precautions: Standard, fall    Orthopedic Precautions:spinal precautions  Braces: AFO, LSO  Respiratory Status: Room air     Occupational Performance:     Bed Mobility:    Pt OOB     Functional Mobility/Transfers:  Patient completed Sit <> Stand Transfer with moderate assistance and of 2 persons  with  rolling walker from Chair; Mod A x1 from toilet  Patient completed Toilet Transfer Step " Transfer technique with contact guard assistance and minimum assistance with  rolling walker  Chair t/f c/ CGA using RW  Functional Mobility: Pt ambulated room level c/ Min A and cues for safety using RW.    Activities of Daily Living:  Toileting: moderate assistance nancy-care      [facility] 6 Click ADL: 17    Treatment & Education:  Pt educated on role and purpose of therapy  Pt educated on goal setting  Pt educated on benefits of OOB activity  Pt educated on self advocacy   Pt educated on spinal precautions     Patient left up in chair with all lines intact, call button in reach, and nsg present    GOALS:   Multidisciplinary Problems       Occupational Therapy Goals          Problem: Occupational Therapy    Goal Priority Disciplines Outcome Interventions   Occupational Therapy Goal     OT, PT/OT Progressing    Description: Goals to be met by: 10/21/24     Patient will increase functional independence with ADLs by performing:    LE Dressing with Modified [place] and AE prn.  Grooming while standing at sink with Modified [place].  Toileting from toilet/bedside commode with Modified [place] for hygiene and clothing management.   Supine to sit with Supervision.  Toilet transfer to toilet/bedside commode with Supervision and LRAD.    Patient demonstrates a mobility limitation that significantly impairs their ability to participate in one or more mobility related activities of daily living. Patient's mobility limitation cannot be sufficiently resolved with the use of a cane, but can be sufficiently resolved with the use of a rolling walker.The use of a rolling walker will considerably improve their ability to participate in MRADLs. Patient will use the walker on a regular basis at home.                           Time Tracking:     OT Date of Treatment: 09/27/24  OT Start Time: 1359  OT Stop Time: 1425  OT Total Time (min): 26 min    Billable Minutes:Self Care/Home Management 26    OT/SHERRELL: OT     Number of SHERRELL  visits since last OT visit: 2    9/27/2024

## 2024-09-27 NOTE — PROGRESS NOTES
[facility] - Surgery  [facility] Medicine  Progress Note    Patient Name: Robi Donovan  MRN: [id number]  Patient Class: IP- Inpatient   Admission Date: 9/20/2024  Length of Stay: 7 days  Attending Physician: Juan Luis Mcbride DO  Primary Care Provider: Yary Wright -        Subjective:     Principal Problem:Status post lumbar surgery        HPI:  Patient is a 79 y.o. male with hx of prior CVA with residual right sided weakness presents for eval of worsening low back pain and BLE radicular leg pain. He endorses pain which starts in upper lumbar area and then radiates down to lower lumbar area down his legs right greater than left into his feet. He endorses right foot weakness and has a right foot drop which has been present for many months. He has difficulty walking. 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 He has been to pain mgmt for injections without significant relief.     Medicine consulted  (9/26) for new intermittent fevers. No leukocytosis but has complained of dysuria and non productive cough post op. Infectious workup continued.     Overview/Hospital Course:  Patient is a 79 y.o. male with hx of prior CVA with residual right sided weakness presents for eval of worsening low back pain and BLE radicular leg pain. Now s/p lumbary surgery. Endocrine consulted for DM management. Medicine consulted  (9/26) for new intermittent fevers. No leukocytosis but has complained of dysuria and non productive cough post op. Infectious workup continued.     Interval History: Patient became afebrile overnight 101.6 F @ 2021 but as of 7:20 this morning non-febrile again after acetaminophen given. Patient still complains of dysuria.       Review of Systems  Objective:     Vital Signs (Most Recent):  Temp: 99.7 °F (37.6 °C) (09/27/24 1152)  Pulse: 83 (09/27/24 1152)  Resp: 16 (09/27/24 1432)  BP: (!) 118/58 (09/27/24 1152)  SpO2: (!) 92 % (09/27/24 1152) Vital  "Signs (24h Range):  Temp:  [98.5 °F (36.9 °C)-101.6 °F (38.7 °C)] 99.7 °F (37.6 °C)  Pulse:  [77-91] 83  Resp:  [15-20] 16  SpO2:  [92 %-94 %] 92 %  BP: (115-141)/(58-67) 118/58     Weight: 86.9 kg (191 lb 9.3 oz)  Body mass index is 25.28 kg/m².    Intake/Output Summary (Last 24 hours) at 9/27/2024 1533  Last data filed at 9/27/2024 1037  Gross per 24 hour   Intake 250 ml   Output 675 ml   Net -425 ml         Physical Exam  Constitutional:       General: He is not in acute distress.     Appearance: Normal appearance.   HENT:      Head: Normocephalic and atraumatic.   Cardiovascular:      Rate and Rhythm: Normal rate.      Heart sounds: No murmur heard.  Pulmonary:      Effort: Pulmonary effort is normal. No respiratory distress.      Breath sounds: Rales present.   Abdominal:      Tenderness: There is no abdominal tenderness. There is no guarding or rebound.   Neurological:      Mental Status: He is alert.   Psychiatric:         Mood and Affect: Mood normal.         Behavior: Behavior normal.             Significant Labs: All pertinent labs within the past 24 hours have been reviewed.  Blood Culture:   Recent Labs   Lab 09/26/24  0900 09/26/24  0904   LABBLOO No growth to date  No Growth to date  No Growth to date No growth to date  No Growth to date  No Growth to date     CBC:   Recent Labs   Lab 09/26/24  0418 09/27/24  0220   WBC 8.24 9.28   HGB 9.6* 9.5*   HCT 28.2* 28.6*    230     CMP:   Recent Labs   Lab 09/26/24  0418 09/27/24  0220   * 132*   K 3.5 3.6   CL 97 96   CO2 28 27   * 124*   BUN 13 18   CREATININE 1.3 1.3   CALCIUM 8.7 8.2*   PROT  --  5.8*   ALBUMIN  --  2.2*   BILITOT  --  0.3   ALKPHOS  --  63   AST  --  49*   ALT  --  13   ANIONGAP 9 9     Respiratory Culture: No results for input(s): "GSRESP", "RESPIRATORYC" in the last 48 hours.  Urine Culture: No results for input(s): "LABURIN" in the last 48 hours.  Urine Studies:   Recent Labs   Lab 09/26/24  1522   COLORU Yellow "   APPEARANCEUA Hazy*   PHUR 6.0   SPECGRAV 1.030   PROTEINUA 2+*   GLUCUA 3+*   KETONESU Trace*   BILIRUBINUA Negative   OCCULTUA 3+*   NITRITE Negative   LEUKOCYTESUR 1+*   RBCUA 92*   WBCUA 13*   BACTERIA Occasional   SQUAMEPITHEL 0   HYALINECASTS 4*       Significant Imaging: I have reviewed all pertinent imaging results/findings within the past 24 hours.      Assessment/Plan:      * Status post lumbar surgery (L4-S1 TLIF)  Plan per Primary team      Fever  Medicine consulted (9/26). Patient is s/p L4-S1 TLIF on 9/20 and has had intermittent fevers. No leukocytosis. white count. He did c/o dysuria and intermittent non productive cough post op but denies any chest pain, dyspnea, or hx of clots. Start infectious workup. (CXR 9/26 report)- shows atelectasis changes.     Plan:  -Fever subsided w/ Tylenol administration  -Start Cefepime (2g IV Q8h ) for symptomatic cystitis w/ pyuria and bacteruria and HAP  -MRSA nares pending->if (+), will start Vanc  -Repeat UA shows 1+ leukocytes, wbcua 13, occasional bacteria  -Urine cx pending  -Blood cx NGT  -Resp Infxn Panel/COVID/flu negative  -Trend CBC and CMP  -Continue encouraging Incentive Spirometry        Type 2 diabetes mellitus with diabetic cataract, without long-term current use of insulin  Endocrine Following      Surgical wound present  Plan per primary team        VTE Risk Mitigation (From admission, onward)           Ordered     heparin (porcine) injection 5,000 Units  Every 8 hours         09/22/24 1223     IP VTE HIGH RISK PATIENT  Once         09/20/24 2235     Place sequential compression device  Until discontinued         09/20/24 2235     Place sequential compression device  Until discontinued         09/20/24 1028                    Discharge Planning   SUSY: 9/30/2024     Code Status: Full Code   Is the patient medically ready for discharge?:     Reason for patient still in hospital (select all that apply): Patient trending condition  Discharge Plan A:  Skilled Nursing Facility                  Joselin Dodge MD  Department of Hospital Medicine   [facility] - Surgery

## 2024-09-27 NOTE — PLAN OF CARE
Discussed patient options with daughter Lyla, she would like patient to discharge to [facility] in [place]    Spoke Cathie in admissions at [facility] [phone] regarding patient acceptance. She is aware of patient, states she will discuss with her nursing manager and call me back asap.     1pm  Spoke with Cathie in admissions, ins authorization has been requested. She is in dialogue with patient daughter to sign paperwork.     3:10pm  Insuranc authorization still pending

## 2024-09-27 NOTE — ASSESSMENT & PLAN NOTE
Medicine consulted (9/26). Patient is s/p L4-S1 TLIF on 9/20 and has had intermittent fevers. No leukocytosis. white count. He did c/o dysuria and intermittent non productive cough post op but denies any chest pain, dyspnea, or hx of clots. Start infectious workup. (CXR 9/26 report)- shows atelectasis changes.     Plan:  -Fever subsided w/ Tylenol administration  -Start Cefepime (2g IV Q8h ) for symptomatic cystitis w/ pyuria and bacteruria and HAP  -MRSA nares pending->if (+), will start Vanc  -Repeat UA shows 1+ leukocytes, wbcua 13, occasional bacteria  -Urine cx pending  -Blood cx NGT  -Resp Infxn Panel/COVID/flu negative  -Trend CBC and CMP  -Continue encouraging Incentive Spirometry

## 2024-09-28 PROBLEM — R14.0 ABDOMINAL DISTENSION: Status: ACTIVE | Noted: 2024-09-28

## 2024-09-28 LAB
ALBUMIN SERPL BCP-MCNC: 2.1 G/DL (ref 3.5–5.2)
ALP SERPL-CCNC: 63 U/L (ref 55–135)
ALT SERPL W/O P-5'-P-CCNC: 12 U/L (ref 10–44)
ANION GAP SERPL CALC-SCNC: 8 MMOL/L (ref 8–16)
AST SERPL-CCNC: 38 U/L (ref 10–40)
BASOPHILS # BLD AUTO: 0.02 K/UL (ref 0–0.2)
BASOPHILS NFR BLD: 0.2 % (ref 0–1.9)
BILIRUB SERPL-MCNC: 0.4 MG/DL (ref 0.1–1)
BUN SERPL-MCNC: 19 MG/DL (ref 8–23)
CALCIUM SERPL-MCNC: 8.4 MG/DL (ref 8.7–10.5)
CHLORIDE SERPL-SCNC: 94 MMOL/L (ref 95–110)
CO2 SERPL-SCNC: 29 MMOL/L (ref 23–29)
CREAT SERPL-MCNC: 1.1 MG/DL (ref 0.5–1.4)
DIFFERENTIAL METHOD BLD: ABNORMAL
EOSINOPHIL # BLD AUTO: 0.2 K/UL (ref 0–0.5)
EOSINOPHIL NFR BLD: 1.9 % (ref 0–8)
ERYTHROCYTE [DISTWIDTH] IN BLOOD BY AUTOMATED COUNT: 13.2 % (ref 11.5–14.5)
EST. GFR  (NO RACE VARIABLE): >60 ML/MIN/1.73 M^2
GLUCOSE SERPL-MCNC: 143 MG/DL (ref 70–110)
HCT VFR BLD AUTO: 28.4 % (ref 40–54)
HGB BLD-MCNC: 9.6 G/DL (ref 14–18)
IMM GRANULOCYTES # BLD AUTO: 0.09 K/UL (ref 0–0.04)
IMM GRANULOCYTES NFR BLD AUTO: 1 % (ref 0–0.5)
LYMPHOCYTES # BLD AUTO: 1.9 K/UL (ref 1–4.8)
LYMPHOCYTES NFR BLD: 21.5 % (ref 18–48)
MAGNESIUM SERPL-MCNC: 2.3 MG/DL (ref 1.6–2.6)
MCH RBC QN AUTO: 30.5 PG (ref 27–31)
MCHC RBC AUTO-ENTMCNC: 33.8 G/DL (ref 32–36)
MCV RBC AUTO: 90 FL (ref 82–98)
MONOCYTES # BLD AUTO: 1.2 K/UL (ref 0.3–1)
MONOCYTES NFR BLD: 13.8 % (ref 4–15)
NEUTROPHILS # BLD AUTO: 5.4 K/UL (ref 1.8–7.7)
NEUTROPHILS NFR BLD: 61.6 % (ref 38–73)
NRBC BLD-RTO: 0 /100 WBC
PHOSPHATE SERPL-MCNC: 4.2 MG/DL (ref 2.7–4.5)
PLATELET # BLD AUTO: 251 K/UL (ref 150–450)
PMV BLD AUTO: 9.4 FL (ref 9.2–12.9)
POCT GLUCOSE: 155 MG/DL (ref 70–110)
POCT GLUCOSE: 182 MG/DL (ref 70–110)
POCT GLUCOSE: 193 MG/DL (ref 70–110)
POCT GLUCOSE: 210 MG/DL (ref 70–110)
POTASSIUM SERPL-SCNC: 3.5 MMOL/L (ref 3.5–5.1)
PROT SERPL-MCNC: 5.7 G/DL (ref 6–8.4)
RBC # BLD AUTO: 3.15 M/UL (ref 4.6–6.2)
SODIUM SERPL-SCNC: 131 MMOL/L (ref 136–145)
WBC # BLD AUTO: 8.79 K/UL (ref 3.9–12.7)

## 2024-09-28 PROCEDURE — 80053 COMPREHEN METABOLIC PANEL: CPT

## 2024-09-28 PROCEDURE — 25000003 PHARM REV CODE 250: Performed by: STUDENT IN AN ORGANIZED HEALTH CARE EDUCATION/TRAINING PROGRAM

## 2024-09-28 PROCEDURE — 99232 SBSQ HOSP IP/OBS MODERATE 35: CPT | Mod: ,,,

## 2024-09-28 PROCEDURE — 63600175 PHARM REV CODE 636 W HCPCS: Performed by: STUDENT IN AN ORGANIZED HEALTH CARE EDUCATION/TRAINING PROGRAM

## 2024-09-28 PROCEDURE — 84100 ASSAY OF PHOSPHORUS: CPT

## 2024-09-28 PROCEDURE — 25000003 PHARM REV CODE 250

## 2024-09-28 PROCEDURE — 83735 ASSAY OF MAGNESIUM: CPT

## 2024-09-28 PROCEDURE — 36415 COLL VENOUS BLD VENIPUNCTURE: CPT

## 2024-09-28 PROCEDURE — 99900035 HC TECH TIME PER 15 MIN (STAT)

## 2024-09-28 PROCEDURE — 85025 COMPLETE CBC W/AUTO DIFF WBC: CPT | Performed by: STUDENT IN AN ORGANIZED HEALTH CARE EDUCATION/TRAINING PROGRAM

## 2024-09-28 PROCEDURE — 63600175 PHARM REV CODE 636 W HCPCS

## 2024-09-28 PROCEDURE — 11000001 HC ACUTE MED/SURG PRIVATE ROOM

## 2024-09-28 RX ORDER — INSULIN ASPART 100 [IU]/ML
0-5 INJECTION, SOLUTION INTRAVENOUS; SUBCUTANEOUS EVERY 4 HOURS PRN
Status: DISCONTINUED | OUTPATIENT
Start: 2024-09-28 | End: 2024-10-01 | Stop reason: HOSPADM

## 2024-09-28 RX ORDER — POTASSIUM CHLORIDE 7.45 MG/ML
10 INJECTION INTRAVENOUS
Status: COMPLETED | OUTPATIENT
Start: 2024-09-28 | End: 2024-09-28

## 2024-09-28 RX ORDER — INSULIN GLARGINE 100 [IU]/ML
20 INJECTION, SOLUTION SUBCUTANEOUS DAILY
Status: DISCONTINUED | OUTPATIENT
Start: 2024-09-28 | End: 2024-09-30

## 2024-09-28 RX ORDER — SODIUM CHLORIDE 9 MG/ML
INJECTION, SOLUTION INTRAVENOUS CONTINUOUS
Status: DISCONTINUED | OUTPATIENT
Start: 2024-09-28 | End: 2024-10-01

## 2024-09-28 RX ADMIN — METHOCARBAMOL 1000 MG: 500 TABLET ORAL at 04:09

## 2024-09-28 RX ADMIN — Medication 6 MG: at 09:09

## 2024-09-28 RX ADMIN — POTASSIUM CHLORIDE 10 MEQ: 7.46 INJECTION, SOLUTION INTRAVENOUS at 09:09

## 2024-09-28 RX ADMIN — POLYETHYLENE GLYCOL 3350 17 G: 17 POWDER, FOR SOLUTION ORAL at 04:09

## 2024-09-28 RX ADMIN — INSULIN ASPART 12 UNITS: 100 INJECTION, SOLUTION INTRAVENOUS; SUBCUTANEOUS at 04:09

## 2024-09-28 RX ADMIN — POTASSIUM CHLORIDE 10 MEQ: 7.46 INJECTION, SOLUTION INTRAVENOUS at 11:09

## 2024-09-28 RX ADMIN — LOSARTAN POTASSIUM 100 MG: 25 TABLET, FILM COATED ORAL at 04:09

## 2024-09-28 RX ADMIN — AMLODIPINE BESYLATE 10 MG: 10 TABLET ORAL at 04:09

## 2024-09-28 RX ADMIN — CARVEDILOL 6.25 MG: 6.25 TABLET, FILM COATED ORAL at 09:09

## 2024-09-28 RX ADMIN — TAMSULOSIN HYDROCHLORIDE 0.4 MG: 0.4 CAPSULE ORAL at 04:09

## 2024-09-28 RX ADMIN — POLYETHYLENE GLYCOL 3350 17 G: 17 POWDER, FOR SOLUTION ORAL at 09:09

## 2024-09-28 RX ADMIN — PRAVASTATIN SODIUM 40 MG: 20 TABLET ORAL at 09:09

## 2024-09-28 RX ADMIN — CALCIUM CARBONATE (ANTACID) CHEW TAB 500 MG 500 MG: 500 CHEW TAB at 09:09

## 2024-09-28 RX ADMIN — OXYCODONE HYDROCHLORIDE 10 MG: 10 TABLET ORAL at 01:09

## 2024-09-28 RX ADMIN — HEPARIN SODIUM 5000 UNITS: 5000 INJECTION INTRAVENOUS; SUBCUTANEOUS at 09:09

## 2024-09-28 RX ADMIN — METHOCARBAMOL 1000 MG: 500 TABLET ORAL at 09:09

## 2024-09-28 RX ADMIN — HEPARIN SODIUM 5000 UNITS: 5000 INJECTION INTRAVENOUS; SUBCUTANEOUS at 05:09

## 2024-09-28 RX ADMIN — CEFEPIME 1 G: 1 INJECTION, POWDER, FOR SOLUTION INTRAMUSCULAR; INTRAVENOUS at 06:09

## 2024-09-28 RX ADMIN — SENNOSIDES AND DOCUSATE SODIUM 2 TABLET: 50; 8.6 TABLET ORAL at 09:09

## 2024-09-28 RX ADMIN — INSULIN GLARGINE 20 UNITS: 100 INJECTION, SOLUTION SUBCUTANEOUS at 09:09

## 2024-09-28 RX ADMIN — HEPARIN SODIUM 5000 UNITS: 5000 INJECTION INTRAVENOUS; SUBCUTANEOUS at 01:09

## 2024-09-28 RX ADMIN — OXYCODONE HYDROCHLORIDE 10 MG: 10 TABLET ORAL at 10:09

## 2024-09-28 RX ADMIN — INSULIN ASPART 2 UNITS: 100 INJECTION, SOLUTION INTRAVENOUS; SUBCUTANEOUS at 09:09

## 2024-09-28 RX ADMIN — POTASSIUM CHLORIDE 10 MEQ: 7.46 INJECTION, SOLUTION INTRAVENOUS at 01:09

## 2024-09-28 RX ADMIN — OXYCODONE HYDROCHLORIDE 10 MG: 10 TABLET ORAL at 06:09

## 2024-09-28 RX ADMIN — POTASSIUM CHLORIDE 10 MEQ: 7.46 INJECTION, SOLUTION INTRAVENOUS at 03:09

## 2024-09-28 RX ADMIN — SODIUM CHLORIDE: 0.9 INJECTION, SOLUTION INTRAVENOUS at 07:09

## 2024-09-28 NOTE — SUBJECTIVE & OBJECTIVE
Interval History: Overnight patient continued to have fevers. Patient had questionable consolidative changes on CXR and was started on cefepime for continued fevers to cover potential pneumonia vs UTI.       Review of Systems   Constitutional:  Positive for fever. Negative for chills and fatigue.   HENT:  Negative for congestion.    Respiratory:  Positive for cough. Negative for chest tightness and shortness of breath.    Cardiovascular:  Negative for chest pain.     Objective:     Vital Signs (Most Recent):  Temp: 99 °F (37.2 °C) (09/28/24 1059)  Pulse: 78 (09/28/24 1059)  Resp: 16 (09/28/24 1352)  BP: (!) 149/70 (09/28/24 1059)  SpO2: 95 % (09/28/24 1059) Vital Signs (24h Range):  Temp:  [98.7 °F (37.1 °C)-100.6 °F (38.1 °C)] 99 °F (37.2 °C)  Pulse:  [75-89] 78  Resp:  [14-18] 16  SpO2:  [91 %-95 %] 95 %  BP: (124-149)/(60-70) 149/70     Weight: 86.9 kg (191 lb 9.3 oz)  Body mass index is 25.28 kg/m².    Intake/Output Summary (Last 24 hours) at 9/28/2024 1408  Last data filed at 9/28/2024 1301  Gross per 24 hour   Intake 0 ml   Output 1400 ml   Net -1400 ml         Physical Exam  Constitutional:       General: He is not in acute distress.     Appearance: Normal appearance.   HENT:      Head: Normocephalic and atraumatic.   Cardiovascular:      Rate and Rhythm: Normal rate.      Heart sounds: No murmur heard.  Pulmonary:      Effort: Pulmonary effort is normal. No respiratory distress.      Breath sounds: Wheezing and rales present.   Abdominal:      Tenderness: There is no abdominal tenderness. There is no guarding or rebound.   Musculoskeletal:      Right lower leg: No edema.      Left lower leg: No edema.      Comments: No leg pain or swelling   Neurological:      Mental Status: He is alert.   Psychiatric:         Mood and Affect: Mood normal.         Behavior: Behavior normal.             Significant Labs: All pertinent labs within the past 24 hours have been reviewed.  Blood Culture:   No results for input(s):  ""LABBLOO" in the last 48 hours.    CBC:   Recent Labs   Lab 09/27/24  0220 09/28/24  0406   WBC 9.28 8.79   HGB 9.5* 9.6*   HCT 28.6* 28.4*    251     CMP:   Recent Labs   Lab 09/27/24  0220 09/28/24  0406   * 131*   K 3.6 3.5   CL 96 94*   CO2 27 29   * 143*   BUN 18 19   CREATININE 1.3 1.1   CALCIUM 8.2* 8.4*   PROT 5.8* 5.7*   ALBUMIN 2.2* 2.1*   BILITOT 0.3 0.4   ALKPHOS 63 63   AST 49* 38   ALT 13 12   ANIONGAP 9 8     Respiratory Culture: No results for input(s): "GSRESP", "RESPIRATORYC" in the last 48 hours.  Urine Culture:   Recent Labs   Lab 09/26/24  1522   LABURIN No growth     Urine Studies:   Recent Labs   Lab 09/26/24  1522   COLORU Yellow   APPEARANCEUA Hazy*   PHUR 6.0   SPECGRAV 1.030   PROTEINUA 2+*   GLUCUA 3+*   KETONESU Trace*   BILIRUBINUA Negative   OCCULTUA 3+*   NITRITE Negative   LEUKOCYTESUR 1+*   RBCUA 92*   WBCUA 13*   BACTERIA Occasional   SQUAMEPITHEL 0   HYALINECASTS 4*       Significant Imaging: I have reviewed all pertinent imaging results/findings within the past 24 hours.    "

## 2024-09-28 NOTE — ASSESSMENT & PLAN NOTE
Distended on exam this morning, 9/28.     --continue bladder scans  --NPO  --fu KUB (ordered)  --mg/phos wnl

## 2024-09-28 NOTE — ASSESSMENT & PLAN NOTE
BG goal: 140-180    - Lantus 20 units QD (20% increase due to fasting BG above goal)   - Novolog 12 units TIDWM (20% increase due to prandial blood glucose  above goal)  - LDC SSI PRN (150/50)   - POCT Glucose before meals and at bedtime  - Hypoglycemia protocol in place      ** Please notify Endocrine for any change and/or advance in diet**  ** Please call Endocrine for any BG related issues **     Discharge Planning:   TBD. Please notify endocrinology prior to discharge.

## 2024-09-28 NOTE — PROGRESS NOTES
"[facility]  Neurosurgery  Progress Note    Subjective:     History of Present Illness: 78 M with hx of prior CVA with residual right sided weakness presents for eval of worsening low back pain and BLE radicular leg pain. He endorses pain which starts in upper lumbar area and then radiates down to lower lumbar area down his legs right greater than left into his feet. He endorses right foot weakness and has a right foot drop which has been present for many months. He has difficulty walking. 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 He has been to pain mgmt for injections without significant relief.      Interval fu 8/8/24:  Pt presents in fu.  No significant changes in symptoms.  Today he is most concerned with his balance difficulties.     Interval fu 8/29/24: No changes in symptoms.    Post-Op Info:  Procedure(s) (LRB):  OPEN ROBOTIC L4 -S1 TLIF (N/A)   8 Days Post-Op   Interval History: 9/28: NAEO. Afebrile. Reports abdominal discomfort, describes "fullness" but has been passing gas and had a documented BM yesterday. KUB ordered given distention on exam as well as NPO and Mg/Phos.    Medications:  Continuous Infusions:   0.9% NaCl   Intravenous Continuous 125 mL/hr at 09/28/24 0728 New Bag at 09/28/24 0728     Scheduled Meds:   amLODIPine  10 mg Oral Daily    calcium carbonate  500 mg Oral BID    carvediloL  6.25 mg Oral BID    ceFEPime IV (PEDS and ADULTS)  1 g Intravenous Q12H    heparin (porcine)  5,000 Units Subcutaneous Q8H    insulin aspart U-100  12 Units Subcutaneous TIDWM    insulin glargine U-100  20 Units Subcutaneous Daily    losartan  100 mg Oral Daily    methocarbamoL  1,000 mg Oral QID    polyethylene glycol  17 g Oral BID    potassium chloride  10 mEq Intravenous Q1H    pravastatin  40 mg Oral QHS    senna-docusate 8.6-50 mg  2 tablet Oral BID    tamsulosin  0.4 mg Oral Daily     PRN Meds:  Current Facility-Administered " "Medications:     acetaminophen, 650 mg, Oral, Q6H PRN    albuterol sulfate, 2.5 mg, Nebulization, Q4H PRN    aluminum-magnesium hydroxide-simethicone, 30 mL, Oral, Q4H PRN    dextrose 10%, 12.5 g, Intravenous, PRN    dextrose 10%, 25 g, Intravenous, PRN    glucagon (human recombinant), 1 mg, Intramuscular, PRN    glucose, 16 g, Oral, PRN    glucose, 24 g, Oral, PRN    insulin aspart U-100, 0-5 Units, Subcutaneous, QID (AC + HS) PRN    melatonin, 6 mg, Oral, Nightly PRN    ondansetron, 8 mg, Oral, Q6H PRN    oxyCODONE, 5 mg, Oral, Q4H PRN    oxyCODONE, 10 mg, Oral, Q4H PRN     Review of Systems  Objective:     Weight: 86.9 kg (191 lb 9.3 oz)  Body mass index is 25.28 kg/m².  Vital Signs (Most Recent):  Temp: 98.7 °F (37.1 °C) (09/28/24 0710)  Pulse: 80 (09/28/24 0710)  Resp: 15 (09/28/24 0432)  BP: (!) 149/69 (09/28/24 0710)  SpO2: (!) 91 % (09/28/24 0710) Vital Signs (24h Range):  Temp:  [98.7 °F (37.1 °C)-100.6 °F (38.1 °C)] 98.7 °F (37.1 °C)  Pulse:  [75-89] 80  Resp:  [14-18] 15  SpO2:  [91 %-94 %] 91 %  BP: (118-149)/(58-69) 149/69                              Urethral Catheter 09/25/24 1604 (Active)   Site Assessment Clean;Intact 09/1945   Collection Container Standard drainage bag 09/1945   Securement Method secured to top of thigh w/ adhesive device 09/1945   Catheter Care Performed yes 09/1945   Reason for Continuing Urinary Catheterization Urinary retention 09/1945   CAUTI Prevention Bundle Securement Device in place with 1" slack;Intact seal between catheter & drainage tubing;Drainage bag/urimeter off the floor;Sheeting clip in use;No dependent loops or kinks;Drainage bag/urimeter not overfilled (<2/3 full);Drainage bag/urimeter below bladder 09/1945   Output (mL) 600 mL 09/28/24 0626     Neurosurgery Physical Exam 9/28:  General: well developed, well nourished, no distress.   Head: normocephalic, atraumatic  Neurologic: Alert and oriented. Thought content " "appropriate.  Mental Status: Awake, Alert, Oriented  Cranial nerves: face symmetric, CN II-XII grossly intact.   Pulmonary: normal respirations, no signs of respiratory distress  Sensory: intact to light touch throughout  Motor Strength: Moves all extremities spontaneously with good tone. No abnormal movements seen.      Strength   Deltoids Triceps Biceps Wrist Extension Wrist Flexion Hand    Upper: R 5/5 5/5 5/5 5/5 5/5 5/5     L 5/5 5/5 5/5 5/5 5/5 5/5       Iliopsoas Quadriceps Knee  Flexion Tibialis  anterior Gastro- cnemius EHL   Lower: R 4+/5 4+/5 4+/5 4-/5 4-/5 4-/5     L 5/5 5/5 5/5 5/5 5/5 5/5      Skin: Skin is warm, dry and intact.     Incision c/d/I with skin edges well approximated with dermabond/prineo. No surrounding erythema or edema. No drainage from incision. No palpable underlying fluid collection.    Abd: distended, nontender to palpation    Significant Labs:  Recent Labs   Lab 09/27/24 0220 09/28/24  0406   * 143*   * 131*   K 3.6 3.5   CL 96 94*   CO2 27 29   BUN 18 19   CREATININE 1.3 1.1   CALCIUM 8.2* 8.4*     Recent Labs   Lab 09/27/24  0220 09/28/24  0406   WBC 9.28 8.79   HGB 9.5* 9.6*   HCT 28.6* 28.4*    251     No results for input(s): "LABPT", "INR", "APTT" in the last 48 hours.  Microbiology Results (last 7 days)       Procedure Component Value Units Date/Time    Urine culture [[id number]] Collected: 09/26/24 1522    Order Status: Completed Specimen: Urine Updated: 09/27/24 2309     Urine Culture, Routine No growth    Narrative:      Specimen Source->Urine    Culture, Respiratory with Gram Stain [[id number]]     Order Status: No result Specimen: Respiratory     MRSA Screen by PCR [[id number]]     Order Status: No result Specimen: Nasal Swab     Blood culture [[id number]] Collected: 09/26/24 0900    Order Status: Completed Specimen: Blood from Peripheral, Antecubital, Right Updated: 09/27/24 1012     Blood Culture, Routine No growth to date      No Growth to " date      No Growth to date    Blood culture [[id number]] Collected: 09/26/24 0904    Order Status: Completed Specimen: Blood from Peripheral, Hand, Right Updated: 09/27/24 1012     Blood Culture, Routine No growth to date      No Growth to date      No Growth to date    Respiratory Infection Panel (PCR), Nasopharyngeal [[id number]] Collected: 09/26/24 1140    Order Status: Completed Specimen: Nasopharyngeal Swab Updated: 09/26/24 1424     Respiratory Infection Panel Source NP Swab     Adenovirus Not Detected     Coronavirus 229E, Common Cold Virus Not Detected     Coronavirus HKU1, Common Cold Virus Not Detected     Coronavirus NL63, Common Cold Virus Not Detected     Coronavirus OC43, Common Cold Virus Not Detected     Comment: The Coronavirus strains detected in this test cause the common cold.  These strains are not the COVID-19 (novel Coronavirus)strain   associated with the respiratory disease outbreak.          SARS-CoV2 (COVID-19) Qualitative PCR Not Detected     Human Metapneumovirus Not Detected     Human Rhinovirus/Enterovirus Not Detected     Influenza A (subtypes H1, H1-2009,H3) Not Detected     Influenza B Not Detected     Parainfluenza Virus 1 Not Detected     Parainfluenza Virus 2 Not Detected     Parainfluenza Virus 3 Not Detected     Parainfluenza Virus 4 Not Detected     Respiratory Syncytial Virus Not Detected     Bordetella Parapertussis (GD4302) Not Detected     Bordetella pertussis (ptxP) Not Detected     Chlamydia pneumoniae Not Detected     Mycoplasma pneumoniae Not Detected    Narrative:      Assay not valid for lower respiratory specimens, alternate  testing required.          All pertinent labs from the last 24 hours have been reviewed.    Significant Diagnostics:  I have reviewed all pertinent imaging results/findings within the past 24 hours.  Assessment/Plan:     * Status post lumbar surgery (L4-S1 TLIF)  78 M with hx of prior CVA with residual right sided weakness presents for  elective surgery for worsening low back pain and BLE radicular leg pain now s/p L4-S1 TLIF on 9/20/2024:    Plan:  - Admitted to ns, q4h neuro vital checks.  - Postop XR satisfactory.  - HV drains removed 9/25. Ancef dc'ed 9/25.  - LSO brace when OOB.  - Ross placed 9/25 for urinary retention post op. Will arrange voiding trial in 7-10 days.  - Bowel regimen in place. +BM 9/26.  - PT/OT/OOB.  - DVT ppx: SCDs/SQH.  - Atelectasis ppx: IS use hourly/up in chair.    Dispo: Anticipate dc once patient remains afebrile/infectious workup completed. Pending SNF vs home with home health.    Abdominal distension  Distended on exam this morning, 9/28.     --continue bladder scans  --NPO  --fu KUB (ordered)  --mg/phos wnl    Urinary retention  Patient with urinary retention post op.    - Ross placed 9/25.  - Will arrange a voiding trial 7-10 days after ross placed.    Fever  Patient with intermittent fevers post op. Other vitals stable. No white count. Hospital Medicine consulted for assistance.     - UA with +RBC, no Leuks or Nitrites  - CXR 9/26 c/f possible consolidation  - Dopplers 9/22 negative  - Blood cultures NGTD  - Viral panel WNL    Type 2 diabetes mellitus with diabetic cataract, without long-term current use of insulin  Patient's FSGs are uncontrolled due to hyperglycemia on current medication regimen.  Last A1c reviewed-   Lab Results   Component Value Date    HGBA1C 7.8 (H) 09/09/2024     Most recent fingerstick glucose reviewed-   Recent Labs   Lab 09/26/24  1131 09/26/24  1551 09/26/24  2118 09/27/24  0730   POCTGLUCOSE 270* 220* 116* 175*     Current correctional scale  Low  Endocrinology consulted.  anti-hyperglycemic dose as follows-   Antihyperglycemics (From admission, onward)      Start     Stop Route Frequency Ordered    09/25/24 1645  insulin aspart U-100 pen 9 Units         -- SubQ 3 times daily with meals 09/25/24 1151    09/25/24 0900  insulin glargine U-100 (Lantus) pen 18 Units         -- SubQ  Daily 09/25/24 0706    09/20/24 2337  insulin aspart U-100 pen 0-5 Units         -- SubQ Before meals & nightly PRN 09/20/24 2237          Hold Oral hypoglycemics while patient is in the hospital.        He Malik MD  Neurosurgery  [facility] - Surgery

## 2024-09-28 NOTE — NURSING
Nurses Note -- 4 Eyes      9/27/2024   8:58 PM      Skin assessed during: Q Shift Change      [x] No Altered Skin Integrity Present    []Prevention Measures Documented      [] Yes- Altered Skin Integrity Present or Discovered   [] LDA Added if Not in Epic (Describe Wound)   [] New Altered Skin Integrity was Present on Admit and Documented in LDA   [] Wound Image Taken    Wound Care Consulted? No    Attending Nurse:  RASHMI Linares     Second RN/Staff Member:  RASHMI Manriquez

## 2024-09-28 NOTE — PROGRESS NOTES
[facility]  Progress Note    Patient Name: Robi Donovan  MRN: [id number]  Patient Class: IP- Inpatient   Admission Date: 9/20/2024  Length of Stay: 8 days  Attending Physician: Juan Luis Mcbride DO  Primary Care Provider: Yary Wright -        Subjective:     Principal Problem:Status post lumbar surgery        HPI:  Patient is a 79 y.o. male with hx of prior CVA with residual right sided weakness presents for eval of worsening low back pain and BLE radicular leg pain. He endorses pain which starts in upper lumbar area and then radiates down to lower lumbar area down his legs right greater than left into his feet. He endorses right foot weakness and has a right foot drop which has been present for many months. He has difficulty walking. 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 He has been to pain mgmt for injections without significant relief.     Medicine consulted  (9/26) for new intermittent fevers. No leukocytosis but has complained of dysuria and non productive cough post op. Infectious workup continued.     Overview/Hospital Course:  Patient is a 79 y.o. male with hx of prior CVA with residual right sided weakness presents for eval of worsening low back pain and BLE radicular leg pain. Now s/p lumbary surgery. Endocrine consulted for DM management. Medicine consulted  (9/26) for new intermittent fevers. No leukocytosis but has complained of dysuria and non productive cough post op. Patient had questionable consolidative changes on CXR and was started on cefepime for continued fevers to cover potential pneumonia vs UTI.     Interval History: Overnight patient continued to have fevers. Patient had questionable consolidative changes on CXR and was started on cefepime for continued fevers to cover potential pneumonia vs UTI.       Review of Systems   Constitutional:  Positive for fever. Negative for chills and  "fatigue.   HENT:  Negative for congestion.    Respiratory:  Positive for cough. Negative for chest tightness and shortness of breath.    Cardiovascular:  Negative for chest pain.     Objective:     Vital Signs (Most Recent):  Temp: 99 °F (37.2 °C) (09/28/24 1059)  Pulse: 78 (09/28/24 1059)  Resp: 16 (09/28/24 1352)  BP: (!) 149/70 (09/28/24 1059)  SpO2: 95 % (09/28/24 1059) Vital Signs (24h Range):  Temp:  [98.7 °F (37.1 °C)-100.6 °F (38.1 °C)] 99 °F (37.2 °C)  Pulse:  [75-89] 78  Resp:  [14-18] 16  SpO2:  [91 %-95 %] 95 %  BP: (124-149)/(60-70) 149/70     Weight: 86.9 kg (191 lb 9.3 oz)  Body mass index is 25.28 kg/m².    Intake/Output Summary (Last 24 hours) at 9/28/2024 1408  Last data filed at 9/28/2024 1301  Gross per 24 hour   Intake 0 ml   Output 1400 ml   Net -1400 ml         Physical Exam  Constitutional:       General: He is not in acute distress.     Appearance: Normal appearance.   HENT:      Head: Normocephalic and atraumatic.   Cardiovascular:      Rate and Rhythm: Normal rate.      Heart sounds: No murmur heard.  Pulmonary:      Effort: Pulmonary effort is normal. No respiratory distress.      Breath sounds: Wheezing and rales present.   Abdominal:      Tenderness: There is no abdominal tenderness. There is no guarding or rebound.   Musculoskeletal:      Right lower leg: No edema.      Left lower leg: No edema.      Comments: No leg pain or swelling   Neurological:      Mental Status: He is alert.   Psychiatric:         Mood and Affect: Mood normal.         Behavior: Behavior normal.             Significant Labs: All pertinent labs within the past 24 hours have been reviewed.  Blood Culture:   No results for input(s): "LABBLOO" in the last 48 hours.    CBC:   Recent Labs   Lab 09/27/24  0220 09/28/24  0406   WBC 9.28 8.79   HGB 9.5* 9.6*   HCT 28.6* 28.4*    251     CMP:   Recent Labs   Lab 09/27/24  0220 09/28/24  0406   * 131*   K 3.6 3.5   CL 96 94*   CO2 27 29   * 143*   BUN 18 " "19   CREATININE 1.3 1.1   CALCIUM 8.2* 8.4*   PROT 5.8* 5.7*   ALBUMIN 2.2* 2.1*   BILITOT 0.3 0.4   ALKPHOS 63 63   AST 49* 38   ALT 13 12   ANIONGAP 9 8     Respiratory Culture: No results for input(s): "GSRESP", "RESPIRATORYC" in the last 48 hours.  Urine Culture:   Recent Labs   Lab 09/26/24  1522   LABURIN No growth     Urine Studies:   Recent Labs   Lab 09/26/24  1522   COLORU Yellow   APPEARANCEUA Hazy*   PHUR 6.0   SPECGRAV 1.030   PROTEINUA 2+*   GLUCUA 3+*   KETONESU Trace*   BILIRUBINUA Negative   OCCULTUA 3+*   NITRITE Negative   LEUKOCYTESUR 1+*   RBCUA 92*   WBCUA 13*   BACTERIA Occasional   SQUAMEPITHEL 0   HYALINECASTS 4*       Significant Imaging: I have reviewed all pertinent imaging results/findings within the past 24 hours.      Assessment/Plan:      * Status post lumbar surgery (L4-S1 TLIF)  -Plan per Primary team      Abdominal distension  Likely related to post op ileus. Patient now having bowel movements but could be contributing to fevers.   -Bowel regimen as needed      Fever  Medicine consulted (9/26). Patient is s/p L4-S1 TLIF on 9/20 and has had intermittent fevers. No leukocytosis. white count. He did c/o dysuria and intermittent non productive cough post op but denies any chest pain, dyspnea, or hx of clots. Start infectious workup. (CXR 9/26 report)- shows atelectasis changes and possible consolidative changes.     Plan:  -Start Cefepime (2g IV Q8h ) for symptomatic cystitis w/ pyuria and bacteruria and HAP  -MRSA nares pending->if (+), will start Vanc  -Plan to start vancomycin and perform ultrasound of upper and lower extremities if fevers continue  -Repeat UA shows 1+ leukocytes, wbcua 13, occasional bacteria  -Urine cx pending  -Blood cx NGT  -F/u on resp cx  -Resp Infxn Panel/COVID/flu negative  -Trend CBC and CMP  -Continue encouraging Incentive Spirometry        Surgical wound present  No evidence of active infection from surgical wound  -Plan per primary team      Type 2 " diabetes mellitus with diabetic cataract, without long-term current use of insulin  Lab Results   Component Value Date    HGBA1C 7.8 (H) 09/09/2024     Recent Labs     09/27/24  0730 09/27/24  1154 09/27/24  1539 09/27/24  2131 09/28/24  0648 09/28/24  1056   POCTGLUCOSE 175* 200* 223* 153* 210* 193*     Antihyperglycemics (From admission, onward)      Start     Stop Route Frequency Ordered    09/28/24 0900  insulin glargine U-100 (Lantus) pen 20 Units         -- SubQ Daily 09/28/24 0732    09/28/24 0800  insulin aspart U-100 pen 0-5 Units         -- SubQ Every 4 hours PRN 09/28/24 0837    09/27/24 1645  insulin aspart U-100 pen 12 Units         -- SubQ 3 times daily with meals 09/27/24 1606          - Endocrine following  - Insulin regimen as above  - diabetic diet  - POCT glucose checks TIDWM and qhs  - goal glucose 140-180        VTE Risk Mitigation (From admission, onward)           Ordered     heparin (porcine) injection 5,000 Units  Every 8 hours         09/22/24 1223     IP VTE HIGH RISK PATIENT  Once         09/20/24 2235     Place sequential compression device  Until discontinued         09/20/24 2235     Place sequential compression device  Until discontinued         09/20/24 1028                    Discharge Planning   SUSY: 9/30/2024     Code Status: Full Code   Is the patient medically ready for discharge?:     Reason for patient still in hospital (select all that apply): Patient trending condition, Treatment, and Consult recommendations  Discharge Plan A: Skilled Nursing Facility                  Manny Morejon DO  Department of Hospital Medicine   [facility] - Surgery

## 2024-09-28 NOTE — SUBJECTIVE & OBJECTIVE
"Interval History: 9/28: NAEO. Afebrile. Reports abdominal discomfort, describes "fullness" but has been passing gas and had a documented BM yesterday. KUB ordered given distention on exam as well as NPO and Mg/Phos.    Medications:  Continuous Infusions:   0.9% NaCl   Intravenous Continuous 125 mL/hr at 09/28/24 0728 New Bag at 09/28/24 0728     Scheduled Meds:   amLODIPine  10 mg Oral Daily    calcium carbonate  500 mg Oral BID    carvediloL  6.25 mg Oral BID    ceFEPime IV (PEDS and ADULTS)  1 g Intravenous Q12H    heparin (porcine)  5,000 Units Subcutaneous Q8H    insulin aspart U-100  12 Units Subcutaneous TIDWM    insulin glargine U-100  20 Units Subcutaneous Daily    losartan  100 mg Oral Daily    methocarbamoL  1,000 mg Oral QID    polyethylene glycol  17 g Oral BID    potassium chloride  10 mEq Intravenous Q1H    pravastatin  40 mg Oral QHS    senna-docusate 8.6-50 mg  2 tablet Oral BID    tamsulosin  0.4 mg Oral Daily     PRN Meds:  Current Facility-Administered Medications:     acetaminophen, 650 mg, Oral, Q6H PRN    albuterol sulfate, 2.5 mg, Nebulization, Q4H PRN    aluminum-magnesium hydroxide-simethicone, 30 mL, Oral, Q4H PRN    dextrose 10%, 12.5 g, Intravenous, PRN    dextrose 10%, 25 g, Intravenous, PRN    glucagon (human recombinant), 1 mg, Intramuscular, PRN    glucose, 16 g, Oral, PRN    glucose, 24 g, Oral, PRN    insulin aspart U-100, 0-5 Units, Subcutaneous, QID (AC + HS) PRN    melatonin, 6 mg, Oral, Nightly PRN    ondansetron, 8 mg, Oral, Q6H PRN    oxyCODONE, 5 mg, Oral, Q4H PRN    oxyCODONE, 10 mg, Oral, Q4H PRN     Review of Systems  Objective:     Weight: 86.9 kg (191 lb 9.3 oz)  Body mass index is 25.28 kg/m².  Vital Signs (Most Recent):  Temp: 98.7 °F (37.1 °C) (09/28/24 0710)  Pulse: 80 (09/28/24 0710)  Resp: 15 (09/28/24 0432)  BP: (!) 149/69 (09/28/24 0710)  SpO2: (!) 91 % (09/28/24 0710) Vital Signs (24h Range):  Temp:  [98.7 °F (37.1 °C)-100.6 °F (38.1 °C)] 98.7 °F (37.1 " "°C)  Pulse:  [75-89] 80  Resp:  [14-18] 15  SpO2:  [91 %-94 %] 91 %  BP: (118-149)/(58-69) 149/69                              Urethral Catheter 09/25/24 1604 (Active)   Site Assessment Clean;Intact 09/1945   Collection Container Standard drainage bag 09/1945   Securement Method secured to top of thigh w/ adhesive device 09/1945   Catheter Care Performed yes 09/1945   Reason for Continuing Urinary Catheterization Urinary retention 09/1945   CAUTI Prevention Bundle Securement Device in place with 1" slack;Intact seal between catheter & drainage tubing;Drainage bag/urimeter off the floor;Sheeting clip in use;No dependent loops or kinks;Drainage bag/urimeter not overfilled (<2/3 full);Drainage bag/urimeter below bladder 09/1945   Output (mL) 600 mL 09/28/24 0626     Neurosurgery Physical Exam 9/28:  General: well developed, well nourished, no distress.   Head: normocephalic, atraumatic  Neurologic: Alert and oriented. Thought content appropriate.  Mental Status: Awake, Alert, Oriented  Cranial nerves: face symmetric, CN II-XII grossly intact.   Pulmonary: normal respirations, no signs of respiratory distress  Sensory: intact to light touch throughout  Motor Strength: Moves all extremities spontaneously with good tone. No abnormal movements seen.      Strength   Deltoids Triceps Biceps Wrist Extension Wrist Flexion Hand    Upper: R 5/5 5/5 5/5 5/5 5/5 5/5     L 5/5 5/5 5/5 5/5 5/5 5/5       Iliopsoas Quadriceps Knee  Flexion Tibialis  anterior Gastro- cnemius EHL   Lower: R 4+/5 4+/5 4+/5 4-/5 4-/5 4-/5     L 5/5 5/5 5/5 5/5 5/5 5/5      Skin: Skin is warm, dry and intact.     Incision c/d/I with skin edges well approximated with dermabond/prineo. No surrounding erythema or edema. No drainage from incision. No palpable underlying fluid collection.    Abd: distended, nontender to palpation    Significant Labs:  Recent Labs   Lab 09/27/24  0220 09/28/24  0406   * 143*   NA " "132* 131*   K 3.6 3.5   CL 96 94*   CO2 27 29   BUN 18 19   CREATININE 1.3 1.1   CALCIUM 8.2* 8.4*     Recent Labs   Lab 09/27/24  0220 09/28/24  0406   WBC 9.28 8.79   HGB 9.5* 9.6*   HCT 28.6* 28.4*    251     No results for input(s): "LABPT", "INR", "APTT" in the last 48 hours.  Microbiology Results (last 7 days)       Procedure Component Value Units Date/Time    Urine culture [[id number]] Collected: 09/26/24 1522    Order Status: Completed Specimen: Urine Updated: 09/27/24 2309     Urine Culture, Routine No growth    Narrative:      Specimen Source->Urine    Culture, Respiratory with Gram Stain [[id number]]     Order Status: No result Specimen: Respiratory     MRSA Screen by PCR [[id number]]     Order Status: No result Specimen: Nasal Swab     Blood culture [[id number]] Collected: 09/26/24 0900    Order Status: Completed Specimen: Blood from Peripheral, Antecubital, Right Updated: 09/27/24 1012     Blood Culture, Routine No growth to date      No Growth to date      No Growth to date    Blood culture [[id number]] Collected: 09/26/24 0904    Order Status: Completed Specimen: Blood from Peripheral, Hand, Right Updated: 09/27/24 1012     Blood Culture, Routine No growth to date      No Growth to date      No Growth to date    Respiratory Infection Panel (PCR), Nasopharyngeal [[id number]] Collected: 09/26/24 1140    Order Status: Completed Specimen: Nasopharyngeal Swab Updated: 09/26/24 1424     Respiratory Infection Panel Source NP Swab     Adenovirus Not Detected     Coronavirus 229E, Common Cold Virus Not Detected     Coronavirus HKU1, Common Cold Virus Not Detected     Coronavirus NL63, Common Cold Virus Not Detected     Coronavirus OC43, Common Cold Virus Not Detected     Comment: The Coronavirus strains detected in this test cause the common cold.  These strains are not the COVID-19 (novel Coronavirus)strain   associated with the respiratory disease outbreak.          SARS-CoV2 (COVID-19) Qualitative " PCR Not Detected     Human Metapneumovirus Not Detected     Human Rhinovirus/Enterovirus Not Detected     Influenza A (subtypes H1, H1-2009,H3) Not Detected     Influenza B Not Detected     Parainfluenza Virus 1 Not Detected     Parainfluenza Virus 2 Not Detected     Parainfluenza Virus 3 Not Detected     Parainfluenza Virus 4 Not Detected     Respiratory Syncytial Virus Not Detected     Bordetella Parapertussis (IG0135) Not Detected     Bordetella pertussis (ptxP) Not Detected     Chlamydia pneumoniae Not Detected     Mycoplasma pneumoniae Not Detected    Narrative:      Assay not valid for lower respiratory specimens, alternate  testing required.          All pertinent labs from the last 24 hours have been reviewed.    Significant Diagnostics:  I have reviewed all pertinent imaging results/findings within the past 24 hours.

## 2024-09-28 NOTE — PROGRESS NOTES
"[facility]  Endocrinology  Progress Note    Admit Date: 9/20/2024     Reason for Consult: Management of T2DM, Hyperglycemia     Surgical Procedure and Date:  lumbar surgery (L4-S1 TLIF) 9/20/24    Diabetes diagnosis year: > 2 years ago     Home Diabetes Medications:  Metformin 1000 mg BID per patient    How often checking glucose at home? One   BG readings on regimen: 120-160s  Hypoglycemia on the regimen?  No  Missed doses on regimen?  No    Diabetes Complications include:     Hyperglycemia    Complicating diabetes co morbidities:   Glucocorticoid use       HPI:   Patient is a 79 y.o. male with hx of prior CVA with residual right sided weakness presents for eval of worsening low back pain and BLE radicular leg pain. He endorses pain which starts in upper lumbar area and then radiates down to lower lumbar area down his legs right greater than left into his feet. He endorses right foot weakness and has a right foot drop which has been present for many months. He has difficulty walking. 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 He has been to pain mgmt for injections without significant relief. Now s/p the above procedures. Endocrine consulted for DM management.       Interval HPI:   No acute events overnight. Patient in room 543/543 A. Blood glucose stable. BG at and above goal on current insulin regimen (SSI, prandial, and basal insulin ). Steroid use- None.   8 Days Post-Op  Renal function- Normal   Vasopressors-  None     Diet NPO     Eating:   NPO  Nausea: No  Hypoglycemia and intervention: No  Fever: No  TPN and/or TF: No    BP (!) 149/69 (BP Location: Left arm, Patient Position: Lying)   Pulse 80   Temp 98.7 °F (37.1 °C) (Tympanic)   Resp 15   Ht 6' 1" (1.854 m)   Wt 86.9 kg (191 lb 9.3 oz)   SpO2 (!) 91%   BMI 25.28 kg/m²     Labs Reviewed and Include    Recent Labs   Lab 09/28/24  0406   *   CALCIUM 8.4*   ALBUMIN 2.1* " "  PROT 5.7*   *   K 3.5   CO2 29   CL 94*   BUN 19   CREATININE 1.1   ALKPHOS 63   ALT 12   AST 38   BILITOT 0.4     Lab Results   Component Value Date    WBC 8.79 09/28/2024    HGB 9.6 (L) 09/28/2024    HCT 28.4 (L) 09/28/2024    MCV 90 09/28/2024     09/28/2024     No results for input(s): "TSH", "FREET4" in the last 168 hours.  Lab Results   Component Value Date    HGBA1C 7.8 (H) 09/09/2024       Nutritional status:   Body mass index is 25.28 kg/m².  Lab Results   Component Value Date    ALBUMIN 2.1 (L) 09/28/2024    ALBUMIN 2.2 (L) 09/27/2024    ALBUMIN 4.1 07/15/2024     No results found for: "PREALBUMIN"    Estimated Creatinine Clearance: 61.5 mL/min (based on SCr of 1.1 mg/dL).    Accu-Checks  Recent Labs     09/25/24  1951 09/26/24  0758 09/26/24  1131 09/26/24  1551 09/26/24  2118 09/27/24  0730 09/27/24  1154 09/27/24  1539 09/27/24  2131 09/28/24  0648   POCTGLUCOSE 202* 189* 270* 220* 116* 175* 200* 223* 153* 210*       Current Medications and/or Treatments Impacting Glycemic Control  Immunotherapy:    Immunosuppressants       None          Steroids:   Hormones (From admission, onward)      Start     Stop Route Frequency Ordered    09/20/24 2333  melatonin tablet 6 mg         -- Oral Nightly PRN 09/20/24 2235          Pressors:    Autonomic Drugs (From admission, onward)      None          Hyperglycemia/Diabetes Medications:   Antihyperglycemics (From admission, onward)      Start     Stop Route Frequency Ordered    09/28/24 0900  insulin glargine U-100 (Lantus) pen 20 Units         -- SubQ Daily 09/28/24 0732    09/28/24 0800  insulin aspart U-100 pen 0-5 Units         -- SubQ Every 4 hours PRN 09/28/24 0837    09/27/24 1645  insulin aspart U-100 pen 12 Units         -- SubQ 3 times daily with meals 09/27/24 8991            ASSESSMENT and PLAN    Neuro  * Status post lumbar surgery (L4-S1 TLIF)  Managed per primary team        Endocrine  Type 2 diabetes mellitus with diabetic cataract, " without long-term current use of insulin  BG goal: 140-180    - Lantus 20 units QD (20% increase due to fasting BG above goal)   - Novolog 12 units TIDWM (20% increase due to prandial blood glucose  above goal)  - LDC SSI PRN (150/50)   - POCT Glucose before meals and at bedtime  - Hypoglycemia protocol in place      ** Please notify Endocrine for any change and/or advance in diet**  ** Please call Endocrine for any BG related issues **     Discharge Planning:   TBD. Please notify endocrinology prior to discharge.        Orthopedic  Surgical wound present  Optimize BG control to improve wound healing           Zora Aguirre PA-C  Endocrinology  [facility]

## 2024-09-28 NOTE — PROGRESS NOTES
Pharmacist Renal Dose Adjustment Note    Will Zion is a 79 y.o. male being treated with the medication cefepime    Patient Data:    Vital Signs (Most Recent):  Temp: 99 °F (37.2 °C) (09/28/24 1059)  Pulse: 78 (09/28/24 1059)  Resp: 16 (09/28/24 1352)  BP: (!) 149/70 (09/28/24 1059)  SpO2: 95 % (09/28/24 1059) Vital Signs (72h Range):  Temp:  [98.4 °F (36.9 °C)-102.1 °F (38.9 °C)]   Pulse:  [75-91]   Resp:  [14-20]   BP: (113-149)/(58-70)   SpO2:  [91 %-95 %]      Recent Labs   Lab 09/26/24  0418 09/27/24  0220 09/28/24  0406   CREATININE 1.3 1.3 1.1     Serum creatinine: 1.1 mg/dL 09/28/24 0406  Estimated creatinine clearance: 61.5 mL/min    Cefepime 1g q12h will be changed to cefepime 1g q8h    Pharmacist's Name: Lisa Fontenot  Pharmacist's Extension: [phone]

## 2024-09-28 NOTE — SUBJECTIVE & OBJECTIVE
"Interval HPI:   No acute events overnight. Patient in room 543/543 A. Blood glucose stable. BG at and above goal on current insulin regimen (SSI, prandial, and basal insulin ). Steroid use- None.   8 Days Post-Op  Renal function- Normal   Vasopressors-  None     Diet NPO     Eating:   NPO  Nausea: No  Hypoglycemia and intervention: No  Fever: No  TPN and/or TF: No    BP (!) 149/69 (BP Location: Left arm, Patient Position: Lying)   Pulse 80   Temp 98.7 °F (37.1 °C) (Tympanic)   Resp 15   Ht 6' 1" (1.854 m)   Wt 86.9 kg (191 lb 9.3 oz)   SpO2 (!) 91%   BMI 25.28 kg/m²     Labs Reviewed and Include    Recent Labs   Lab 09/28/24  0406   *   CALCIUM 8.4*   ALBUMIN 2.1*   PROT 5.7*   *   K 3.5   CO2 29   CL 94*   BUN 19   CREATININE 1.1   ALKPHOS 63   ALT 12   AST 38   BILITOT 0.4     Lab Results   Component Value Date    WBC 8.79 09/28/2024    HGB 9.6 (L) 09/28/2024    HCT 28.4 (L) 09/28/2024    MCV 90 09/28/2024     09/28/2024     No results for input(s): "TSH", "FREET4" in the last 168 hours.  Lab Results   Component Value Date    HGBA1C 7.8 (H) 09/09/2024       Nutritional status:   Body mass index is 25.28 kg/m².  Lab Results   Component Value Date    ALBUMIN 2.1 (L) 09/28/2024    ALBUMIN 2.2 (L) 09/27/2024    ALBUMIN 4.1 07/15/2024     No results found for: "PREALBUMIN"    Estimated Creatinine Clearance: 61.5 mL/min (based on SCr of 1.1 mg/dL).    Accu-Checks  Recent Labs     09/25/24  1951 09/26/24  0758 09/26/24  1131 09/26/24  1551 09/26/24  2118 09/27/24  0730 09/27/24  1154 09/27/24  1539 09/27/24  2131 09/28/24  0648   POCTGLUCOSE 202* 189* 270* 220* 116* 175* 200* 223* 153* 210*       Current Medications and/or Treatments Impacting Glycemic Control  Immunotherapy:    Immunosuppressants       None          Steroids:   Hormones (From admission, onward)      Start     Stop Route Frequency Ordered    09/20/24 2333  melatonin tablet 6 mg         -- Oral Nightly PRN 09/20/24 2235      "     Pressors:    Autonomic Drugs (From admission, onward)      None          Hyperglycemia/Diabetes Medications:   Antihyperglycemics (From admission, onward)      Start     Stop Route Frequency Ordered    09/28/24 0900  insulin glargine U-100 (Lantus) pen 20 Units         -- SubQ Daily 09/28/24 0732    09/28/24 0800  insulin aspart U-100 pen 0-5 Units         -- SubQ Every 4 hours PRN 09/28/24 0837    09/27/24 1645  insulin aspart U-100 pen 12 Units         -- SubQ 3 times daily with meals 09/27/24 1606

## 2024-09-28 NOTE — ASSESSMENT & PLAN NOTE
Lab Results   Component Value Date    HGBA1C 7.8 (H) 09/09/2024     Recent Labs     09/27/24  0730 09/27/24  1154 09/27/24  1539 09/27/24  2131 09/28/24  0648 09/28/24  1056   POCTGLUCOSE 175* 200* 223* 153* 210* 193*     Antihyperglycemics (From admission, onward)      Start     Stop Route Frequency Ordered    09/28/24 0900  insulin glargine U-100 (Lantus) pen 20 Units         -- SubQ Daily 09/28/24 0732    09/28/24 0800  insulin aspart U-100 pen 0-5 Units         -- SubQ Every 4 hours PRN 09/28/24 0837    09/27/24 1645  insulin aspart U-100 pen 12 Units         -- SubQ 3 times daily with meals 09/27/24 1606          - Endocrine following  - Insulin regimen as above  - diabetic diet  - POCT glucose checks TIDWM and qhs  - goal glucose 140-180

## 2024-09-28 NOTE — ASSESSMENT & PLAN NOTE
Likely related to post op ileus. Patient now having bowel movements but could be contributing to fevers.   -Bowel regimen as needed

## 2024-09-29 LAB
ALBUMIN SERPL BCP-MCNC: 1.9 G/DL (ref 3.5–5.2)
ALP SERPL-CCNC: 69 U/L (ref 55–135)
ALT SERPL W/O P-5'-P-CCNC: 12 U/L (ref 10–44)
ANION GAP SERPL CALC-SCNC: 7 MMOL/L (ref 8–16)
AST SERPL-CCNC: 30 U/L (ref 10–40)
BASOPHILS # BLD AUTO: 0.03 K/UL (ref 0–0.2)
BASOPHILS NFR BLD: 0.4 % (ref 0–1.9)
BILIRUB SERPL-MCNC: 0.4 MG/DL (ref 0.1–1)
BUN SERPL-MCNC: 14 MG/DL (ref 8–23)
CALCIUM SERPL-MCNC: 8.1 MG/DL (ref 8.7–10.5)
CHLORIDE SERPL-SCNC: 97 MMOL/L (ref 95–110)
CO2 SERPL-SCNC: 28 MMOL/L (ref 23–29)
CREAT SERPL-MCNC: 1 MG/DL (ref 0.5–1.4)
DIFFERENTIAL METHOD BLD: ABNORMAL
EOSINOPHIL # BLD AUTO: 0.1 K/UL (ref 0–0.5)
EOSINOPHIL NFR BLD: 1.9 % (ref 0–8)
ERYTHROCYTE [DISTWIDTH] IN BLOOD BY AUTOMATED COUNT: 13.2 % (ref 11.5–14.5)
EST. GFR  (NO RACE VARIABLE): >60 ML/MIN/1.73 M^2
GLUCOSE SERPL-MCNC: 148 MG/DL (ref 70–110)
HCT VFR BLD AUTO: 28 % (ref 40–54)
HGB BLD-MCNC: 8.9 G/DL (ref 14–18)
IMM GRANULOCYTES # BLD AUTO: 0.07 K/UL (ref 0–0.04)
IMM GRANULOCYTES NFR BLD AUTO: 0.9 % (ref 0–0.5)
LYMPHOCYTES # BLD AUTO: 1.9 K/UL (ref 1–4.8)
LYMPHOCYTES NFR BLD: 24.6 % (ref 18–48)
MCH RBC QN AUTO: 29.6 PG (ref 27–31)
MCHC RBC AUTO-ENTMCNC: 31.8 G/DL (ref 32–36)
MCV RBC AUTO: 93 FL (ref 82–98)
MONOCYTES # BLD AUTO: 1 K/UL (ref 0.3–1)
MONOCYTES NFR BLD: 13.3 % (ref 4–15)
NEUTROPHILS # BLD AUTO: 4.4 K/UL (ref 1.8–7.7)
NEUTROPHILS NFR BLD: 58.9 % (ref 38–73)
NRBC BLD-RTO: 0 /100 WBC
PLATELET # BLD AUTO: 286 K/UL (ref 150–450)
PMV BLD AUTO: 9.3 FL (ref 9.2–12.9)
POCT GLUCOSE: 157 MG/DL (ref 70–110)
POCT GLUCOSE: 164 MG/DL (ref 70–110)
POCT GLUCOSE: 205 MG/DL (ref 70–110)
POCT GLUCOSE: 218 MG/DL (ref 70–110)
POCT GLUCOSE: 95 MG/DL (ref 70–110)
POTASSIUM SERPL-SCNC: 3.9 MMOL/L (ref 3.5–5.1)
PROT SERPL-MCNC: 5.1 G/DL (ref 6–8.4)
RBC # BLD AUTO: 3.01 M/UL (ref 4.6–6.2)
SODIUM SERPL-SCNC: 132 MMOL/L (ref 136–145)
WBC # BLD AUTO: 7.52 K/UL (ref 3.9–12.7)

## 2024-09-29 PROCEDURE — 25000003 PHARM REV CODE 250: Performed by: STUDENT IN AN ORGANIZED HEALTH CARE EDUCATION/TRAINING PROGRAM

## 2024-09-29 PROCEDURE — 63600175 PHARM REV CODE 636 W HCPCS: Performed by: STUDENT IN AN ORGANIZED HEALTH CARE EDUCATION/TRAINING PROGRAM

## 2024-09-29 PROCEDURE — 85025 COMPLETE CBC W/AUTO DIFF WBC: CPT | Performed by: STUDENT IN AN ORGANIZED HEALTH CARE EDUCATION/TRAINING PROGRAM

## 2024-09-29 PROCEDURE — 93010 ELECTROCARDIOGRAM REPORT: CPT | Mod: ,,, | Performed by: INTERNAL MEDICINE

## 2024-09-29 PROCEDURE — 11000001 HC ACUTE MED/SURG PRIVATE ROOM

## 2024-09-29 PROCEDURE — 93005 ELECTROCARDIOGRAM TRACING: CPT

## 2024-09-29 PROCEDURE — 63600175 PHARM REV CODE 636 W HCPCS

## 2024-09-29 PROCEDURE — 25000003 PHARM REV CODE 250

## 2024-09-29 PROCEDURE — 80053 COMPREHEN METABOLIC PANEL: CPT

## 2024-09-29 RX ORDER — LEVOFLOXACIN 750 MG/1
750 TABLET ORAL DAILY
Status: DISCONTINUED | OUTPATIENT
Start: 2024-09-29 | End: 2024-10-01 | Stop reason: HOSPADM

## 2024-09-29 RX ADMIN — SENNOSIDES AND DOCUSATE SODIUM 2 TABLET: 50; 8.6 TABLET ORAL at 09:09

## 2024-09-29 RX ADMIN — HEPARIN SODIUM 5000 UNITS: 5000 INJECTION INTRAVENOUS; SUBCUTANEOUS at 01:09

## 2024-09-29 RX ADMIN — OXYCODONE HYDROCHLORIDE 10 MG: 10 TABLET ORAL at 08:09

## 2024-09-29 RX ADMIN — LOSARTAN POTASSIUM 100 MG: 25 TABLET, FILM COATED ORAL at 09:09

## 2024-09-29 RX ADMIN — AMLODIPINE BESYLATE 10 MG: 10 TABLET ORAL at 09:09

## 2024-09-29 RX ADMIN — INSULIN ASPART 12 UNITS: 100 INJECTION, SOLUTION INTRAVENOUS; SUBCUTANEOUS at 09:09

## 2024-09-29 RX ADMIN — POLYETHYLENE GLYCOL 3350 17 G: 17 POWDER, FOR SOLUTION ORAL at 09:09

## 2024-09-29 RX ADMIN — METHOCARBAMOL 1000 MG: 500 TABLET ORAL at 01:09

## 2024-09-29 RX ADMIN — OXYCODONE HYDROCHLORIDE 10 MG: 10 TABLET ORAL at 02:09

## 2024-09-29 RX ADMIN — CEFEPIME 1 G: 1 INJECTION, POWDER, FOR SOLUTION INTRAMUSCULAR; INTRAVENOUS at 09:09

## 2024-09-29 RX ADMIN — LEVOFLOXACIN 750 MG: 750 TABLET, FILM COATED ORAL at 03:09

## 2024-09-29 RX ADMIN — INSULIN ASPART 2 UNITS: 100 INJECTION, SOLUTION INTRAVENOUS; SUBCUTANEOUS at 11:09

## 2024-09-29 RX ADMIN — INSULIN ASPART 12 UNITS: 100 INJECTION, SOLUTION INTRAVENOUS; SUBCUTANEOUS at 11:09

## 2024-09-29 RX ADMIN — HEPARIN SODIUM 5000 UNITS: 5000 INJECTION INTRAVENOUS; SUBCUTANEOUS at 09:09

## 2024-09-29 RX ADMIN — CEFEPIME 1 G: 1 INJECTION, POWDER, FOR SOLUTION INTRAMUSCULAR; INTRAVENOUS at 02:09

## 2024-09-29 RX ADMIN — CARVEDILOL 6.25 MG: 6.25 TABLET, FILM COATED ORAL at 09:09

## 2024-09-29 RX ADMIN — METHOCARBAMOL 1000 MG: 500 TABLET ORAL at 09:09

## 2024-09-29 RX ADMIN — CALCIUM CARBONATE (ANTACID) CHEW TAB 500 MG 500 MG: 500 CHEW TAB at 09:09

## 2024-09-29 RX ADMIN — INSULIN GLARGINE 20 UNITS: 100 INJECTION, SOLUTION SUBCUTANEOUS at 09:09

## 2024-09-29 RX ADMIN — METHOCARBAMOL 1000 MG: 500 TABLET ORAL at 04:09

## 2024-09-29 RX ADMIN — OXYCODONE HYDROCHLORIDE 10 MG: 10 TABLET ORAL at 09:09

## 2024-09-29 RX ADMIN — OXYCODONE HYDROCHLORIDE 10 MG: 10 TABLET ORAL at 12:09

## 2024-09-29 RX ADMIN — INSULIN ASPART 12 UNITS: 100 INJECTION, SOLUTION INTRAVENOUS; SUBCUTANEOUS at 04:09

## 2024-09-29 RX ADMIN — OXYCODONE HYDROCHLORIDE 10 MG: 10 TABLET ORAL at 05:09

## 2024-09-29 RX ADMIN — TAMSULOSIN HYDROCHLORIDE 0.4 MG: 0.4 CAPSULE ORAL at 09:09

## 2024-09-29 RX ADMIN — HEPARIN SODIUM 5000 UNITS: 5000 INJECTION INTRAVENOUS; SUBCUTANEOUS at 05:09

## 2024-09-29 RX ADMIN — PRAVASTATIN SODIUM 40 MG: 20 TABLET ORAL at 09:09

## 2024-09-29 NOTE — CARE UPDATE
-Glucose Goal 140-180    -A1C:   Hemoglobin A1C   Date Value Ref Range Status   09/09/2024 7.8 (H) 4.0 - 5.6 % Final     Comment:     ADA Screening Guidelines:  5.7-6.4%  Consistent with prediabetes  >or=6.5%  Consistent with diabetes    High levels of fetal hemoglobin interfere with the HbA1C  assay. Heterozygous hemoglobin variants (HbS, HgC, etc)do  not significantly interfere with this assay.   However, presence of multiple variants may affect accuracy.           -HOME REGIMEN:   Metformin 1000 mg BID per patient     -GLUCOSE TREND FOR THE PAST 24HRS:   Recent Labs   Lab 09/27/24  2131 09/28/24  0648 09/28/24  1056 09/28/24  1547 09/28/24  2126 09/29/24  0722   POCTGLUCOSE 153* 210* 193* 182* 155* 157*         -NO HYPOGYCEMIAS NOTED     - Diet  Diet diabetic 2000 Calorie    -TOLERATING 75 % OF PO DIET     Plan:   - Lantus 20 units QD  - Novolog 12 units TIDWM   - LDC SSI PRN (150/50)   - POCT Glucose before meals and at bedtime  - Hypoglycemia protocol in place      ** Please notify Endocrine for any change and/or advance in diet**  ** Please call Endocrine for any BG related issues **     Discharge Planning:   TBD. Please notify endocrinology prior to discharge.

## 2024-09-29 NOTE — NURSING
Nurses Note -- 4 Eyes      9/28/2024   7:20 PM      Skin assessed during: Q Shift Change      [x] No Altered Skin Integrity Present    []Prevention Measures Documented      [] Yes- Altered Skin Integrity Present or Discovered   [] LDA Added if Not in Epic (Describe Wound)   [] New Altered Skin Integrity was Present on Admit and Documented in LDA   [] Wound Image Taken    Wound Care Consulted? No    Attending Nurse:  Kim     Second RN/Staff Member:  Paulina

## 2024-09-29 NOTE — ASSESSMENT & PLAN NOTE
Medicine consulted (9/26). Patient is s/p L4-S1 TLIF on 9/20 and has had intermittent fevers. No leukocytosis. white count. He did c/o dysuria and intermittent non productive cough post op but denies any chest pain, dyspnea, or hx of clots. Start infectious workup. (CXR 9/26 report)- shows atelectasis changes and possible consolidative changes.     Plan:  -Start Cefepime (2g IV Q8h ) for symptomatic cystitis w/ pyuria and bacteruria and HAP coverage  -Planning to transition to levoquin pending updated EKG  - Will treat for 7 days total course to cover for HAP  -Plan to start vancomycin and perform ultrasound of upper and lower extremities if fevers continue  -Repeat UA shows 1+ leukocytes, wbcua 13, occasional bacteria  -Urine cx pending  -Blood cx NGT  -F/u on resp cx  -Resp Infxn Panel/COVID/flu negative  -Trend CBC and CMP  -Continue encouraging Incentive Spirometry

## 2024-09-29 NOTE — PROGRESS NOTES
[facility]  Neurosurgery  Progress Note    Subjective:     History of Present Illness: 78 M with hx of prior CVA with residual right sided weakness presents for eval of worsening low back pain and BLE radicular leg pain. He endorses pain which starts in upper lumbar area and then radiates down to lower lumbar area down his legs right greater than left into his feet. He endorses right foot weakness and has a right foot drop which has been present for many months. He has difficulty walking. 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 He has been to pain mgmt for injections without significant relief.      Interval fu 8/8/24:  Pt presents in fu.  No significant changes in symptoms.  Today he is most concerned with his balance difficulties.     Interval fu 8/29/24: No changes in symptoms.    Post-Op Info:  Procedure(s) (LRB):  OPEN ROBOTIC L4 -S1 TLIF (N/A)   9 Days Post-Op   Interval History: 9/29: NAEO. Afebrile. WBC 7.5. Had large bowel movement and is no longer distended. Fung remains in. Pending SNF.    Medications:  Continuous Infusions:   0.9% NaCl   Intravenous Continuous 125 mL/hr at 09/28/24 0728 New Bag at 09/28/24 0728     Scheduled Meds:   amLODIPine  10 mg Oral Daily    calcium carbonate  500 mg Oral BID    carvediloL  6.25 mg Oral BID    ceFEPime IV (PEDS and ADULTS)  1 g Intravenous Q8H    heparin (porcine)  5,000 Units Subcutaneous Q8H    insulin aspart U-100  12 Units Subcutaneous TIDWM    insulin glargine U-100  20 Units Subcutaneous Daily    losartan  100 mg Oral Daily    methocarbamoL  1,000 mg Oral QID    polyethylene glycol  17 g Oral BID    pravastatin  40 mg Oral QHS    senna-docusate 8.6-50 mg  2 tablet Oral BID    tamsulosin  0.4 mg Oral Daily     PRN Meds:  Current Facility-Administered Medications:     acetaminophen, 650 mg, Oral, Q6H PRN    albuterol sulfate, 2.5 mg, Nebulization, Q4H PRN    aluminum-magnesium  "hydroxide-simethicone, 30 mL, Oral, Q4H PRN    dextrose 10%, 12.5 g, Intravenous, PRN    dextrose 10%, 25 g, Intravenous, PRN    glucagon (human recombinant), 1 mg, Intramuscular, PRN    glucose, 16 g, Oral, PRN    glucose, 24 g, Oral, PRN    insulin aspart U-100, 0-5 Units, Subcutaneous, Q4H PRN    melatonin, 6 mg, Oral, Nightly PRN    ondansetron, 8 mg, Oral, Q6H PRN    oxyCODONE, 5 mg, Oral, Q4H PRN    oxyCODONE, 10 mg, Oral, Q4H PRN     Review of Systems  Objective:     Weight: 86.9 kg (191 lb 9.3 oz)  Body mass index is 25.28 kg/m².  Vital Signs (Most Recent):  Temp: 98.4 °F (36.9 °C) (09/29/24 0720)  Pulse: 75 (09/29/24 0720)  Resp: 18 (09/29/24 0816)  BP: (!) 154/70 (09/29/24 0720)  SpO2: (!) 93 % (09/29/24 0720) Vital Signs (24h Range):  Temp:  [98 °F (36.7 °C)-99.3 °F (37.4 °C)] 98.4 °F (36.9 °C)  Pulse:  [74-81] 75  Resp:  [16-18] 18  SpO2:  [93 %-96 %] 93 %  BP: (129-158)/(60-73) 154/70                              Urethral Catheter 09/25/24 1604 (Active)   Site Assessment Clean;Dry;Intact 09/28/24 0800   Collection Container Urimeter 09/28/24 0800   Securement Method secured to top of thigh w/ adhesive device 09/28/24 0800   Catheter Care Performed no 09/28/24 0800   Reason for Continuing Urinary Catheterization Urinary retention 09/28/24 0800   CAUTI Prevention Bundle Securement Device in place with 1" slack;Intact seal between catheter & drainage tubing;Drainage bag/urimeter off the floor;Sheeting clip in use;No dependent loops or kinks;Drainage bag/urimeter not overfilled (<2/3 full);Drainage bag/urimeter below bladder 09/28/24 0800   Output (mL) 520 mL 09/28/24 1745     Neurosurgery Physical Exam 9/29:  General: well developed, well nourished, no distress.   Head: normocephalic, atraumatic  Neurologic: Alert and oriented. Thought content appropriate.  Mental Status: Awake, Alert, Oriented  Cranial nerves: face symmetric, CN II-XII grossly intact.   Pulmonary: normal respirations, no signs of " "respiratory distress  Sensory: intact to light touch throughout  Motor Strength: Moves all extremities spontaneously with good tone. No abnormal movements seen.      Strength   Deltoids Triceps Biceps Wrist Extension Wrist Flexion Hand    Upper: R 5/5 5/5 5/5 5/5 5/5 5/5     L 5/5 5/5 5/5 5/5 5/5 5/5       Iliopsoas Quadriceps Knee  Flexion Tibialis  anterior Gastro- cnemius EHL   Lower: R 4+/5 4+/5 4+/5 4-/5 4-/5 4-/5     L 5/5 5/5 5/5 5/5 5/5 5/5      Skin: Skin is warm, dry and intact.     Incision c/d/I with skin edges well approximated with dermabond/prineo. No surrounding erythema or edema. No drainage from incision. No palpable underlying fluid collection.     Abd: no longer distended, nontender    Significant Labs:  Recent Labs   Lab 09/28/24  0406 09/29/24  0443   * 148*   * 132*   K 3.5 3.9   CL 94* 97   CO2 29 28   BUN 19 14   CREATININE 1.1 1.0   CALCIUM 8.4* 8.1*   MG 2.3  --      Recent Labs   Lab 09/28/24  0406 09/29/24  0443   WBC 8.79 7.52   HGB 9.6* 8.9*   HCT 28.4* 28.0*    286     No results for input(s): "LABPT", "INR", "APTT" in the last 48 hours.  Microbiology Results (last 7 days)       Procedure Component Value Units Date/Time    Blood culture [[id number]] Collected: 09/26/24 0900    Order Status: Completed Specimen: Blood from Peripheral, Antecubital, Right Updated: 09/28/24 1012     Blood Culture, Routine No growth to date      No Growth to date      No Growth to date      No Growth to date    Blood culture [[id number]] Collected: 09/26/24 0904    Order Status: Completed Specimen: Blood from Peripheral, Hand, Right Updated: 09/28/24 1012     Blood Culture, Routine No growth to date      No Growth to date      No Growth to date      No Growth to date    Urine culture [[id number]] Collected: 09/26/24 1522    Order Status: Completed Specimen: Urine Updated: 09/27/24 2309     Urine Culture, Routine No growth    Narrative:      Specimen Source->Urine    Culture, " Respiratory with Gram Stain [[id number]]     Order Status: No result Specimen: Respiratory     MRSA Screen by PCR [[id number]]     Order Status: No result Specimen: Nasal Swab     Respiratory Infection Panel (PCR), Nasopharyngeal [[id number]] Collected: 09/26/24 1140    Order Status: Completed Specimen: Nasopharyngeal Swab Updated: 09/26/24 1424     Respiratory Infection Panel Source NP Swab     Adenovirus Not Detected     Coronavirus 229E, Common Cold Virus Not Detected     Coronavirus HKU1, Common Cold Virus Not Detected     Coronavirus NL63, Common Cold Virus Not Detected     Coronavirus OC43, Common Cold Virus Not Detected     Comment: The Coronavirus strains detected in this test cause the common cold.  These strains are not the COVID-19 (novel Coronavirus)strain   associated with the respiratory disease outbreak.          SARS-CoV2 (COVID-19) Qualitative PCR Not Detected     Human Metapneumovirus Not Detected     Human Rhinovirus/Enterovirus Not Detected     Influenza A (subtypes H1, H1-2009,H3) Not Detected     Influenza B Not Detected     Parainfluenza Virus 1 Not Detected     Parainfluenza Virus 2 Not Detected     Parainfluenza Virus 3 Not Detected     Parainfluenza Virus 4 Not Detected     Respiratory Syncytial Virus Not Detected     Bordetella Parapertussis (EZ0322) Not Detected     Bordetella pertussis (ptxP) Not Detected     Chlamydia pneumoniae Not Detected     Mycoplasma pneumoniae Not Detected    Narrative:      Assay not valid for lower respiratory specimens, alternate  testing required.          All pertinent labs from the last 24 hours have been reviewed.    Significant Diagnostics:  I have reviewed all pertinent imaging results/findings within the past 24 hours.  Assessment/Plan:     * Status post lumbar surgery (L4-S1 TLIF)  78 M with hx of prior CVA with residual right sided weakness presents for elective surgery for worsening low back pain and BLE radicular leg pain now s/p L4-S1 TLIF on  9/20/2024:    Plan:  - Admitted to ns, q4h neuro vital checks.  - Postop XR satisfactory.  - HV drains removed 9/25. Ancef dc'ed 9/25.  - LSO brace when OOB.  - Ross placed 9/25 for urinary retention post op. Will arrange voiding trial in 7-10 days.  - Bowel regimen in place. +BM 9/26. +BM 9/28  - PT/OT/OOB.  - DVT ppx: SCDs/SQH.  - Atelectasis ppx: IS use hourly/up in chair.    Dispo: Pending SNF vs home with home health.    Abdominal distension  Distended on exam this morning, 9/28. KUB showed ileus at this time. Mg/Phos wnl.     --continue bladder scans  --nondistended on exam as of 9/29  --had BM 9/28  --diet restarted  --will continue to monitor    Urinary retention  Patient with urinary retention post op.    - Ross placed 9/25.  - Will arrange a voiding trial 7-10 days after ross placed.    Fever  Patient with intermittent fevers post op. Other vitals stable. No white count. Hospital Medicine consulted for assistance.     - UA with +RBC, no Leuks or Nitrites  - CXR 9/26 c/f possible consolidation  - Dopplers 9/22 negative  - Blood cultures NGTD  - Viral panel WNL    Type 2 diabetes mellitus with diabetic cataract, without long-term current use of insulin  Patient's FSGs are uncontrolled due to hyperglycemia on current medication regimen.  Last A1c reviewed-   Lab Results   Component Value Date    HGBA1C 7.8 (H) 09/09/2024     Most recent fingerstick glucose reviewed-   Recent Labs   Lab 09/26/24  1131 09/26/24  1551 09/26/24  2118 09/27/24  0730   POCTGLUCOSE 270* 220* 116* 175*     Current correctional scale  Low  Endocrinology consulted.  anti-hyperglycemic dose as follows-   Antihyperglycemics (From admission, onward)      Start     Stop Route Frequency Ordered    09/25/24 1645  insulin aspart U-100 pen 9 Units         -- SubQ 3 times daily with meals 09/25/24 1151    09/25/24 0900  insulin glargine U-100 (Lantus) pen 18 Units         -- SubQ Daily 09/25/24 0706    09/20/24 2337  insulin aspart U-100  pen 0-5 Units         -- SubQ Before meals & nightly PRN 09/20/24 2237          Hold Oral hypoglycemics while patient is in the hospital.      Plan discussed with Dr Mcbride and on call attending Dr. Gerard Malik MD  Neurosurgery  [facility] - Surgery

## 2024-09-29 NOTE — PROGRESS NOTES
[facility] Medicine  Progress Note    Patient Name: Robi Donovan  MRN: [id number]  Patient Class: IP- Inpatient   Admission Date: 9/20/2024  Length of Stay: 9 days  Attending Physician: Juan Luis Mcbride DO  Primary Care Provider: Yary Wright -        Subjective:     Principal Problem:Status post lumbar surgery        HPI:  Patient is a 79 y.o. male with hx of prior CVA with residual right sided weakness presents for eval of worsening low back pain and BLE radicular leg pain. He endorses pain which starts in upper lumbar area and then radiates down to lower lumbar area down his legs right greater than left into his feet. He endorses right foot weakness and has a right foot drop which has been present for many months. He has difficulty walking. 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 He has been to pain mgmt for injections without significant relief.     Medicine consulted  (9/26) for new intermittent fevers. No leukocytosis but has complained of dysuria and non productive cough post op. Infectious workup continued.     Overview/Hospital Course:  Patient is a 79 y.o. male with hx of prior CVA with residual right sided weakness presents for eval of worsening low back pain and BLE radicular leg pain. Now s/p lumbary surgery. Endocrine consulted for DM management. Medicine consulted  (9/26) for new intermittent fevers. No leukocytosis but has complained of dysuria and non productive cough post op. Patient had questionable consolidative changes on CXR and was started on cefepime for continued fevers to cover potential pneumonia vs UTI. Fevers resolved on cefepime, planning to treat with 7 day course of levoquin to complete coverage for HAP.    Interval History: No new fevers overnight. Plan to switch patient to oral antibiotics. Likely levoquin pending updated EKG.      Review of Systems   Constitutional:  Negative for chills,  "fatigue and fever.   HENT:  Negative for congestion.    Respiratory:  Negative for cough, chest tightness and shortness of breath.    Cardiovascular:  Negative for chest pain.     Objective:     Vital Signs (Most Recent):  Temp: 97.5 °F (36.4 °C) (09/29/24 1111)  Pulse: 78 (09/29/24 1111)  Resp: 18 (09/29/24 0816)  BP: 129/66 (09/29/24 1111)  SpO2: (!) 94 % (09/29/24 1111) Vital Signs (24h Range):  Temp:  [97.5 °F (36.4 °C)-99.3 °F (37.4 °C)] 97.5 °F (36.4 °C)  Pulse:  [74-81] 78  Resp:  [16-18] 18  SpO2:  [93 %-96 %] 94 %  BP: (129-158)/(60-73) 129/66     Weight: 86.9 kg (191 lb 9.3 oz)  Body mass index is 25.28 kg/m².    Intake/Output Summary (Last 24 hours) at 9/29/2024 1137  Last data filed at 9/29/2024 0930  Gross per 24 hour   Intake 30 ml   Output 2290 ml   Net -2260 ml         Physical Exam  Constitutional:       General: He is not in acute distress.     Appearance: Normal appearance.   HENT:      Head: Normocephalic and atraumatic.   Cardiovascular:      Rate and Rhythm: Normal rate.      Heart sounds: No murmur heard.  Pulmonary:      Effort: Pulmonary effort is normal. No respiratory distress.      Breath sounds: Rhonchi (worse in bottom left lobe) present. No wheezing or rales.   Abdominal:      Tenderness: There is no abdominal tenderness. There is no guarding or rebound.   Musculoskeletal:      Right lower leg: No edema.      Left lower leg: No edema.      Comments: No leg pain or swelling   Neurological:      Mental Status: He is alert.   Psychiatric:         Mood and Affect: Mood normal.         Behavior: Behavior normal.             Significant Labs: All pertinent labs within the past 24 hours have been reviewed.  Blood Culture:   No results for input(s): "LABBLOO" in the last 48 hours.    CBC:   Recent Labs   Lab 09/28/24  0406 09/29/24  0443   WBC 8.79 7.52   HGB 9.6* 8.9*   HCT 28.4* 28.0*    286     CMP:   Recent Labs   Lab 09/28/24  0406 09/29/24  0443   * 132*   K 3.5 3.9   CL 94* " "97   CO2 29 28   * 148*   BUN 19 14   CREATININE 1.1 1.0   CALCIUM 8.4* 8.1*   PROT 5.7* 5.1*   ALBUMIN 2.1* 1.9*   BILITOT 0.4 0.4   ALKPHOS 63 69   AST 38 30   ALT 12 12   ANIONGAP 8 7*     Respiratory Culture: No results for input(s): "GSRESP", "RESPIRATORYC" in the last 48 hours.  Urine Culture:   No results for input(s): "LABURIN" in the last 48 hours.    Urine Studies:   No results for input(s): "COLORU", "APPEARANCEUA", "PHUR", "SPECGRAV", "PROTEINUA", "GLUCUA", "KETONESU", "BILIRUBINUA", "OCCULTUA", "NITRITE", "UROBILINOGEN", "LEUKOCYTESUR", "RBCUA", "WBCUA", "BACTERIA", "SQUAMEPITHEL", "HYALINECASTS" in the last 48 hours.    Invalid input(s): "WRIGHTSUR"      Significant Imaging: I have reviewed all pertinent imaging results/findings within the past 24 hours.      Assessment/Plan:      * Status post lumbar surgery (L4-S1 TLIF)  -Plan per Primary team      Abdominal distension  Likely related to post op ileus. Patient now having bowel movements but could be contributing to fevers.   -Bowel regimen as needed      Fever  Medicine consulted (9/26). Patient is s/p L4-S1 TLIF on 9/20 and has had intermittent fevers. No leukocytosis. white count. He did c/o dysuria and intermittent non productive cough post op but denies any chest pain, dyspnea, or hx of clots. Start infectious workup. (CXR 9/26 report)- shows atelectasis changes and possible consolidative changes.     Plan:  -Start Cefepime (2g IV Q8h ) for symptomatic cystitis w/ pyuria and bacteruria and HAP coverage  -Planning to transition to levoquin pending updated EKG  - Will treat for 7 days total course to cover for HAP  -Plan to start vancomycin and perform ultrasound of upper and lower extremities if fevers continue  -Repeat UA shows 1+ leukocytes, wbcua 13, occasional bacteria  -Urine cx pending  -Blood cx NGT  -F/u on resp cx  -Resp Infxn Panel/COVID/flu negative  -Trend CBC and CMP  -Continue encouraging Incentive Spirometry        Surgical " wound present  No evidence of active infection from surgical wound  -Plan per primary team      Type 2 diabetes mellitus with diabetic cataract, without long-term current use of insulin  Lab Results   Component Value Date    HGBA1C 7.8 (H) 09/09/2024     Recent Labs     09/28/24  0648 09/28/24  1056 09/28/24  1547 09/28/24  2126 09/29/24  0722 09/29/24  1113   POCTGLUCOSE 210* 193* 182* 155* 157* 218*       Antihyperglycemics (From admission, onward)      Start     Stop Route Frequency Ordered    09/28/24 0900  insulin glargine U-100 (Lantus) pen 20 Units         -- SubQ Daily 09/28/24 0732    09/28/24 0800  insulin aspart U-100 pen 0-5 Units         -- SubQ Every 4 hours PRN 09/28/24 0837    09/27/24 1645  insulin aspart U-100 pen 12 Units         -- SubQ 3 times daily with meals 09/27/24 1606          - Endocrine following  - Insulin regimen as above  - diabetic diet  - POCT glucose checks TIDWM and qhs  - goal glucose 140-180        VTE Risk Mitigation (From admission, onward)           Ordered     heparin (porcine) injection 5,000 Units  Every 8 hours         09/22/24 1223     IP VTE HIGH RISK PATIENT  Once         09/20/24 2235     Place sequential compression device  Until discontinued         09/20/24 2235     Place sequential compression device  Until discontinued         09/20/24 1028                    Discharge Planning   SUSY: 9/30/2024     Code Status: Full Code   Is the patient medically ready for discharge?:     Reason for patient still in hospital (select all that apply): Patient trending condition, Treatment, and Consult recommendations  Discharge Plan A: Skilled Nursing Facility                  Manny Morejon DO  Department of Hospital Medicine   [facility] - Surgery

## 2024-09-29 NOTE — ASSESSMENT & PLAN NOTE
Distended on exam this morning, 9/28. KUB showed ileus at this time. Mg/Phos wnl.     --continue bladder scans  --nondistended on exam as of 9/29  --had BM 9/28  --diet restarted  --will continue to monitor

## 2024-09-29 NOTE — ASSESSMENT & PLAN NOTE
78 M with hx of prior CVA with residual right sided weakness presents for elective surgery for worsening low back pain and BLE radicular leg pain now s/p L4-S1 TLIF on 9/20/2024:    Plan:  - Admitted to ns, q4h neuro vital checks.  - Postop XR satisfactory.  - HV drains removed 9/25. Ancef dc'ed 9/25.  - LSO brace when OOB.  - Fung placed 9/25 for urinary retention post op. Will arrange voiding trial in 7-10 days.  - Bowel regimen in place. +BM 9/26. +BM 9/28  - PT/OT/OOB.  - DVT ppx: SCDs/SQH.  - Atelectasis ppx: IS use hourly/up in chair.    Dispo: Pending SNF vs home with home health.

## 2024-09-29 NOTE — ASSESSMENT & PLAN NOTE
Lab Results   Component Value Date    HGBA1C 7.8 (H) 09/09/2024     Recent Labs     09/28/24  0648 09/28/24  1056 09/28/24  1547 09/28/24  2126 09/29/24  0722 09/29/24  1113   POCTGLUCOSE 210* 193* 182* 155* 157* 218*       Antihyperglycemics (From admission, onward)    Start     Stop Route Frequency Ordered    09/28/24 0900  insulin glargine U-100 (Lantus) pen 20 Units         -- SubQ Daily 09/28/24 0732    09/28/24 0800  insulin aspart U-100 pen 0-5 Units         -- SubQ Every 4 hours PRN 09/28/24 0837    09/27/24 1645  insulin aspart U-100 pen 12 Units         -- SubQ 3 times daily with meals 09/27/24 1606        - Endocrine following  - Insulin regimen as above  - diabetic diet  - POCT glucose checks TIDWM and qhs  - goal glucose 140-180

## 2024-09-29 NOTE — SUBJECTIVE & OBJECTIVE
Interval History: 9/29: NAEO. Afebrile. WBC 7.5. Had large bowel movement and is no longer distended. Fung remains in. Pending SNF.    Medications:  Continuous Infusions:   0.9% NaCl   Intravenous Continuous 125 mL/hr at 09/28/24 0728 New Bag at 09/28/24 0728     Scheduled Meds:   amLODIPine  10 mg Oral Daily    calcium carbonate  500 mg Oral BID    carvediloL  6.25 mg Oral BID    ceFEPime IV (PEDS and ADULTS)  1 g Intravenous Q8H    heparin (porcine)  5,000 Units Subcutaneous Q8H    insulin aspart U-100  12 Units Subcutaneous TIDWM    insulin glargine U-100  20 Units Subcutaneous Daily    losartan  100 mg Oral Daily    methocarbamoL  1,000 mg Oral QID    polyethylene glycol  17 g Oral BID    pravastatin  40 mg Oral QHS    senna-docusate 8.6-50 mg  2 tablet Oral BID    tamsulosin  0.4 mg Oral Daily     PRN Meds:  Current Facility-Administered Medications:     acetaminophen, 650 mg, Oral, Q6H PRN    albuterol sulfate, 2.5 mg, Nebulization, Q4H PRN    aluminum-magnesium hydroxide-simethicone, 30 mL, Oral, Q4H PRN    dextrose 10%, 12.5 g, Intravenous, PRN    dextrose 10%, 25 g, Intravenous, PRN    glucagon (human recombinant), 1 mg, Intramuscular, PRN    glucose, 16 g, Oral, PRN    glucose, 24 g, Oral, PRN    insulin aspart U-100, 0-5 Units, Subcutaneous, Q4H PRN    melatonin, 6 mg, Oral, Nightly PRN    ondansetron, 8 mg, Oral, Q6H PRN    oxyCODONE, 5 mg, Oral, Q4H PRN    oxyCODONE, 10 mg, Oral, Q4H PRN     Review of Systems  Objective:     Weight: 86.9 kg (191 lb 9.3 oz)  Body mass index is 25.28 kg/m².  Vital Signs (Most Recent):  Temp: 98.4 °F (36.9 °C) (09/29/24 0720)  Pulse: 75 (09/29/24 0720)  Resp: 18 (09/29/24 0816)  BP: (!) 154/70 (09/29/24 0720)  SpO2: (!) 93 % (09/29/24 0720) Vital Signs (24h Range):  Temp:  [98 °F (36.7 °C)-99.3 °F (37.4 °C)] 98.4 °F (36.9 °C)  Pulse:  [74-81] 75  Resp:  [16-18] 18  SpO2:  [93 %-96 %] 93 %  BP: (129-158)/(60-73) 154/70                              Urethral Catheter  "09/25/24 1604 (Active)   Site Assessment Clean;Dry;Intact 09/28/24 0800   Collection Container Urimeter 09/28/24 0800   Securement Method secured to top of thigh w/ adhesive device 09/28/24 0800   Catheter Care Performed no 09/28/24 0800   Reason for Continuing Urinary Catheterization Urinary retention 09/28/24 0800   CAUTI Prevention Bundle Securement Device in place with 1" slack;Intact seal between catheter & drainage tubing;Drainage bag/urimeter off the floor;Sheeting clip in use;No dependent loops or kinks;Drainage bag/urimeter not overfilled (<2/3 full);Drainage bag/urimeter below bladder 09/28/24 0800   Output (mL) 520 mL 09/28/24 1745     Neurosurgery Physical Exam 9/29:  General: well developed, well nourished, no distress.   Head: normocephalic, atraumatic  Neurologic: Alert and oriented. Thought content appropriate.  Mental Status: Awake, Alert, Oriented  Cranial nerves: face symmetric, CN II-XII grossly intact.   Pulmonary: normal respirations, no signs of respiratory distress  Sensory: intact to light touch throughout  Motor Strength: Moves all extremities spontaneously with good tone. No abnormal movements seen.      Strength   Deltoids Triceps Biceps Wrist Extension Wrist Flexion Hand    Upper: R 5/5 5/5 5/5 5/5 5/5 5/5     L 5/5 5/5 5/5 5/5 5/5 5/5       Iliopsoas Quadriceps Knee  Flexion Tibialis  anterior Gastro- cnemius EHL   Lower: R 4+/5 4+/5 4+/5 4-/5 4-/5 4-/5     L 5/5 5/5 5/5 5/5 5/5 5/5      Skin: Skin is warm, dry and intact.     Incision c/d/I with skin edges well approximated with dermabond/prineo. No surrounding erythema or edema. No drainage from incision. No palpable underlying fluid collection.     Abd: no longer distended, nontender    Significant Labs:  Recent Labs   Lab 09/28/24  0406 09/29/24  0443   * 148*   * 132*   K 3.5 3.9   CL 94* 97   CO2 29 28   BUN 19 14   CREATININE 1.1 1.0   CALCIUM 8.4* 8.1*   MG 2.3  --      Recent Labs   Lab 09/28/24  0406 " "09/29/24  0443   WBC 8.79 7.52   HGB 9.6* 8.9*   HCT 28.4* 28.0*    286     No results for input(s): "LABPT", "INR", "APTT" in the last 48 hours.  Microbiology Results (last 7 days)       Procedure Component Value Units Date/Time    Blood culture [[id number]] Collected: 09/26/24 0900    Order Status: Completed Specimen: Blood from Peripheral, Antecubital, Right Updated: 09/28/24 1012     Blood Culture, Routine No growth to date      No Growth to date      No Growth to date      No Growth to date    Blood culture [[id number]] Collected: 09/26/24 0904    Order Status: Completed Specimen: Blood from Peripheral, Hand, Right Updated: 09/28/24 1012     Blood Culture, Routine No growth to date      No Growth to date      No Growth to date      No Growth to date    Urine culture [[id number]] Collected: 09/26/24 1522    Order Status: Completed Specimen: Urine Updated: 09/27/24 2309     Urine Culture, Routine No growth    Narrative:      Specimen Source->Urine    Culture, Respiratory with Gram Stain [[id number]]     Order Status: No result Specimen: Respiratory     MRSA Screen by PCR [[id number]]     Order Status: No result Specimen: Nasal Swab     Respiratory Infection Panel (PCR), Nasopharyngeal [[id number]] Collected: 09/26/24 1140    Order Status: Completed Specimen: Nasopharyngeal Swab Updated: 09/26/24 1424     Respiratory Infection Panel Source NP Swab     Adenovirus Not Detected     Coronavirus 229E, Common Cold Virus Not Detected     Coronavirus HKU1, Common Cold Virus Not Detected     Coronavirus NL63, Common Cold Virus Not Detected     Coronavirus OC43, Common Cold Virus Not Detected     Comment: The Coronavirus strains detected in this test cause the common cold.  These strains are not the COVID-19 (novel Coronavirus)strain   associated with the respiratory disease outbreak.          SARS-CoV2 (COVID-19) Qualitative PCR Not Detected     Human Metapneumovirus Not Detected     Human Rhinovirus/Enterovirus Not " Detected     Influenza A (subtypes H1, H1-2009,H3) Not Detected     Influenza B Not Detected     Parainfluenza Virus 1 Not Detected     Parainfluenza Virus 2 Not Detected     Parainfluenza Virus 3 Not Detected     Parainfluenza Virus 4 Not Detected     Respiratory Syncytial Virus Not Detected     Bordetella Parapertussis ([id number]) Not Detected     Bordetella pertussis (ptxP) Not Detected     Chlamydia pneumoniae Not Detected     Mycoplasma pneumoniae Not Detected    Narrative:      Assay not valid for lower respiratory specimens, alternate  testing required.          All pertinent labs from the last 24 hours have been reviewed.    Significant Diagnostics:  I have reviewed all pertinent imaging results/findings within the past 24 hours.

## 2024-09-29 NOTE — SUBJECTIVE & OBJECTIVE
"Interval History: No new fevers overnight. Plan to switch patient to oral antibiotics. Likely levoquin pending updated EKG.      Review of Systems   Constitutional:  Negative for chills, fatigue and fever.   HENT:  Negative for congestion.    Respiratory:  Negative for cough, chest tightness and shortness of breath.    Cardiovascular:  Negative for chest pain.     Objective:     Vital Signs (Most Recent):  Temp: 97.5 °F (36.4 °C) (09/29/24 1111)  Pulse: 78 (09/29/24 1111)  Resp: 18 (09/29/24 0816)  BP: 129/66 (09/29/24 1111)  SpO2: (!) 94 % (09/29/24 1111) Vital Signs (24h Range):  Temp:  [97.5 °F (36.4 °C)-99.3 °F (37.4 °C)] 97.5 °F (36.4 °C)  Pulse:  [74-81] 78  Resp:  [16-18] 18  SpO2:  [93 %-96 %] 94 %  BP: (129-158)/(60-73) 129/66     Weight: 86.9 kg (191 lb 9.3 oz)  Body mass index is 25.28 kg/m².    Intake/Output Summary (Last 24 hours) at 9/29/2024 1137  Last data filed at 9/29/2024 0930  Gross per 24 hour   Intake 30 ml   Output 2290 ml   Net -2260 ml         Physical Exam  Constitutional:       General: He is not in acute distress.     Appearance: Normal appearance.   HENT:      Head: Normocephalic and atraumatic.   Cardiovascular:      Rate and Rhythm: Normal rate.      Heart sounds: No murmur heard.  Pulmonary:      Effort: Pulmonary effort is normal. No respiratory distress.      Breath sounds: Rhonchi (worse in bottom left lobe) present. No wheezing or rales.   Abdominal:      Tenderness: There is no abdominal tenderness. There is no guarding or rebound.   Musculoskeletal:      Right lower leg: No edema.      Left lower leg: No edema.      Comments: No leg pain or swelling   Neurological:      Mental Status: He is alert.   Psychiatric:         Mood and Affect: Mood normal.         Behavior: Behavior normal.             Significant Labs: All pertinent labs within the past 24 hours have been reviewed.  Blood Culture:   No results for input(s): "LABBLOO" in the last 48 hours.    CBC:   Recent Labs   Lab " "09/28/24  0406 09/29/24  0443   WBC 8.79 7.52   HGB 9.6* 8.9*   HCT 28.4* 28.0*    286     CMP:   Recent Labs   Lab 09/28/24  0406 09/29/24  0443   * 132*   K 3.5 3.9   CL 94* 97   CO2 29 28   * 148*   BUN 19 14   CREATININE 1.1 1.0   CALCIUM 8.4* 8.1*   PROT 5.7* 5.1*   ALBUMIN 2.1* 1.9*   BILITOT 0.4 0.4   ALKPHOS 63 69   AST 38 30   ALT 12 12   ANIONGAP 8 7*     Respiratory Culture: No results for input(s): "GSRESP", "RESPIRATORYC" in the last 48 hours.  Urine Culture:   No results for input(s): "LABURIN" in the last 48 hours.    Urine Studies:   No results for input(s): "COLORU", "APPEARANCEUA", "PHUR", "SPECGRAV", "PROTEINUA", "GLUCUA", "KETONESU", "BILIRUBINUA", "OCCULTUA", "NITRITE", "UROBILINOGEN", "LEUKOCYTESUR", "RBCUA", "WBCUA", "BACTERIA", "SQUAMEPITHEL", "HYALINECASTS" in the last 48 hours.    Invalid input(s): "WRIGHTSUR"      Significant Imaging: I have reviewed all pertinent imaging results/findings within the past 24 hours.    "

## 2024-09-30 LAB
ALBUMIN SERPL BCP-MCNC: 2 G/DL (ref 3.5–5.2)
ALP SERPL-CCNC: 84 U/L (ref 55–135)
ALT SERPL W/O P-5'-P-CCNC: 18 U/L (ref 10–44)
ANION GAP SERPL CALC-SCNC: 8 MMOL/L (ref 8–16)
AST SERPL-CCNC: 32 U/L (ref 10–40)
BASOPHILS # BLD AUTO: 0.04 K/UL (ref 0–0.2)
BASOPHILS NFR BLD: 0.5 % (ref 0–1.9)
BILIRUB SERPL-MCNC: 0.4 MG/DL (ref 0.1–1)
BUN SERPL-MCNC: 13 MG/DL (ref 8–23)
CALCIUM SERPL-MCNC: 8.3 MG/DL (ref 8.7–10.5)
CHLORIDE SERPL-SCNC: 98 MMOL/L (ref 95–110)
CO2 SERPL-SCNC: 26 MMOL/L (ref 23–29)
CREAT SERPL-MCNC: 1 MG/DL (ref 0.5–1.4)
DIFFERENTIAL METHOD BLD: ABNORMAL
EOSINOPHIL # BLD AUTO: 0.1 K/UL (ref 0–0.5)
EOSINOPHIL NFR BLD: 1.8 % (ref 0–8)
ERYTHROCYTE [DISTWIDTH] IN BLOOD BY AUTOMATED COUNT: 13.2 % (ref 11.5–14.5)
EST. GFR  (NO RACE VARIABLE): >60 ML/MIN/1.73 M^2
GLUCOSE SERPL-MCNC: 101 MG/DL (ref 70–110)
HCT VFR BLD AUTO: 29.9 % (ref 40–54)
HGB BLD-MCNC: 9.7 G/DL (ref 14–18)
IMM GRANULOCYTES # BLD AUTO: 0.12 K/UL (ref 0–0.04)
IMM GRANULOCYTES NFR BLD AUTO: 1.6 % (ref 0–0.5)
LYMPHOCYTES # BLD AUTO: 1.7 K/UL (ref 1–4.8)
LYMPHOCYTES NFR BLD: 22.4 % (ref 18–48)
MCH RBC QN AUTO: 29.8 PG (ref 27–31)
MCHC RBC AUTO-ENTMCNC: 32.4 G/DL (ref 32–36)
MCV RBC AUTO: 92 FL (ref 82–98)
MONOCYTES # BLD AUTO: 1 K/UL (ref 0.3–1)
MONOCYTES NFR BLD: 12.7 % (ref 4–15)
NEUTROPHILS # BLD AUTO: 4.6 K/UL (ref 1.8–7.7)
NEUTROPHILS NFR BLD: 61 % (ref 38–73)
NRBC BLD-RTO: 0 /100 WBC
OHS QRS DURATION: 86 MS
OHS QTC CALCULATION: 450 MS
PLATELET # BLD AUTO: 289 K/UL (ref 150–450)
PMV BLD AUTO: 9.5 FL (ref 9.2–12.9)
POCT GLUCOSE: 103 MG/DL (ref 70–110)
POCT GLUCOSE: 109 MG/DL (ref 70–110)
POCT GLUCOSE: 116 MG/DL (ref 70–110)
POCT GLUCOSE: 163 MG/DL (ref 70–110)
POTASSIUM SERPL-SCNC: 4.2 MMOL/L (ref 3.5–5.1)
PROT SERPL-MCNC: 5.6 G/DL (ref 6–8.4)
RBC # BLD AUTO: 3.25 M/UL (ref 4.6–6.2)
SODIUM SERPL-SCNC: 132 MMOL/L (ref 136–145)
WBC # BLD AUTO: 7.58 K/UL (ref 3.9–12.7)

## 2024-09-30 PROCEDURE — 63600175 PHARM REV CODE 636 W HCPCS: Performed by: STUDENT IN AN ORGANIZED HEALTH CARE EDUCATION/TRAINING PROGRAM

## 2024-09-30 PROCEDURE — 99024 POSTOP FOLLOW-UP VISIT: CPT | Mod: ,,,

## 2024-09-30 PROCEDURE — 97116 GAIT TRAINING THERAPY: CPT | Mod: CQ

## 2024-09-30 PROCEDURE — 25000003 PHARM REV CODE 250

## 2024-09-30 PROCEDURE — 11000001 HC ACUTE MED/SURG PRIVATE ROOM

## 2024-09-30 PROCEDURE — 80053 COMPREHEN METABOLIC PANEL: CPT

## 2024-09-30 PROCEDURE — 25000003 PHARM REV CODE 250: Performed by: STUDENT IN AN ORGANIZED HEALTH CARE EDUCATION/TRAINING PROGRAM

## 2024-09-30 PROCEDURE — 99232 SBSQ HOSP IP/OBS MODERATE 35: CPT | Mod: ,,,

## 2024-09-30 PROCEDURE — 94761 N-INVAS EAR/PLS OXIMETRY MLT: CPT

## 2024-09-30 PROCEDURE — 85025 COMPLETE CBC W/AUTO DIFF WBC: CPT | Performed by: STUDENT IN AN ORGANIZED HEALTH CARE EDUCATION/TRAINING PROGRAM

## 2024-09-30 RX ORDER — OXYCODONE AND ACETAMINOPHEN 5; 325 MG/1; MG/1
1 TABLET ORAL EVERY 4 HOURS PRN
Status: DISCONTINUED | OUTPATIENT
Start: 2024-09-30 | End: 2024-10-01 | Stop reason: HOSPADM

## 2024-09-30 RX ORDER — INSULIN ASPART 100 [IU]/ML
10 INJECTION, SOLUTION INTRAVENOUS; SUBCUTANEOUS
Status: DISCONTINUED | OUTPATIENT
Start: 2024-09-30 | End: 2024-10-01 | Stop reason: HOSPADM

## 2024-09-30 RX ORDER — INSULIN GLARGINE 100 [IU]/ML
16 INJECTION, SOLUTION SUBCUTANEOUS DAILY
Status: DISCONTINUED | OUTPATIENT
Start: 2024-09-30 | End: 2024-10-01 | Stop reason: HOSPADM

## 2024-09-30 RX ORDER — OXYCODONE AND ACETAMINOPHEN 10; 325 MG/1; MG/1
1 TABLET ORAL EVERY 4 HOURS PRN
Status: DISCONTINUED | OUTPATIENT
Start: 2024-09-30 | End: 2024-10-01 | Stop reason: HOSPADM

## 2024-09-30 RX ADMIN — OXYCODONE AND ACETAMINOPHEN 1 TABLET: 10; 325 TABLET ORAL at 08:09

## 2024-09-30 RX ADMIN — TAMSULOSIN HYDROCHLORIDE 0.4 MG: 0.4 CAPSULE ORAL at 08:09

## 2024-09-30 RX ADMIN — INSULIN ASPART 10 UNITS: 100 INJECTION, SOLUTION INTRAVENOUS; SUBCUTANEOUS at 11:09

## 2024-09-30 RX ADMIN — AMLODIPINE BESYLATE 10 MG: 10 TABLET ORAL at 08:09

## 2024-09-30 RX ADMIN — METHOCARBAMOL 1000 MG: 500 TABLET ORAL at 04:09

## 2024-09-30 RX ADMIN — OXYCODONE HYDROCHLORIDE 10 MG: 10 TABLET ORAL at 01:09

## 2024-09-30 RX ADMIN — INSULIN ASPART 10 UNITS: 100 INJECTION, SOLUTION INTRAVENOUS; SUBCUTANEOUS at 04:09

## 2024-09-30 RX ADMIN — Medication 6 MG: at 01:09

## 2024-09-30 RX ADMIN — OXYCODONE HYDROCHLORIDE 10 MG: 10 TABLET ORAL at 05:09

## 2024-09-30 RX ADMIN — LOSARTAN POTASSIUM 100 MG: 25 TABLET, FILM COATED ORAL at 08:09

## 2024-09-30 RX ADMIN — HEPARIN SODIUM 5000 UNITS: 5000 INJECTION INTRAVENOUS; SUBCUTANEOUS at 05:09

## 2024-09-30 RX ADMIN — INSULIN GLARGINE 16 UNITS: 100 INJECTION, SOLUTION SUBCUTANEOUS at 08:09

## 2024-09-30 RX ADMIN — CALCIUM CARBONATE (ANTACID) CHEW TAB 500 MG 500 MG: 500 CHEW TAB at 08:09

## 2024-09-30 RX ADMIN — INSULIN ASPART 10 UNITS: 100 INJECTION, SOLUTION INTRAVENOUS; SUBCUTANEOUS at 08:09

## 2024-09-30 RX ADMIN — SENNOSIDES AND DOCUSATE SODIUM 2 TABLET: 50; 8.6 TABLET ORAL at 08:09

## 2024-09-30 RX ADMIN — PRAVASTATIN SODIUM 40 MG: 20 TABLET ORAL at 08:09

## 2024-09-30 RX ADMIN — CARVEDILOL 6.25 MG: 6.25 TABLET, FILM COATED ORAL at 08:09

## 2024-09-30 RX ADMIN — LEVOFLOXACIN 750 MG: 750 TABLET, FILM COATED ORAL at 08:09

## 2024-09-30 RX ADMIN — OXYCODONE HYDROCHLORIDE 10 MG: 10 TABLET ORAL at 11:09

## 2024-09-30 RX ADMIN — POLYETHYLENE GLYCOL 3350 17 G: 17 POWDER, FOR SOLUTION ORAL at 08:09

## 2024-09-30 RX ADMIN — METHOCARBAMOL 1000 MG: 500 TABLET ORAL at 01:09

## 2024-09-30 RX ADMIN — METHOCARBAMOL 1000 MG: 500 TABLET ORAL at 08:09

## 2024-09-30 RX ADMIN — HEPARIN SODIUM 5000 UNITS: 5000 INJECTION INTRAVENOUS; SUBCUTANEOUS at 10:09

## 2024-09-30 RX ADMIN — OXYCODONE AND ACETAMINOPHEN 1 TABLET: 10; 325 TABLET ORAL at 03:09

## 2024-09-30 RX ADMIN — HEPARIN SODIUM 5000 UNITS: 5000 INJECTION INTRAVENOUS; SUBCUTANEOUS at 01:09

## 2024-09-30 NOTE — SUBJECTIVE & OBJECTIVE
"Interval HPI:   No acute events overnight. Patient in room 543/543 A. Blood glucose stable. BG at and below goal on current insulin regimen (SSI, prandial, and basal insulin ). Steroid use- None.   10 Days Post-Op  Renal function-   Lab Results   Component Value Date    CREATININE 1.0 2024        Vasopressors-  None     Diet diabetic  Calorie     Eatin%  Nausea: No  Hypoglycemia and intervention: No  Fever: No  TPN and/or TF: No    BP (!) 157/82 (BP Location: Left arm, Patient Position: Lying)   Pulse 77   Temp 99.3 °F (37.4 °C) (Oral)   Resp 18   Ht 6' 1" (1.854 m)   Wt 86.9 kg (191 lb 9.3 oz)   SpO2 95%   BMI 25.28 kg/m²     Labs Reviewed and Include    Recent Labs   Lab 24  0447      CALCIUM 8.3*   ALBUMIN 2.0*   PROT 5.6*   *   K 4.2   CO2 26   CL 98   BUN 13   CREATININE 1.0   ALKPHOS 84   ALT 18   AST 32   BILITOT 0.4     Lab Results   Component Value Date    WBC 7.58 2024    HGB 9.7 (L) 2024    HCT 29.9 (L) 2024    MCV 92 2024     2024     No results for input(s): "TSH", "FREET4" in the last 168 hours.  Lab Results   Component Value Date    HGBA1C 7.8 (H) 2024       Nutritional status:   Body mass index is 25.28 kg/m².  Lab Results   Component Value Date    ALBUMIN 2.0 (L) 2024    ALBUMIN 1.9 (L) 2024    ALBUMIN 2.1 (L) 2024     No results found for: "PREALBUMIN"    Estimated Creatinine Clearance: 67.7 mL/min (based on SCr of 1 mg/dL).    Accu-Checks  Recent Labs     24  0648 24  1056 24  1547 24  2126 24  0722 24  1113 24  1418 24  1636 24  2109 24  0712   POCTGLUCOSE 210* 193* 182* 155* 157* 218* 205* 164* 95 116*       Current Medications and/or Treatments Impacting Glycemic Control  Immunotherapy:    Immunosuppressants       None          Steroids:   Hormones (From admission, onward)      Start     Stop Route Frequency Ordered    24 2333  " melatonin tablet 6 mg         -- Oral Nightly PRN 09/20/24 0170          Pressors:    Autonomic Drugs (From admission, onward)      None          Hyperglycemia/Diabetes Medications:   Antihyperglycemics (From admission, onward)      Start     Stop Route Frequency Ordered    09/30/24 0900  insulin glargine U-100 (Lantus) pen 16 Units         -- SubQ Daily 09/30/24 0710    09/30/24 0715  insulin aspart U-100 pen 10 Units         -- SubQ 3 times daily with meals 09/30/24 0710    09/28/24 0800  insulin aspart U-100 pen 0-5 Units         -- SubQ Every 4 hours PRN 09/28/24 0819

## 2024-09-30 NOTE — ASSESSMENT & PLAN NOTE
Patient with intermittent fevers post op. Other vitals stable. No white count. Hospital Medicine consulted for assistance. Levoquin PO for presumed HAP and UTI.    - UA with +RBC, no Leuks or Nitrites. Repeat with + Leuks.  - CXR 9/26 c/f possible consolidation. Repeat CXR today.  - Dopplers 9/22 negative. Repeat dopplers today.  - Blood cultures NGTD.  - Viral panel negative.

## 2024-09-30 NOTE — ASSESSMENT & PLAN NOTE
Medicine consulted (9/26). Patient is s/p L4-S1 TLIF on 9/20 and has had intermittent fevers. No leukocytosis. white count. He did c/o dysuria and intermittent non productive cough post op but denies any chest pain, dyspnea, or hx of clots. Start infectious workup. (CXR 9/26 report)- shows atelectasis changes and possible consolidative changes. Initially, Cefepime (2g IV Q8h ) for symptomatic cystitis w/ pyuria and bacteruria and HAP coverage but transitioned to Levaquin for presumed PNA.     Plan:  -Levaquin for presumed PNA  -Will treat for 7 days total course to cover for HAP  - (9/30) US of upper and lower extremities showed no evidence of DVT  -CXR (9/30) report showed persistent left basilar atelectasis but no acute changes from last CXR      -Repeat UA shows 1+ leukocytes, wbcua 13, occasional bacteria  -Urine cx no growth and Blood cx NGTD  -F/u on resp cx  -Resp Infxn Panel/COVID/flu negative  -Trend CBC and CMP  -Continue encouraging Incentive Spirometry

## 2024-09-30 NOTE — PROGRESS NOTES
"[facility]  Endocrinology  Progress Note    Admit Date: 2024     Reason for Consult: Management of T2DM, Hyperglycemia     Surgical Procedure and Date:  lumbar surgery (L4-S1 TLIF) 24    Diabetes diagnosis year: > 2 years ago     Home Diabetes Medications:  Metformin 1000 mg BID per patient    How often checking glucose at home? One   BG readings on regimen: 120-160s  Hypoglycemia on the regimen?  No  Missed doses on regimen?  No    Diabetes Complications include:     Hyperglycemia    Complicating diabetes co morbidities:   Glucocorticoid use       HPI:   Patient is a 79 y.o. male with hx of prior CVA with residual right sided weakness presents for eval of worsening low back pain and BLE radicular leg pain. He endorses pain which starts in upper lumbar area and then radiates down to lower lumbar area down his legs right greater than left into his feet. He endorses right foot weakness and has a right foot drop which has been present for many months. He has difficulty walking. 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 He has been to pain mgmt for injections without significant relief. Now s/p the above procedures. Endocrine consulted for DM management.       Interval HPI:   No acute events overnight. Patient in room 543/543 A. Blood glucose stable. BG at and below goal on current insulin regimen (SSI, prandial, and basal insulin ). Steroid use- None.   10 Days Post-Op  Renal function-   Lab Results   Component Value Date    CREATININE 1.0 2024        Vasopressors-  None     Diet diabetic  Calorie     Eatin%  Nausea: No  Hypoglycemia and intervention: No  Fever: No  TPN and/or TF: No    BP (!) 157/82 (BP Location: Left arm, Patient Position: Lying)   Pulse 77   Temp 99.3 °F (37.4 °C) (Oral)   Resp 18   Ht 6' 1" (1.854 m)   Wt 86.9 kg (191 lb 9.3 oz)   SpO2 95%   BMI 25.28 kg/m²     Labs Reviewed and Include  " "  Recent Labs   Lab 09/30/24  0447      CALCIUM 8.3*   ALBUMIN 2.0*   PROT 5.6*   *   K 4.2   CO2 26   CL 98   BUN 13   CREATININE 1.0   ALKPHOS 84   ALT 18   AST 32   BILITOT 0.4     Lab Results   Component Value Date    WBC 7.58 09/30/2024    HGB 9.7 (L) 09/30/2024    HCT 29.9 (L) 09/30/2024    MCV 92 09/30/2024     09/30/2024     No results for input(s): "TSH", "FREET4" in the last 168 hours.  Lab Results   Component Value Date    HGBA1C 7.8 (H) 09/09/2024       Nutritional status:   Body mass index is 25.28 kg/m².  Lab Results   Component Value Date    ALBUMIN 2.0 (L) 09/30/2024    ALBUMIN 1.9 (L) 09/29/2024    ALBUMIN 2.1 (L) 09/28/2024     No results found for: "PREALBUMIN"    Estimated Creatinine Clearance: 67.7 mL/min (based on SCr of 1 mg/dL).    Accu-Checks  Recent Labs     09/28/24  0648 09/28/24  1056 09/28/24  1547 09/28/24  2126 09/29/24  0722 09/29/24  1113 09/29/24  1418 09/29/24  1636 09/29/24  2109 09/30/24  0712   POCTGLUCOSE 210* 193* 182* 155* 157* 218* 205* 164* 95 116*       Current Medications and/or Treatments Impacting Glycemic Control  Immunotherapy:    Immunosuppressants       None          Steroids:   Hormones (From admission, onward)      Start     Stop Route Frequency Ordered    09/20/24 2333  melatonin tablet 6 mg         -- Oral Nightly PRN 09/20/24 2235          Pressors:    Autonomic Drugs (From admission, onward)      None          Hyperglycemia/Diabetes Medications:   Antihyperglycemics (From admission, onward)      Start     Stop Route Frequency Ordered    09/30/24 0900  insulin glargine U-100 (Lantus) pen 16 Units         -- SubQ Daily 09/30/24 0710    09/30/24 0715  insulin aspart U-100 pen 10 Units         -- SubQ 3 times daily with meals 09/30/24 0710    09/28/24 0800  insulin aspart U-100 pen 0-5 Units         -- SubQ Every 4 hours PRN 09/28/24 0837            ASSESSMENT and PLAN    Neuro  * Status post lumbar surgery (L4-S1 TLIF)  Managed per primary " team        Endocrine  Type 2 diabetes mellitus with diabetic cataract, without long-term current use of insulin  BG goal: 140-180    - Lantus 16 units QD (20% decrease due to fasting BG below goal)   - Novolog 10 units TIDWM (20% decrease due to prandial blood glucose  below goal)  - LDC SSI PRN (150/50)   - POCT Glucose before meals and at bedtime  - Hypoglycemia protocol in place      ** Please notify Endocrine for any change and/or advance in diet**  ** Please call Endocrine for any BG related issues **     Discharge Planning:   TBD. Please notify endocrinology prior to discharge.        Orthopedic  Surgical wound present  Optimize BG control to improve wound healing           Zora Aguirre PA-C  Endocrinology  [facility]

## 2024-09-30 NOTE — ASSESSMENT & PLAN NOTE
Medicine consulted (9/26). Patient is s/p L4-S1 TLIF on 9/20 and has had intermittent fevers. No leukocytosis. white count. He did c/o dysuria and intermittent non productive cough post op but denies any chest pain, dyspnea, or hx of clots. Start infectious workup. (CXR 9/26 report)- shows atelectasis changes and possible consolidative changes. Initially, Cefepime (2g IV Q8h ) for symptomatic cystitis w/ pyuria and bacteruria and HAP coverage but transitioned to Levaquin for presumed PNA.     Plan:  -Levaquin for presumed PNA (7 days total course to cover for HAP)  -Will sign off. Thanks for letting us be involved in the care. Please reach out if new concerns arise.   -Recommend Fung removal trial  -Reconsider IV cefepime and infectious workup if fever spikes again; although overall fever curve has gone down.      -Repeat UA shows 1+ leukocytes, wbcua 13, occasional bacteria  -Urine cx no growth and Blood cx NGTD  -F/u on resp cx  -Resp Infxn Panel/COVID/flu negative  -Continue encouraging Incentive Spirometry

## 2024-09-30 NOTE — PLAN OF CARE
09/30/24 0915   Post-Acute Status   Post-Acute Authorization Placement   Post-Acute Placement Status Pending payor review/awaiting authorization (if required)   Discharge Plan   Discharge Plan A Skilled Nursing Facility     Pt accepted to [facility] ([facility]  #[phone]) , SW called facility, admissions currently in morning meeting. SW to return call after 10:00 AM.    10:39 AM  SW called [facility] (#[phone]) to obtain follow up. Pt's authorization remains pending. Staff to reach out to daughter after auth obtained to complete intake paperwork. Staff agreeable to faxing paperwork to facility.    SW escalated pending authorization to  supervisors.      Judy Murphy, SAM  Case Management   [facility]   Ext. [phone]

## 2024-09-30 NOTE — ASSESSMENT & PLAN NOTE
Patient's FSGs are uncontrolled due to hyperglycemia on current medication regimen.  Last A1c reviewed-   Lab Results   Component Value Date    HGBA1C 7.8 (H) 09/09/2024     Most recent fingerstick glucose reviewed-   Recent Labs   Lab 09/29/24  1418 09/29/24  1636 09/29/24  2109 09/30/24  0712   POCTGLUCOSE 205* 164* 95 116*       Current correctional scale  Low  Endocrinology consulted.  anti-hyperglycemic dose as follows-   Antihyperglycemics (From admission, onward)    Start     Stop Route Frequency Ordered    09/30/24 0900  insulin glargine U-100 (Lantus) pen 16 Units         -- SubQ Daily 09/30/24 0710    09/30/24 0715  insulin aspart U-100 pen 10 Units         -- SubQ 3 times daily with meals 09/30/24 0710    09/28/24 0800  insulin aspart U-100 pen 0-5 Units         -- SubQ Every 4 hours PRN 09/28/24 0837        Hold Oral hypoglycemics while patient is in the hospital.

## 2024-09-30 NOTE — PROGRESS NOTES
[facility] - Surgery  Neurosurgery  Progress Note    Subjective:     History of Present Illness: 78 M with hx of prior CVA with residual right sided weakness presents for eval of worsening low back pain and BLE radicular leg pain. He endorses pain which starts in upper lumbar area and then radiates down to lower lumbar area down his legs right greater than left into his feet. He endorses right foot weakness and has a right foot drop which has been present for many months. He has difficulty walking. 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 He has been to pain mgmt for injections without significant relief.      Interval fu 8/8/24:  Pt presents in fu.  No significant changes in symptoms.  Today he is most concerned with his balance difficulties.     Interval fu 8/29/24: No changes in symptoms.    Post-Op Info:  Procedure(s) (LRB):  OPEN ROBOTIC L4 -S1 TLIF (N/A)   10 Days Post-Op   Interval History: NAEON. Fever to 100.5 overnight. Medicine repeating BLE US and CXR. On Levoquin PO. Incision is c/d/I. Fung in place. SNF accepted patient, pending afebrile x 24 hrs.    Medications:  Continuous Infusions:   0.9% NaCl   Intravenous Continuous   Stopped at 09/29/24 2012     Scheduled Meds:   amLODIPine  10 mg Oral Daily    calcium carbonate  500 mg Oral BID    carvediloL  6.25 mg Oral BID    heparin (porcine)  5,000 Units Subcutaneous Q8H    insulin aspart U-100  10 Units Subcutaneous TIDWM    insulin glargine U-100  16 Units Subcutaneous Daily    levoFLOXacin  750 mg Oral Daily    losartan  100 mg Oral Daily    methocarbamoL  1,000 mg Oral QID    polyethylene glycol  17 g Oral BID    pravastatin  40 mg Oral QHS    senna-docusate 8.6-50 mg  2 tablet Oral BID    tamsulosin  0.4 mg Oral Daily     PRN Meds:  Current Facility-Administered Medications:     acetaminophen, 650 mg, Oral, Q6H PRN    albuterol sulfate, 2.5 mg, Nebulization, Q4H PRN     "aluminum-magnesium hydroxide-simethicone, 30 mL, Oral, Q4H PRN    dextrose 10%, 12.5 g, Intravenous, PRN    dextrose 10%, 25 g, Intravenous, PRN    glucagon (human recombinant), 1 mg, Intramuscular, PRN    glucose, 16 g, Oral, PRN    glucose, 24 g, Oral, PRN    insulin aspart U-100, 0-5 Units, Subcutaneous, Q4H PRN    melatonin, 6 mg, Oral, Nightly PRN    ondansetron, 8 mg, Oral, Q6H PRN    oxyCODONE, 5 mg, Oral, Q4H PRN    oxyCODONE, 10 mg, Oral, Q4H PRN     Review of Systems  Objective:     Weight: 86.9 kg (191 lb 9.3 oz)  Body mass index is 25.28 kg/m².  Vital Signs (Most Recent):  Temp: 99.3 °F (37.4 °C) (09/30/24 0726)  Pulse: 77 (09/30/24 0726)  Resp: 18 (09/30/24 0726)  BP: (!) 157/82 (09/30/24 0726)  SpO2: 95 % (09/30/24 0726) Vital Signs (24h Range):  Temp:  [97.5 °F (36.4 °C)-100.5 °F (38.1 °C)] 99.3 °F (37.4 °C)  Pulse:  [72-79] 77  Resp:  [14-18] 18  SpO2:  [94 %-98 %] 95 %  BP: (129-157)/(66-82) 157/82     Date 09/30/24 0700 - 10/01/24 0659   Shift 6779-5420 7349-5735 9234-4864 24 Hour Total   INTAKE   P.O. 240   240   Shift Total(mL/kg) 240(2.8)   240(2.8)   OUTPUT   Shift Total(mL/kg)       Weight (kg) 86.9 86.9 86.9 86.9                            Urethral Catheter 09/25/24 1604 (Active)   Site Assessment Clean;Intact 09/30/24 0800   Collection Container Urimeter 09/30/24 0800   Securement Method secured to top of thigh w/ adhesive device 09/30/24 0800   Catheter Care Performed yes 09/30/24 0800   Reason for Continuing Urinary Catheterization Urinary retention 09/30/24 0800   CAUTI Prevention Bundle Securement Device in place with 1" slack 09/30/24 0800   Output (mL) 400 mL 09/29/24 1830              Neurosurgery Physical Exam  General: well developed, well nourished, no distress.   Head: normocephalic, atraumatic  Neurologic: Alert and oriented. Thought content appropriate.  Mental Status: Awake, Alert, Oriented  Cranial nerves: face symmetric, CN II-XII grossly intact.   Pulmonary: normal " "respirations, no signs of respiratory distress  Sensory: intact to light touch throughout  Abd: no longer distended, nontender  Motor Strength: Moves all extremities spontaneously with good tone. No abnormal movements seen.      Strength   Deltoids Triceps Biceps Wrist Extension Wrist Flexion Hand    Upper: R 5/5 5/5 5/5 5/5 5/5 5/5     L 5/5 5/5 5/5 5/5 5/5 5/5       Iliopsoas Quadriceps Knee  Flexion Tibialis  anterior Gastro- cnemius EHL   Lower: R 4+/5 4+/5 4+/5 4-/5 4-/5 4-/5     L 5/5 5/5 5/5 5/5 5/5 5/5      Skin: Skin is warm, dry and intact.     Incision c/d/I with skin edges well approximated with dermabond/prineo. No surrounding erythema or edema. No drainage from incision. No palpable underlying fluid collection.       Significant Labs:  Recent Labs   Lab 09/29/24  0443 09/30/24  0447   * 101   * 132*   K 3.9 4.2   CL 97 98   CO2 28 26   BUN 14 13   CREATININE 1.0 1.0   CALCIUM 8.1* 8.3*     Recent Labs   Lab 09/29/24  0443 09/30/24  0447   WBC 7.52 7.58   HGB 8.9* 9.7*   HCT 28.0* 29.9*    289     No results for input(s): "LABPT", "INR", "APTT" in the last 48 hours.  Microbiology Results (last 7 days)       Procedure Component Value Units Date/Time    Blood culture [[id number]] Collected: 09/26/24 0904    Order Status: Completed Specimen: Blood from Peripheral, Hand, Right Updated: 09/30/24 1012     Blood Culture, Routine No growth to date      No Growth to date      No Growth to date      No Growth to date      No Growth to date      No Growth to date    Blood culture [[id number]] Collected: 09/26/24 0900    Order Status: Completed Specimen: Blood from Peripheral, Antecubital, Right Updated: 09/30/24 1012     Blood Culture, Routine No growth to date      No Growth to date      No Growth to date      No Growth to date      No Growth to date      No Growth to date    Urine culture [[id number]] Collected: 09/26/24 1522    Order Status: Completed Specimen: Urine Updated: 09/27/24 " 2309     Urine Culture, Routine No growth    Narrative:      Specimen Source->Urine    Culture, Respiratory with Gram Stain [[id number]]     Order Status: No result Specimen: Respiratory     MRSA Screen by PCR [[id number]]     Order Status: No result Specimen: Nasal Swab     Respiratory Infection Panel (PCR), Nasopharyngeal [[id number]] Collected: 09/26/24 1140    Order Status: Completed Specimen: Nasopharyngeal Swab Updated: 09/26/24 1424     Respiratory Infection Panel Source NP Swab     Adenovirus Not Detected     Coronavirus 229E, Common Cold Virus Not Detected     Coronavirus HKU1, Common Cold Virus Not Detected     Coronavirus NL63, Common Cold Virus Not Detected     Coronavirus OC43, Common Cold Virus Not Detected     Comment: The Coronavirus strains detected in this test cause the common cold.  These strains are not the COVID-19 (novel Coronavirus)strain   associated with the respiratory disease outbreak.          SARS-CoV2 (COVID-19) Qualitative PCR Not Detected     Human Metapneumovirus Not Detected     Human Rhinovirus/Enterovirus Not Detected     Influenza A (subtypes H1, H1-2009,H3) Not Detected     Influenza B Not Detected     Parainfluenza Virus 1 Not Detected     Parainfluenza Virus 2 Not Detected     Parainfluenza Virus 3 Not Detected     Parainfluenza Virus 4 Not Detected     Respiratory Syncytial Virus Not Detected     Bordetella Parapertussis (SM3255) Not Detected     Bordetella pertussis (ptxP) Not Detected     Chlamydia pneumoniae Not Detected     Mycoplasma pneumoniae Not Detected    Narrative:      Assay not valid for lower respiratory specimens, alternate  testing required.          All pertinent labs from the last 24 hours have been reviewed.    Significant Diagnostics:  I have reviewed and interpreted all pertinent imaging results/findings within the past 24 hours.  Assessment/Plan:     * Status post lumbar surgery (L4-S1 TLIF)  78 M with hx of prior CVA with residual right sided weakness  presents for elective surgery for worsening low back pain and BLE radicular leg pain now s/p L4-S1 TLIF on 9/20/2024:    Plan:  - Admitted to ns, q4h neuro vital checks.  - Postop XR satisfactory.  - HV drains removed 9/25. Ancef dc'ed 9/25.  - LSO brace when OOB.  - Ross placed 9/25 for urinary retention post op. Will arrange voiding trial in 7-10 days.  - Bowel regimen in place. +BM 9/26. +BM 9/28.  - PT/OT/OOB.  - DVT ppx: SCDs/SQH.  - Atelectasis ppx: IS use hourly/up in chair.    Dispo: SNF pending afebrile x 24 hours.    Abdominal distension  Distended on exam this morning, 9/28. KUB showed ileus at this time. Mg/Phos wnl.     - Continue bladder scans.  - Nondistended on exam as of 9/29.  - + BM 9/28.  - Diet restarted.  - Will continue to monitor.    Urinary retention  Patient with urinary retention post op.    - Ross placed 9/25.  - Will arrange a voiding trial 7-10 days after ross placed.    Fever  Patient with intermittent fevers post op. Other vitals stable. No white count. Hospital Medicine consulted for assistance. Levoquin PO for presumed HAP and UTI.    - UA with +RBC, no Leuks or Nitrites. Repeat with + Leuks.  - CXR 9/26 c/f possible consolidation. Repeat CXR today.  - Dopplers 9/22 negative. Repeat dopplers today.  - Blood cultures NGTD.  - Viral panel negative.    Type 2 diabetes mellitus with diabetic cataract, without long-term current use of insulin  Patient's FSGs are uncontrolled due to hyperglycemia on current medication regimen.  Last A1c reviewed-   Lab Results   Component Value Date    HGBA1C 7.8 (H) 09/09/2024     Most recent fingerstick glucose reviewed-   Recent Labs   Lab 09/29/24  1418 09/29/24  1636 09/29/24  2109 09/30/24  0712   POCTGLUCOSE 205* 164* 95 116*       Current correctional scale  Low  Endocrinology consulted.  anti-hyperglycemic dose as follows-   Antihyperglycemics (From admission, onward)      Start     Stop Route Frequency Ordered    09/30/24 0900  insulin  glargine U-100 (Lantus) pen 16 Units         -- SubQ Daily 09/30/24 0710    09/30/24 0715  insulin aspart U-100 pen 10 Units         -- SubQ 3 times daily with meals 09/30/24 0710    09/28/24 0800  insulin aspart U-100 pen 0-5 Units         -- SubQ Every 4 hours PRN 09/28/24 0837          Hold Oral hypoglycemics while patient is in the hospital.        Sandi Callaway PA-C  Neurosurgery  [facility] - Surgery

## 2024-09-30 NOTE — SUBJECTIVE & OBJECTIVE
"Interval History: Fever of 100.5 at 7:40pm but improved w/o tylenol. Patient has no complaints at this time? Chest pain/cough/dyspnea??? Ordered a UE and LE US to rule out DVT.     Review of Systems  Objective:     Vital Signs (Most Recent):  Temp: 99.3 °F (37.4 °C) (09/30/24 0726)  Pulse: 77 (09/30/24 0726)  Resp: 18 (09/30/24 0726)  BP: (!) 157/82 (09/30/24 0726)  SpO2: 95 % (09/30/24 0726) Vital Signs (24h Range):  Temp:  [97.5 °F (36.4 °C)-100.5 °F (38.1 °C)] 99.3 °F (37.4 °C)  Pulse:  [72-79] 77  Resp:  [14-18] 18  SpO2:  [94 %-98 %] 95 %  BP: (129-157)/(66-82) 157/82     Weight: 86.9 kg (191 lb 9.3 oz)  Body mass index is 25.28 kg/m².    Intake/Output Summary (Last 24 hours) at 9/30/2024 0818  Last data filed at 9/30/2024 0817  Gross per 24 hour   Intake 240 ml   Output 2195 ml   Net -1955 ml         Physical Exam  Constitutional:       General: He is not in acute distress.     Appearance: Normal appearance.   HENT:      Head: Normocephalic and atraumatic.   Cardiovascular:      Rate and Rhythm: Normal rate.      Heart sounds: No murmur heard.  Pulmonary:      Effort: Pulmonary effort is normal. No respiratory distress.      Breath sounds: Wheezing and rales present.   Abdominal:      General: Abdomen is flat.      Palpations: Abdomen is soft.      Tenderness: There is no abdominal tenderness. There is no guarding or rebound.   Musculoskeletal:      Right lower leg: No edema.      Left lower leg: No edema.      Comments: No leg pain or swelling   Neurological:      Mental Status: He is alert.   Psychiatric:         Mood and Affect: Mood normal.             Significant Labs: All pertinent labs within the past 24 hours have been reviewed.  Blood Culture: No results for input(s): "LABBLOO" in the last 48 hours.  CBC:   Recent Labs   Lab 09/29/24  0443 09/30/24  0447   WBC 7.52 7.58   HGB 8.9* 9.7*   HCT 28.0* 29.9*    289     CMP:   Recent Labs   Lab 09/29/24 0443 09/30/24 0447   * 132*   K 3.9 4.2 " "  CL 97 98   CO2 28 26   * 101   BUN 14 13   CREATININE 1.0 1.0   CALCIUM 8.1* 8.3*   PROT 5.1* 5.6*   ALBUMIN 1.9* 2.0*   BILITOT 0.4 0.4   ALKPHOS 69 84   AST 30 32   ALT 12 18   ANIONGAP 7* 8     POCT Glucose:   Recent Labs   Lab 09/29/24  1636 09/29/24  2109 09/30/24  0712   POCTGLUCOSE 164* 95 116*     Respiratory Culture: No results for input(s): "GSRESP", "RESPIRATORYC" in the last 48 hours.  Urine Culture: NGTD  Blood cx NGTD      Significant Imaging: I have reviewed all pertinent imaging results/findings within the past 24 hours.  B/L UE and LE US ordered (9/30)    "

## 2024-09-30 NOTE — PT/OT/SLP PROGRESS
Occupational Therapy      Patient Name:  Robi Donovan   MRN:  [id number]    Patient not seen today secondary to Testing/imaging xray/CT/MRI. Will follow-up tomorrow.    9/30/2024

## 2024-09-30 NOTE — SUBJECTIVE & OBJECTIVE
"Interval History:  Patient afebrile overnight.  Patient denies any chest pain, cough, dyspnea this time.      Review of Systems  Objective:     Vital Signs (Most Recent):  Temp: 98.8 °F (37.1 °C) (10/01/24 0737)  Pulse: 78 (10/01/24 0737)  Resp: 18 (10/01/24 0851)  BP: (!) 157/82 (10/01/24 0737)  SpO2: 95 % (10/01/24 0737) Vital Signs (24h Range):  Temp:  [98.3 °F (36.8 °C)-99.5 °F (37.5 °C)] 98.8 °F (37.1 °C)  Pulse:  [71-80] 78  Resp:  [16-18] 18  SpO2:  [95 %-97 %] 95 %  BP: (130-157)/(60-82) 157/82     Weight: 86.9 kg (191 lb 9.3 oz)  Body mass index is 25.28 kg/m².    Intake/Output Summary (Last 24 hours) at 10/1/2024 0959  Last data filed at 10/1/2024 0902  Gross per 24 hour   Intake --   Output 3700 ml   Net -3700 ml         Physical Exam  Constitutional:       General: He is not in acute distress.     Appearance: Normal appearance.   HENT:      Head: Normocephalic and atraumatic.   Cardiovascular:      Rate and Rhythm: Normal rate.      Heart sounds: No murmur heard.  Pulmonary:      Effort: Pulmonary effort is normal. No respiratory distress.      Breath sounds: Rales present.   Abdominal:      General: Abdomen is flat.      Palpations: Abdomen is soft.      Tenderness: There is no abdominal tenderness. There is no guarding or rebound.   Musculoskeletal:      Right lower leg: No edema.      Left lower leg: No edema.      Comments: No leg pain or swelling   Neurological:      Mental Status: He is alert.   Psychiatric:         Mood and Affect: Mood normal.             Significant Labs: All pertinent labs within the past 24 hours have been reviewed.  Blood Culture: No results for input(s): "LABBLOO" in the last 48 hours.  CBC:   Recent Labs   Lab 09/30/24  0447 10/01/24  0250   WBC 7.58 9.35   HGB 9.7* 10.0*   HCT 29.9* 30.9*    387     CMP:   Recent Labs   Lab 09/30/24  0447 10/01/24  0250   * 132*   K 4.2 4.4   CL 98 98   CO2 26 27    82   BUN 13 15   CREATININE 1.0 1.0   CALCIUM 8.3* 8.9 " "  PROT 5.6* 6.0   ALBUMIN 2.0* 2.2*   BILITOT 0.4 0.3   ALKPHOS 84 86   AST 32 31   ALT 18 23   ANIONGAP 8 7*     Respiratory Culture: No results for input(s): "GSRESP", "RESPIRATORYC" in the last 48 hours.  Urine Culture: No results for input(s): "LABURIN" in the last 48 hours.    Significant Imaging: I have reviewed all pertinent imaging results/findings within the past 24 hours.    US Lower Extremity Veins Bilateral  Result Date: 9/30/2024  No evidence of deep venous thrombosis in either lower extremity.     US Upper Extremity Veins Bilateral  Result Date: 9/30/2024  No thrombus in central veins of the right or left upper extremity.     X-Ray Chest AP Portable  Result Date: 9/30/2024  Persistent minor left basilar atelectasis, but there has been no significant detrimental interval change in the appearance of the chest since 09/26/2024.     "

## 2024-09-30 NOTE — PROGRESS NOTES
[facility] Medicine  Progress Note    Patient Name: Robi Donovan  MRN: [id number]  Patient Class: IP- Inpatient   Admission Date: 9/20/2024  Length of Stay: 10 days  Attending Physician: Juan Luis Mcbride DO  Primary Care Provider: Yary Wright -        Subjective:     Principal Problem:Status post lumbar surgery        HPI:  Patient is a 79 y.o. male with hx of prior CVA with residual right sided weakness presents for eval of worsening low back pain and BLE radicular leg pain. He endorses pain which starts in upper lumbar area and then radiates down to lower lumbar area down his legs right greater than left into his feet. He endorses right foot weakness and has a right foot drop which has been present for many months. He has difficulty walking. 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 He has been to pain mgmt for injections without significant relief.     Medicine consulted  (9/26) for new intermittent fevers. No leukocytosis but has complained of dysuria and non productive cough post op. Infectious workup continued.     Overview/Hospital Course:  Patient is a 79 y.o. male with hx of prior CVA with residual right sided weakness presents for eval of worsening low back pain and BLE radicular leg pain. Now s/p lumbary surgery. Endocrine consulted for DM management. Medicine consulted  (9/26) for new intermittent fevers. No leukocytosis but has complained of dysuria and non productive cough post op. Patient had questionable consolidative changes on CXR and was started on cefepime for continued fevers to cover potential pneumonia vs UTI. Fevers resolved on cefepime, planning to treat with 7 day course of levoquin to complete coverage for HAP.    Interval History:  Fever of 100.5 at 7:40pm last night and improved w/o tylenol. Patient denies any complaints such as chest pain/ worsening cough, or dyspnea at this time.     Review of  "Systems  Objective:     Vital Signs (Most Recent):  Temp: 99.1 °F (37.3 °C) (09/30/24 1056)  Pulse: 78 (09/30/24 1056)  Resp: 18 (09/30/24 1524)  BP: 130/60 (09/30/24 1056)  SpO2: 96 % (09/30/24 1056) Vital Signs (24h Range):  Temp:  [98.1 °F (36.7 °C)-100.5 °F (38.1 °C)] 99.1 °F (37.3 °C)  Pulse:  [72-79] 78  Resp:  [14-18] 18  SpO2:  [94 %-98 %] 96 %  BP: (130-157)/(60-82) 130/60     Weight: 86.9 kg (191 lb 9.3 oz)  Body mass index is 25.28 kg/m².    Intake/Output Summary (Last 24 hours) at 9/30/2024 1535  Last data filed at 9/30/2024 1325  Gross per 24 hour   Intake 240 ml   Output 2775 ml   Net -2535 ml         Physical Exam  Constitutional:       General: He is not in acute distress.     Appearance: Normal appearance.   HENT:      Head: Normocephalic and atraumatic.   Cardiovascular:      Rate and Rhythm: Normal rate.      Heart sounds: No murmur heard.  Pulmonary:      Effort: Pulmonary effort is normal. No respiratory distress.      Breath sounds: Rales present.   Abdominal:      General: Abdomen is flat.      Palpations: Abdomen is soft.      Tenderness: There is no abdominal tenderness. There is no guarding or rebound.   Musculoskeletal:      Right lower leg: No edema.      Left lower leg: No edema.      Comments: No leg pain or swelling   Neurological:      Mental Status: He is alert.   Psychiatric:         Mood and Affect: Mood normal.             Significant Labs: All pertinent labs within the past 24 hours have been reviewed.  Blood Culture: No results for input(s): "LABBLOO" in the last 48 hours.  CBC:   Recent Labs   Lab 09/29/24 0443 09/30/24 0447   WBC 7.52 7.58   HGB 8.9* 9.7*   HCT 28.0* 29.9*    289     CMP:   Recent Labs   Lab 09/29/24 0443 09/30/24 0447   * 132*   K 3.9 4.2   CL 97 98   CO2 28 26   * 101   BUN 14 13   CREATININE 1.0 1.0   CALCIUM 8.1* 8.3*   PROT 5.1* 5.6*   ALBUMIN 1.9* 2.0*   BILITOT 0.4 0.4   ALKPHOS 69 84   AST 30 32   ALT 12 18   ANIONGAP 7* 8 " "    Respiratory Culture: No results for input(s): "GSRESP", "RESPIRATORYC" in the last 48 hours.  Urine Culture: No results for input(s): "LABURIN" in the last 48 hours.    Significant Imaging: I have reviewed all pertinent imaging results/findings within the past 24 hours.    US Lower Extremity Veins Bilateral  Result Date: 9/30/2024  No evidence of deep venous thrombosis in either lower extremity.     US Upper Extremity Veins Bilateral  Result Date: 9/30/2024  No thrombus in central veins of the right or left upper extremity.     X-Ray Chest AP Portable  Result Date: 9/30/2024  Persistent minor left basilar atelectasis, but there has been no significant detrimental interval change in the appearance of the chest since 09/26/2024.       Assessment/Plan:      * Status post lumbar surgery (L4-S1 TLIF)  -Plan per Primary team      Fever  Medicine consulted (9/26). Patient is s/p L4-S1 TLIF on 9/20 and has had intermittent fevers. No leukocytosis. white count. He did c/o dysuria and intermittent non productive cough post op but denies any chest pain, dyspnea, or hx of clots. Start infectious workup. (CXR 9/26 report)- shows atelectasis changes and possible consolidative changes. Initially, Cefepime (2g IV Q8h ) for symptomatic cystitis w/ pyuria and bacteruria and HAP coverage but transitioned to Levaquin for presumed PNA.     Plan:  -Levaquin for presumed PNA  -Will treat for 7 days total course to cover for HAP  - (9/30) US of upper and lower extremities showed no evidence of DVT  -CXR (9/30) report showed persistent left basilar atelectasis but no acute changes from last CXR  -Recommend Fung removal trial  -Reconsider IV cefepime and blood cx if fever spikes again      -Repeat UA shows 1+ leukocytes, wbcua 13, occasional bacteria  -Urine cx no growth and Blood cx NGTD  -F/u on resp cx  -Resp Infxn Panel/COVID/flu negative  -Trend CBC and CMP  -Continue encouraging Incentive Spirometry        Type 2 diabetes mellitus " with diabetic cataract, without long-term current use of insulin  Lab Results   Component Value Date    HGBA1C 7.8 (H) 09/09/2024     Recent Labs     09/29/24  1113 09/29/24  1418 09/29/24  1636 09/29/24  2109 09/30/24  0712 09/30/24  1058   POCTGLUCOSE 218* 205* 164* 95 116* 163*     Antihyperglycemics (From admission, onward)      Start     Stop Route Frequency Ordered    09/30/24 0900  insulin glargine U-100 (Lantus) pen 16 Units         -- SubQ Daily 09/30/24 0710    09/30/24 0715  insulin aspart U-100 pen 10 Units         -- SubQ 3 times daily with meals 09/30/24 0710    09/28/24 0800  insulin aspart U-100 pen 0-5 Units         -- SubQ Every 4 hours PRN 09/28/24 0837          - Endocrine following  - Insulin regimen as above  - diabetic diet  - POCT glucose checks TIDWM and qhs  - goal glucose 140-180      Abdominal distension  Likely related to post op ileus. Patient now having bowel movements but could be contributing to fevers.   -Bowel regimen as needed      Surgical wound present  No evidence of active infection from surgical wound  -Plan per primary team        VTE Risk Mitigation (From admission, onward)           Ordered     heparin (porcine) injection 5,000 Units  Every 8 hours         09/22/24 1223     IP VTE HIGH RISK PATIENT  Once         09/20/24 2235     Place sequential compression device  Until discontinued         09/20/24 2235     Place sequential compression device  Until discontinued         09/20/24 1028                    Discharge Planning   SUSY: 10/1/2024     Code Status: Full Code   Is the patient medically ready for discharge?:     Reason for patient still in hospital (select all that apply): Patient trending condition  Discharge Plan A: Skilled Nursing Facility                  Joselin Dodge MD  Department of Hospital Medicine   [facility] - Surgery

## 2024-09-30 NOTE — PLAN OF CARE
Escalated SNF authorization request to patient's payor for expedited review.     LASHONDA Carcamo, LCSW  Manager - Case Management

## 2024-09-30 NOTE — PROGRESS NOTES
Cristopher devorah - Surgery  [facility] Medicine  Progress Note    Patient Name: Robi Donovan  MRN: [id number]  Patient Class: IP- Inpatient   Admission Date: 9/20/2024  Length of Stay: 10 days  Attending Physician: Juan Luis Mcbride DO  Primary Care Provider: Yary Wright -        Subjective:     Principal Problem:Status post lumbar surgery        HPI:  Patient is a 79 y.o. male with hx of prior CVA with residual right sided weakness presents for eval of worsening low back pain and BLE radicular leg pain. He endorses pain which starts in upper lumbar area and then radiates down to lower lumbar area down his legs right greater than left into his feet. He endorses right foot weakness and has a right foot drop which has been present for many months. He has difficulty walking. 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 He has been to pain mgmt for injections without significant relief.     Medicine consulted  (9/26) for new intermittent fevers. No leukocytosis but has complained of dysuria and non productive cough post op. Infectious workup continued.     Overview/Hospital Course:  Patient is a 79 y.o. male with hx of prior CVA with residual right sided weakness presents for eval of worsening low back pain and BLE radicular leg pain. Now s/p lumbary surgery. Endocrine consulted for DM management. Medicine consulted  (9/26) for new intermittent fevers. No leukocytosis but has complained of dysuria and non productive cough post op. Patient had questionable consolidative changes on CXR and was started on cefepime for continued fevers to cover potential pneumonia vs UTI. Fevers resolved on cefepime, planning to treat with 7 day course of levoquin to complete coverage for HAP.    No new subjective & objective note has been filed under this hospital service since the last note was generated.      Assessment/Plan:      * Status post lumbar surgery (L4-S1 TLIF)  -Plan  per Primary team      Fever  Medicine consulted (9/26). Patient is s/p L4-S1 TLIF on 9/20 and has had intermittent fevers. No leukocytosis. white count. He did c/o dysuria and intermittent non productive cough post op but denies any chest pain, dyspnea, or hx of clots. Start infectious workup. (CXR 9/26 report)- shows atelectasis changes and possible consolidative changes. Initially, Cefepime (2g IV Q8h ) for symptomatic cystitis w/ pyuria and bacteruria and HAP coverage but transitioned to Levaquin for presumed PNA.     Plan:  -Levaquin for presumed PNA  -Will treat for 7 days total course to cover for HAP  - (9/30) US of upper and lower extremities showed no evidence of DVT  -CXR (9/30) report showed persistent left basilar atelectasis but no acute changes from last CXR  -Recommend Fung removal trial  -Reconsider IV cefepime and blood cx if fever spikes again      -Repeat UA shows 1+ leukocytes, wbcua 13, occasional bacteria  -Urine cx no growth and Blood cx NGTD  -F/u on resp cx  -Resp Infxn Panel/COVID/flu negative  -Trend CBC and CMP  -Continue encouraging Incentive Spirometry        Type 2 diabetes mellitus with diabetic cataract, without long-term current use of insulin  Lab Results   Component Value Date    HGBA1C 7.8 (H) 09/09/2024     Recent Labs     09/29/24  1113 09/29/24  1418 09/29/24  1636 09/29/24  2109 09/30/24  0712 09/30/24  1058   POCTGLUCOSE 218* 205* 164* 95 116* 163*     Antihyperglycemics (From admission, onward)      Start     Stop Route Frequency Ordered    09/30/24 0900  insulin glargine U-100 (Lantus) pen 16 Units         -- SubQ Daily 09/30/24 0710    09/30/24 0715  insulin aspart U-100 pen 10 Units         -- SubQ 3 times daily with meals 09/30/24 0710    09/28/24 0800  insulin aspart U-100 pen 0-5 Units         -- SubQ Every 4 hours PRN 09/28/24 0837          - Endocrine following  - Insulin regimen as above  - diabetic diet  - POCT glucose checks TIDWM and qhs  - goal glucose  140-180      Abdominal distension  Likely related to post op ileus. Patient now having bowel movements but could be contributing to fevers.   -Bowel regimen as needed      Surgical wound present  No evidence of active infection from surgical wound  -Plan per primary team        VTE Risk Mitigation (From admission, onward)           Ordered     heparin (porcine) injection 5,000 Units  Every 8 hours         09/22/24 1223     IP VTE HIGH RISK PATIENT  Once         09/20/24 2235     Place sequential compression device  Until discontinued         09/20/24 2235     Place sequential compression device  Until discontinued         09/20/24 1028                    Discharge Planning   SUSY: 10/1/2024     Code Status: Full Code   Is the patient medically ready for discharge?:     Reason for patient still in hospital (select all that apply): Patient trending condition  Discharge Plan A: Skilled Nursing Facility                  Joselin Dodge MD  Department of Hospital Medicine   [facility] - Surgery

## 2024-09-30 NOTE — PLAN OF CARE
Problem: Adult Inpatient Plan of Care  Goal: Plan of Care Review  Outcome: Progressing     Problem: Adult Inpatient Plan of Care  Goal: Patient-Specific Goal (Individualized)  Outcome: Progressing     Problem: Adult Inpatient Plan of Care  Goal: Optimal Comfort and Wellbeing  Outcome: Progressing     Problem: Diabetes Comorbidity  Goal: Blood Glucose Level Within Targeted Range  Outcome: Progressing     Problem: Infection  Goal: Absence of Infection Signs and Symptoms  Outcome: Progressing     Problem: Wound  Goal: Skin Health and Integrity  Outcome: Progressing     Problem: Skin Injury Risk Increased  Goal: Skin Health and Integrity  Outcome: Progressing     Problem: Fall Injury Risk  Goal: Absence of Fall and Fall-Related Injury  Outcome: Progressing   Patient sitting up in chair watching television. NAD noted. Safety measures in place. Daughter at the bedside.   Mom said that patient has had cold symptoms and congestion since Wednesday. Mom said that patient is still feeding normally (), but is not sleeping well. Mom said that patient is having difficulty breathing when trying to sleep and cries whenever she puts him in crib - patient will only sleep when mom is holding him - mom reports that she took patients temperature temporally and it was \"98 something\". Mom reports that patient is a lot fussier than normal. Advised mom to make appointment because she is concerned and because patient is having difficulty breathing. Patient is awake and stable right now. Mom stated verbal understanding. transferred mom to scheduling to make appointment.

## 2024-09-30 NOTE — ASSESSMENT & PLAN NOTE
BG goal: 140-180    - Lantus 16 units QD (20% decrease due to fasting BG below goal)   - Novolog 10 units TIDWM (20% decrease due to prandial blood glucose  below goal)  - LDC SSI PRN (150/50)   - POCT Glucose before meals and at bedtime  - Hypoglycemia protocol in place      ** Please notify Endocrine for any change and/or advance in diet**  ** Please call Endocrine for any BG related issues **     Discharge Planning:   TBD. Please notify endocrinology prior to discharge.

## 2024-09-30 NOTE — ASSESSMENT & PLAN NOTE
78 M with hx of prior CVA with residual right sided weakness presents for elective surgery for worsening low back pain and BLE radicular leg pain now s/p L4-S1 TLIF on 9/20/2024:    Plan:  - Admitted to ns, q4h neuro vital checks.  - Postop XR satisfactory.  - HV drains removed 9/25. Ancef dc'ed 9/25.  - LSO brace when OOB.  - Fung placed 9/25 for urinary retention post op. Will arrange voiding trial in 7-10 days.  - Bowel regimen in place. +BM 9/26. +BM 9/28.  - PT/OT/OOB.  - DVT ppx: SCDs/SQH.  - Atelectasis ppx: IS use hourly/up in chair.    Dispo: SNF pending afebrile x 24 hours.

## 2024-09-30 NOTE — NURSING
Nurses Note -- 4 Eyes      9/29/2024   7:05 PM      Skin assessed during: Daily Assessment      [x] No Altered Skin Integrity Present    []Prevention Measures Documented      [] Yes- Altered Skin Integrity Present or Discovered   [] LDA Added if Not in Epic (Describe Wound)   [] New Altered Skin Integrity was Present on Admit and Documented in LDA   [] Wound Image Taken    Wound Care Consulted? No    Attending Nurse:  Kim     Second RN/Staff Member:  Vidya

## 2024-09-30 NOTE — SUBJECTIVE & OBJECTIVE
Interval History: NAEON. Fever to 100.5 overnight. Medicine repeating BLE US and CXR. On Levoquin PO. Incision is c/d/I. Fung in place. SNF accepted patient, pending afebrile x 24 hrs.    Medications:  Continuous Infusions:   0.9% NaCl   Intravenous Continuous   Stopped at 09/29/24 2012     Scheduled Meds:   amLODIPine  10 mg Oral Daily    calcium carbonate  500 mg Oral BID    carvediloL  6.25 mg Oral BID    heparin (porcine)  5,000 Units Subcutaneous Q8H    insulin aspart U-100  10 Units Subcutaneous TIDWM    insulin glargine U-100  16 Units Subcutaneous Daily    levoFLOXacin  750 mg Oral Daily    losartan  100 mg Oral Daily    methocarbamoL  1,000 mg Oral QID    polyethylene glycol  17 g Oral BID    pravastatin  40 mg Oral QHS    senna-docusate 8.6-50 mg  2 tablet Oral BID    tamsulosin  0.4 mg Oral Daily     PRN Meds:  Current Facility-Administered Medications:     acetaminophen, 650 mg, Oral, Q6H PRN    albuterol sulfate, 2.5 mg, Nebulization, Q4H PRN    aluminum-magnesium hydroxide-simethicone, 30 mL, Oral, Q4H PRN    dextrose 10%, 12.5 g, Intravenous, PRN    dextrose 10%, 25 g, Intravenous, PRN    glucagon (human recombinant), 1 mg, Intramuscular, PRN    glucose, 16 g, Oral, PRN    glucose, 24 g, Oral, PRN    insulin aspart U-100, 0-5 Units, Subcutaneous, Q4H PRN    melatonin, 6 mg, Oral, Nightly PRN    ondansetron, 8 mg, Oral, Q6H PRN    oxyCODONE, 5 mg, Oral, Q4H PRN    oxyCODONE, 10 mg, Oral, Q4H PRN     Review of Systems  Objective:     Weight: 86.9 kg (191 lb 9.3 oz)  Body mass index is 25.28 kg/m².  Vital Signs (Most Recent):  Temp: 99.3 °F (37.4 °C) (09/30/24 0726)  Pulse: 77 (09/30/24 0726)  Resp: 18 (09/30/24 0726)  BP: (!) 157/82 (09/30/24 0726)  SpO2: 95 % (09/30/24 0726) Vital Signs (24h Range):  Temp:  [97.5 °F (36.4 °C)-100.5 °F (38.1 °C)] 99.3 °F (37.4 °C)  Pulse:  [72-79] 77  Resp:  [14-18] 18  SpO2:  [94 %-98 %] 95 %  BP: (129-157)/(66-82) 157/82     Date 09/30/24 0700 - 10/01/24 0659   Shift  "[id number] [id number] 8663-0659 24 Hour Total   INTAKE   P.O. 240   240   Shift Total(mL/kg) 240(2.8)   240(2.8)   OUTPUT   Shift Total(mL/kg)       Weight (kg) 86.9 86.9 86.9 86.9                            Urethral Catheter 09/25/24 1604 (Active)   Site Assessment Clean;Intact 09/30/24 0800   Collection Container Urimeter 09/30/24 0800   Securement Method secured to top of thigh w/ adhesive device 09/30/24 0800   Catheter Care Performed yes 09/30/24 0800   Reason for Continuing Urinary Catheterization Urinary retention 09/30/24 0800   CAUTI Prevention Bundle Securement Device in place with 1" slack 09/30/24 0800   Output (mL) 400 mL 09/29/24 1830              Neurosurgery Physical Exam  General: well developed, well nourished, no distress.   Head: normocephalic, atraumatic  Neurologic: Alert and oriented. Thought content appropriate.  Mental Status: Awake, Alert, Oriented  Cranial nerves: face symmetric, CN II-XII grossly intact.   Pulmonary: normal respirations, no signs of respiratory distress  Sensory: intact to light touch throughout  Abd: no longer distended, nontender  Motor Strength: Moves all extremities spontaneously with good tone. No abnormal movements seen.      Strength   Deltoids Triceps Biceps Wrist Extension Wrist Flexion Hand    Upper: R 5/5 5/5 5/5 5/5 5/5 5/5     L 5/5 5/5 5/5 5/5 5/5 5/5       Iliopsoas Quadriceps Knee  Flexion Tibialis  anterior Gastro- cnemius EHL   Lower: R 4+/5 4+/5 4+/5 4-/5 4-/5 4-/5     L 5/5 5/5 5/5 5/5 5/5 5/5      Skin: Skin is warm, dry and intact.     Incision c/d/I with skin edges well approximated with dermabond/prineo. No surrounding erythema or edema. No drainage from incision. No palpable underlying fluid collection.       Significant Labs:  Recent Labs   Lab 09/29/24  0443 09/30/24  0447   * 101   * 132*   K 3.9 4.2   CL 97 98   CO2 28 26   BUN 14 13   CREATININE 1.0 1.0   CALCIUM 8.1* 8.3*     Recent Labs   Lab 09/29/24  0443 09/30/24  0447 " "  WBC 7.52 7.58   HGB 8.9* 9.7*   HCT 28.0* 29.9*    289     No results for input(s): "LABPT", "INR", "APTT" in the last 48 hours.  Microbiology Results (last 7 days)       Procedure Component Value Units Date/Time    Blood culture [[id number]] Collected: 09/26/24 0904    Order Status: Completed Specimen: Blood from Peripheral, Hand, Right Updated: 09/30/24 1012     Blood Culture, Routine No growth to date      No Growth to date      No Growth to date      No Growth to date      No Growth to date      No Growth to date    Blood culture [[id number]] Collected: 09/26/24 0900    Order Status: Completed Specimen: Blood from Peripheral, Antecubital, Right Updated: 09/30/24 1012     Blood Culture, Routine No growth to date      No Growth to date      No Growth to date      No Growth to date      No Growth to date      No Growth to date    Urine culture [[id number]] Collected: 09/26/24 1522    Order Status: Completed Specimen: Urine Updated: 09/27/24 2309     Urine Culture, Routine No growth    Narrative:      Specimen Source->Urine    Culture, Respiratory with Gram Stain [[id number]]     Order Status: No result Specimen: Respiratory     MRSA Screen by PCR [[id number]]     Order Status: No result Specimen: Nasal Swab     Respiratory Infection Panel (PCR), Nasopharyngeal [[id number]] Collected: 09/26/24 1140    Order Status: Completed Specimen: Nasopharyngeal Swab Updated: 09/26/24 1424     Respiratory Infection Panel Source NP Swab     Adenovirus Not Detected     Coronavirus 229E, Common Cold Virus Not Detected     Coronavirus HKU1, Common Cold Virus Not Detected     Coronavirus NL63, Common Cold Virus Not Detected     Coronavirus OC43, Common Cold Virus Not Detected     Comment: The Coronavirus strains detected in this test cause the common cold.  These strains are not the COVID-19 (novel Coronavirus)strain   associated with the respiratory disease outbreak.          SARS-CoV2 (COVID-19) Qualitative PCR Not " Detected     Human Metapneumovirus Not Detected     Human Rhinovirus/Enterovirus Not Detected     Influenza A (subtypes H1, H1-2009,H3) Not Detected     Influenza B Not Detected     Parainfluenza Virus 1 Not Detected     Parainfluenza Virus 2 Not Detected     Parainfluenza Virus 3 Not Detected     Parainfluenza Virus 4 Not Detected     Respiratory Syncytial Virus Not Detected     Bordetella Parapertussis (KP0164) Not Detected     Bordetella pertussis (ptxP) Not Detected     Chlamydia pneumoniae Not Detected     Mycoplasma pneumoniae Not Detected    Narrative:      Assay not valid for lower respiratory specimens, alternate  testing required.          All pertinent labs from the last 24 hours have been reviewed.    Significant Diagnostics:  I have reviewed and interpreted all pertinent imaging results/findings within the past 24 hours.

## 2024-09-30 NOTE — SUBJECTIVE & OBJECTIVE
"Interval History: Fever of 100.5 at 7:40pm last night and improved w/o tylenol. Patient denies any complaints such as chest pain/ worsening cough, or dyspnea at this time.    Review of Systems  Objective:     Vital Signs (Most Recent):  Temp: 99.3 °F (37.4 °C) (09/30/24 0726)  Pulse: 77 (09/30/24 0726)  Resp: 18 (09/30/24 0726)  BP: (!) 157/82 (09/30/24 0726)  SpO2: 95 % (09/30/24 0726) Vital Signs (24h Range):  Temp:  [97.5 °F (36.4 °C)-100.5 °F (38.1 °C)] 99.3 °F (37.4 °C)  Pulse:  [72-79] 77  Resp:  [14-18] 18  SpO2:  [94 %-98 %] 95 %  BP: (129-157)/(66-82) 157/82     Weight: 86.9 kg (191 lb 9.3 oz)  Body mass index is 25.28 kg/m².    Intake/Output Summary (Last 24 hours) at 9/30/2024 0825  Last data filed at 9/30/2024 0817  Gross per 24 hour   Intake 240 ml   Output 2195 ml   Net -1955 ml         Physical Exam  Constitutional:       General: He is not in acute distress.     Appearance: Normal appearance.   HENT:      Head: Normocephalic and atraumatic.   Cardiovascular:      Rate and Rhythm: Normal rate.      Heart sounds: No murmur heard.  Pulmonary:      Effort: Pulmonary effort is normal. No respiratory distress.      Breath sounds: Rales present.   Abdominal:      General: Abdomen is flat.      Palpations: Abdomen is soft.      Tenderness: There is no abdominal tenderness. There is no guarding or rebound.   Musculoskeletal:      Right lower leg: No edema.      Left lower leg: No edema.      Comments: No leg pain or swelling   Neurological:      Mental Status: He is alert.   Psychiatric:         Mood and Affect: Mood normal.             Significant Labs: All pertinent labs within the past 24 hours have been reviewed.  Blood Culture: No results for input(s): "LABBLOO" in the last 48 hours.  CBC:   Recent Labs   Lab 09/29/24  0443 09/30/24  0447   WBC 7.52 7.58   HGB 8.9* 9.7*   HCT 28.0* 29.9*    289     CMP:   Recent Labs   Lab 09/29/24  0443 09/30/24  0447   * 132*   K 3.9 4.2   CL 97 98   CO2 " "28 26   * 101   BUN 14 13   CREATININE 1.0 1.0   CALCIUM 8.1* 8.3*   PROT 5.1* 5.6*   ALBUMIN 1.9* 2.0*   BILITOT 0.4 0.4   ALKPHOS 69 84   AST 30 32   ALT 12 18   ANIONGAP 7* 8     POCT Glucose:   Recent Labs   Lab 09/29/24  1636 09/29/24  2109 09/30/24  0712   POCTGLUCOSE 164* 95 116*     Respiratory Culture: No results for input(s): "GSRESP", "RESPIRATORYC" in the last 48 hours.  Urine Culture: No results for input(s): "LABURIN" in the last 48 hours.    Significant Imaging: I have reviewed all pertinent imaging results/findings within the past 24 hours.  "

## 2024-09-30 NOTE — ASSESSMENT & PLAN NOTE
PLAN:  - Endocrine following  - diabetic diet  - POCT glucose checks TIDWM and qhs  - goal glucose 140-180    Lab Results   Component Value Date    HGBA1C 7.8 (H) 09/09/2024     Recent Labs     09/29/24 2109 09/30/24  0712 09/30/24  1058 09/30/24  1617 09/30/24  2039 10/01/24  0741   POCTGLUCOSE 95 116* 163* 109 103 131*     Antihyperglycemics (From admission, onward)      Start     Stop Route Frequency Ordered    09/30/24 0900  insulin glargine U-100 (Lantus) pen 16 Units         -- SubQ Daily 09/30/24 0710    09/30/24 0715  insulin aspart U-100 pen 10 Units         -- SubQ 3 times daily with meals 09/30/24 0710    09/28/24 0800  insulin aspart U-100 pen 0-5 Units         -- SubQ Every 4 hours PRN 09/28/24 0837

## 2024-09-30 NOTE — PT/OT/SLP PROGRESS
"Physical Therapy Treatment    Patient Name:  Robi Donovan   MRN:  [id number]    Recommendations:     Discharge Recommendations: Moderate Intensity Therapy  Discharge Equipment Recommendations: walker, rolling  Barriers to discharge: None    Assessment:     Robi Donovan is a 79 y.o. male admitted with a medical diagnosis of Status post lumbar surgery.  He presents with the following impairments/functional limitations: weakness, impaired endurance, impaired self care skills, impaired functional mobility, gait instability, impaired balance, decreased lower extremity function, pain, orthopedic precautions.  Pt agreeable, up in chair prior to treat, daughter present focused on increasing gait distance and safe gait technique, LSO and AFO donned prior to treat    Rehab Prognosis: Good; patient would benefit from acute skilled PT services to address these deficits and reach maximum level of function.    Recent Surgery: Procedure(s) (LRB):  OPEN ROBOTIC L4 -S1 TLIF (N/A) 10 Days Post-Op    Plan:     During this hospitalization, patient to be seen 5 x/week to address the identified rehab impairments via gait training, therapeutic activities, therapeutic exercises, neuromuscular re-education and progress toward the following goals:    Plan of Care Expires:  10/21/24    Subjective     Chief Complaint: pain  Patient/Family Comments/goals: "Let's go"  Pain/Comfort:  Pain Rating 1: 9/10  Location - Orientation 1: generalized  Location 1: back  Pain Addressed 1: Reposition  Pain Rating Post-Intervention 1: 7/10      Objective:     Communicated with RN prior to session.  Patient found up in chair with ross catheter, peripheral IV upon PT entry to room.     General Precautions: Standard, fall  Orthopedic Precautions: spinal precautions  Braces: AFO, LSO  Respiratory Status: Room air     Functional Mobility:    Transfers:   Sit <> Stand Transfer: moderate assistance from bedside chair using rolling walker     Balance:   Sitting balance: " FAIR+: Maintains balance through MINIMAL excursions of active trunk motion  Standing balance:   FAIR+: Takes MINIMAL challenges from all directions  POOR+: Needs MIN (minimal ) assist during gait                 Gait:  Distance: 50' x 2   Assistive Device: RW  Assistance Level: moderate assistance  Gait Assessment: pt takes slow step through steps, intermittently takes step  to steps but able to perform step through steps w/frequent VC's to take bigger steps, occasional cues for posture, occasionally requires RW management assist but shows improvement by staying closer to RW, fatigued after activity             AM-PAC 6 CLICK MOBILITY  Turning over in bed (including adjusting bedclothes, sheets and blankets)?: 2  Sitting down on and standing up from a chair with arms (e.g., wheelchair, bedside commode, etc.): 3  Moving from lying on back to sitting on the side of the bed?: 2  Moving to and from a bed to a chair (including a wheelchair)?: 3  Need to walk in hospital room?: 3  Climbing 3-5 steps with a railing?: 2  Basic Mobility Total Score: 15       Treatment & Education:  Education provided regarding PT role and PoC to increase strength and endurance as well as for safe gait technique    Patient left up in chair with all lines intact, call button in reach, and daughter present..    GOALS:   Multidisciplinary Problems       Physical Therapy Goals          Problem: Physical Therapy    Goal Priority Disciplines Outcome Interventions   Physical Therapy Goal     PT, PT/OT Progressing    Description: Goals to be met by: 10/5/24     Patient will increase functional independence with mobility by performin. Supine to sit with Stand-by Assistance - Not met  2. Rolling to Left or Right with Modified [place] - Not met  3. Sit to stand transfer with Stand-by Assistance with RW - Not met  4. Gait  x 125 feet with Stand-by Assistance using Rolling Walker - Not met  5. Ascend/descend 5 stair with left Handrail with  Contact Guard Assistance using No Assistive Device - Not met   6. Pt will verbalize 3/3 spinal precautions without cueing - Not met                       Time Tracking:     PT Received On: 09/30/24  PT Start Time: 1433     PT Stop Time: 1446  PT Total Time (min): 13 min     Billable Minutes: Gait Training 13min    Treatment Type: Treatment  PT/PTA: PTA     Number of PTA visits since last PT visit: 2     09/30/2024

## 2024-09-30 NOTE — ASSESSMENT & PLAN NOTE
Distended on exam this morning, 9/28. KUB showed ileus at this time. Mg/Phos wnl.     - Continue bladder scans.  - Nondistended on exam as of 9/29.  - + BM 9/28.  - Diet restarted.  - Will continue to monitor.

## 2024-09-30 NOTE — PLAN OF CARE
Pt AAOx4 and VSS . Progressing with plan of care. Free of skin breakdown as the pt positioned/repositioned well independently. Clean, dry, and intact dressing noted on back. SCDs in place at all times. Incentive spirometer at bedside and pt instructed on its use. Pain controlled well with PRN meds. Frequent rounds made to assess pain and safety and no complaints at this time noted. Side rails up x 2. Bed locked. Call light within reach. No falls noted. Will continue to monitor.    Problem: Adult Inpatient Plan of Care  Goal: Plan of Care Review  Outcome: Progressing  Goal: Patient-Specific Goal (Individualized)  Outcome: Progressing  Goal: Absence of Hospital-Acquired Illness or Injury  Outcome: Progressing  Goal: Optimal Comfort and Wellbeing  Outcome: Progressing  Goal: Readiness for Transition of Care  Outcome: Progressing     Problem: Diabetes Comorbidity  Goal: Blood Glucose Level Within Targeted Range  Outcome: Progressing     Problem: Infection  Goal: Absence of Infection Signs and Symptoms  Outcome: Progressing     Problem: Wound  Goal: Optimal Coping  Outcome: Progressing  Goal: Optimal Functional Ability  Outcome: Progressing  Goal: Absence of Infection Signs and Symptoms  Outcome: Progressing  Goal: Improved Oral Intake  Outcome: Progressing  Goal: Optimal Pain Control and Function  Outcome: Progressing  Goal: Skin Health and Integrity  Outcome: Progressing  Goal: Optimal Wound Healing  Outcome: Progressing     Problem: Skin Injury Risk Increased  Goal: Skin Health and Integrity  Outcome: Progressing     Problem: Fall Injury Risk  Goal: Absence of Fall and Fall-Related Injury  Outcome: Progressing

## 2024-10-01 VITALS
BODY MASS INDEX: 25.39 KG/M2 | OXYGEN SATURATION: 97 % | RESPIRATION RATE: 19 BRPM | TEMPERATURE: 100 F | SYSTOLIC BLOOD PRESSURE: 145 MMHG | HEART RATE: 79 BPM | WEIGHT: 191.56 LBS | HEIGHT: 73 IN | DIASTOLIC BLOOD PRESSURE: 67 MMHG

## 2024-10-01 LAB
ALBUMIN SERPL BCP-MCNC: 2.2 G/DL (ref 3.5–5.2)
ALP SERPL-CCNC: 86 U/L (ref 55–135)
ALT SERPL W/O P-5'-P-CCNC: 23 U/L (ref 10–44)
ANION GAP SERPL CALC-SCNC: 7 MMOL/L (ref 8–16)
AST SERPL-CCNC: 31 U/L (ref 10–40)
BACTERIA BLD CULT: NORMAL
BACTERIA BLD CULT: NORMAL
BASOPHILS # BLD AUTO: 0.03 K/UL (ref 0–0.2)
BASOPHILS NFR BLD: 0.3 % (ref 0–1.9)
BILIRUB SERPL-MCNC: 0.3 MG/DL (ref 0.1–1)
BUN SERPL-MCNC: 15 MG/DL (ref 8–23)
CALCIUM SERPL-MCNC: 8.9 MG/DL (ref 8.7–10.5)
CHLORIDE SERPL-SCNC: 98 MMOL/L (ref 95–110)
CO2 SERPL-SCNC: 27 MMOL/L (ref 23–29)
CREAT SERPL-MCNC: 1 MG/DL (ref 0.5–1.4)
DIFFERENTIAL METHOD BLD: ABNORMAL
EOSINOPHIL # BLD AUTO: 0.2 K/UL (ref 0–0.5)
EOSINOPHIL NFR BLD: 1.9 % (ref 0–8)
ERYTHROCYTE [DISTWIDTH] IN BLOOD BY AUTOMATED COUNT: 13.2 % (ref 11.5–14.5)
EST. GFR  (NO RACE VARIABLE): >60 ML/MIN/1.73 M^2
GLUCOSE SERPL-MCNC: 82 MG/DL (ref 70–110)
HCT VFR BLD AUTO: 30.9 % (ref 40–54)
HGB BLD-MCNC: 10 G/DL (ref 14–18)
IMM GRANULOCYTES # BLD AUTO: 0.18 K/UL (ref 0–0.04)
IMM GRANULOCYTES NFR BLD AUTO: 1.9 % (ref 0–0.5)
LYMPHOCYTES # BLD AUTO: 2 K/UL (ref 1–4.8)
LYMPHOCYTES NFR BLD: 21.5 % (ref 18–48)
MCH RBC QN AUTO: 29.4 PG (ref 27–31)
MCHC RBC AUTO-ENTMCNC: 32.4 G/DL (ref 32–36)
MCV RBC AUTO: 91 FL (ref 82–98)
MONOCYTES # BLD AUTO: 1 K/UL (ref 0.3–1)
MONOCYTES NFR BLD: 10.8 % (ref 4–15)
NEUTROPHILS # BLD AUTO: 5.9 K/UL (ref 1.8–7.7)
NEUTROPHILS NFR BLD: 63.6 % (ref 38–73)
NRBC BLD-RTO: 0 /100 WBC
PLATELET # BLD AUTO: 387 K/UL (ref 150–450)
PMV BLD AUTO: 9.2 FL (ref 9.2–12.9)
POCT GLUCOSE: 131 MG/DL (ref 70–110)
POCT GLUCOSE: 140 MG/DL (ref 70–110)
POCT GLUCOSE: 167 MG/DL (ref 70–110)
POTASSIUM SERPL-SCNC: 4.4 MMOL/L (ref 3.5–5.1)
PROT SERPL-MCNC: 6 G/DL (ref 6–8.4)
RBC # BLD AUTO: 3.4 M/UL (ref 4.6–6.2)
SODIUM SERPL-SCNC: 132 MMOL/L (ref 136–145)
WBC # BLD AUTO: 9.35 K/UL (ref 3.9–12.7)

## 2024-10-01 PROCEDURE — 99232 SBSQ HOSP IP/OBS MODERATE 35: CPT | Mod: ,,, | Performed by: NURSE PRACTITIONER

## 2024-10-01 PROCEDURE — 80053 COMPREHEN METABOLIC PANEL: CPT

## 2024-10-01 PROCEDURE — 25000003 PHARM REV CODE 250

## 2024-10-01 PROCEDURE — 36415 COLL VENOUS BLD VENIPUNCTURE: CPT

## 2024-10-01 PROCEDURE — 63600175 PHARM REV CODE 636 W HCPCS: Performed by: STUDENT IN AN ORGANIZED HEALTH CARE EDUCATION/TRAINING PROGRAM

## 2024-10-01 PROCEDURE — 97116 GAIT TRAINING THERAPY: CPT | Mod: CQ

## 2024-10-01 PROCEDURE — 99024 POSTOP FOLLOW-UP VISIT: CPT | Mod: ,,,

## 2024-10-01 PROCEDURE — 97110 THERAPEUTIC EXERCISES: CPT

## 2024-10-01 PROCEDURE — 85025 COMPLETE CBC W/AUTO DIFF WBC: CPT | Performed by: STUDENT IN AN ORGANIZED HEALTH CARE EDUCATION/TRAINING PROGRAM

## 2024-10-01 PROCEDURE — 25000003 PHARM REV CODE 250: Performed by: STUDENT IN AN ORGANIZED HEALTH CARE EDUCATION/TRAINING PROGRAM

## 2024-10-01 RX ORDER — METHOCARBAMOL 500 MG/1
1000 TABLET, FILM COATED ORAL 3 TIMES DAILY
Qty: 30 TABLET | Refills: 0 | Status: SHIPPED | OUTPATIENT
Start: 2024-10-01 | End: 2024-10-06

## 2024-10-01 RX ORDER — OXYCODONE AND ACETAMINOPHEN 10; 325 MG/1; MG/1
1 TABLET ORAL EVERY 6 HOURS PRN
Qty: 28 TABLET | Refills: 0 | Status: SHIPPED | OUTPATIENT
Start: 2024-10-01 | End: 2024-10-09

## 2024-10-01 RX ORDER — LEVOFLOXACIN 750 MG/1
750 TABLET ORAL DAILY
Qty: 4 TABLET | Refills: 0 | Status: SHIPPED | OUTPATIENT
Start: 2024-10-02 | End: 2024-10-06

## 2024-10-01 RX ADMIN — CALCIUM CARBONATE (ANTACID) CHEW TAB 500 MG 500 MG: 500 CHEW TAB at 08:10

## 2024-10-01 RX ADMIN — LEVOFLOXACIN 750 MG: 750 TABLET, FILM COATED ORAL at 08:10

## 2024-10-01 RX ADMIN — LOSARTAN POTASSIUM 100 MG: 25 TABLET, FILM COATED ORAL at 08:10

## 2024-10-01 RX ADMIN — AMLODIPINE BESYLATE 10 MG: 10 TABLET ORAL at 08:10

## 2024-10-01 RX ADMIN — OXYCODONE AND ACETAMINOPHEN 1 TABLET: 10; 325 TABLET ORAL at 08:10

## 2024-10-01 RX ADMIN — METHOCARBAMOL 1000 MG: 500 TABLET ORAL at 08:10

## 2024-10-01 RX ADMIN — INSULIN ASPART 10 UNITS: 100 INJECTION, SOLUTION INTRAVENOUS; SUBCUTANEOUS at 08:10

## 2024-10-01 RX ADMIN — METHOCARBAMOL 1000 MG: 500 TABLET ORAL at 01:10

## 2024-10-01 RX ADMIN — INSULIN GLARGINE 16 UNITS: 100 INJECTION, SOLUTION SUBCUTANEOUS at 08:10

## 2024-10-01 RX ADMIN — HEPARIN SODIUM 5000 UNITS: 5000 INJECTION INTRAVENOUS; SUBCUTANEOUS at 01:10

## 2024-10-01 RX ADMIN — TAMSULOSIN HYDROCHLORIDE 0.4 MG: 0.4 CAPSULE ORAL at 08:10

## 2024-10-01 RX ADMIN — OXYCODONE AND ACETAMINOPHEN 1 TABLET: 10; 325 TABLET ORAL at 01:10

## 2024-10-01 RX ADMIN — HEPARIN SODIUM 5000 UNITS: 5000 INJECTION INTRAVENOUS; SUBCUTANEOUS at 05:10

## 2024-10-01 RX ADMIN — OXYCODONE AND ACETAMINOPHEN 1 TABLET: 10; 325 TABLET ORAL at 02:10

## 2024-10-01 RX ADMIN — CARVEDILOL 6.25 MG: 6.25 TABLET, FILM COATED ORAL at 08:10

## 2024-10-01 RX ADMIN — INSULIN ASPART 10 UNITS: 100 INJECTION, SOLUTION INTRAVENOUS; SUBCUTANEOUS at 11:10

## 2024-10-01 NOTE — PROGRESS NOTES
[facility]  Neurosurgery  Progress Note    Subjective:     History of Present Illness: 78 M with hx of prior CVA with residual right sided weakness presents for eval of worsening low back pain and BLE radicular leg pain. He endorses pain which starts in upper lumbar area and then radiates down to lower lumbar area down his legs right greater than left into his feet. He endorses right foot weakness and has a right foot drop which has been present for many months. He has difficulty walking. 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 He has been to pain mgmt for injections without significant relief.      Interval fu 8/8/24:  Pt presents in fu.  No significant changes in symptoms.  Today he is most concerned with his balance difficulties.     Interval fu 8/29/24: No changes in symptoms.    Post-Op Info:  Procedure(s) (LRB):  OPEN ROBOTIC L4 -S1 TLIF (N/A)   11 Days Post-Op   Interval History: NAEON. Remained afebrile overnight. BLE US w/o DVT. CXR w/o significant change from prior, shows atelectasis. Fung removed this AM, voiding spontaneously. Pending SNF.    Medications:  Continuous Infusions:  Scheduled Meds:   amLODIPine  10 mg Oral Daily    calcium carbonate  500 mg Oral BID    carvediloL  6.25 mg Oral BID    heparin (porcine)  5,000 Units Subcutaneous Q8H    insulin aspart U-100  10 Units Subcutaneous TIDWM    insulin glargine U-100  16 Units Subcutaneous Daily    levoFLOXacin  750 mg Oral Daily    losartan  100 mg Oral Daily    methocarbamoL  1,000 mg Oral QID    polyethylene glycol  17 g Oral BID    pravastatin  40 mg Oral QHS    senna-docusate 8.6-50 mg  2 tablet Oral BID    tamsulosin  0.4 mg Oral Daily     PRN Meds:  Current Facility-Administered Medications:     acetaminophen, 650 mg, Oral, Q6H PRN    albuterol sulfate, 2.5 mg, Nebulization, Q4H PRN    aluminum-magnesium hydroxide-simethicone, 30 mL, Oral, Q4H PRN    dextrose 10%,  12.5 g, Intravenous, PRN    dextrose 10%, 25 g, Intravenous, PRN    glucagon (human recombinant), 1 mg, Intramuscular, PRN    glucose, 16 g, Oral, PRN    glucose, 24 g, Oral, PRN    insulin aspart U-100, 0-5 Units, Subcutaneous, Q4H PRN    melatonin, 6 mg, Oral, Nightly PRN    ondansetron, 8 mg, Oral, Q6H PRN    oxyCODONE-acetaminophen, 1 tablet, Oral, Q4H PRN    oxyCODONE-acetaminophen, 1 tablet, Oral, Q4H PRN     Review of Systems  Objective:     Weight: 86.9 kg (191 lb 9.3 oz)  Body mass index is 25.28 kg/m².  Vital Signs (Most Recent):  Temp: 98.7 °F (37.1 °C) (10/01/24 1121)  Pulse: 75 (10/01/24 1121)  Resp: 19 (10/01/24 1121)  BP: (!) 144/65 (10/01/24 1121)  SpO2: 98 % (10/01/24 1121) Vital Signs (24h Range):  Temp:  [98.3 °F (36.8 °C)-99.5 °F (37.5 °C)] 98.7 °F (37.1 °C)  Pulse:  [71-80] 75  Resp:  [16-19] 19  SpO2:  [95 %-98 %] 98 %  BP: (144-157)/(65-82) 144/65     Date 10/01/24 0700 - 10/02/24 0659   Shift 7626-0897 6280-9622 8516-0843 24 Hour Total   INTAKE   P.O. 237   237   Shift Total(mL/kg) 237(2.7)   237(2.7)   OUTPUT   Urine(mL/kg/hr) 750   750   Shift Total(mL/kg) 750(8.6)   750(8.6)   Weight (kg) 86.9 86.9 86.9 86.9             Neurosurgery Physical Exam  General: well developed, well nourished, no distress.   Head: normocephalic, atraumatic  Neurologic: Alert and oriented. Thought content appropriate.  Mental Status: Awake, Alert, Oriented  Cranial nerves: face symmetric, CN II-XII grossly intact.   Pulmonary: normal respirations, no signs of respiratory distress  Sensory: intact to light touch throughout  Abd: no longer distended, nontender  Motor Strength: Moves all extremities spontaneously with good tone. No abnormal movements seen.      Strength   Deltoids Triceps Biceps Wrist Extension Wrist Flexion Hand    Upper: R 5/5 5/5 5/5 5/5 5/5 5/5     L 5/5 5/5 5/5 5/5 5/5 5/5       Iliopsoas Quadriceps Knee  Flexion Tibialis  anterior Gastro- cnemius EHL   Lower: R 4+/5 4+/5 4+/5 4-/5 4-/5  "4-/5     L 5/5 5/5 5/5 5/5 5/5 5/5      Skin: Skin is warm, dry and intact.     Incision c/d/I with skin edges well approximated with dermabond/prineo. No surrounding erythema or edema. No drainage from incision. No palpable underlying fluid collection.    Significant Labs:  Recent Labs   Lab 09/30/24  0447 10/01/24  0250    82   * 132*   K 4.2 4.4   CL 98 98   CO2 26 27   BUN 13 15   CREATININE 1.0 1.0   CALCIUM 8.3* 8.9     Recent Labs   Lab 09/30/24  0447 10/01/24  0250   WBC 7.58 9.35   HGB 9.7* 10.0*   HCT 29.9* 30.9*    387     No results for input(s): "LABPT", "INR", "APTT" in the last 48 hours.  Microbiology Results (last 7 days)       Procedure Component Value Units Date/Time    Blood culture [[id number]] Collected: 09/26/24 0904    Order Status: Completed Specimen: Blood from Peripheral, Hand, Right Updated: 10/01/24 1012     Blood Culture, Routine No growth after 5 days.    Blood culture [[id number]] Collected: 09/26/24 0900    Order Status: Completed Specimen: Blood from Peripheral, Antecubital, Right Updated: 10/01/24 1012     Blood Culture, Routine No growth after 5 days.    Urine culture [[id number]] Collected: 09/26/24 1522    Order Status: Completed Specimen: Urine Updated: 09/27/24 2309     Urine Culture, Routine No growth    Narrative:      Specimen Source->Urine    Culture, Respiratory with Gram Stain [[id number]]     Order Status: No result Specimen: Respiratory     MRSA Screen by PCR [[id number]]     Order Status: No result Specimen: Nasal Swab     Respiratory Infection Panel (PCR), Nasopharyngeal [[id number]] Collected: 09/26/24 1140    Order Status: Completed Specimen: Nasopharyngeal Swab Updated: 09/26/24 1424     Respiratory Infection Panel Source NP Swab     Adenovirus Not Detected     Coronavirus 229E, Common Cold Virus Not Detected     Coronavirus HKU1, Common Cold Virus Not Detected     Coronavirus NL63, Common Cold Virus Not Detected     Coronavirus OC43, Common " Cold Virus Not Detected     Comment: The Coronavirus strains detected in this test cause the common cold.  These strains are not the COVID-19 (novel Coronavirus)strain   associated with the respiratory disease outbreak.          SARS-CoV2 (COVID-19) Qualitative PCR Not Detected     Human Metapneumovirus Not Detected     Human Rhinovirus/Enterovirus Not Detected     Influenza A (subtypes H1, H1-2009,H3) Not Detected     Influenza B Not Detected     Parainfluenza Virus 1 Not Detected     Parainfluenza Virus 2 Not Detected     Parainfluenza Virus 3 Not Detected     Parainfluenza Virus 4 Not Detected     Respiratory Syncytial Virus Not Detected     Bordetella Parapertussis (RV5687) Not Detected     Bordetella pertussis (ptxP) Not Detected     Chlamydia pneumoniae Not Detected     Mycoplasma pneumoniae Not Detected    Narrative:      Assay not valid for lower respiratory specimens, alternate  testing required.          All pertinent labs from the last 24 hours have been reviewed.    Significant Diagnostics:  I have reviewed and interpreted all pertinent imaging results/findings within the past 24 hours.  Assessment/Plan:     * Status post lumbar surgery (L4-S1 TLIF)  78 M with hx of prior CVA with residual right sided weakness presents for elective surgery for worsening low back pain and BLE radicular leg pain now s/p L4-S1 TLIF on 9/20/2024:    Plan:  - Admitted to ns, q4h neuro vital checks.  - Postop XR satisfactory.  - HV drains removed 9/25. Ancef dc'ed 9/25.  - LSO brace when OOB.  - Fung placed 9/25 for urinary retention post op. Voiding trial today 10/1. Voiding spontaneously.  - Bowel regimen in place. +BM 9/26. +BM 9/28.  - PT/OT/OOB.  - DVT ppx: SCDs/SQH.  - Atelectasis ppx: IS use hourly/up in chair.    Dispo: SNF pending.    Abdominal distension  Distended on exam this morning, 9/28. KUB showed ileus at this time. Mg/Phos wnl.     - Continue bladder scans.  - Nondistended on exam as of 9/29.  - + BM  9/28.  - Diet restarted.  - Will continue to monitor.    Urinary retention  Patient with urinary retention post op.    - Ross placed 9/25.  - Will arrange a voiding trial 7-10 days after ross placed.    Fever  Patient with intermittent fevers post op. Other vitals stable. No white count. Hospital Medicine consulted for assistance. Levoquin PO for presumed HAP and UTI.    - UA with +RBC, no Leuks or Nitrites. Repeat with + Leuks.  - CXR 9/26 c/f possible consolidation. Repeat CXR w/o significant change from prior, shows atelectasis.  - Dopplers 9/22 negative. Repeat dopplers negative.  - Blood cultures NGTD.  - Viral panel negative.    Type 2 diabetes mellitus with diabetic cataract, without long-term current use of insulin  Patient's FSGs are uncontrolled due to hyperglycemia on current medication regimen.  Last A1c reviewed-   Lab Results   Component Value Date    HGBA1C 7.8 (H) 09/09/2024     Most recent fingerstick glucose reviewed-   Recent Labs   Lab 09/30/24  1617 09/30/24  2039 10/01/24  0741 10/01/24  1123   POCTGLUCOSE 109 103 131* 167*       Current correctional scale  Low  Endocrinology consulted.  anti-hyperglycemic dose as follows-   Antihyperglycemics (From admission, onward)      Start     Stop Route Frequency Ordered    09/30/24 0900  insulin glargine U-100 (Lantus) pen 16 Units         -- SubQ Daily 09/30/24 0710    09/30/24 0715  insulin aspart U-100 pen 10 Units         -- SubQ 3 times daily with meals 09/30/24 0710    09/28/24 0800  insulin aspart U-100 pen 0-5 Units         -- SubQ Every 4 hours PRN 09/28/24 0837          Hold Oral hypoglycemics while patient is in the hospital.        Sandi Callaway PA-C  Neurosurgery  [facility]

## 2024-10-01 NOTE — PROGRESS NOTES
"[facility]  Endocrinology  Progress Note    Admit Date: 2024     Reason for Consult: Management of T2DM, Hyperglycemia     Surgical Procedure and Date:  lumbar surgery (L4-S1 TLIF) 24    Diabetes diagnosis year: > 2 years ago     Home Diabetes Medications:  Metformin 1000 mg BID per patient    How often checking glucose at home? One   BG readings on regimen: 120-160s  Hypoglycemia on the regimen?  No  Missed doses on regimen?  No    Diabetes Complications include:     Hyperglycemia    Complicating diabetes co morbidities:   Glucocorticoid use       HPI:   Patient is a 79 y.o. male with hx of prior CVA with residual right sided weakness presents for eval of worsening low back pain and BLE radicular leg pain. He endorses pain which starts in upper lumbar area and then radiates down to lower lumbar area down his legs right greater than left into his feet. He endorses right foot weakness and has a right foot drop which has been present for many months. He has difficulty walking. 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 He has been to pain mgmt for injections without significant relief. Now s/p the above procedures. Endocrine consulted for DM management.       Interval HPI:   Overnight events: No acute events overnight. Patient in room 543/543 A. Blood glucose stable. BG at goal on current insulin regimen (SSI, prandial, and basal insulin ). Steroid use- None. 11 Days Post-Op  Renal function- Normal   Vasopressors-  None       Endocrine will continue to follow and manage insulin orders inpatient.         Diet diabetic 2000 Calorie     Eatin%  Nausea: No  Hypoglycemia and intervention: No  Fever: No  TPN and/or TF: No      BP (!) 146/68 (BP Location: Left arm, Patient Position: Lying)   Pulse 77   Temp 98.7 °F (37.1 °C) (Oral)   Resp 17   Ht 6' 1" (1.854 m)   Wt 86.9 kg (191 lb 9.3 oz)   SpO2 96%   BMI 25.28 kg/m² " "    Labs Reviewed and Include    Recent Labs   Lab 10/01/24  0250   GLU 82   CALCIUM 8.9   ALBUMIN 2.2*   PROT 6.0   *   K 4.4   CO2 27   CL 98   BUN 15   CREATININE 1.0   ALKPHOS 86   ALT 23   AST 31   BILITOT 0.3     Lab Results   Component Value Date    WBC 9.35 10/01/2024    HGB 10.0 (L) 10/01/2024    HCT 30.9 (L) 10/01/2024    MCV 91 10/01/2024     10/01/2024     No results for input(s): "TSH", "FREET4" in the last 168 hours.  Lab Results   Component Value Date    HGBA1C 7.8 (H) 09/09/2024       Nutritional status:   Body mass index is 25.28 kg/m².  Lab Results   Component Value Date    ALBUMIN 2.2 (L) 10/01/2024    ALBUMIN 2.0 (L) 09/30/2024    ALBUMIN 1.9 (L) 09/29/2024     No results found for: "PREALBUMIN"    Estimated Creatinine Clearance: 67.7 mL/min (based on SCr of 1 mg/dL).    Accu-Checks  Recent Labs     09/28/24  2126 09/29/24  0722 09/29/24  1113 09/29/24  1418 09/29/24  1636 09/29/24  2109 09/30/24  0712 09/30/24  1058 09/30/24  1617 09/30/24  2039   POCTGLUCOSE 155* 157* 218* 205* 164* 95 116* 163* 109 103       Current Medications and/or Treatments Impacting Glycemic Control  Immunotherapy:    Immunosuppressants       None          Steroids:   Hormones (From admission, onward)      Start     Stop Route Frequency Ordered    09/20/24 2333  melatonin tablet 6 mg         -- Oral Nightly PRN 09/20/24 2235          Pressors:    Autonomic Drugs (From admission, onward)      None          Hyperglycemia/Diabetes Medications:   Antihyperglycemics (From admission, onward)      Start     Stop Route Frequency Ordered    09/30/24 0900  insulin glargine U-100 (Lantus) pen 16 Units         -- SubQ Daily 09/30/24 0710    09/30/24 0715  insulin aspart U-100 pen 10 Units         -- SubQ 3 times daily with meals 09/30/24 0710    09/28/24 0800  insulin aspart U-100 pen 0-5 Units         -- SubQ Every 4 hours PRN 09/28/24 0837            ASSESSMENT and PLAN    Neuro  * Status post lumbar surgery (L4-S1 " TLIF)  Managed per primary team        Endocrine  Type 2 diabetes mellitus with diabetic cataract, without long-term current use of insulin  BG goal: 140-180    - Lantus 16 units QD   - Novolog 10 units TIDWM   - LDC SSI PRN (150/50)   - POCT Glucose before meals and at bedtime  - Hypoglycemia protocol in place      ** Please notify Endocrine for any change and/or advance in diet**  ** Please call Endocrine for any BG related issues **     Discharge Planning:   TBD. Please notify endocrinology prior to discharge.        Orthopedic  Surgical wound present  Optimize BG control to improve wound healing            Rudy Granados, DNP, FNP  Endocrinology  [facility] [address] - Surgery

## 2024-10-01 NOTE — ASSESSMENT & PLAN NOTE
78 M with hx of prior CVA with residual right sided weakness presents for elective surgery for worsening low back pain and BLE radicular leg pain now s/p L4-S1 TLIF on 9/20/2024:    Plan:  - Admitted to ns, q4h neuro vital checks.  - Postop XR satisfactory.  - HV drains removed 9/25. Ancef dc'ed 9/25.  - LSO brace when OOB.  - Fung placed 9/25 for urinary retention post op. Voiding trial today 10/1. Voiding spontaneously.  - Bowel regimen in place. +BM 9/26. +BM 9/28.  - PT/OT/OOB.  - DVT ppx: SCDs/SQH.  - Atelectasis ppx: IS use hourly/up in chair.    Dispo: SNF pending.

## 2024-10-01 NOTE — PT/OT/SLP PROGRESS
Occupational Therapy   Treatment    Name: Robi Donovan  MRN: [id number]  Admitting Diagnosis:  Status post lumbar surgery  11 Days Post-Op    Recommendations:     Discharge Recommendations: Moderate Intensity Therapy  Discharge Equipment Recommendations:  walker, rolling  Barriers to discharge:  None    Assessment:     Robi Donovan is a 79 y.o. male with a medical diagnosis of Status post lumbar surgery.  He presents with the following performance deficits affecting function: weakness, impaired endurance, impaired self care skills, impaired functional mobility, gait instability, impaired balance, decreased lower extremity function, pain, orthopedic precautions. Pt performed UE therex for strengthening needed for transfers for ADL tasks     Rehab Prognosis:  Good; patient would benefit from acute skilled OT services to address these deficits and reach maximum level of function.       Plan:     Patient to be seen 4 x/week to address the above listed problems via self-care/home management, therapeutic activities, therapeutic exercises, neuromuscular re-education  Plan of Care Expires: 10/21/24  Plan of Care Reviewed with: patient    Subjective     Chief Complaint: None  Patient/Family Comments/goals: return home  Pain/Comfort:  Pain Rating 1: 0/10    Objective:     Communicated with: Nsg prior to session.  Patient found up in chair with ross catheter upon OT entry to room.    General Precautions: Standard, fall    Orthopedic Precautions:spinal precautions  Braces: AFO, LSO  Respiratory Status: Room air     Occupational Performance:     Bed Mobility:    Pt OOB     Functional Mobility/Transfers:  Functional Mobility: NT 2/2 patient declined     Activities of Daily Living:  Performed prior to Tx    Therex:  Pt performed the following exercises using 3lb dowel for 10 reps   Bicep curls  Chest press  Fwd sholder raise  Pt performed 10 reps of chair pushups to help facilitate initiation of sit>stand from chair.      [facility] 6  Click ADL: 17    Treatment & Education:  Pt educated on role and purpose of therapy  Pt educated on goal setting  Pt educated on benefits of OOB activity  Pt educated on self advocacy   Pt educated on spinal precautions  Pt educated on brace wear schedule     Patient left up in chair with all lines intact, call button in reach, and nsg notified    GOALS:   Multidisciplinary Problems       Occupational Therapy Goals          Problem: Occupational Therapy    Goal Priority Disciplines Outcome Interventions   Occupational Therapy Goal     OT, PT/OT Progressing    Description: Goals to be met by: 10/21/24     Patient will increase functional independence with ADLs by performing:    LE Dressing with Modified [place] and AE prn.  Grooming while standing at sink with Modified [place].  Toileting from toilet/bedside commode with Modified [place] for hygiene and clothing management.   Supine to sit with Supervision.  Toilet transfer to toilet/bedside commode with Supervision and LRAD.    Patient demonstrates a mobility limitation that significantly impairs their ability to participate in one or more mobility related activities of daily living. Patient's mobility limitation cannot be sufficiently resolved with the use of a cane, but can be sufficiently resolved with the use of a rolling walker.The use of a rolling walker will considerably improve their ability to participate in MRADLs. Patient will use the walker on a regular basis at home.                           Time Tracking:     OT Date of Treatment: 10/01/24  OT Start Time: 1418  OT Stop Time: 1429  OT Total Time (min): 11 min    Billable Minutes:Therapeutic Exercise 11    OT/SHERRELL: OT     Number of SHERRELL visits since last OT visit: 2    10/1/2024

## 2024-10-01 NOTE — PLAN OF CARE
10/01/24 1027   Post-Acute Status   Post-Acute Authorization Placement   Post-Acute Placement Status Pending payor review/awaiting authorization (if required)  (Pending in-network Facility)   Discharge Delays (!) Payor Issues   Discharge Plan   Discharge Plan A Skilled Nursing Facility     SW called Cathie lopez/ [facility] to follow up on authorization. SW informed authorization denied due to discovery that SNF is actually not in network with pt's plan. CAIO sent updated clinicals to previously accepting SNF's ( [facility], [facility], and [facility]), checking to confirm if in network. CM team to follow.    Judy Murphy, SAM  Case Management   [facility]   Ext. [phone]

## 2024-10-01 NOTE — ASSESSMENT & PLAN NOTE
Patient with intermittent fevers post op. Other vitals stable. No white count. Hospital Medicine consulted for assistance. Levoquin PO for presumed HAP and UTI.    - UA with +RBC, no Leuks or Nitrites. Repeat with + Leuks.  - CXR 9/26 c/f possible consolidation. Repeat CXR w/o significant change from prior, shows atelectasis.  - Dopplers 9/22 negative. Repeat dopplers negative.  - Blood cultures NGTD.  - Viral panel negative.

## 2024-10-01 NOTE — NURSING
Discharge instructions given to patient .Allow time for questions, all questions answered. Patient has prescriptions. No apparent distress noted. Awaiting transport via wheel chair to bring patient down.

## 2024-10-01 NOTE — ASSESSMENT & PLAN NOTE
BG goal: 140-180    - Lantus 16 units QD   - Novolog 10 units TIDWM   - LDC SSI PRN (150/50)   - POCT Glucose before meals and at bedtime  - Hypoglycemia protocol in place      ** Please notify Endocrine for any change and/or advance in diet**  ** Please call Endocrine for any BG related issues **     Discharge Planning:   TBD. Please notify endocrinology prior to discharge.

## 2024-10-01 NOTE — SUBJECTIVE & OBJECTIVE
Interval History: NAEON. Remained afebrile overnight. BLE US w/o DVT. CXR w/o significant change from prior, shows atelectasis. Fung removed this AM, voiding spontaneously. Pending SNF.    Medications:  Continuous Infusions:  Scheduled Meds:   amLODIPine  10 mg Oral Daily    calcium carbonate  500 mg Oral BID    carvediloL  6.25 mg Oral BID    heparin (porcine)  5,000 Units Subcutaneous Q8H    insulin aspart U-100  10 Units Subcutaneous TIDWM    insulin glargine U-100  16 Units Subcutaneous Daily    levoFLOXacin  750 mg Oral Daily    losartan  100 mg Oral Daily    methocarbamoL  1,000 mg Oral QID    polyethylene glycol  17 g Oral BID    pravastatin  40 mg Oral QHS    senna-docusate 8.6-50 mg  2 tablet Oral BID    tamsulosin  0.4 mg Oral Daily     PRN Meds:  Current Facility-Administered Medications:     acetaminophen, 650 mg, Oral, Q6H PRN    albuterol sulfate, 2.5 mg, Nebulization, Q4H PRN    aluminum-magnesium hydroxide-simethicone, 30 mL, Oral, Q4H PRN    dextrose 10%, 12.5 g, Intravenous, PRN    dextrose 10%, 25 g, Intravenous, PRN    glucagon (human recombinant), 1 mg, Intramuscular, PRN    glucose, 16 g, Oral, PRN    glucose, 24 g, Oral, PRN    insulin aspart U-100, 0-5 Units, Subcutaneous, Q4H PRN    melatonin, 6 mg, Oral, Nightly PRN    ondansetron, 8 mg, Oral, Q6H PRN    oxyCODONE-acetaminophen, 1 tablet, Oral, Q4H PRN    oxyCODONE-acetaminophen, 1 tablet, Oral, Q4H PRN     Review of Systems  Objective:     Weight: 86.9 kg (191 lb 9.3 oz)  Body mass index is 25.28 kg/m².  Vital Signs (Most Recent):  Temp: 98.7 °F (37.1 °C) (10/01/24 1121)  Pulse: 75 (10/01/24 1121)  Resp: 19 (10/01/24 1121)  BP: (!) 144/65 (10/01/24 1121)  SpO2: 98 % (10/01/24 1121) Vital Signs (24h Range):  Temp:  [98.3 °F (36.8 °C)-99.5 °F (37.5 °C)] 98.7 °F (37.1 °C)  Pulse:  [71-80] 75  Resp:  [16-19] 19  SpO2:  [95 %-98 %] 98 %  BP: (144-157)/(65-82) 144/65     Date 10/01/24 0700 - 10/02/24 0659   Shift [id number] 1091-3322 0277-2478  "24 Hour Total   INTAKE   P.O. 237   237   Shift Total(mL/kg) 237(2.7)   237(2.7)   OUTPUT   Urine(mL/kg/hr) 750   750   Shift Total(mL/kg) 750(8.6)   750(8.6)   Weight (kg) 86.9 86.9 86.9 86.9             Neurosurgery Physical Exam  General: well developed, well nourished, no distress.   Head: normocephalic, atraumatic  Neurologic: Alert and oriented. Thought content appropriate.  Mental Status: Awake, Alert, Oriented  Cranial nerves: face symmetric, CN II-XII grossly intact.   Pulmonary: normal respirations, no signs of respiratory distress  Sensory: intact to light touch throughout  Abd: no longer distended, nontender  Motor Strength: Moves all extremities spontaneously with good tone. No abnormal movements seen.      Strength   Deltoids Triceps Biceps Wrist Extension Wrist Flexion Hand    Upper: R 5/5 5/5 5/5 5/5 5/5 5/5     L 5/5 5/5 5/5 5/5 5/5 5/5       Iliopsoas Quadriceps Knee  Flexion Tibialis  anterior Gastro- cnemius EHL   Lower: R 4+/5 4+/5 4+/5 4-/5 4-/5 4-/5     L 5/5 5/5 5/5 5/5 5/5 5/5      Skin: Skin is warm, dry and intact.     Incision c/d/I with skin edges well approximated with dermabond/prineo. No surrounding erythema or edema. No drainage from incision. No palpable underlying fluid collection.    Significant Labs:  Recent Labs   Lab 09/30/24 0447 10/01/24  0250    82   * 132*   K 4.2 4.4   CL 98 98   CO2 26 27   BUN 13 15   CREATININE 1.0 1.0   CALCIUM 8.3* 8.9     Recent Labs   Lab 09/30/24  0447 10/01/24  0250   WBC 7.58 9.35   HGB 9.7* 10.0*   HCT 29.9* 30.9*    387     No results for input(s): "LABPT", "INR", "APTT" in the last 48 hours.  Microbiology Results (last 7 days)       Procedure Component Value Units Date/Time    Blood culture [[id number]] Collected: 09/26/24 0904    Order Status: Completed Specimen: Blood from Peripheral, Hand, Right Updated: 10/01/24 1012     Blood Culture, Routine No growth after 5 days.    Blood culture [[id number]] Collected: " 09/26/24 0900    Order Status: Completed Specimen: Blood from Peripheral, Antecubital, Right Updated: 10/01/24 1012     Blood Culture, Routine No growth after 5 days.    Urine culture [[id number]] Collected: 09/26/24 1522    Order Status: Completed Specimen: Urine Updated: 09/27/24 2309     Urine Culture, Routine No growth    Narrative:      Specimen Source->Urine    Culture, Respiratory with Gram Stain [[id number]]     Order Status: No result Specimen: Respiratory     MRSA Screen by PCR [[id number]]     Order Status: No result Specimen: Nasal Swab     Respiratory Infection Panel (PCR), Nasopharyngeal [[id number]] Collected: 09/26/24 1140    Order Status: Completed Specimen: Nasopharyngeal Swab Updated: 09/26/24 1424     Respiratory Infection Panel Source NP Swab     Adenovirus Not Detected     Coronavirus 229E, Common Cold Virus Not Detected     Coronavirus HKU1, Common Cold Virus Not Detected     Coronavirus NL63, Common Cold Virus Not Detected     Coronavirus OC43, Common Cold Virus Not Detected     Comment: The Coronavirus strains detected in this test cause the common cold.  These strains are not the COVID-19 (novel Coronavirus)strain   associated with the respiratory disease outbreak.          SARS-CoV2 (COVID-19) Qualitative PCR Not Detected     Human Metapneumovirus Not Detected     Human Rhinovirus/Enterovirus Not Detected     Influenza A (subtypes H1, H1-2009,H3) Not Detected     Influenza B Not Detected     Parainfluenza Virus 1 Not Detected     Parainfluenza Virus 2 Not Detected     Parainfluenza Virus 3 Not Detected     Parainfluenza Virus 4 Not Detected     Respiratory Syncytial Virus Not Detected     Bordetella Parapertussis (RM3113) Not Detected     Bordetella pertussis (ptxP) Not Detected     Chlamydia pneumoniae Not Detected     Mycoplasma pneumoniae Not Detected    Narrative:      Assay not valid for lower respiratory specimens, alternate  testing required.          All pertinent labs from the  last 24 hours have been reviewed.    Significant Diagnostics:  I have reviewed and interpreted all pertinent imaging results/findings within the past 24 hours.

## 2024-10-01 NOTE — ASSESSMENT & PLAN NOTE
Patient's FSGs are uncontrolled due to hyperglycemia on current medication regimen.  Last A1c reviewed-   Lab Results   Component Value Date    HGBA1C 7.8 (H) 09/09/2024     Most recent fingerstick glucose reviewed-   Recent Labs   Lab 09/30/24  1617 09/30/24  2039 10/01/24  0741 10/01/24  1123   POCTGLUCOSE 109 103 131* 167*       Current correctional scale  Low  Endocrinology consulted.  anti-hyperglycemic dose as follows-   Antihyperglycemics (From admission, onward)    Start     Stop Route Frequency Ordered    09/30/24 0900  insulin glargine U-100 (Lantus) pen 16 Units         -- SubQ Daily 09/30/24 0710    09/30/24 0715  insulin aspart U-100 pen 10 Units         -- SubQ 3 times daily with meals 09/30/24 0710    09/28/24 0800  insulin aspart U-100 pen 0-5 Units         -- SubQ Every 4 hours PRN 09/28/24 0837        Hold Oral hypoglycemics while patient is in the hospital.

## 2024-10-01 NOTE — PROGRESS NOTES
[facility] Medicine  Progress Note    Patient Name: Robi Donovan  MRN: [id number]  Patient Class: IP- Inpatient   Admission Date: 9/20/2024  Length of Stay: 11 days  Attending Physician: Juan Luis Mcbride DO  Primary Care Provider: Yary Wright -        Subjective:     Principal Problem:Status post lumbar surgery        HPI:  Patient is a 79 y.o. male with hx of prior CVA with residual right sided weakness presents for eval of worsening low back pain and BLE radicular leg pain. He endorses pain which starts in upper lumbar area and then radiates down to lower lumbar area down his legs right greater than left into his feet. He endorses right foot weakness and has a right foot drop which has been present for many months. He has difficulty walking. 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 He has been to pain mgmt for injections without significant relief.     Medicine consulted  (9/26) for new intermittent fevers. No leukocytosis but has complained of dysuria and non productive cough post op. Infectious workup continued.     Overview/Hospital Course:  Patient is a 79 y.o. male with hx of prior CVA with residual right sided weakness presents for eval of worsening low back pain and BLE radicular leg pain. Now s/p lumbary surgery. Endocrine consulted for DM management. Medicine consulted  (9/26) for new intermittent fevers. No leukocytosis but has complained of dysuria and non productive cough post op. Patient had questionable consolidative changes on CXR and was started on cefepime for continued fevers to cover potential pneumonia vs UTI. Fevers resolved on cefepime, planning to treat with 7 day course of levoquin to complete coverage for HAP.    Interval History:  Patient afebrile overnight.  Patient denies any chest pain, cough, dyspnea this time.      Review of Systems  Objective:     Vital Signs (Most Recent):  Temp: 98.8 °F  "(37.1 °C) (10/01/24 0737)  Pulse: 78 (10/01/24 0737)  Resp: 18 (10/01/24 0851)  BP: (!) 157/82 (10/01/24 0737)  SpO2: 95 % (10/01/24 0737) Vital Signs (24h Range):  Temp:  [98.3 °F (36.8 °C)-99.5 °F (37.5 °C)] 98.8 °F (37.1 °C)  Pulse:  [71-80] 78  Resp:  [16-18] 18  SpO2:  [95 %-97 %] 95 %  BP: (130-157)/(60-82) 157/82     Weight: 86.9 kg (191 lb 9.3 oz)  Body mass index is 25.28 kg/m².    Intake/Output Summary (Last 24 hours) at 10/1/2024 0959  Last data filed at 10/1/2024 0902  Gross per 24 hour   Intake --   Output 3700 ml   Net -3700 ml         Physical Exam  Constitutional:       General: He is not in acute distress.     Appearance: Normal appearance.   HENT:      Head: Normocephalic and atraumatic.   Cardiovascular:      Rate and Rhythm: Normal rate.      Heart sounds: No murmur heard.  Pulmonary:      Effort: Pulmonary effort is normal. No respiratory distress.      Breath sounds: Rales present.   Abdominal:      General: Abdomen is flat.      Palpations: Abdomen is soft.      Tenderness: There is no abdominal tenderness. There is no guarding or rebound.   Musculoskeletal:      Right lower leg: No edema.      Left lower leg: No edema.      Comments: No leg pain or swelling   Neurological:      Mental Status: He is alert.   Psychiatric:         Mood and Affect: Mood normal.             Significant Labs: All pertinent labs within the past 24 hours have been reviewed.  Blood Culture: No results for input(s): "LABBLOO" in the last 48 hours.  CBC:   Recent Labs   Lab 09/30/24  0447 10/01/24  0250   WBC 7.58 9.35   HGB 9.7* 10.0*   HCT 29.9* 30.9*    387     CMP:   Recent Labs   Lab 09/30/24  0447 10/01/24  0250   * 132*   K 4.2 4.4   CL 98 98   CO2 26 27    82   BUN 13 15   CREATININE 1.0 1.0   CALCIUM 8.3* 8.9   PROT 5.6* 6.0   ALBUMIN 2.0* 2.2*   BILITOT 0.4 0.3   ALKPHOS 84 86   AST 32 31   ALT 18 23   ANIONGAP 8 7*     Respiratory Culture: No results for input(s): "GSRESP", "RESPIRATORYC" " "in the last 48 hours.  Urine Culture: No results for input(s): "LABURIN" in the last 48 hours.    Significant Imaging: I have reviewed all pertinent imaging results/findings within the past 24 hours.    US Lower Extremity Veins Bilateral  Result Date: 9/30/2024  No evidence of deep venous thrombosis in either lower extremity.     US Upper Extremity Veins Bilateral  Result Date: 9/30/2024  No thrombus in central veins of the right or left upper extremity.     X-Ray Chest AP Portable  Result Date: 9/30/2024  Persistent minor left basilar atelectasis, but there has been no significant detrimental interval change in the appearance of the chest since 09/26/2024.       Assessment/Plan:      * Status post lumbar surgery (L4-S1 TLIF)  -Plan per Primary team      Fever  Medicine consulted (9/26). Patient is s/p L4-S1 TLIF on 9/20 and has had intermittent fevers. No leukocytosis. white count. He did c/o dysuria and intermittent non productive cough post op but denies any chest pain, dyspnea, or hx of clots. Start infectious workup. (CXR 9/26 report)- shows atelectasis changes and possible consolidative changes. Initially, Cefepime (2g IV Q8h ) for symptomatic cystitis w/ pyuria and bacteruria and HAP coverage but transitioned to Levaquin for presumed PNA.     Plan:  -Levaquin for presumed PNA (7 days total course to cover for HAP)  -Will sign off. Thanks for letting us be involved in the care. Please reach out if new concerns arise.   -Recommend Fung removal trial  -Reconsider IV cefepime and infectious workup if fever spikes again; although overall fever curve has gone down.      -Repeat UA shows 1+ leukocytes, wbcua 13, occasional bacteria  -Urine cx no growth and Blood cx NGTD  -F/u on resp cx  -Resp Infxn Panel/COVID/flu negative  -Continue encouraging Incentive Spirometry        Type 2 diabetes mellitus with diabetic cataract, without long-term current use of insulin  PLAN:  - Endocrine following  - diabetic diet  - " POCT glucose checks TIDWM and qhs  - goal glucose 140-180    Lab Results   Component Value Date    HGBA1C 7.8 (H) 09/09/2024     Recent Labs     09/29/24  2109 09/30/24  0712 09/30/24  1058 09/30/24  1617 09/30/24  2039 10/01/24  0741   POCTGLUCOSE 95 116* 163* 109 103 131*     Antihyperglycemics (From admission, onward)      Start     Stop Route Frequency Ordered    09/30/24 0900  insulin glargine U-100 (Lantus) pen 16 Units         -- SubQ Daily 09/30/24 0710    09/30/24 0715  insulin aspart U-100 pen 10 Units         -- SubQ 3 times daily with meals 09/30/24 0710    09/28/24 0800  insulin aspart U-100 pen 0-5 Units         -- SubQ Every 4 hours PRN 09/28/24 0837              Abdominal distension  Likely related to post op ileus. Patient now having bowel movements but could be contributing to fevers.   -Bowel regimen as needed      Surgical wound present  No evidence of active infection from surgical wound  -Plan per primary team        VTE Risk Mitigation (From admission, onward)           Ordered     heparin (porcine) injection 5,000 Units  Every 8 hours         09/22/24 1223     IP VTE HIGH RISK PATIENT  Once         09/20/24 2235     Place sequential compression device  Until discontinued         09/20/24 2235     Place sequential compression device  Until discontinued         09/20/24 1028                    Discharge Planning   SUSY: 10/1/2024     Code Status: Full Code   Is the patient medically ready for discharge?:     Reason for patient still in hospital (select all that apply): Patient trending condition  Discharge Plan A: Skilled Nursing Facility   Discharge Delays: (!) Payor Issues              Joselin Dodge MD  Department of Hospital Medicine   [facility] - Surgery

## 2024-10-01 NOTE — SUBJECTIVE & OBJECTIVE
"Interval HPI:   Overnight events: No acute events overnight. Patient in room 543/543 A. Blood glucose stable. BG at goal on current insulin regimen (SSI, prandial, and basal insulin ). Steroid use- None. 11 Days Post-Op  Renal function- Normal   Vasopressors-  None       Endocrine will continue to follow and manage insulin orders inpatient.         Diet diabetic  Calorie     Eatin%  Nausea: No  Hypoglycemia and intervention: No  Fever: No  TPN and/or TF: No      BP (!) 146/68 (BP Location: Left arm, Patient Position: Lying)   Pulse 77   Temp 98.7 °F (37.1 °C) (Oral)   Resp 17   Ht 6' 1" (1.854 m)   Wt 86.9 kg (191 lb 9.3 oz)   SpO2 96%   BMI 25.28 kg/m²     Labs Reviewed and Include    Recent Labs   Lab 10/01/24  0250   GLU 82   CALCIUM 8.9   ALBUMIN 2.2*   PROT 6.0   *   K 4.4   CO2 27   CL 98   BUN 15   CREATININE 1.0   ALKPHOS 86   ALT 23   AST 31   BILITOT 0.3     Lab Results   Component Value Date    WBC 9.35 10/01/2024    HGB 10.0 (L) 10/01/2024    HCT 30.9 (L) 10/01/2024    MCV 91 10/01/2024     10/01/2024     No results for input(s): "TSH", "FREET4" in the last 168 hours.  Lab Results   Component Value Date    HGBA1C 7.8 (H) 2024       Nutritional status:   Body mass index is 25.28 kg/m².  Lab Results   Component Value Date    ALBUMIN 2.2 (L) 10/01/2024    ALBUMIN 2.0 (L) 2024    ALBUMIN 1.9 (L) 2024     No results found for: "PREALBUMIN"    Estimated Creatinine Clearance: 67.7 mL/min (based on SCr of 1 mg/dL).    Accu-Checks  Recent Labs     24  2126 24  0722 24  1113 24  1418 24  1636 24  2109 24  0712 24  1058 24  1617 24   POCTGLUCOSE 155* 157* 218* 205* 164* 95 116* 163* 109 103       Current Medications and/or Treatments Impacting Glycemic Control  Immunotherapy:    Immunosuppressants       None          Steroids:   Hormones (From admission, onward)      Start     Stop Route Frequency " Ordered    09/20/24 2333  melatonin tablet 6 mg         -- Oral Nightly PRN 09/20/24 2235          Pressors:    Autonomic Drugs (From admission, onward)      None          Hyperglycemia/Diabetes Medications:   Antihyperglycemics (From admission, onward)      Start     Stop Route Frequency Ordered    09/30/24 0900  insulin glargine U-100 (Lantus) pen 16 Units         -- SubQ Daily 09/30/24 0710    09/30/24 0715  insulin aspart U-100 pen 10 Units         -- SubQ 3 times daily with meals 09/30/24 0710    09/28/24 0800  insulin aspart U-100 pen 0-5 Units         -- SubQ Every 4 hours PRN 09/28/24 0890

## 2024-10-02 ENCOUNTER — PATIENT MESSAGE (OUTPATIENT)
Dept: ENDOCRINOLOGY | Facility: HOSPITAL | Age: 79
End: 2024-10-02
Payer: MEDICARE

## 2024-10-02 NOTE — PLAN OF CARE
[facility]  Discharge Final Note    Primary Care Provider: Yary Wright -    Expected Discharge Date: 10/1/2024    Final Discharge Note (most recent)       Final Note - 10/01/24 1823          Final Note    Assessment Type Final Discharge Note     Anticipated Discharge Disposition Home or Self Care     Hospital Resources/Appts/Education Provided Provided patient/caregiver with written discharge plan information;Provided education on problems/symptoms using teachback                   Future Appointments   Date Time Provider Department Center   10/10/2024  4:00 PM Antonio Vasquez, PT [facility]     Patient able to manipulate 5 stairs today with therapy. Patient daughter would like to have patient discharge to home with Outpatient Physical therapy. MD in agreement, patient discharged to home.

## 2024-10-03 NOTE — DISCHARGE SUMMARY
[facility] - Surgery  Neurosurgery  Discharge Summary      Patient Name: Robi Donovan  MRN: [id number]  Admission Date: 9/20/2024  Hospital Length of Stay: 11 days  Discharge Date and Time: 10/1/2024  6:23 PM  Attending Physician: Juan Luis Mcbride DO  Discharging Provider: Sandi Callaway PA-C  Primary Care Provider: Yary Wright -    HPI:   78 M with hx of prior CVA with residual right sided weakness presents for eval of worsening low back pain and BLE radicular leg pain. He endorses pain which starts in upper lumbar area and then radiates down to lower lumbar area down his legs right greater than left into his feet. He endorses right foot weakness and has a right foot drop which has been present for many months. He has difficulty walking. 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 He has been to pain mgmt for injections without significant relief.      Interval fu 8/8/24:  Pt presents in fu.  No significant changes in symptoms.  Today he is most concerned with his balance difficulties.     Interval fu 8/29/24: No changes in symptoms.    Procedure(s) (LRB):  OPEN ROBOTIC L4 -S1 TLIF (N/A)     Hospital Course: 09/21/2024: POD1. NAEON. Pt tolerating diet. Reports new onset R great toe pain, otherwise pain well controlled with no numbness/tingling. No bowel movement but passing gas. Fung removed this morning. Incision c/d/I. Drains putting out 340 and 33 SS. Postop x-rays pending.  9/22: doing well POD 2. More back pain today. Drains still high output but slowing down overnight. Xrays pending. Brace in place. Patient with improvement in leg pain, eating, passing gas, and voiding fine.   9/23: POD 3. Febrile to 101.1 yesterday afternoon. CXR, UA, BLED all within normal limits. No subsequent episodes of fever. Patient asymptomatic. Will continue to monitor. HV x 2 with output of 100, will plan to leave in place today. Exam stable.   9/24: NAEON. Tmax  "100.4 at 7 pm yesterday. Bowel regimen advanced. Continue HV drains. Postop XR today. Home pending drain removal, fever resolution.   9/25: NAEON. Neuro stable. Pain controlled. OOB/up in chair and ambulating down hallways with therapy. Febrile again to 101 at 1935 yesterday. No SOB or infectious symptoms. UA negative. Patient still having difficulties urinating. Continue bladder scans, possible ross placement today. HV drains with low output, removed. +BM yesterday. Endocrine following for hypergylcemia.  9/26: Continued fevers to 102 last night and 100.5 this AM. Denies SOB, chest pain. Does report intermittent cough. Other vitals stable, no white count. Flu/covid and blood cultures ordered. Encouraged IS use. Inhalation tx ordered. Medicine consulted for assistance, repeat CXR ordered. Remains neuro stable. Ross placed yesterday for urinary retention. Will arrange for voiding trial in 1 week.   9/27: Febrile to 101.6 overnight with subsequent afebrile readings. Discussed with Hospital Medicine in the setting of possible consolidation on CXR. Medicine considering starting abx. Recs to follow. Denies any new complaints. Exam stable.   9/28: NAEO. Afebrile. Reports abdominal discomfort, describes "fullness" but has been passing gas and had a documented BM yesterday. KUB ordered given distention on exam as well as NPO and Mg/Phos.  9/29: NAEO. Afebrile. WBC 7.5. Had large bowel movement and is no longer distended. Ross remains in. Pending SNF.  9/30: NAEON. Fever to 100.5 overnight. Medicine repeating BLE US and CXR. On Levoquin PO. Incision is c/d/I. Ross in place. SNF accepted patient, pending afebrile x 24 hrs.  10/1: NAEON. Remained afebrile overnight. BLE US w/o DVT. CXR w/o significant change from prior, shows atelectasis. Ross removed this AM, will f/u voiding. Pending SNF.  Patient and daughter decided to dc home with home health. Patient was voiding spontaneously upon discharge. DC instructions provided " "and follow up appointments arranged.    Goals of Care Treatment Preferences:  Code Status: Full Code      Consults:   Consults (From admission, onward)          Status Ordering Provider     Inpatient consult to Hospital Medicine-General  Once        Provider:  (Not yet assigned)    Completed FE HESTER     Inpatient consult to Endocrinology  Once        Provider:  (Not yet assigned)    Completed CURT OHARA            Significant Diagnostic Studies: Labs: CMP No results for input(s): "NA", "K", "CL", "CO2", "GLU", "BUN", "CREATININE", "CALCIUM", "PROT", "ALBUMIN", "BILITOT", "ALKPHOS", "AST", "ALT", "ANIONGAP", "ESTGFRAFRICA", "EGFRNONAA" in the last 48 hours., CBC No results for input(s): "WBC", "HGB", "HCT", "PLT" in the last 48 hours., and All labs within the past 24 hours have been reviewed  Microbiology: Urine Culture    Lab Results   Component Value Date    LABURIN No growth 09/26/2024     Radiology: X-Ray: chest, lumbar spine  Ultrasound: BLE, BUE    Pending Diagnostic Studies:       None          Final Active Diagnoses:    Diagnosis Date Noted POA    PRINCIPAL PROBLEM:  Status post lumbar surgery (L4-S1 TLIF) [Z98.890] 09/21/2024 Not Applicable    Abdominal distension [R14.0] 09/28/2024 Yes    Fever [R50.9] 09/26/2024 No    Urinary retention [R33.9] 09/26/2024 No    Surgical wound present [T14.8XXA] 09/24/2024 No    Type 2 diabetes mellitus with diabetic cataract, without long-term current use of insulin [E11.36] 05/15/2024 Yes      Problems Resolved During this Admission:      Discharged Condition: good     Disposition: Home-Health Care [facility]    Follow Up:    Patient Instructions:      Ambulatory referral/consult to Physical/Occupational Therapy   Standing Status: Future   Referral Priority: Routine Referral Type: Physical Medicine   Referral Reason: Specialty Services Required   Number of Visits Requested: 1     Notify your health care provider if you experience any of the following:  increased " confusion or weakness     Notify your health care provider if you experience any of the following:  persistent dizziness, light-headedness, or visual disturbances     Notify your health care provider if you experience any of the following:  worsening rash     Notify your health care provider if you experience any of the following:  severe persistent headache     Notify your health care provider if you experience any of the following:  difficulty breathing or increased cough     Notify your health care provider if you experience any of the following:  redness, tenderness, or signs of infection (pain, swelling, redness, odor or green/yellow discharge around incision site)     Notify your health care provider if you experience any of the following:  severe uncontrolled pain     Notify your health care provider if you experience any of the following:  persistent nausea and vomiting or diarrhea     Notify your health care provider if you experience any of the following:  temperature >100.4     Activity as tolerated     Medications:  Reconciled Home Medications:      Medication List        START taking these medications      levoFLOXacin 750 MG tablet  Commonly known as: LEVAQUIN  Take 1 tablet (750 mg total) by mouth once daily. for 4 days     methocarbamoL 500 MG Tab  Commonly known as: ROBAXIN  Take 2 tablets (1,000 mg total) by mouth 3 (three) times daily. for 5 days     oxyCODONE-acetaminophen  mg per tablet  Commonly known as: PERCOCET  Take 1 tablet by mouth every 6 (six) hours as needed for Pain.            CONTINUE taking these medications      amLODIPine 10 MG tablet  Commonly known as: NORVASC  Take by mouth once daily.     aspirin 81 MG EC tablet  Commonly known as: ECOTRIN  Take 81 mg by mouth once daily.     carvediloL 6.25 MG tablet  Commonly known as: COREG  Take by mouth 2 (two) times daily.     finasteride 5 mg tablet  Commonly known as: PROSCAR  Take by mouth once daily.     fluticasone propionate  50 mcg/actuation nasal spray  Commonly known as: FLONASE  by Each Nostril route as needed.     glipiZIDE 10 MG tablet  Commonly known as: GLUCOTROL  Take by mouth 2 (two) times daily with meals.     losartan 100 MG tablet  Commonly known as: COZAAR  Take by mouth.     metFORMIN 1000 MG tablet  Commonly known as: GLUCOPHAGE  Take by mouth 2 (two) times daily with meals.     rosuvastatin 40 MG Tab  Commonly known as: CRESTOR  Take by mouth every evening.     tamsulosin 0.4 mg Cap  Commonly known as: FLOMAX  Take by mouth once daily.     traMADoL 50 mg tablet  Commonly known as: ULTRAM  Take 50 mg by mouth every 12 (twelve) hours as needed for Pain.     TRUE METRIX GLUCOSE TEST STRIP Strp  Generic drug: blood sugar diagnostic  1 strip 2 (two) times daily.            STOP taking these medications      naproxen 500 MG tablet  Commonly known as: NAPROSYN              Sandi Callaway PA-C  Neurosurgery  [facility] - Surgery

## 2024-10-09 RX ORDER — OXYCODONE AND ACETAMINOPHEN 5; 325 MG/1; MG/1
1 TABLET ORAL EVERY 8 HOURS PRN
Qty: 21 TABLET | Refills: 0 | Status: SHIPPED | OUTPATIENT
Start: 2024-10-09

## 2024-10-14 ENCOUNTER — TELEPHONE (OUTPATIENT)
Dept: NEUROSURGERY | Facility: CLINIC | Age: 79
End: 2024-10-14
Payer: MEDICARE

## 2024-10-14 NOTE — TELEPHONE ENCOUNTER
----- Message from Sandi Callaway PA-C sent at 10/14/2024  1:31 PM CDT -----  Regarding: RE: Questions  Can you schedule him for a 2 week post op and a 6 week post op with an X-ray lumbar spine AP and Lateral?  ----- Message -----  From: Taylor Byrne RN  Sent: 10/14/2024   1:23 PM CDT  To: Sandi Callaway PA-C; Taylor Byrne RN  Subject: FW: Questions                                    Any idea? Not sure what's going on with this pt  ----- Message -----  From: Pricila Burnette  Sent: 10/14/2024   1:08 PM CDT  To: Rae HORTA Staff  Subject: Questions                                              Name Of Caller:   Olvin (Pt's Daughter)      Contact Preference:   [phone]      Nature of call:   Requesting to speak with nurse. Pt's Daughter would like to know what steps her father needs to take next.

## 2024-10-14 NOTE — TELEPHONE ENCOUNTER
P/c to pt. Dtr with appt info    Future Appointments   Date Time Provider Department Center   10/16/2024  9:00 AM Taylor Byrne, [facility] [place]UROS[address] [place] [address]   11/14/2024  9:00 AM [facility]  XR1 700LB LIMIT [facility] X[facility] [place]   11/14/2024 10:00 AM Juan Luis Mcbride, DO [facility] NEUSRG [place]

## 2024-10-16 ENCOUNTER — CLINICAL SUPPORT (OUTPATIENT)
Dept: NEUROSURGERY | Facility: CLINIC | Age: 79
End: 2024-10-16
Payer: MEDICARE

## 2024-10-16 VITALS — TEMPERATURE: 98 F | SYSTOLIC BLOOD PRESSURE: 153 MMHG | HEART RATE: 72 BPM | DIASTOLIC BLOOD PRESSURE: 73 MMHG

## 2024-10-16 DIAGNOSIS — Z98.1 S/P LUMBAR SPINAL FUSION: Primary | ICD-10-CM

## 2024-10-16 PROCEDURE — 99999 PR PBB SHADOW E&M-EST. PATIENT-LVL III: CPT | Mod: PBBFAC,,,

## 2024-10-16 NOTE — PROGRESS NOTES
Robi Donovan is a 79 y.o. male here for 2 week post op wound check here on 8th floor neurosurgery clinic    Surgery: L4-S1 TLIF    Symptoms: tingling in left fingers    DME:w/c    Brace: pt. States he forgot to bring his brace    Pain: 4    Medication Refills: yes percocet       Incision  SHERRELL, well approximated, no redness, swelling or purulent drainage.    EDUCATION:    Instructed patient to keep incision SHERRELL, no lotions, creams or bandages. OK to shower without water pressure to incision. No scrubbing. Pat dry.  No submurging incision in water (no bathing/swimming) until 8 weeks after surgery once wound is healed. Do not lift more than 10 pounds. Avoid bending or twisting in the area of your surgery more than 45 degrees from neutral position in any direction. Wear brace at all times when up out of bed except when showering or flat in bed. Increase ambulation as tolerated. You should be walking 2 miles/day. Walk on paved surfaces only, holding side rail when using stairs.  No sexual activity for 6 weeks after surgery.  No driving or operating heavy machinary : until cleared by surgeon  or while you are taking narcotic pain medication or muscle relaxant.  If you have had a fusion, do not take any non steroidal anti-inflammatory drugs (NSAIDS) Including the following: Ibuprofen, naprosyn, Aleve, Advil, Indocin, Mobic, or Celebrex for 6 weeks.  Take docusate (colace 100mg): take 1 capsule a day as needed for constipation. You can purchase over the counter.  Avoid use of tobacco products.    Call your doctor or go to the Emergency Room for any signs of infection including: increased redness, drainage, pain or fever (temperature of 101.5 or above for 24hrs). Call your doctor or go to the ER if there are any localized neurological changes, problems with speech, vision,numbness,tingling,weakness,severe headache or other concerns.  Above instructions reviewed with pt and copy of instructions given to pt along with follow up  appt date and time.    Physicians need 3 days' notice for pain medicine to be refilled. Pain medicine will only be refilled between 8am and 5pm Monday-Friday.    Patient verbalizes understanding. All questions answered. Pt. Encouraged to call or send message thru the portal for any additional concerns. Patient to followup with Dr. whitlock for 6 week followup with imaging.    Future Appointments   Date Time Provider Department Center   11/14/2024  9:00 AM [facility]  XR1 700LB LIMIT [facility] X[facility] [place]   11/14/2024 10:00 AM Juan Luis Whitlock, DO [facility] NEUSRG [place]

## 2024-10-21 ENCOUNTER — TELEPHONE (OUTPATIENT)
Dept: NEUROSURGERY | Facility: CLINIC | Age: 79
End: 2024-10-21
Payer: MEDICARE

## 2024-10-21 RX ORDER — OXYCODONE AND ACETAMINOPHEN 5; 325 MG/1; MG/1
1 TABLET ORAL EVERY 12 HOURS PRN
Qty: 14 TABLET | Refills: 0 | Status: SHIPPED | OUTPATIENT
Start: 2024-10-21

## 2024-10-21 NOTE — TELEPHONE ENCOUNTER
----- Message from Paulina sent at 10/21/2024  9:25 AM CDT -----  Refill request.    oxyCODONE-acetaminophen (PERCOCET) 5-325 mg per tablet      [id number] - [place] - [address] AT [facility]  [address] [place] [address]  Phone: [phone] Fax: [phone]        Confirmed patient's contact info below:  Contact Name: Robi Donovan  Phone Number: [phone]

## 2024-10-29 ENCOUNTER — CLINICAL SUPPORT (OUTPATIENT)
Dept: REHABILITATION | Facility: HOSPITAL | Age: 79
End: 2024-10-29
Payer: MEDICARE

## 2024-10-29 DIAGNOSIS — R53.1 DECREASED STRENGTH, ENDURANCE, AND MOBILITY: Primary | ICD-10-CM

## 2024-10-29 DIAGNOSIS — M53.86 DECREASED ROM OF LUMBAR SPINE: ICD-10-CM

## 2024-10-29 DIAGNOSIS — R68.89 DECREASED STRENGTH, ENDURANCE, AND MOBILITY: Primary | ICD-10-CM

## 2024-10-29 DIAGNOSIS — Z74.09 DECREASED STRENGTH, ENDURANCE, AND MOBILITY: Primary | ICD-10-CM

## 2024-10-29 DIAGNOSIS — Z98.890 STATUS POST LUMBAR SURGERY: ICD-10-CM

## 2024-10-29 PROCEDURE — 97110 THERAPEUTIC EXERCISES: CPT | Mod: PN

## 2024-10-29 PROCEDURE — 97162 PT EVAL MOD COMPLEX 30 MIN: CPT | Mod: PN

## 2024-10-30 ENCOUNTER — CLINICAL SUPPORT (OUTPATIENT)
Dept: REHABILITATION | Facility: HOSPITAL | Age: 79
End: 2024-10-30
Payer: MEDICARE

## 2024-10-30 DIAGNOSIS — R68.89 DECREASED STRENGTH, ENDURANCE, AND MOBILITY: Primary | ICD-10-CM

## 2024-10-30 DIAGNOSIS — M53.86 DECREASED ROM OF LUMBAR SPINE: ICD-10-CM

## 2024-10-30 DIAGNOSIS — R53.1 DECREASED STRENGTH, ENDURANCE, AND MOBILITY: Primary | ICD-10-CM

## 2024-10-30 DIAGNOSIS — Z74.09 DECREASED STRENGTH, ENDURANCE, AND MOBILITY: Primary | ICD-10-CM

## 2024-10-30 PROCEDURE — 97110 THERAPEUTIC EXERCISES: CPT | Mod: PN,CQ

## 2024-10-30 PROCEDURE — 97530 THERAPEUTIC ACTIVITIES: CPT | Mod: PN,CQ

## 2024-10-30 PROCEDURE — 97112 NEUROMUSCULAR REEDUCATION: CPT | Mod: PN,CQ

## 2024-11-04 ENCOUNTER — CLINICAL SUPPORT (OUTPATIENT)
Dept: REHABILITATION | Facility: HOSPITAL | Age: 79
End: 2024-11-04
Payer: MEDICARE

## 2024-11-04 DIAGNOSIS — R53.1 DECREASED STRENGTH, ENDURANCE, AND MOBILITY: Primary | ICD-10-CM

## 2024-11-04 DIAGNOSIS — R68.89 DECREASED STRENGTH, ENDURANCE, AND MOBILITY: Primary | ICD-10-CM

## 2024-11-04 DIAGNOSIS — M53.86 DECREASED ROM OF LUMBAR SPINE: ICD-10-CM

## 2024-11-04 DIAGNOSIS — Z74.09 DECREASED STRENGTH, ENDURANCE, AND MOBILITY: Primary | ICD-10-CM

## 2024-11-04 PROCEDURE — 97112 NEUROMUSCULAR REEDUCATION: CPT | Mod: PN,CQ

## 2024-11-04 NOTE — PROGRESS NOTES
"[facility]   Physical Therapy Treatment Note      Name: Robi Donovan  Clinic Number: [id number]    Therapy Diagnosis:   Encounter Diagnoses   Name Primary?    Decreased strength, endurance, and mobility Yes    Decreased ROM of lumbar spine      Physician: Sandi Callaway PA-C    Visit Date: 11/4/2024  Physician Orders: PT Eval and Treat   Medical Diagnosis from Referral: Z98.890 (ICD-10-CM) - Status post lumbar surgery   Evaluation Date: 10/29/2024  Authorization Period Expiration: 10/01/2025  Plan of Care Expiration: 1/29/25  Progress Note Due: 11/29/24  Date of Surgery: 9/20/24 L4-S1 TLIF  Visit # / Visits authorized: 2/ 16, (+1)  FOTO: 1/ 3     Precautions: Standard, Diabetes, and HTN, HX of stroke       Time In: 11:00 AM  Time Out:11:55 AM   Total Billable Time: 30 minutes (2 NMR)    PTA Visit #: 2/5     Subjective     Patient reports: "Im gonna try to hang in there". Pt agreeable to PT session.  He was compliant with home exercise program.  Response to previous treatment: 1st after  Functional change: 1st after    Pain: 0/10  Location:  lumbar     Objective      Objective Measures updated at progress report unless specified.     Treatment     Will received the treatments listed below:      therapeutic exercises to develop strength, ROM, flexibility, posture, and core stabilization for 20 minutes including:  Hook lying TrA 5"x10  Hook lying hip adduction with Theraball 5"x20  Hook lying hip abduction with green theraband 5"x20  Bridges 2x10 (minimal range of motion)    neuromuscular re-education activities to improve: Balance, Coordination, and Proprioception for 25 minutes. The following activities were included:  Toe taps on blue block 2 x 10 in // bars  6" forward weight shifts  (lunge) 2x10 B   Nustep x 7 min level 3 for B UE / LE HILLS PROGRAM reciprocal AROM  (cardio vascular training)    therapeutic activities to improve functional performance for 10  minutes, " including:  -lateral sidestepping in // bars with UE assist as needed 10 ft B x 4 trials   -supine to sit trials from [facility] x 3 trials focusing on technique and sequencing.   Cybex LAQ 3x10 30 # B    Patient Education and Home Exercises       Education provided:   - cont HEP    Written Home Exercises Provided: Pt instructed to continue prior HEP. Exercises were reviewed and Robi was able to demonstrate them prior to the end of the session.  Will demonstrated good  understanding of the education provided. See Electronic Medical Record under Patient Instructions for exercises provided during therapy sessions    Assessment     Will is a 79 y.o. male referred to outpatient Physical Therapy with a medical diagnosis of Status post lumbar surgery. L4-S1 TLIF 9/20/24. Patient presents with Aspen back brace donned and ambulating with rollator.   Pt arrived to session using rollator as assistive device w/ R AFO donned. Attempted cybex leg press however provoked lumbar pain. Pt is demonstrating improved supine to sit via log rolling (performed effectively today). Added Cybex LAQ for resisted AROM and strengthening. No adverse reactions to session today, beside reports of general fatigue.   Will Is progressing well towards his goals.   Patient prognosis is Good.     Patient will continue to benefit from skilled outpatient physical therapy to address the deficits listed in the problem list box on initial evaluation, provide pt/family education and to maximize pt's level of independence in the home and community environment.     Patient's spiritual, cultural and educational needs considered and pt agreeable to plan of care and goals.     Anticipated barriers to physical therapy:  transportation, history of stroke with residual right lower extremity      Goals:  Short Term Goals: 6 weeks   Patient will be independent with Home exercise program to supplement therapy progressing, not met   Patient will be able to perform 5x sit  to  30 seconds to improve functional strength and mobility progressing, not met  Patient will be able to perform Timed up and Go in 24 seconds with LRAD to improve mobility progressing, not met      Long Term Goals: 12 weeks   Patient will improve PABLO to 38% to improve ability to perform functional tasks progressing, not met  Patient will be able to perform Timed up and Go in 12 seconds or less with LRAD to improve mobility progressing, not met  Patient will be able to perform 10 reps with 30 second sit to stand to improve functional strength progressing, not met  Patient will be able to ambulate 255 ft with 2 Minute walk test to improve endurance progressing, not met    Plan   Cont to progress PT as per tolerance.      Plan of care Certification: 10/29/2024 to 1/29/25.     Outpatient Physical Therapy 1-2 times weekly for 12 weeks to include the following interventions: Gait Training, Manual Therapy, Moist Heat/ Ice, Neuromuscular Re-ed, Patient Education, Therapeutic Activities, and Therapeutic Exercise.    James Zhu, PTA   11/4/2024

## 2024-11-06 ENCOUNTER — CLINICAL SUPPORT (OUTPATIENT)
Dept: REHABILITATION | Facility: HOSPITAL | Age: 79
End: 2024-11-06
Payer: MEDICARE

## 2024-11-06 DIAGNOSIS — R53.1 DECREASED STRENGTH, ENDURANCE, AND MOBILITY: Primary | ICD-10-CM

## 2024-11-06 DIAGNOSIS — M53.86 DECREASED ROM OF LUMBAR SPINE: ICD-10-CM

## 2024-11-06 DIAGNOSIS — Z74.09 DECREASED STRENGTH, ENDURANCE, AND MOBILITY: Primary | ICD-10-CM

## 2024-11-06 DIAGNOSIS — R68.89 DECREASED STRENGTH, ENDURANCE, AND MOBILITY: Primary | ICD-10-CM

## 2024-11-06 PROCEDURE — 97530 THERAPEUTIC ACTIVITIES: CPT | Mod: PN

## 2024-11-06 PROCEDURE — 97112 NEUROMUSCULAR REEDUCATION: CPT | Mod: PN

## 2024-11-06 NOTE — PROGRESS NOTES
"[facility]   Physical Therapy Treatment Note      Name: Robi Donovan  Clinic Number: [id number]    Therapy Diagnosis:   Encounter Diagnoses   Name Primary?    Decreased strength, endurance, and mobility Yes    Decreased ROM of lumbar spine      Physician: Sandi Callaway PA-C    Visit Date: 11/6/2024  Physician Orders: PT Eval and Treat   Medical Diagnosis from Referral: Z98.890 (ICD-10-CM) - Status post lumbar surgery   Evaluation Date: 10/29/2024  Authorization Period Expiration: 10/01/2025  Plan of Care Expiration: 1/29/25  Progress Note Due: 11/29/24  Date of Surgery: 9/20/24 L4-S1 TLIF  Visit # / Visits authorized: 1/ 1   FOTO: 1/ 3     Precautions: Standard, Diabetes, and HTN, HX of stroke       Time In: 11:00 AM  Time [id number] PM   Total Billable Time: 40 minutes 1 NMR, 1 TE, 1 TA    PTA Visit #: 0/5     Subjective     Patient reports: He was sore after his last visit. He had some pain last night but he thinks it is rather the position that he is sleeping in that causes the pain  He was compliant with home exercise program.  Response to previous treatment: sore  Functional change: ongoing    Pain: 0/10  Location:  lumbar     Objective      Objective Measures updated at progress report unless specified.     Treatment     Will received the treatments listed below:      therapeutic exercises to develop strength, ROM, flexibility, posture, and core stabilization for 18 minutes including:  Hook lying TrA 5"x30  Hook lying hip adduction with Theraball 5"x30  Hook lying hip abduction with green theraband 5"x30  Bridges 2x10 (minimal range of motion)  Standing hip abduction 3x10 ea    neuromuscular re-education activities to improve: Balance, Coordination, and Proprioception for 12 minutes. The following activities were included:  Toe taps on blue block 2 x 10 in // bars PT blocked right lower extremity   6" forward weight shifts  (lunge) 2x10 B NT  Nustep x 8 min level 3 for B UE / LE HILLS " PROGRAM reciprocal AROM  (cardio vascular training)    therapeutic activities to improve functional performance for 30  minutes, including:  - seated marches 3x10  - sit to stand from raised mat with 1in step under left lower extremity and PT blocking Right knee 2x10   -lateral sidestepping in // bars with UE assist as needed 10 ft B x 4 trials   -supine to sit trials from HiLo table x 3 trials focusing on technique and sequencing.   Cybex LAQ 3x10 30 # B  Cybex knee flexion 4 plates 3x10     Patient Education and Home Exercises       Education provided:   - cont HEP    Written Home Exercises Provided: Pt instructed to continue prior HEP. Exercises were reviewed and Will was able to demonstrate them prior to the end of the session.  Will demonstrated good  understanding of the education provided. See Electronic Medical Record under Patient Instructions for exercises provided during therapy sessions    Assessment     Will is a 79 y.o. male referred to outpatient Physical Therapy with a medical diagnosis of Status post lumbar surgery. L4-S1 TLIF 9/20/24. Patient presents with Aspen back brace donned and ambulating with rollator.   Pt arrived to session using rollator as assistive device w/ R AFO donned. PT blocked right lower extremity with standing activities to decrease extensor thrust. 1 inch step was also placed under left lower extremity with sit to stand to promote weight bearing through right lower extremity. Patient reported challenge with progression. He denied increase in pain throughout treatment session. He would continue to benefit from strengthening and balance training to improve function and decrease risk for future falls.   Will Is progressing well towards his goals.   Patient prognosis is Good.     Patient will continue to benefit from skilled outpatient physical therapy to address the deficits listed in the problem list box on initial evaluation, provide pt/family education and to maximize pt's  level of independence in the home and community environment.     Patient's spiritual, cultural and educational needs considered and pt agreeable to plan of care and goals.     Anticipated barriers to physical therapy:  transportation, history of stroke with residual right lower extremity      Goals:  Short Term Goals: 6 weeks   Patient will be independent with Home exercise program to supplement therapy progressing, not met   Patient will be able to perform 5x sit to  30 seconds to improve functional strength and mobility progressing, not met  Patient will be able to perform Timed up and Go in 24 seconds with LRAD to improve mobility progressing, not met      Long Term Goals: 12 weeks   Patient will improve PABLO to 38% to improve ability to perform functional tasks progressing, not met  Patient will be able to perform Timed up and Go in 12 seconds or less with LRAD to improve mobility progressing, not met  Patient will be able to perform 10 reps with 30 second sit to stand to improve functional strength progressing, not met  Patient will be able to ambulate 255 ft with 2 Minute walk test to improve endurance progressing, not met    Plan   Cont to progress PT as per tolerance.      Plan of care Certification: 10/29/2024 to 1/29/25.     Outpatient Physical Therapy 1-2 times weekly for 12 weeks to include the following interventions: Gait Training, Manual Therapy, Moist Heat/ Ice, Neuromuscular Re-ed, Patient Education, Therapeutic Activities, and Therapeutic Exercise.    Loida Negrete, PT ,DPT,OCS  11/6/2024

## 2024-11-11 ENCOUNTER — CLINICAL SUPPORT (OUTPATIENT)
Dept: REHABILITATION | Facility: HOSPITAL | Age: 79
End: 2024-11-11
Payer: MEDICARE

## 2024-11-11 DIAGNOSIS — R53.1 DECREASED STRENGTH, ENDURANCE, AND MOBILITY: Primary | ICD-10-CM

## 2024-11-11 DIAGNOSIS — M53.86 DECREASED ROM OF LUMBAR SPINE: ICD-10-CM

## 2024-11-11 DIAGNOSIS — Z74.09 DECREASED STRENGTH, ENDURANCE, AND MOBILITY: Primary | ICD-10-CM

## 2024-11-11 DIAGNOSIS — R68.89 DECREASED STRENGTH, ENDURANCE, AND MOBILITY: Primary | ICD-10-CM

## 2024-11-11 PROCEDURE — 97530 THERAPEUTIC ACTIVITIES: CPT | Mod: PN

## 2024-11-11 PROCEDURE — 97112 NEUROMUSCULAR REEDUCATION: CPT | Mod: PN

## 2024-11-11 NOTE — PROGRESS NOTES
"[facility]   Physical Therapy Treatment Note      Name: Robi Donovan  Clinic Number: [id number]    Therapy Diagnosis:   Encounter Diagnoses   Name Primary?    Decreased strength, endurance, and mobility Yes    Decreased ROM of lumbar spine        Physician: Sandi Callaway PA-C    Visit Date: 11/11/2024  Physician Orders: PT Eval and Treat   Medical Diagnosis from Referral: Z98.890 (ICD-10-CM) - Status post lumbar surgery   Evaluation Date: 10/29/2024  Authorization Period Expiration: 10/01/2025  Plan of Care Expiration: 1/29/25  Progress Note Due: 11/29/24  Date of Surgery: 9/20/24 L4-S1 TLIF  Visit # / Visits authorized: 4/16  FOTO: 1/ 3     Precautions: Standard, Diabetes, and HTN, HX of stroke       Time In: 1059AM  Time Out 1200 PM   Total Billable Time: 30 minutes 1 NMR, 1 TA    PTA Visit #: 0/5     Subjective     Patient reports: He felt good after his last PT visit without any increase in low back pain.   He was compliant with home exercise program.  Response to previous treatment: sore  Functional change: ongoing    Pain: 0/10  Location:  lumbar     Objective      Objective Measures updated at progress report unless specified.     Treatment     Will received the treatments listed below:      therapeutic exercises to develop strength, ROM, flexibility, posture, and core stabilization for 30 minutes including:  Hook lying TrA 5"x30  Hook lying hip adduction with Theraball 5"x30  Hook lying hip abduction with green theraband 5"x30  Bridges 3x10   Standing hip abduction 3x10 ea    neuromuscular re-education activities to improve: Balance, Coordination, and Proprioception for 8 minutes. The following activities were included:  Toe taps on blue block 2 x 10 in // bars PT blocked right lower extremity NT  6" forward weight shifts  (lunge) 2x10 B NT  Nustep x 8 min level 3 for B UE / LE HILLS PROGRAM reciprocal AROM  (cardio vascular training)    therapeutic activities to improve " functional performance for 23 minutes, including:  - seated marches 3x10  - sit to stand from raised mat with 1in step under left lower extremity and PT blocking Right knee 3x10   -lateral sidestepping in // bars with UE assist as needed 10 ft B x 4 trials NT  -supine to sit trials from HiLo table x 3 trials focusing on technique and sequencing. NT  Cybex LAQ 3x10 30 # B  Cybex knee flexion 4 plates 3x10     Patient Education and Home Exercises       Education provided:   - cont HEP    Written Home Exercises Provided: Pt instructed to continue prior HEP. Exercises were reviewed and Will was able to demonstrate them prior to the end of the session.  Will demonstrated good  understanding of the education provided. See Electronic Medical Record under Patient Instructions for exercises provided during therapy sessions    Assessment     Robi is a 79 y.o. male referred to outpatient Physical Therapy with a medical diagnosis of Status post lumbar surgery. L4-S1 TLIF 9/20/24. Patient presents with Aspen back brace donned and ambulating with rollator.   Pt arrived to session using rollator as assistive device w/ R AFO donned. PT continued with functional strengthening exercises today. He requires verbal cues throughout treatment session to perform slow and controlled movements with exercises. PT to add more balance and standing exercises at next visit as appropriate.   Robi Is progressing well towards his goals.   Patient prognosis is Good.     Patient will continue to benefit from skilled outpatient physical therapy to address the deficits listed in the problem list box on initial evaluation, provide pt/family education and to maximize pt's level of independence in the home and community environment.     Patient's spiritual, cultural and educational needs considered and pt agreeable to plan of care and goals.     Anticipated barriers to physical therapy:  transportation, history of stroke with residual right lower extremity       Goals:  Short Term Goals: 6 weeks   Patient will be independent with Home exercise program to supplement therapy progressing, not met   Patient will be able to perform 5x sit to  30 seconds to improve functional strength and mobility progressing, not met  Patient will be able to perform Timed up and Go in 24 seconds with LRAD to improve mobility progressing, not met      Long Term Goals: 12 weeks   Patient will improve PABLO to 38% to improve ability to perform functional tasks progressing, not met  Patient will be able to perform Timed up and Go in 12 seconds or less with LRAD to improve mobility progressing, not met  Patient will be able to perform 10 reps with 30 second sit to stand to improve functional strength progressing, not met  Patient will be able to ambulate 255 ft with 2 Minute walk test to improve endurance progressing, not met    Plan   Cont to progress PT as per tolerance.      Plan of care Certification: 10/29/2024 to 1/29/25.     Outpatient Physical Therapy 1-2 times weekly for 12 weeks to include the following interventions: Gait Training, Manual Therapy, Moist Heat/ Ice, Neuromuscular Re-ed, Patient Education, Therapeutic Activities, and Therapeutic Exercise.    Loida Negrete, PT ,DPT,OCS  11/11/2024

## 2024-11-12 NOTE — PROGRESS NOTES
"[facility]   Physical Therapy Treatment Note      Name: Robi Donovan  Clinic Number: [id number]    Therapy Diagnosis:   Encounter Diagnoses   Name Primary?    Decreased strength, endurance, and mobility Yes    Decreased ROM of lumbar spine          Physician: Sandi Callaway PA-C    Visit Date: 11/13/2024  Physician Orders: PT Eval and Treat   Medical Diagnosis from Referral: Z98.890 (ICD-10-CM) - Status post lumbar surgery   Evaluation Date: 10/29/2024  Authorization Period Expiration: 10/01/2025  Plan of Care Expiration: 1/29/25  Progress Note Due: 11/29/24  Date of Surgery: 9/20/24 L4-S1 TLIF  Visit # / Visits authorized: 5/16  FOTO: 1/ 3     Precautions: Standard, Diabetes, and HTN, HX of stroke       Time In: 1102AM  Time Out 1200 PM   Total Billable Time: 30 minutes 1 NMR, 1 TA    PTA Visit #: 0/5     Subjective     Patient reports: He was sore after his last visit. He doesn't know if it was from wearing the back brace or not. He thinks it is just soreness and not any increase in his back pain. He is not wearing his AFO today because sometimes he will get feet swelling and it will irritate his feet.   He was compliant with home exercise program.  Response to previous treatment: sore  Functional change: ongoing    Pain: 0/10  Location:  lumbar     Objective      Objective Measures updated at progress report unless specified.     Treatment     Will received the treatments listed below:      therapeutic exercises to develop strength, ROM, flexibility, posture, and core stabilization for 28 minutes including:  Hook lying TrA 5"x30  Hook lying hip adduction with Theraball 5"x30  Hook lying hip abduction with green theraband 5"x30  Bridges 3x10   Standing hip abduction 3x10 ea    neuromuscular re-education activities to improve: Balance, Coordination, and Proprioception for 10 minutes. The following activities were included:  Toe taps on blue block 2 x 10 in // bars PT blocked right lower " "extremity   6" forward weight shifts  (lunge) 2x10 B   Nustep x 8 min level 3 for B UE / LE HILLS PROGRAM reciprocal AROM  (cardio vascular training)    therapeutic activities to improve functional performance for 15 minutes, including:  - seated marches 3x10  - sit to stand from raised mat with 1in step under left lower extremity and PT blocking Right knee 2x10   -lateral sidestepping in // bars with UE assist as needed 10 ft B x 4 trials   -supine to sit trials from HiLo table x 3 trials focusing on technique and sequencing. NT  Cybex LAQ 3x10 30 # B NT  Cybex knee flexion 4 plates 3x10 NT    Patient Education and Home Exercises       Education provided:   - cont HEP    Written Home Exercises Provided: Pt instructed to continue prior HEP. Exercises were reviewed and Will was able to demonstrate them prior to the end of the session.  Will demonstrated good  understanding of the education provided. See Electronic Medical Record under Patient Instructions for exercises provided during therapy sessions    Assessment     Robi is a 79 y.o. male referred to outpatient Physical Therapy with a medical diagnosis of Status post lumbar surgery. L4-S1 TLIF 9/20/24. Patient presents with Aspen back brace donned and ambulating with rollator.   Patient arrived to today's session with rollator and without AFO. He had increase in soreness after his last visit and therefore PT regressed today's session without use of Cybex strengthening machines today. He denied increase in pain with exercises today.   Robi Is progressing well towards his goals.   Patient prognosis is Good.     Patient will continue to benefit from skilled outpatient physical therapy to address the deficits listed in the problem list box on initial evaluation, provide pt/family education and to maximize pt's level of independence in the home and community environment.     Patient's spiritual, cultural and educational needs considered and pt agreeable to plan of care " and goals.     Anticipated barriers to physical therapy:  transportation, history of stroke with residual right lower extremity      Goals:  Short Term Goals: 6 weeks   Patient will be independent with Home exercise program to supplement therapy progressing, not met   Patient will be able to perform 5x sit to  30 seconds to improve functional strength and mobility progressing, not met  Patient will be able to perform Timed up and Go in 24 seconds with LRAD to improve mobility progressing, not met      Long Term Goals: 12 weeks   Patient will improve PABLO to 38% to improve ability to perform functional tasks progressing, not met  Patient will be able to perform Timed up and Go in 12 seconds or less with LRAD to improve mobility progressing, not met  Patient will be able to perform 10 reps with 30 second sit to stand to improve functional strength progressing, not met  Patient will be able to ambulate 255 ft with 2 Minute walk test to improve endurance progressing, not met    Plan   Cont to progress PT as per tolerance.      Plan of care Certification: 10/29/2024 to 1/29/25.     Outpatient Physical Therapy 1-2 times weekly for 12 weeks to include the following interventions: Gait Training, Manual Therapy, Moist Heat/ Ice, Neuromuscular Re-ed, Patient Education, Therapeutic Activities, and Therapeutic Exercise.    Loida Negrete, PT ,DPT,OCS  11/13/2024

## 2024-11-13 ENCOUNTER — CLINICAL SUPPORT (OUTPATIENT)
Dept: REHABILITATION | Facility: HOSPITAL | Age: 79
End: 2024-11-13
Payer: MEDICARE

## 2024-11-13 DIAGNOSIS — R53.1 DECREASED STRENGTH, ENDURANCE, AND MOBILITY: Primary | ICD-10-CM

## 2024-11-13 DIAGNOSIS — Z74.09 DECREASED STRENGTH, ENDURANCE, AND MOBILITY: Primary | ICD-10-CM

## 2024-11-13 DIAGNOSIS — M53.86 DECREASED ROM OF LUMBAR SPINE: ICD-10-CM

## 2024-11-13 DIAGNOSIS — R68.89 DECREASED STRENGTH, ENDURANCE, AND MOBILITY: Primary | ICD-10-CM

## 2024-11-13 PROCEDURE — 97530 THERAPEUTIC ACTIVITIES: CPT | Mod: PN

## 2024-11-13 PROCEDURE — 97112 NEUROMUSCULAR REEDUCATION: CPT | Mod: PN

## 2024-11-14 ENCOUNTER — HOSPITAL ENCOUNTER (OUTPATIENT)
Dept: RADIOLOGY | Facility: HOSPITAL | Age: 79
Discharge: HOME | End: 2024-11-14
Attending: STUDENT IN AN ORGANIZED HEALTH CARE EDUCATION/TRAINING PROGRAM
Payer: MEDICARE

## 2024-11-14 ENCOUNTER — OFFICE VISIT (OUTPATIENT)
Dept: NEUROSURGERY | Facility: CLINIC | Age: 79
End: 2024-11-14
Payer: MEDICARE

## 2024-11-14 DIAGNOSIS — M48.061 SPINAL STENOSIS OF LUMBAR REGION, UNSPECIFIED WHETHER NEUROGENIC CLAUDICATION PRESENT: ICD-10-CM

## 2024-11-14 DIAGNOSIS — Z98.1 S/P LUMBAR SPINAL FUSION: Primary | ICD-10-CM

## 2024-11-14 DIAGNOSIS — R26.81 GAIT INSTABILITY: ICD-10-CM

## 2024-11-14 PROCEDURE — 72100 X-RAY EXAM L-S SPINE 2/3 VWS: CPT | Mod: 26,,, | Performed by: RADIOLOGY

## 2024-11-14 PROCEDURE — 72100 X-RAY EXAM L-S SPINE 2/3 VWS: CPT | Mod: TC

## 2024-11-14 PROCEDURE — 99999 PR PBB SHADOW E&M-EST. PATIENT-LVL II: CPT | Mod: PBBFAC,,, | Performed by: STUDENT IN AN ORGANIZED HEALTH CARE EDUCATION/TRAINING PROGRAM

## 2024-11-14 RX ORDER — METHOCARBAMOL 500 MG/1
500 TABLET, FILM COATED ORAL 4 TIMES DAILY
Qty: 40 TABLET | Refills: 0 | Status: SHIPPED | OUTPATIENT
Start: 2024-11-14 | End: 2024-11-24

## 2024-11-14 RX ORDER — OXYCODONE AND ACETAMINOPHEN 5; 325 MG/1; MG/1
1 TABLET ORAL EVERY 12 HOURS PRN
Qty: 14 TABLET | Refills: 0 | Status: SHIPPED | OUTPATIENT
Start: 2024-11-14

## 2024-11-14 NOTE — PROGRESS NOTES
Neurosurgery  Established Patient    SUBJECTIVE:     History of Present Illness:  78 M with hx of prior CVA with residual right sided weakness presents for eval of worsening low back pain and BLE radicular leg pain. He endorses pain which starts in upper lumbar area and then radiates down to lower lumbar area down his legs right greater than left into his feet. He endorses right foot weakness and has a right foot drop which has been present for many months. He has difficulty walking. 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 He has been to pain mgmt for injections without significant relief.      Interval fu 8/8/24:  Pt presents in fu.  No significant changes in symptoms.  Today he is most concerned with his balance difficulties.     Interval fu 8/29/24: No changes in symptoms.    Interval fu 11/14/24:  Pt presents in fu.  He is doing very well with mild to moderate back pain which was acutely exacerbated following PT yesterday.  He has resolution of his preop leg pain.  His wound has healed well and he is using his brace and bone growth stimulator.  He has noticed some left foot swelling which responds to elevation and to a diuretic prescribed by his PCP.    Review of patient's allergies indicates:  No Known Allergies    Current Outpatient Medications   Medication Sig Dispense Refill    amLODIPine (NORVASC) 10 MG tablet Take by mouth once daily.      aspirin (ECOTRIN) 81 MG EC tablet Take 81 mg by mouth once daily.      carvediloL (COREG) 6.25 MG tablet Take by mouth 2 (two) times daily.      docusate sodium (COLACE) 50 MG capsule Take 50 mg by mouth 2 (two) times daily.      finasteride (PROSCAR) 5 mg tablet Take by mouth once daily.      fluticasone propionate (FLONASE) 50 mcg/actuation nasal spray by Each Nostril route as needed.      glipiZIDE (GLUCOTROL) 10 MG tablet Take by mouth 2 (two) times daily with meals.      losartan (COZAAR) 100 MG  tablet Take by mouth.      metFORMIN (GLUCOPHAGE) 1000 MG tablet Take by mouth 2 (two) times daily with meals.      oxyCODONE-acetaminophen (PERCOCET) 5-325 mg per tablet Take 1 tablet by mouth every 12 (twelve) hours as needed for Pain. 14 tablet 0    rosuvastatin (CRESTOR) 40 MG Tab Take by mouth every evening.      tamsulosin (FLOMAX) 0.4 mg Cap Take by mouth once daily. (Patient taking differently: Take by mouth 2 (two) times daily.)      traMADoL (ULTRAM) 50 mg tablet Take 50 mg by mouth every 12 (twelve) hours as needed for Pain.      TRUE METRIX GLUCOSE TEST STRIP Strp 1 strip 2 (two) times daily.       No current facility-administered medications for this visit.       Past Medical History:   Diagnosis Date    Diabetes mellitus type I     Hypertension     Stroke      Past Surgical History:   Procedure Laterality Date    ROBOTIC FUSION,SPINE,LUMBAR,TLIF N/A 2024    Procedure: OPEN ROBOTIC L4 -S1 TLIF;  Surgeon: Juan Luis Mcbride DO;  Location: [facility] OR [address] [place] [address];  Service: Neurosurgery;  Laterality: N/A;    TRANSFORAMINAL EPIDURAL INJECTION OF STEROID Bilateral 2024    Procedure: LUMBAR TRANSFORAMINAL BILATERAL L4/5;  Surgeon: Aria Orozco MD;  Location: [facility];  Service: Pain Management;  Laterality: Bilateral;  [phone]  2 WK F/U LM     Family History    None       Social History     Socioeconomic History    Marital status:    Tobacco Use    Smoking status: Former     Current packs/day: 0.00     Types: Cigarettes     Start date:      Quit date:      Years since quittin.8    Smokeless tobacco: Never   Substance and Sexual Activity    Alcohol use: Not Currently     Social Drivers of Health     Financial Resource Strain: Medium Risk (2024)    Overall Financial Resource Strain (CARDIA)     Difficulty of Paying Living Expenses: Somewhat hard   Food Insecurity: Food Insecurity Present (2024)    Hunger Vital Sign     Worried About Running Out of Food in  the Last Year: Sometimes true     Ran Out of Food in the Last Year: Never true   Transportation Needs: No Transportation Needs (5/13/2024)    PRAPARE - Transportation     Lack of Transportation (Medical): No     Lack of Transportation (Non-Medical): No   Physical Activity: Unknown (5/13/2024)    Exercise Vital Sign     Days of Exercise per Week: 0 days   Stress: No Stress Concern Present (5/13/2024)    [employer] - Occupational Stress Questionnaire     Feeling of Stress : Not at all       Review of Systems  14 point ROS was negative    OBJECTIVE:     Vital Signs  Pain Score:   5  There is no height or weight on file to calculate BMI.    Neurosurgery Physical Exam  Constitutional: He appears well-developed and well-nourished.      Eyes: Pupils are equal, round, and reactive to light.      Cardiovascular: Normal rate and regular rhythm.      Abdominal: Soft.      Psych/Behavior: He is alert. He is oriented to person, place, and time. He has a normal mood and affect.      Musculoskeletal: Gait is abnormal.        Neck: Range of motion is limited.        Back: Range of motion is limited.      Neurological:        Coordination: He has an abnormal Romberg Test and abnormal tandem walking coordination.        Sensory: There is no sensory deficit in the trunk. There is no sensory deficit in the extremities.        DTRs: DTRs are DTRS NORMAL AND SYMMETRICnormal and symmetric.        Cranial nerves: Cranial nerve(s) II, III, IV, V, VI, VII, VIII, IX, X, XI and XII are intact.      4/5 in right hemibody except 1/5 in right DF/EHL now 3/5 in right PF  5/5 in LUE except 4/5 in left HI, difficulty with left hand dexterity  5/5 in LLE    Lumbar incision well healed, wo TTP  Diagnostic Results:  L spine xrays: s/p L4-S1 TLIFs, no failure, good alignment.  Reviewed    ASSESSMENT/PLAN:     79 M with hx of prior CVA and residual right sided weakness, chronic L2 compression fracture and multilevel lumbar  stenosis in the setting of dynamic instability at L4/5.  He is now s/p L4-S1 TLIFs on 9/22/24.  He is doing very well postop with resolution of his radicular leg pain and only mild back pain.  His PF strength seems stronger postop as well.  Will plan to get CT L in 6 weeks.  I have encouraged him to wean off the brace.  He can perform any bending or twisting movements which don't elicit significant pain with PT.  He should continue the bone growth stimulator.  I will also provide a medication refill.

## 2024-11-15 NOTE — PROGRESS NOTES
"[facility]   Physical Therapy Treatment Note      Name: Robi Donovan  Clinic Number: [id number]    Therapy Diagnosis:   Encounter Diagnoses   Name Primary?    Decreased strength, endurance, and mobility Yes    Decreased ROM of lumbar spine        Physician: Sandi Callaway PA-C    Visit Date: 11/18/2024  Physician Orders: PT Eval and Treat   Medical Diagnosis from Referral: Z98.890 (ICD-10-CM) - Status post lumbar surgery   Evaluation Date: 10/29/2024  Authorization Period Expiration: 10/01/2025  Plan of Care Expiration: 1/29/25  Progress Note Due: 11/29/24  Date of Surgery: 9/20/24 L4-S1 TLIF  Visit # / Visits authorized: 6/16  FOTO: 1/ 3     Precautions: Standard, Diabetes, and HTN, HX of stroke       Time In: 1105AM  Time Out 1201 PM   Total Billable Time: 30 minutes 1 NMR, 1 TA    PTA Visit #: 0/5     Subjective     Patient reports:He had some soreness on his Left side but he thinks it was just from how he was sitting because when he changed positions the soreness went away.   He was compliant with home exercise program.  Response to previous treatment: sore  Functional change: ongoing    Pain: 0/10  Location:  lumbar     Objective      Objective Measures updated at progress report unless specified.     Treatment     Will received the treatments listed below:      therapeutic exercises to develop strength, ROM, flexibility, posture, and core stabilization for 24 minutes including:  Hook lying TrA 5"x30  Hook lying hip adduction with Theraball 5"x30  Hook lying hip abduction with green theraband 5"x30  Bridges 3x10   Standing hip abduction 3x10 ea    neuromuscular re-education activities to improve: Balance, Coordination, and Proprioception for 15 minutes. The following activities were included:  Toe taps on blue block 2 x 10 in // bars PT blocked right lower extremity   6" forward weight shifts  (lunge) 2x10 B NT  Nustep x 8 min level 3 for B UE / LE HILLS PROGRAM reciprocal AROM  " (cardio vascular training)    therapeutic activities to improve functional performance for 15 minutes, including:  - seated marches 3x10  - sit to stand from raised mat with 1in step under left lower extremity and PT blocking Right knee 3x10   -lateral sidestepping in // bars with UE assist as needed 10 ft B x 4 trials   -supine to sit trials from HiLo table x 3 trials focusing on technique and sequencing. NT  Cybex LAQ 3x10 30 # B NT  Cybex knee flexion 4 plates 3x10 NT    Patient Education and Home Exercises       Education provided:   - cont HEP    Written Home Exercises Provided: Pt instructed to continue prior HEP. Exercises were reviewed and Will was able to demonstrate them prior to the end of the session.  Will demonstrated good  understanding of the education provided. See Electronic Medical Record under Patient Instructions for exercises provided during therapy sessions    Assessment     Robi is a 79 y.o. male referred to outpatient Physical Therapy with a medical diagnosis of Status post lumbar surgery. L4-S1 TLIF 9/20/24. Patient presents with Aspen back brace donned and ambulating with rollator.   Patient arrived to today's session with rollator and without AFO. PT progressed functional strengthening with increase in reps with sit to stand. He reported fatigue and required one seated rest break after lateral walks. PT to reintroduce Cybex at next visit as tolerated by patient.   Will Is progressing well towards his goals.   Patient prognosis is Good.     Patient will continue to benefit from skilled outpatient physical therapy to address the deficits listed in the problem list box on initial evaluation, provide pt/family education and to maximize pt's level of independence in the home and community environment.     Patient's spiritual, cultural and educational needs considered and pt agreeable to plan of care and goals.     Anticipated barriers to physical therapy:  transportation, history of stroke  with residual right lower extremity      Goals:  Short Term Goals: 6 weeks   Patient will be independent with Home exercise program to supplement therapy progressing, not met   Patient will be able to perform 5x sit to  30 seconds to improve functional strength and mobility progressing, not met  Patient will be able to perform Timed up and Go in 24 seconds with LRAD to improve mobility progressing, not met      Long Term Goals: 12 weeks   Patient will improve PABLO to 38% to improve ability to perform functional tasks progressing, not met  Patient will be able to perform Timed up and Go in 12 seconds or less with LRAD to improve mobility progressing, not met  Patient will be able to perform 10 reps with 30 second sit to stand to improve functional strength progressing, not met  Patient will be able to ambulate 255 ft with 2 Minute walk test to improve endurance progressing, not met    Plan   Cont to progress PT as per tolerance.      Plan of care Certification: 10/29/2024 to 1/29/25.     Outpatient Physical Therapy 1-2 times weekly for 12 weeks to include the following interventions: Gait Training, Manual Therapy, Moist Heat/ Ice, Neuromuscular Re-ed, Patient Education, Therapeutic Activities, and Therapeutic Exercise.    Loida Ngerete, PT ,DPT,OCS  11/18/2024

## 2024-11-18 ENCOUNTER — CLINICAL SUPPORT (OUTPATIENT)
Dept: REHABILITATION | Facility: HOSPITAL | Age: 79
End: 2024-11-18
Payer: MEDICARE

## 2024-11-18 DIAGNOSIS — R68.89 DECREASED STRENGTH, ENDURANCE, AND MOBILITY: Primary | ICD-10-CM

## 2024-11-18 DIAGNOSIS — R53.1 DECREASED STRENGTH, ENDURANCE, AND MOBILITY: Primary | ICD-10-CM

## 2024-11-18 DIAGNOSIS — M53.86 DECREASED ROM OF LUMBAR SPINE: ICD-10-CM

## 2024-11-18 DIAGNOSIS — Z74.09 DECREASED STRENGTH, ENDURANCE, AND MOBILITY: Primary | ICD-10-CM

## 2024-11-18 PROCEDURE — 97530 THERAPEUTIC ACTIVITIES: CPT | Mod: PN

## 2024-11-18 PROCEDURE — 97112 NEUROMUSCULAR REEDUCATION: CPT | Mod: PN

## 2024-11-20 ENCOUNTER — CLINICAL SUPPORT (OUTPATIENT)
Dept: REHABILITATION | Facility: HOSPITAL | Age: 79
End: 2024-11-20
Payer: MEDICARE

## 2024-11-20 DIAGNOSIS — R53.1 DECREASED STRENGTH, ENDURANCE, AND MOBILITY: Primary | ICD-10-CM

## 2024-11-20 DIAGNOSIS — R68.89 DECREASED STRENGTH, ENDURANCE, AND MOBILITY: Primary | ICD-10-CM

## 2024-11-20 DIAGNOSIS — M53.86 DECREASED ROM OF LUMBAR SPINE: ICD-10-CM

## 2024-11-20 DIAGNOSIS — Z74.09 DECREASED STRENGTH, ENDURANCE, AND MOBILITY: Primary | ICD-10-CM

## 2024-11-20 PROCEDURE — 97530 THERAPEUTIC ACTIVITIES: CPT | Mod: KX,PN,CQ

## 2024-11-20 PROCEDURE — 97112 NEUROMUSCULAR REEDUCATION: CPT | Mod: KX,PN,CQ

## 2024-11-20 NOTE — PROGRESS NOTES
"[facility]   Physical Therapy Treatment Note      Name: Robi Donovan  Clinic Number: [id number]    Therapy Diagnosis:   Encounter Diagnoses   Name Primary?    Decreased strength, endurance, and mobility Yes    Decreased ROM of lumbar spine        Physician: Sandi Callaway PA-C    Visit Date: 11/20/2024  Physician Orders: PT Eval and Treat   Medical Diagnosis from Referral: Z98.890 (ICD-10-CM) - Status post lumbar surgery   Evaluation Date: 10/29/2024  Authorization Period Expiration: 10/01/2025  Plan of Care Expiration: 1/29/25  Progress Note Due: 11/29/24  Date of Surgery: 9/20/24 L4-S1 TLIF  Visit # / Visits authorized: 7/16  FOTO: 1/ 3     Precautions: Standard, Diabetes, and HTN, HX of stroke       Time In: 1100 AM  Time Out 1200 PM   Total Billable Time: 30 minutes 1 NMR, 1 TA    PTA Visit #: 1/5     Subjective     Patient reports:no reports of new pain today. Pt agreeable to PT session.  He was compliant with home exercise program.  Response to previous treatment: sore  Functional change: ongoing    Pain: 0/10  Location:  lumbar     Objective      Objective Measures updated at progress report unless specified.     Treatment     Will received the treatments listed below:      therapeutic exercises to develop strength, ROM, flexibility, posture, and core stabilization for 24 minutes including:  Hook lying TrA 5"x30  Hook lying hip adduction with Theraball 5"x30  Hook lying hip abduction with green theraband 5"x30  Standing hip abduction 3x10 ea    neuromuscular re-education activities to improve: Balance, Coordination, and Proprioception for 15 minutes. The following activities were included:  Toe taps on blue block 2 x 10 in // bars PT blocked right lower extremity   6" forward weight shifts  (lunge) 2x10 B NT  Nustep x 8 min level 3 for B UE / LE HILLS PROGRAM reciprocal AROM  (cardio vascular training)    therapeutic activities to improve functional performance for 15 minutes, " including:  - seated marches 3x10  - sit to stand from raised mat with 1in step under left lower extremity and PTA blocking Right knee 3x10   -lateral sidestepping in // bars with UE assist as needed 10 ft B x 4 trials   -supine to sit trials from HiLo table x 3 trials focusing on technique and sequencing. NT  Cybex LAQ 3x10 30 # B NT  Cybex knee flexion 4 plates 3x10 NT    Patient Education and Home Exercises       Education provided:   - cont HEP    Written Home Exercises Provided: Pt instructed to continue prior HEP. Exercises were reviewed and Will was able to demonstrate them prior to the end of the session.  Will demonstrated good  understanding of the education provided. See Electronic Medical Record under Patient Instructions for exercises provided during therapy sessions    Assessment     Robi is a 79 y.o. male referred to outpatient Physical Therapy with a medical diagnosis of Status post lumbar surgery. L4-S1 TLIF 9/20/24. Patient presents with Aspen back brace donned and ambulating with rollator.   Pt completed today's session focusing on strengthening and general balance activities as per logged in treatment. Heavy verbal instructions provided on sit to standing activities from Hi/ Lo table especially with R knee blocking activities. Overall no adverse reactions noted at end of session.  Robi Is progressing well towards his goals.   Patient prognosis is Good.     Patient will continue to benefit from skilled outpatient physical therapy to address the deficits listed in the problem list box on initial evaluation, provide pt/family education and to maximize pt's level of independence in the home and community environment.     Patient's spiritual, cultural and educational needs considered and pt agreeable to plan of care and goals.     Anticipated barriers to physical therapy:  transportation, history of stroke with residual right lower extremity      Goals:  Short Term Goals: 6 weeks   Patient will be  independent with Home exercise program to supplement therapy progressing, not met   Patient will be able to perform 5x sit to  30 seconds to improve functional strength and mobility progressing, not met  Patient will be able to perform Timed up and Go in 24 seconds with LRAD to improve mobility progressing, not met      Long Term Goals: 12 weeks   Patient will improve PABLO to 38% to improve ability to perform functional tasks progressing, not met  Patient will be able to perform Timed up and Go in 12 seconds or less with LRAD to improve mobility progressing, not met  Patient will be able to perform 10 reps with 30 second sit to stand to improve functional strength progressing, not met  Patient will be able to ambulate 255 ft with 2 Minute walk test to improve endurance progressing, not met    Plan   Cont to progress PT as per tolerance.      Plan of care Certification: 10/29/2024 to 1/29/25.     Outpatient Physical Therapy 1-2 times weekly for 12 weeks to include the following interventions: Gait Training, Manual Therapy, Moist Heat/ Ice, Neuromuscular Re-ed, Patient Education, Therapeutic Activities, and Therapeutic Exercise.    James Zhu, PTA   11/20/2024

## 2024-11-25 ENCOUNTER — CLINICAL SUPPORT (OUTPATIENT)
Dept: REHABILITATION | Facility: HOSPITAL | Age: 79
End: 2024-11-25
Payer: MEDICARE

## 2024-11-25 DIAGNOSIS — M53.86 DECREASED ROM OF LUMBAR SPINE: ICD-10-CM

## 2024-11-25 DIAGNOSIS — Z74.09 DECREASED STRENGTH, ENDURANCE, AND MOBILITY: Primary | ICD-10-CM

## 2024-11-25 DIAGNOSIS — R68.89 DECREASED STRENGTH, ENDURANCE, AND MOBILITY: Primary | ICD-10-CM

## 2024-11-25 DIAGNOSIS — R53.1 DECREASED STRENGTH, ENDURANCE, AND MOBILITY: Primary | ICD-10-CM

## 2024-11-25 PROCEDURE — 97110 THERAPEUTIC EXERCISES: CPT | Mod: KX,PN,CQ

## 2024-11-25 PROCEDURE — 97530 THERAPEUTIC ACTIVITIES: CPT | Mod: KX,PN,CQ

## 2024-11-25 PROCEDURE — 97112 NEUROMUSCULAR REEDUCATION: CPT | Mod: KX,PN,CQ

## 2024-11-25 NOTE — PROGRESS NOTES
"[facility]   Physical Therapy Treatment Note      Name: Robi Donovan  Clinic Number: [id number]    Therapy Diagnosis:   Encounter Diagnoses   Name Primary?    Decreased strength, endurance, and mobility Yes    Decreased ROM of lumbar spine      Physician: Sandi Callaway PA-C    Visit Date: 11/25/2024  Physician Orders: PT Eval and Treat   Medical Diagnosis from Referral: Z98.890 (ICD-10-CM) - Status post lumbar surgery   Evaluation Date: 10/29/2024  Authorization Period Expiration: 10/01/2025  Plan of Care Expiration: 1/29/25  Progress Note Due: 11/29/24  Date of Surgery: 9/20/24 L4-S1 TLIF  Visit # / Visits authorized: 8/16  FOTO: 1/ 3     Precautions: Standard, Diabetes, and HTN, HX of stroke       Time In: 1100 AM  Time Out 1200 PM   Total Billable Time: 38 minutes (1TE, 1 TA, 1NMR)    PTA Visit #: 1/5     Subjective     Patient reports:no reports of new pain today. Pt agreeable to PT session.  He was compliant with home exercise program.  Response to previous treatment: sore  Functional change: ongoing    Pain: 0/10  Location:  lumbar     Objective      Objective Measures updated at progress report unless specified.     Treatment     Will received the treatments listed below:      therapeutic exercises to develop strength, ROM, flexibility, posture, and core stabilization for 25 minutes including:  Hook lying TrA 5"x30  Hook lying hip adduction with Theraball 5"x30  Hook lying hip abduction with green theraband 5"x30  Standing hip abduction 3x10 ea    neuromuscular re-education activities to improve: Balance, Coordination, and Proprioception for 15 minutes. The following activities were included:  Toe taps on blue block 2 x 10 in // bars PT blocked right lower extremity   6" forward weight shifts  (lunge) 2x10 B NT  Nustep x 8 min level 3 for B UE / LE HILLS PROGRAM reciprocal AROM  (cardio vascular training)    therapeutic activities to improve functional performance for 15 minutes, " including:  - seated marches 3x10  - sit to stand from raised mat with 1in step under left lower extremity and PTA blocking Right knee 2x10   -lateral sidestepping in // bars with UE assist as needed 10 ft B x 4 trials   -supine to sit trials from HiLo table x 3 trials focusing on technique and sequencing. NT  Cybex LAQ 3x10 30 # B  Cybex knee flexion 4 plates 3x10 NT    Patient Education and Home Exercises       Education provided:   - cont HEP    Written Home Exercises Provided: Pt instructed to continue prior HEP. Exercises were reviewed and Will was able to demonstrate them prior to the end of the session.  Will demonstrated good  understanding of the education provided. See Electronic Medical Record under Patient Instructions for exercises provided during therapy sessions    Assessment     Will is a 79 y.o. male referred to outpatient Physical Therapy with a medical diagnosis of Status post lumbar surgery. L4-S1 TLIF 9/20/24. Patient presents with Aspen back brace donned and ambulating with rollator.   Heavy focus with sit to stand sequencing and positioning from low mat surface ( strong lean to L side), PTA encouraged R side leaning for balanced tranfers. Overall pt completed session with no reports of pain, he is demonstrating improved sup<>sit transfers via log rolling.   Will Is progressing well towards his goals.   Patient prognosis is Good.     Patient will continue to benefit from skilled outpatient physical therapy to address the deficits listed in the problem list box on initial evaluation, provide pt/family education and to maximize pt's level of independence in the home and community environment.     Patient's spiritual, cultural and educational needs considered and pt agreeable to plan of care and goals.     Anticipated barriers to physical therapy:  transportation, history of stroke with residual right lower extremity      Goals:  Short Term Goals: 6 weeks   Patient will be independent with Home  exercise program to supplement therapy progressing, not met   Patient will be able to perform 5x sit to  30 seconds to improve functional strength and mobility progressing, not met  Patient will be able to perform Timed up and Go in 24 seconds with LRAD to improve mobility progressing, not met      Long Term Goals: 12 weeks   Patient will improve PABLO to 38% to improve ability to perform functional tasks progressing, not met  Patient will be able to perform Timed up and Go in 12 seconds or less with LRAD to improve mobility progressing, not met  Patient will be able to perform 10 reps with 30 second sit to stand to improve functional strength progressing, not met  Patient will be able to ambulate 255 ft with 2 Minute walk test to improve endurance progressing, not met    Plan   Cont to progress PT as per tolerance.      Plan of care Certification: 10/29/2024 to 1/29/25.     Outpatient Physical Therapy 1-2 times weekly for 12 weeks to include the following interventions: Gait Training, Manual Therapy, Moist Heat/ Ice, Neuromuscular Re-ed, Patient Education, Therapeutic Activities, and Therapeutic Exercise.    James Zhu, PTA   11/25/2024

## 2024-11-27 ENCOUNTER — CLINICAL SUPPORT (OUTPATIENT)
Dept: REHABILITATION | Facility: HOSPITAL | Age: 79
End: 2024-11-27
Payer: MEDICARE

## 2024-11-27 DIAGNOSIS — M53.86 DECREASED ROM OF LUMBAR SPINE: ICD-10-CM

## 2024-11-27 DIAGNOSIS — R68.89 DECREASED STRENGTH, ENDURANCE, AND MOBILITY: Primary | ICD-10-CM

## 2024-11-27 DIAGNOSIS — R53.1 DECREASED STRENGTH, ENDURANCE, AND MOBILITY: Primary | ICD-10-CM

## 2024-11-27 DIAGNOSIS — Z74.09 DECREASED STRENGTH, ENDURANCE, AND MOBILITY: Primary | ICD-10-CM

## 2024-11-27 PROCEDURE — 97112 NEUROMUSCULAR REEDUCATION: CPT | Mod: PN

## 2024-11-27 PROCEDURE — 97530 THERAPEUTIC ACTIVITIES: CPT | Mod: PN

## 2024-11-27 NOTE — PROGRESS NOTES
"[facility]   Physical Therapy Treatment Note      Name: Robi Donovan  Clinic Number: [id number]    Therapy Diagnosis:   Encounter Diagnoses   Name Primary?    Decreased strength, endurance, and mobility Yes    Decreased ROM of lumbar spine      Physician: Sandi Callaway PA-C    Visit Date: 11/27/2024  Physician Orders: PT Eval and Treat   Medical Diagnosis from Referral: Z98.890 (ICD-10-CM) - Status post lumbar surgery   Evaluation Date: 10/29/2024  Authorization Period Expiration: 10/01/2025  Plan of Care Expiration: 1/29/25  Progress Note Due: 12/29/24  Date of Surgery: 9/20/24 L4-S1 TLIF  Visit # / Visits authorized: 9/16  FOTO: 2/ 3     Precautions: Standard, Diabetes, and HTN, HX of stroke       Time In: 1100 AM  Time Out 1155AM   Total Billable Time: 55 minutes (3 TA, 1NMR)    PTA Visit #: 0/5     Subjective     Patient reports: He has been feeling good without any pain  He was compliant with home exercise program.  Response to previous treatment: sore  Functional change: ongoing    Pain: 0/10  Location:  lumbar     Objective      Objective Measures updated at progress report unless specified.     11/27/24    Timed up and Go: 25 seconds with rollator  30 second sit to stand: 9 seconds with definite yes of hands and knee extensor thrust on Right   2 Minute walk test: 177 ft with rollator     FOTO Lumbar Survey    Therapist reviewed FOTO scores for Robi Donovan on 11/27/2024.   FOTO documents entered into [employer] - see Media section.    status Score: 72%  PABLO: 6         Treatment     Will received the treatments listed below:      neuromuscular re-education activities to improve: Balance, Coordination, and Proprioception for 15 minutes. The following activities were included:  Toe taps on blue block 2 x 10 in // bars PT blocked right lower extremity NT  6" forward weight shifts  (lunge) 2x10 B   Nustep x 8 min level 3 for B UE / LE HILLS PROGRAM reciprocal AROM  (cardio vascular " training)    therapeutic activities to improve functional performance for 40 minutes, including:  - seated marches 3x10 NT  - sit to stand from raised mat with 1in step under left lower extremity and PTA blocking Right knee 2x10 NT  -lateral sidestepping in // bars with UE assist as needed 10 ft B x 4 trials   Standing hip abduction 3x10 ea  -supine to sit trials from HiLo table x 3 trials focusing on technique and sequencing. NT  Cybex LAQ 3x10 30 # B NT  Cybex knee flexion 4 plates 3x10 NT  reassessment    Patient Education and Home Exercises       Education provided:   - cont HEP    Written Home Exercises Provided: Pt instructed to continue prior HEP. Exercises were reviewed and Robi was able to demonstrate them prior to the end of the session.  Robi demonstrated good  understanding of the education provided. See Electronic Medical Record under Patient Instructions for exercises provided during therapy sessions    Assessment     Robi is a 79 y.o. male referred to outpatient Physical Therapy with a medical diagnosis of Status post lumbar surgery. L4-S1 TLIF 9/20/24. Patient presents with Aspen back brace donned and ambulating with rollator.   Mr. Rosenbaum was reassessed today and displayed improvements with Timed up and Go, 30 second sit to stand, and FOTO as compared to his initial evaluation. At this time his low back pain is minimal but continues to be limited due to decrease in functional strength and endurance. PT and patient agreed to continue with PT at this time to address functional deficits and improve quality of life.   Robi Is progressing well towards his goals.   Patient prognosis is Good.     Patient will continue to benefit from skilled outpatient physical therapy to address the deficits listed in the problem list box on initial evaluation, provide pt/family education and to maximize pt's level of independence in the home and community environment.     Patient's spiritual, cultural and educational needs  considered and pt agreeable to plan of care and goals.     Anticipated barriers to physical therapy:  transportation, history of stroke with residual right lower extremity      Goals:  Short Term Goals: 6 weeks   Patient will be independent with Home exercise program to supplement therapy met   Patient will be able to perform 5x sit to  30 seconds to improve functional strength and mobility met  Patient will be able to perform Timed up and Go in 24 seconds with LRAD to improve mobility progressing, not met      Long Term Goals: 12 weeks   Patient will improve PABLO to 38% to improve ability to perform functional tasks met  Patient will be able to perform Timed up and Go in 12 seconds or less with LRAD to improve mobility progressing, not met  Patient will be able to perform 10 reps with 30 second sit to stand to improve functional strength progressing, not met  Patient will be able to ambulate 255 ft with 2 Minute walk test to improve endurance progressing, not met    Plan   Improve functional strength and endurance to return to prior level of function.     Loida Negrete, PT ,DPT,OCS  11/27/2024

## 2024-12-03 ENCOUNTER — CLINICAL SUPPORT (OUTPATIENT)
Dept: REHABILITATION | Facility: HOSPITAL | Age: 79
End: 2024-12-03
Payer: MEDICARE

## 2024-12-03 DIAGNOSIS — M53.86 DECREASED ROM OF LUMBAR SPINE: ICD-10-CM

## 2024-12-03 DIAGNOSIS — R53.1 DECREASED STRENGTH, ENDURANCE, AND MOBILITY: Primary | ICD-10-CM

## 2024-12-03 DIAGNOSIS — R68.89 DECREASED STRENGTH, ENDURANCE, AND MOBILITY: Primary | ICD-10-CM

## 2024-12-03 DIAGNOSIS — Z74.09 DECREASED STRENGTH, ENDURANCE, AND MOBILITY: Primary | ICD-10-CM

## 2024-12-03 PROCEDURE — 97530 THERAPEUTIC ACTIVITIES: CPT | Mod: KX,PN,CQ

## 2024-12-03 PROCEDURE — 97112 NEUROMUSCULAR REEDUCATION: CPT | Mod: KX,PN,CQ

## 2024-12-03 NOTE — PROGRESS NOTES
"[facility]   Physical Therapy Treatment Note      Name: Robi Donovan  Clinic Number: [id number]    Therapy Diagnosis:   Encounter Diagnoses   Name Primary?    Decreased strength, endurance, and mobility Yes    Decreased ROM of lumbar spine      Physician: Sandi Callaway PA-C    Visit Date: 12/3/2024  Physician Orders: PT Eval and Treat   Medical Diagnosis from Referral: Z98.890 (ICD-10-CM) - Status post lumbar surgery   Evaluation Date: 10/29/2024  Authorization Period Expiration: 10/01/2025  Plan of Care Expiration: 1/29/25  Progress Note Due: 12/29/24  Date of Surgery: 9/20/24 L4-S1 TLIF  Visit # / Visits authorized: 10/16  FOTO: 2/ 3     Precautions: Standard, Diabetes, and HTN, HX of stroke       Time In: 2:00 PM   Time Out 3:00 PM  Total Billable Time: 30 minutes (1 TA, 1NMR)    PTA Visit #: 1/5     Subjective     Patient reports: no reports of pain, pt agreeable to PT session  He was compliant with home exercise program.  Response to previous treatment: general soreness  Functional change: ongoing    Pain: 0/10  Location:  lumbar     Objective      Objective Measures updated at progress report unless specified.     11/27/24    Timed up and Go: 25 seconds with rollator  30 second sit to stand: 9 seconds with definite yes of hands and knee extensor thrust on Right   2 Minute walk test: 177 ft with rollator     FOTO Lumbar Survey    Therapist reviewed FOTO scores for Robi Donovan on 12/3/2024.   FOTO documents entered into [email or web address] - see Media section.    status Score: 72%  PABLO: 6       Treatment     Will received the treatments listed below:      neuromuscular re-education activities to improve: Balance, Coordination, and Proprioception for 15 minutes. The following activities were included:  Toe taps on blue block 2 x 10 in // bars PT blocked right lower extremity  6" forward weight shifts  (lunge) 2x10 B   Nustep x 8 min level 3 for B UE / LE HILLS PROGRAM reciprocal AROM  (cardio " vascular training)    therapeutic activities to improve functional performance for 40 minutes, including:  - seated marches 3x10 NT  - sit to stand from raised mat with 1in step under left lower extremity and PTA blocking Right knee 2x10   -lateral sidestepping in // bars with UE assist as needed 10 ft B x 4 trials   Standing hip abduction 3x10 ea  Cybex LAQ 3x10 30 # B NT  Cybex knee flexion 4 plates 3x10 NT      Patient Education and Home Exercises       Education provided:   - cont HEP    Written Home Exercises Provided: Pt instructed to continue prior HEP. Exercises were reviewed and Robi was able to demonstrate them prior to the end of the session.  Will demonstrated good  understanding of the education provided. See Electronic Medical Record under Patient Instructions for exercises provided during therapy sessions    Assessment     Robi is a 79 y.o. male referred to outpatient Physical Therapy with a medical diagnosis of Status post lumbar surgery. L4-S1 TLIF 9/20/24. Patient presents with Aspen back brace donned and ambulating with rollator.   Mr. Rosenbaum had no complaints of pain, but is still displaying weakness and decreased ROM in his RLE. Pt performed balance and coordination activities without loss of balance, seated rest breaks granted as needed for general fatigue recovery. Verbal and Manual instruction for sit-to-stands to correct form and promote weight bearing on the RLE. Pt ambulates with a rollator and AFO on RLE , forward flexed trunk and favors the LLE while ambulating. Recommend stand-by assist while ambulating in gym.       Patient prognosis is Good.     Patient will continue to benefit from skilled outpatient physical therapy to address the deficits listed in the problem list box on initial evaluation, provide pt/family education and to maximize pt's level of independence in the home and community environment.     Patient's spiritual, cultural and educational needs considered and pt agreeable  to plan of care and goals.     Anticipated barriers to physical therapy:  transportation, history of stroke with residual right lower extremity      Goals:  Short Term Goals: 6 weeks   Patient will be independent with Home exercise program to supplement therapy met   Patient will be able to perform 5x sit to  30 seconds to improve functional strength and mobility met  Patient will be able to perform Timed up and Go in 24 seconds with LRAD to improve mobility progressing, not met      Long Term Goals: 12 weeks   Patient will improve PABLO to 38% to improve ability to perform functional tasks met  Patient will be able to perform Timed up and Go in 12 seconds or less with LRAD to improve mobility progressing, not met  Patient will be able to perform 10 reps with 30 second sit to stand to improve functional strength progressing, not met  Patient will be able to ambulate 255 ft with 2 Minute walk test to improve endurance progressing, not met    Plan   Improve functional strength and endurance to return to prior level of function.     James Zhu, PTA   12/3/2024

## 2024-12-05 ENCOUNTER — TELEPHONE (OUTPATIENT)
Dept: NEUROSURGERY | Facility: CLINIC | Age: 79
End: 2024-12-05
Payer: MEDICARE

## 2024-12-05 NOTE — TELEPHONE ENCOUNTER
----- Message from Nasir sent at 12/4/2024  3:56 PM CST -----  Regarding: Refill  Contact: [phone]  Rx Refill/Request         Is this a Refill or New Rx:  Pt's daughter called for a refill   Rx Name and Strength:  methocarbamoL (ROBAXIN) 500 MG Tab and oxyCODONE-acetaminophen (PERCOCET) 5-325 mg per tablet  Preferred Pharmacy with phone number:  [id number] - [place] - [address] AT [facility] [address]  [address] [place] [address]  Phone: [phone] Fax: [phone]     Communication Preference:  [phone]  Additional Information:  Thank you.

## 2024-12-09 ENCOUNTER — CLINICAL SUPPORT (OUTPATIENT)
Dept: REHABILITATION | Facility: HOSPITAL | Age: 79
End: 2024-12-09
Payer: MEDICARE

## 2024-12-09 DIAGNOSIS — R53.1 DECREASED STRENGTH, ENDURANCE, AND MOBILITY: Primary | ICD-10-CM

## 2024-12-09 DIAGNOSIS — M53.86 DECREASED ROM OF LUMBAR SPINE: ICD-10-CM

## 2024-12-09 DIAGNOSIS — Z74.09 DECREASED STRENGTH, ENDURANCE, AND MOBILITY: Primary | ICD-10-CM

## 2024-12-09 DIAGNOSIS — R68.89 DECREASED STRENGTH, ENDURANCE, AND MOBILITY: Primary | ICD-10-CM

## 2024-12-09 PROCEDURE — 97112 NEUROMUSCULAR REEDUCATION: CPT | Mod: KX,PN,CQ

## 2024-12-09 PROCEDURE — 97530 THERAPEUTIC ACTIVITIES: CPT | Mod: KX,PN,CQ

## 2024-12-09 NOTE — PROGRESS NOTES
"[facility]   Physical Therapy Treatment Note      Name: Robi Donovan  Clinic Number: [id number]    Therapy Diagnosis:   Encounter Diagnoses   Name Primary?    Decreased strength, endurance, and mobility Yes    Decreased ROM of lumbar spine      Physician: Sandi Callaway PA-C    Visit Date: 12/9/2024  Physician Orders: PT Eval and Treat   Medical Diagnosis from Referral: Z98.890 (ICD-10-CM) - Status post lumbar surgery   Evaluation Date: 10/29/2024  Authorization Period Expiration: 10/01/2025  Plan of Care Expiration: 1/29/25  Progress Note Due: 12/29/24  Date of Surgery: 9/20/24 L4-S1 TLIF  Visit # / Visits authorized: 11/16  FOTO: 2/ 3     Precautions: Standard, Diabetes, and HTN, HX of stroke       Time In: 2:00 PM   Time Out 3:00 PM  Total Billable Time: 60 minutes (3TA, 1NMR) KX    PTA Visit #: 2/5     Subjective     Patient reports: no reports of pain, pt agreeable to PT session  He was compliant with home exercise program.  Response to previous treatment: general soreness  Functional change: ongoing    Pain: 0/10  Location:  lumbar     Objective      Objective Measures updated at progress report unless specified.     11/27/24    Timed up and Go: 25 seconds with rollator  30 second sit to stand: 9 seconds with definite yes of hands and knee extensor thrust on Right   2 Minute walk test: 177 ft with rollator     FOTO Lumbar Survey    Therapist reviewed FOTO scores for Robi Donovan on 12/9/2024.   FOTO documents entered into [employer] - see Media section.    status Score: 72%  PABLO: 6       Treatment     Will received the treatments listed below:      neuromuscular re-education activities to improve: Balance, Coordination, and Proprioception for 15 minutes. The following activities were included:  Toe taps on blue block 2 x 10 in // bars PT blocked right lower extremity  6" forward weight shifts  (lunge) 2x10 B   Nustep x 8 min level 3 for B UE / LE HILLS PROGRAM reciprocal AROM  (cardio " vascular training)    therapeutic activities to improve functional performance for 40 minutes, including:  - seated marches 3x10 NT  - sit to stand from raised mat with 1in step under left lower extremity and PTA blocking Right knee 2x10   -lateral sidestepping in // bars with UE assist as needed 10 ft B x 4 trials   Standing hip abduction 3x10 ea  Cybex LAQ 3x10 15 # B Vc's on technique and pace  Cybex knee flexion 4 plates 3x10 NT    Patient Education and Home Exercises       Education provided:   - cont HEP    Written Home Exercises Provided: Pt instructed to continue prior HEP. Exercises were reviewed and Will was able to demonstrate them prior to the end of the session.  Will demonstrated good  understanding of the education provided. See Electronic Medical Record under Patient Instructions for exercises provided during therapy sessions    Assessment     Will is a 79 y.o. male referred to outpatient Physical Therapy with a medical diagnosis of Status post lumbar surgery. L4-S1 TLIF 9/20/24. Patient presents with Aspen back brace donned and ambulating with rollator.   Mr. Rosenbaum completed today's session with general reports of fatigue but no pain today. Pt able to perform standing activities involving SLS and LE strengthening with no adverse response. Completed resistive cybex machinery today (Knee Extension) with VC's for controlled pace. Pt ambulates in gym with personal Rollator with noted forward trunk, reviewed postural awareness / corrections.  Patient prognosis is Good.     Patient will continue to benefit from skilled outpatient physical therapy to address the deficits listed in the problem list box on initial evaluation, provide pt/family education and to maximize pt's level of independence in the home and community environment.     Patient's spiritual, cultural and educational needs considered and pt agreeable to plan of care and goals.     Anticipated barriers to physical therapy:  transportation,  history of stroke with residual right lower extremity      Goals:  Short Term Goals: 6 weeks   Patient will be independent with Home exercise program to supplement therapy met   Patient will be able to perform 5x sit to  30 seconds to improve functional strength and mobility met  Patient will be able to perform Timed up and Go in 24 seconds with LRAD to improve mobility progressing, not met      Long Term Goals: 12 weeks   Patient will improve PABLO to 38% to improve ability to perform functional tasks met  Patient will be able to perform Timed up and Go in 12 seconds or less with LRAD to improve mobility progressing, not met  Patient will be able to perform 10 reps with 30 second sit to stand to improve functional strength progressing, not met  Patient will be able to ambulate 255 ft with 2 Minute walk test to improve endurance progressing, not met    Plan   Improve functional strength and endurance to return to prior level of function.     James Zhu, PTA   12/9/2024

## 2024-12-11 ENCOUNTER — CLINICAL SUPPORT (OUTPATIENT)
Dept: REHABILITATION | Facility: HOSPITAL | Age: 79
End: 2024-12-11
Payer: MEDICARE

## 2024-12-11 DIAGNOSIS — M53.86 DECREASED ROM OF LUMBAR SPINE: ICD-10-CM

## 2024-12-11 DIAGNOSIS — Z74.09 DECREASED STRENGTH, ENDURANCE, AND MOBILITY: Primary | ICD-10-CM

## 2024-12-11 DIAGNOSIS — R53.1 DECREASED STRENGTH, ENDURANCE, AND MOBILITY: Primary | ICD-10-CM

## 2024-12-11 DIAGNOSIS — R68.89 DECREASED STRENGTH, ENDURANCE, AND MOBILITY: Primary | ICD-10-CM

## 2024-12-11 PROCEDURE — 97530 THERAPEUTIC ACTIVITIES: CPT | Mod: KX,PN,CQ

## 2024-12-11 PROCEDURE — 97112 NEUROMUSCULAR REEDUCATION: CPT | Mod: KX,PN,CQ

## 2024-12-11 NOTE — PROGRESS NOTES
"[facility]   Physical Therapy Treatment Note      Name: Robi Donovan  Clinic Number: [id number]    Therapy Diagnosis:   Encounter Diagnoses   Name Primary?    Decreased strength, endurance, and mobility Yes    Decreased ROM of lumbar spine        Physician: Sandi Callaway PA-C    Visit Date: 12/11/2024  Physician Orders: PT Eval and Treat   Medical Diagnosis from Referral: Z98.890 (ICD-10-CM) - Status post lumbar surgery   Evaluation Date: 10/29/2024  Authorization Period Expiration: 10/01/2025  Plan of Care Expiration: 1/29/25  Progress Note Due: 12/29/24  Date of Surgery: 9/20/24 L4-S1 TLIF  Visit # / Visits authorized: 11/16  FOTO: 2/ 3     Precautions: Standard, Diabetes, and HTN, HX of stroke       Time In: 2:00 PM   Time Out 3:00 PM  Total Billable Time: 55 minutes (3TA, 1NMR) KX    PTA Visit #: 2/5     Subjective     Patient reports: no reports of pain, pt agreeable to PT session  He was compliant with home exercise program.  Response to previous treatment: general soreness  Functional change: ongoing    Pain: 0/10  Location:  lumbar     Objective      Objective Measures updated at progress report unless specified.     11/27/24    Timed up and Go: 25 seconds with rollator  30 second sit to stand: 9 seconds with definite yes of hands and knee extensor thrust on Right   2 Minute walk test: 177 ft with rollator     FOTO Lumbar Survey    Therapist reviewed FOTO scores for Robi Donovan on 12/11/2024.   FOTO documents entered into [employer] - see Media section.    status Score: 72%  PABLO: 6       Treatment     Will received the treatments listed below:      neuromuscular re-education activities to improve: Balance, Coordination, and Proprioception for 25 minutes. The following activities were included:  Toe taps on blue block 2 x 10 in // bars PT blocked right lower extremity  6" forward weight shifts  (lunge) 2x10 B   Nustep x 8 min level 3 for B UE / LE HILLS PROGRAM reciprocal AROM  (cardio " vascular training)  [place] step overs (4 inch) 5, x 4 trials B UE assist in //bars    therapeutic activities to improve functional performance for 30 minutes, including:  - seated marches 3x10 NT  - sit to stand from raised mat with 1in step under left lower extremity and PTA blocking Right knee 2x10   -lateral sidestepping in // bars with UE assist as needed 10 ft B x 4 trials   Standing hip abduction 3x10 ea  Cybex LAQ 3x10 15 # B Vc's on technique and pace  Cybex knee flexion 4 plates 3x10 NT    Patient Education and Home Exercises       Education provided:   - cont HEP    Written Home Exercises Provided: Pt instructed to continue prior HEP. Exercises were reviewed and Will was able to demonstrate them prior to the end of the session.  Will demonstrated good  understanding of the education provided. See Electronic Medical Record under Patient Instructions for exercises provided during therapy sessions    Assessment     Robi is a 79 y.o. male referred to outpatient Physical Therapy with a medical diagnosis of Status post lumbar surgery. L4-S1 TLIF 9/20/24. Patient presents with Aspen back brace donned and ambulating with rollator.   Mr. Rosenbaum completed today's session with general reports of fatigue but no pain today. Pt able to perform standing activities involving SLS and LE strengthening with no adverse response. Completed resistive cybex machinery today (Knee Extension) with VC's for controlled pace. Pt ambulates in gym with personal Rollator with noted forward trunk, reviewed postural awareness / corrections.  Patient prognosis is Good.     Patient will continue to benefit from skilled outpatient physical therapy to address the deficits listed in the problem list box on initial evaluation, provide pt/family education and to maximize pt's level of independence in the home and community environment.     Patient's spiritual, cultural and educational needs considered and pt agreeable to plan of care and goals.      Anticipated barriers to physical therapy:  transportation, history of stroke with residual right lower extremity      Goals:  Short Term Goals: 6 weeks   Patient will be independent with Home exercise program to supplement therapy met   Patient will be able to perform 5x sit to  30 seconds to improve functional strength and mobility met  Patient will be able to perform Timed up and Go in 24 seconds with LRAD to improve mobility progressing, not met      Long Term Goals: 12 weeks   Patient will improve PABLO to 38% to improve ability to perform functional tasks met  Patient will be able to perform Timed up and Go in 12 seconds or less with LRAD to improve mobility progressing, not met  Patient will be able to perform 10 reps with 30 second sit to stand to improve functional strength progressing, not met  Patient will be able to ambulate 255 ft with 2 Minute walk test to improve endurance progressing, not met    Plan   Improve functional strength and endurance to return to prior level of function.     James Zhu, PTA   12/16/2024

## 2024-12-16 ENCOUNTER — TELEPHONE (OUTPATIENT)
Dept: NEUROSURGERY | Facility: CLINIC | Age: 79
End: 2024-12-16
Payer: MEDICARE

## 2024-12-16 ENCOUNTER — CLINICAL SUPPORT (OUTPATIENT)
Dept: REHABILITATION | Facility: HOSPITAL | Age: 79
End: 2024-12-16
Payer: MEDICARE

## 2024-12-16 DIAGNOSIS — R53.1 DECREASED STRENGTH, ENDURANCE, AND MOBILITY: Primary | ICD-10-CM

## 2024-12-16 DIAGNOSIS — R68.89 DECREASED STRENGTH, ENDURANCE, AND MOBILITY: Primary | ICD-10-CM

## 2024-12-16 DIAGNOSIS — Z74.09 DECREASED STRENGTH, ENDURANCE, AND MOBILITY: Primary | ICD-10-CM

## 2024-12-16 DIAGNOSIS — M53.86 DECREASED ROM OF LUMBAR SPINE: ICD-10-CM

## 2024-12-16 PROCEDURE — 97530 THERAPEUTIC ACTIVITIES: CPT | Mod: PN

## 2024-12-16 PROCEDURE — 97112 NEUROMUSCULAR REEDUCATION: CPT | Mod: PN

## 2024-12-16 NOTE — PROGRESS NOTES
"[facility]   Physical Therapy Treatment Note      Name: Robi Donovan  Clinic Number: [id number]    Therapy Diagnosis:   Encounter Diagnoses   Name Primary?    Decreased strength, endurance, and mobility Yes    Decreased ROM of lumbar spine        Physician: Sandi Callaway PA-C    Visit Date: 12/16/2024  Physician Orders: PT Eval and Treat   Medical Diagnosis from Referral: Z98.890 (ICD-10-CM) - Status post lumbar surgery   Evaluation Date: 10/29/2024  Authorization Period Expiration: 10/01/2025  Plan of Care Expiration: 1/29/25  Progress Note Due: 12/29/24  Date of Surgery: 9/20/24 L4-S1 TLIF  Visit # / Visits authorized: 12/16  FOTO: 2/ 3     Precautions: Standard, Diabetes, and HTN, HX of stroke       Time In: 12:55 PM   Time Out 1:55 PM  Total Billable Time: 40 minutes (2TA, 1NMR) KX    PTA Visit #: 0/5     Subjective     Patient reports:He is doing good. He is wearing his AFO today.   He was compliant with home exercise program.  Response to previous treatment: general soreness  Functional change: ongoing    Pain: 0/10  Location:  lumbar     Objective      Objective Measures updated at progress report unless specified.     11/27/24    Timed up and Go: 25 seconds with rollator  30 second sit to stand: 9 seconds with definite yes of hands and knee extensor thrust on Right   2 Minute walk test: 177 ft with rollator     FOTO Lumbar Survey    Therapist reviewed FOTO scores for Robi Donovan on 12/16/2024.   FOTO documents entered into [employer] - see Media section.    status Score: 72%  PABLO: 6       Treatment     Will received the treatments listed below:      neuromuscular re-education activities to improve: Balance, Coordination, and Proprioception for 15 minutes. The following activities were included:  Toe taps on blue block 2 x 10 in // bars PT blocked right lower extremity NT  6" forward weight shifts  (lunge) 2x10 B   Nustep x 8 min level 3 for B UE / LE HILLS PROGRAM reciprocal AROM  " (cardio vascular training)    therapeutic activities to improve functional performance for 45 minutes, including:  - standing marches 3x10 (1 upper extremity assist)  - sit to stand from raised mat with 1in step under left lower extremity and PT blocking Right knee 2x10   -lateral sidestepping in // bars with UE assist as needed 10 ft B x 4 trials   Standing hip abduction 3x10 ea  Cybex LAQ 3x15 15 # B Vc's on technique and pace  Cybex knee flexion 4 plates 3x10 NT    Patient Education and Home Exercises       Education provided:   - cont HEP    Written Home Exercises Provided: Pt instructed to continue prior HEP. Exercises were reviewed and Robi was able to demonstrate them prior to the end of the session.  Robi demonstrated good  understanding of the education provided. See Electronic Medical Record under Patient Instructions for exercises provided during therapy sessions    Assessment     Robi is a 79 y.o. male referred to outpatient Physical Therapy with a medical diagnosis of Status post lumbar surgery. L4-S1 TLIF 9/20/24. Patient presents with Aspen back brace donned and ambulating with rollator.   Mr. Rosenbaum continues to be appropriately fatigued with all exercises. He required frequent seated rest breaks throughout his session due to his fatigue. He was challenged with progression of standing marches today and had difficulty with standing on right lower extremity as compared to Left. He also required cues to lift right lower extremity higher to improve step.     Patient prognosis is Good.     Patient will continue to benefit from skilled outpatient physical therapy to address the deficits listed in the problem list box on initial evaluation, provide pt/family education and to maximize pt's level of independence in the home and community environment.     Patient's spiritual, cultural and educational needs considered and pt agreeable to plan of care and goals.     Anticipated barriers to physical therapy:   transportation, history of stroke with residual right lower extremity      Goals:  Short Term Goals: 6 weeks   Patient will be independent with Home exercise program to supplement therapy met   Patient will be able to perform 5x sit to  30 seconds to improve functional strength and mobility met  Patient will be able to perform Timed up and Go in 24 seconds with LRAD to improve mobility progressing, not met      Long Term Goals: 12 weeks   Patient will improve PABLO to 38% to improve ability to perform functional tasks met  Patient will be able to perform Timed up and Go in 12 seconds or less with LRAD to improve mobility progressing, not met  Patient will be able to perform 10 reps with 30 second sit to stand to improve functional strength progressing, not met  Patient will be able to ambulate 255 ft with 2 Minute walk test to improve endurance progressing, not met    Plan   Improve functional strength and endurance to return to prior level of function.     Loida Negrete, PT ,DPT,OCS  12/16/2024

## 2024-12-16 NOTE — TELEPHONE ENCOUNTER
----- Message from Pricila sent at 12/16/2024  8:49 AM CST -----  Regarding: Rx    RX Name:    methocarbamoL (ROBAXIN) 500 MG Tab    How is it taken:   Take 1 tablet (500 mg total) by mouth 4 (four) times daily. for 10 days - Oral    Quantity:    40 tablet      RX Name:    oxyCODONE-acetaminophen (PERCOCET) 5-325 mg per tablet    How is it taken:   Take 1 tablet by mouth every 12 (twelve) hours as needed for Pain. - Oral    Quantity:     14 tablet       Preferred Pharmacy with phone number:    [id number] - [place] - [address] AT [facility]  [address] [place] [address]  Phone: [phone] Fax: [phone]      Ordering Provider:   Dr. Mcbride       Contact Preference:   Lyla (Pt's Daughter) @ [phone]    Addl info:   Requesting new scripts for methocarbamol and Oxycodone on behalf of pt. She states he's out. Please call back to notify once sent.

## 2024-12-18 NOTE — PROGRESS NOTES
"[facility]   Physical Therapy Treatment Note      Name: Robi Donovan  Clinic Number: [id number]    Therapy Diagnosis:   Encounter Diagnoses   Name Primary?    Decreased strength, endurance, and mobility Yes    Decreased ROM of lumbar spine          Physician: Sandi Callaway PA-C    Visit Date: 12/19/2024  Physician Orders: PT Eval and Treat   Medical Diagnosis from Referral: Z98.890 (ICD-10-CM) - Status post lumbar surgery   Evaluation Date: 10/29/2024  Authorization Period Expiration: 10/01/2025  Plan of Care Expiration: 1/29/25  Progress Note Due: 12/29/24  Date of Surgery: 9/20/24 L4-S1 TLIF  Visit # / Visits authorized: 14/16  FOTO: 2/ 3     Precautions: Standard, Diabetes, and HTN, HX of stroke       Time In: 1055 AM   Time Out 1153 PM  Total Billable Time: 30 minutes (1TA, 1NMR) KX    PTA Visit #: 0/5     Subjective     Patient reports:He was a little tired after the other day but he did not really have a lot of soreness.   He was compliant with home exercise program.  Response to previous treatment: general soreness  Functional change: ongoing    Pain: 0/10  Location:  lumbar     Objective      Objective Measures updated at progress report unless specified.     11/27/24    Timed up and Go: 25 seconds with rollator  30 second sit to stand: 9 seconds with definite yes of hands and knee extensor thrust on Right   2 Minute walk test: 177 ft with rollator     FOTO Lumbar Survey    Therapist reviewed FOTO scores for Robi Donovan on 12/19/2024.   FOTO documents entered into [employer] - see Media section.    status Score: 72%  PABLO: 6       Treatment     Will received the treatments listed below:      neuromuscular re-education activities to improve: Balance, Coordination, and Proprioception for 8 minutes. The following activities were included:  Toe taps on blue block 2 x 10 in // bars PT blocked right lower extremity NT  6" forward weight shifts  (lunge) 2x10 B NT  Nustep x 8 min level 3 for B " UE / LE HILLS PROGRAM reciprocal AROM  (cardio vascular training)    therapeutic activities to improve functional performance for 50 minutes, including:  - standing marches 3x10 (1 upper extremity assist)  - sit to stand from raised mat with 1in step under left lower extremity and PT blocking Right knee 2x10   -lateral sidestepping in // bars with UE assist as needed 10 ft B x 4 trials   Standing hip abduction 3x10 ea  Cybex LAQ 3x15 15 # B Vc's on technique and pace  Cybex knee flexion 4 plates 3x10   Cybex leg press 5 plates 3x10 bilateral  Cybex single leg leg press 4 plates 3x10 ea    Patient Education and Home Exercises       Education provided:   - cont HEP    Written Home Exercises Provided: Pt instructed to continue prior HEP. Exercises were reviewed and Will was able to demonstrate them prior to the end of the session.  Will demonstrated good  understanding of the education provided. See Electronic Medical Record under Patient Instructions for exercises provided during therapy sessions    Assessment     PT progressed today's strengthening with addition of bilateral and single leg leg press today. Patient reported appropriate amount of challenged and fatigue with progression. He also continued to require some seated rest breaks throughout the session due to fatigue.     Patient prognosis is Good.     Patient will continue to benefit from skilled outpatient physical therapy to address the deficits listed in the problem list box on initial evaluation, provide pt/family education and to maximize pt's level of independence in the home and community environment.     Patient's spiritual, cultural and educational needs considered and pt agreeable to plan of care and goals.     Anticipated barriers to physical therapy:  transportation, history of stroke with residual right lower extremity      Goals:  Short Term Goals: 6 weeks   Patient will be independent with Home exercise program to supplement therapy met    Patient will be able to perform 5x sit to  30 seconds to improve functional strength and mobility met  Patient will be able to perform Timed up and Go in 24 seconds with LRAD to improve mobility progressing, not met      Long Term Goals: 12 weeks   Patient will improve PABLO to 38% to improve ability to perform functional tasks met  Patient will be able to perform Timed up and Go in 12 seconds or less with LRAD to improve mobility progressing, not met  Patient will be able to perform 10 reps with 30 second sit to stand to improve functional strength progressing, not met  Patient will be able to ambulate 255 ft with 2 Minute walk test to improve endurance progressing, not met    Plan   Improve functional strength and endurance to return to prior level of function.     Loida Negrete, PT ,DPT,OCS  12/19/2024

## 2024-12-19 ENCOUNTER — CLINICAL SUPPORT (OUTPATIENT)
Dept: REHABILITATION | Facility: HOSPITAL | Age: 79
End: 2024-12-19
Payer: MEDICARE

## 2024-12-19 DIAGNOSIS — Z74.09 DECREASED STRENGTH, ENDURANCE, AND MOBILITY: Primary | ICD-10-CM

## 2024-12-19 DIAGNOSIS — R68.89 DECREASED STRENGTH, ENDURANCE, AND MOBILITY: Primary | ICD-10-CM

## 2024-12-19 DIAGNOSIS — R53.1 DECREASED STRENGTH, ENDURANCE, AND MOBILITY: Primary | ICD-10-CM

## 2024-12-19 DIAGNOSIS — M53.86 DECREASED ROM OF LUMBAR SPINE: ICD-10-CM

## 2024-12-19 PROCEDURE — 97530 THERAPEUTIC ACTIVITIES: CPT | Mod: PN

## 2024-12-19 PROCEDURE — 97112 NEUROMUSCULAR REEDUCATION: CPT | Mod: PN

## 2024-12-23 ENCOUNTER — CLINICAL SUPPORT (OUTPATIENT)
Dept: REHABILITATION | Facility: HOSPITAL | Age: 79
End: 2024-12-23
Payer: MEDICARE

## 2024-12-23 DIAGNOSIS — Z74.09 DECREASED STRENGTH, ENDURANCE, AND MOBILITY: Primary | ICD-10-CM

## 2024-12-23 DIAGNOSIS — R68.89 DECREASED STRENGTH, ENDURANCE, AND MOBILITY: Primary | ICD-10-CM

## 2024-12-23 DIAGNOSIS — R53.1 DECREASED STRENGTH, ENDURANCE, AND MOBILITY: Primary | ICD-10-CM

## 2024-12-23 DIAGNOSIS — M53.86 DECREASED ROM OF LUMBAR SPINE: ICD-10-CM

## 2024-12-23 PROCEDURE — 97112 NEUROMUSCULAR REEDUCATION: CPT | Mod: KX,PN,CQ

## 2024-12-23 PROCEDURE — 97530 THERAPEUTIC ACTIVITIES: CPT | Mod: KX,PN,CQ

## 2024-12-23 NOTE — PROGRESS NOTES
"[facility]   Physical Therapy Treatment Note      Name: Robi Donovan  Clinic Number: [id number]    Therapy Diagnosis:   Encounter Diagnoses   Name Primary?    Decreased strength, endurance, and mobility Yes    Decreased ROM of lumbar spine      Physician: Sandi Callaway PA-C    Visit Date: 12/23/2024  Physician Orders: PT Eval and Treat   Medical Diagnosis from Referral: Z98.890 (ICD-10-CM) - Status post lumbar surgery   Evaluation Date: 10/29/2024  Authorization Period Expiration: 10/01/2025  Plan of Care Expiration: 1/29/25  Progress Note Due: 12/29/24  Date of Surgery: 9/20/24 L4-S1 TLIF  Visit # / Visits authorized: 14/16  FOTO: 2/ 3     Precautions: Standard, Diabetes, and HTN, HX of stroke       Time In: 9:00 AM   Time Out: 10:00 AM  Total Billable Time: 30 minutes (1TA, 1NMR) KX    PTA Visit #: 1/5     Subjective     Patient reports: "The standing exercises make my legs sore". Pt agreeable to PT session.   He was compliant with home exercise program.  Response to previous treatment: general soreness  Functional change: ongoing    Pain: 0/10  Location:  lumbar     Objective      Objective Measures updated at progress report unless specified.     11/27/24    Timed up and Go: 25 seconds with rollator  30 second sit to stand: 9 seconds with definite yes of hands and knee extensor thrust on Right   2 Minute walk test: 177 ft with rollator     FOTO Lumbar Survey    Therapist reviewed FOTO scores for Robi Donovan on 12/23/2024.   FOTO documents entered into [employer] - see Media section.    status Score: 72%  PABLO: 6       Treatment     Will received the treatments listed below:      neuromuscular re-education activities to improve: Balance, Coordination, and Proprioception for 8 minutes. The following activities were included:  Toe taps on blue block 2 x 10 in // bars PT blocked right lower extremity NT  6" forward weight shifts  (lunge) 2x10 B NT  Nustep x 8 min level 3 for B UE / LE HILLS " PROGRAM reciprocal AROM  (cardio vascular training)    therapeutic activities to improve functional performance for 50 minutes, including:  - standing marches 3x10 (1 upper extremity assist)  - sit to stand from raised mat with 1in step under left lower extremity and PT blocking Right knee 2x10   -lateral sidestepping in // bars with UE assist as needed 10 ft B x 4 trials   Standing hip abduction 3x10 ea  Cybex LAQ 3x15 15 # B Vc's on technique and pace  Cybex knee flexion 4 plates 3x10   Cybex leg press 5 plates 3x10 bilateral  Cybex single leg leg press 4 plates 3x10 ea    Patient Education and Home Exercises       Education provided:   - cont HEP    Written Home Exercises Provided: Pt instructed to continue prior HEP. Exercises were reviewed and Will was able to demonstrate them prior to the end of the session.  Will demonstrated good  understanding of the education provided. See Electronic Medical Record under Patient Instructions for exercises provided during therapy sessions    Assessment     PT progressed today's strengthening with addition of bilateral and single leg leg press today. Pt does require seated rest breaks throughout the session to recover from general fatigue. He did not experience any knee buckling or loss of balance while performing balance activities in // bars today. Progressing well overall, will continue to advance within tolerance.    Patient prognosis is Good.     Patient will continue to benefit from skilled outpatient physical therapy to address the deficits listed in the problem list box on initial evaluation, provide pt/family education and to maximize pt's level of independence in the home and community environment.     Patient's spiritual, cultural and educational needs considered and pt agreeable to plan of care and goals.     Anticipated barriers to physical therapy:  transportation, history of stroke with residual right lower extremity      Goals:  Short Term Goals: 6 weeks    Patient will be independent with Home exercise program to supplement therapy met   Patient will be able to perform 5x sit to  30 seconds to improve functional strength and mobility met  Patient will be able to perform Timed up and Go in 24 seconds with LRAD to improve mobility progressing, not met      Long Term Goals: 12 weeks   Patient will improve PABLO to 38% to improve ability to perform functional tasks met  Patient will be able to perform Timed up and Go in 12 seconds or less with LRAD to improve mobility progressing, not met  Patient will be able to perform 10 reps with 30 second sit to stand to improve functional strength progressing, not met  Patient will be able to ambulate 255 ft with 2 Minute walk test to improve endurance progressing, not met    Plan   Improve functional strength and endurance to return to prior level of function.     James Zhu, PTA   12/23/2024

## 2024-12-26 ENCOUNTER — TELEPHONE (OUTPATIENT)
Dept: NEUROSURGERY | Facility: CLINIC | Age: 79
End: 2024-12-26
Payer: MEDICARE

## 2024-12-26 NOTE — TELEPHONE ENCOUNTER
----- Message from Ba Pruett sent at 12/23/2024  4:25 PM CST -----  Regarding: FW: Returning a Missed Call    ----- Message -----  From: Pricila Burnette  Sent: 12/20/2024   1:18 PM CST  To: [facility] Staff  Subject: Returning a Missed Call                                Caller:    Lyla (Pt's Daughter)      Returning call to:   Seble Dutton       Caller can be reached @: [phone]      Nature of the call:   Requesting to speak with Seble about the pt's scripts for Oxycodone and Methocarbamol.

## 2024-12-26 NOTE — PROGRESS NOTES
"[facility]   Physical Therapy Treatment Note      Name: Robi Donovan  Clinic Number: [id number]    Therapy Diagnosis:   Encounter Diagnoses   Name Primary?    Decreased strength, endurance, and mobility Yes    Decreased ROM of lumbar spine      Physician: Sandi Callaway PA-C    Visit Date: 12/27/2024  Physician Orders: PT Eval and Treat   Medical Diagnosis from Referral: Z98.890 (ICD-10-CM) - Status post lumbar surgery   Evaluation Date: 10/29/2024  Authorization Period Expiration: 10/01/2025  Plan of Care Expiration: 1/29/25  Progress Note Due: 1/29/25  Date of Surgery: 9/20/24 L4-S1 TLIF  Visit # / Visits authorized: 16/16  FOTO: 2/ 3     Precautions: Standard, Diabetes, and HTN, HX of stroke       Time In: 1103 AM   Time Out 1200 PM  Total Billable Time: 30 minutes (1TA, 1NMR) KX    PTA Visit #: 0/5     Subjective     Patient reports:He feels like he is getting better  He was compliant with home exercise program.  Response to previous treatment: general soreness  Functional change: ongoing    Pain: 0/10  Location:  lumbar     Objective      Objective Measures updated at progress report unless specified.     12/27/25    2 Minute walk test: 214 ft with rollator      Treatment     Will received the treatments listed below:      neuromuscular re-education activities to improve: Balance, Coordination, and Proprioception for 8 minutes. The following activities were included:  Toe taps on blue block 2 x 10 in // bars PT blocked right lower extremity NT  6" forward weight shifts  (lunge) 2x10 B NT  Nustep x 8 min level 3 for B UE / LE HILLS PROGRAM reciprocal AROM  (cardio vascular training)    therapeutic activities to improve functional performance for 49 minutes, including:  - standing marches 3x10 (1 upper extremity assist) NT  - sit to stand from raised mat  PT blocking Right knee 2x10 (hands on thighs)  -lateral sidestepping in // bars with UE assist as needed 10 ft B x 4 trials "   Standing hip abduction 3x10 ea NT  Cybex LAQ 3x15 15 # B Vc's on technique and pace  Cybex knee flexion 4 plates 3x10   Cybex leg press 5 plates 3x10 bilateral  Cybex single leg leg press 4 plates 3x10 ea    Patient Education and Home Exercises       Education provided:   - cont HEP    Written Home Exercises Provided: Pt instructed to continue prior HEP. Exercises were reviewed and Will was able to demonstrate them prior to the end of the session.  Will demonstrated good  understanding of the education provided. See Electronic Medical Record under Patient Instructions for exercises provided during therapy sessions    Assessment     Mr. Donovan was reassessed today with 2 Minute walk test and improved distance ambulated by 37 ft as compared to his previous reassessment which is just shy of his MDC. He is slowly making improvements in overall function and mobility. PT progressed functional strengthening with sit to stand today by no use of hands on mat when standing. Muscle fasciculations noted in Right quad with challenge reported by patient.     Patient prognosis is Good.     Patient will continue to benefit from skilled outpatient physical therapy to address the deficits listed in the problem list box on initial evaluation, provide pt/family education and to maximize pt's level of independence in the home and community environment.     Patient's spiritual, cultural and educational needs considered and pt agreeable to plan of care and goals.     Anticipated barriers to physical therapy:  transportation, history of stroke with residual right lower extremity      Goals:  Short Term Goals: 6 weeks   Patient will be independent with Home exercise program to supplement therapy met   Patient will be able to perform 5x sit to  30 seconds to improve functional strength and mobility met  Patient will be able to perform Timed up and Go in 24 seconds with LRAD to improve mobility progressing, not met      Long Term  Goals: 12 weeks   Patient will improve PABLO to 38% to improve ability to perform functional tasks met  Patient will be able to perform Timed up and Go in 12 seconds or less with LRAD to improve mobility progressing, not met  Patient will be able to perform 10 reps with 30 second sit to stand to improve functional strength progressing, not met  Patient will be able to ambulate 255 ft with 2 Minute walk test to improve endurance progressing, not met    Plan   Improve functional strength and endurance to return to prior level of function.     Loida Negrete, PT ,DPT,OCS  12/27/2024

## 2024-12-26 NOTE — TELEPHONE ENCOUNTER
Called to inform patient that he's more than 2 months post along with letting him know we can put in orders for pain management no response lvm

## 2024-12-27 ENCOUNTER — CLINICAL SUPPORT (OUTPATIENT)
Dept: REHABILITATION | Facility: HOSPITAL | Age: 79
End: 2024-12-27
Payer: MEDICARE

## 2024-12-27 DIAGNOSIS — Z74.09 DECREASED STRENGTH, ENDURANCE, AND MOBILITY: Primary | ICD-10-CM

## 2024-12-27 DIAGNOSIS — R68.89 DECREASED STRENGTH, ENDURANCE, AND MOBILITY: Primary | ICD-10-CM

## 2024-12-27 DIAGNOSIS — M53.86 DECREASED ROM OF LUMBAR SPINE: ICD-10-CM

## 2024-12-27 DIAGNOSIS — R53.1 DECREASED STRENGTH, ENDURANCE, AND MOBILITY: Primary | ICD-10-CM

## 2024-12-27 PROCEDURE — 97530 THERAPEUTIC ACTIVITIES: CPT | Mod: PN

## 2024-12-27 PROCEDURE — 97112 NEUROMUSCULAR REEDUCATION: CPT | Mod: PN

## 2024-12-30 ENCOUNTER — CLINICAL SUPPORT (OUTPATIENT)
Dept: REHABILITATION | Facility: HOSPITAL | Age: 79
End: 2024-12-30
Payer: MEDICARE

## 2024-12-30 DIAGNOSIS — R68.89 DECREASED STRENGTH, ENDURANCE, AND MOBILITY: Primary | ICD-10-CM

## 2024-12-30 DIAGNOSIS — M53.86 DECREASED ROM OF LUMBAR SPINE: ICD-10-CM

## 2024-12-30 DIAGNOSIS — Z74.09 DECREASED STRENGTH, ENDURANCE, AND MOBILITY: Primary | ICD-10-CM

## 2024-12-30 DIAGNOSIS — R53.1 DECREASED STRENGTH, ENDURANCE, AND MOBILITY: Primary | ICD-10-CM

## 2024-12-30 PROCEDURE — 97530 THERAPEUTIC ACTIVITIES: CPT | Mod: KX,PN,CQ

## 2024-12-30 NOTE — PROGRESS NOTES
"[facility]   Physical Therapy Treatment Note      Name: Robi Donovan  Clinic Number: [id number]    Therapy Diagnosis:   Encounter Diagnoses   Name Primary?    Decreased strength, endurance, and mobility Yes    Decreased ROM of lumbar spine      Physician: Sandi Callaway PA-C    Visit Date: 12/30/2024  Physician Orders: PT Eval and Treat   Medical Diagnosis from Referral: Z98.890 (ICD-10-CM) - Status post lumbar surgery   Evaluation Date: 10/29/2024  Authorization Period Expiration: 10/01/2025  Plan of Care Expiration: 1/29/25  Progress Note Due: 1/29/25  Date of Surgery: 9/20/24 L4-S1 TLIF  Visit # / Visits authorized: 17/16  FOTO: 2/ 3     Precautions: Standard, Diabetes, and HTN, HX of stroke       Time In: 1000 AM   Time Out 1100 AM  Total Billable Time: 30 minutes (2TA) KX    PTA Visit #: 1/5     Subjective     Patient reports: "I'm doing ok, it's getting better".  Pt agreeable to PT session.  He was compliant with home exercise program.  Response to previous treatment: general soreness  Functional change: ongoing    Pain: 0/10  Location:  lumbar     Objective      Objective Measures updated at progress report unless specified.     12/27/25    2 Minute walk test: 214 ft with rollator  Treatment     Will received the treatments listed below:      neuromuscular re-education activities to improve: Balance, Coordination, and Proprioception for 8 minutes. The following activities were included:  Toe taps on blue block 2 x 10 in // bars PT blocked right lower extremity NT  6" forward weight shifts  (lunge) 2x10 B NT  Nustep x 8 min level 3 for B UE / LE HILLS PROGRAM reciprocal AROM  (cardio vascular training)    therapeutic activities to improve functional performance for 45 minutes, including:  - standing marches 3x10 (1 upper extremity assist) NT  - sit to stand from raised mat  PT blocking Right knee 2x10 (hands on thighs)  -lateral sidestepping in // bars with UE assist as needed 10 ft " B x 4 trials   Standing hip abduction 3x10 ea NT  Cybex LAQ 3x15 15 # B Vc's on technique and pace  Cybex knee flexion 4 plates 3x10   Cybex leg press 5 plates 3x10 bilateral  Cybex single leg leg press 4 plates 3x10 ea    Patient Education and Home Exercises       Education provided:   - cont HEP    Written Home Exercises Provided: Pt instructed to continue prior HEP. Exercises were reviewed and Will was able to demonstrate them prior to the end of the session.  Will demonstrated good  understanding of the education provided. See Electronic Medical Record under Patient Instructions for exercises provided during therapy sessions    Assessment     Mr. Donovan arrived to session with personal FWW (four wheeled walker), AFO R foot donned. Pt completed session focusing on balance and B LE strengthening as per logged in treatment. Demonstrated improved standing tolerance, however seated rest breaks granted as needed for general fatigue recovery.  Patient prognosis is Good.     Patient will continue to benefit from skilled outpatient physical therapy to address the deficits listed in the problem list box on initial evaluation, provide pt/family education and to maximize pt's level of independence in the home and community environment.     Patient's spiritual, cultural and educational needs considered and pt agreeable to plan of care and goals.     Anticipated barriers to physical therapy:  transportation, history of stroke with residual right lower extremity      Goals:  Short Term Goals: 6 weeks   Patient will be independent with Home exercise program to supplement therapy met   Patient will be able to perform 5x sit to  30 seconds to improve functional strength and mobility met  Patient will be able to perform Timed up and Go in 24 seconds with LRAD to improve mobility progressing, not met      Long Term Goals: 12 weeks   Patient will improve PABLO to 38% to improve ability to perform functional tasks met  Patient  will be able to perform Timed up and Go in 12 seconds or less with LRAD to improve mobility progressing, not met  Patient will be able to perform 10 reps with 30 second sit to stand to improve functional strength progressing, not met  Patient will be able to ambulate 255 ft with 2 Minute walk test to improve endurance progressing, not met    Plan   Improve functional strength and endurance to return to prior level of function.     James Zhu, PTA   12/30/2024

## 2025-01-02 ENCOUNTER — CLINICAL SUPPORT (OUTPATIENT)
Dept: REHABILITATION | Facility: HOSPITAL | Age: 80
End: 2025-01-02
Payer: MEDICARE

## 2025-01-02 DIAGNOSIS — R53.1 DECREASED STRENGTH, ENDURANCE, AND MOBILITY: Primary | ICD-10-CM

## 2025-01-02 DIAGNOSIS — Z74.09 DECREASED STRENGTH, ENDURANCE, AND MOBILITY: Primary | ICD-10-CM

## 2025-01-02 DIAGNOSIS — R68.89 DECREASED STRENGTH, ENDURANCE, AND MOBILITY: Primary | ICD-10-CM

## 2025-01-02 DIAGNOSIS — M53.86 DECREASED ROM OF LUMBAR SPINE: ICD-10-CM

## 2025-01-02 PROCEDURE — 97530 THERAPEUTIC ACTIVITIES: CPT | Mod: PN

## 2025-01-02 PROCEDURE — 97112 NEUROMUSCULAR REEDUCATION: CPT | Mod: PN

## 2025-01-02 NOTE — PROGRESS NOTES
"[facility]   Physical Therapy Treatment Note      Name: Robi Donovan  Clinic Number: [id number]    Therapy Diagnosis:   Encounter Diagnoses   Name Primary?    Decreased strength, endurance, and mobility Yes    Decreased ROM of lumbar spine        Physician: Sandi Callaway PA-C    Visit Date: 1/2/2025  Physician Orders: PT Eval and Treat   Medical Diagnosis from Referral: Z98.890 (ICD-10-CM) - Status post lumbar surgery   Evaluation Date: 10/29/2024  Authorization Period Expiration: 10/01/2025  Plan of Care Expiration: 1/29/25  Progress Note Due: 1/29/25  Date of Surgery: 9/20/24 L4-S1 TLIF  Visit # / Visits authorized: 1/20 +17  FOTO: 2/ 3     Precautions: Standard, Diabetes, and HTN, HX of stroke       Time In: 1100 AM   Time Out 1200 pM  Total Billable Time: 60 minutes (2TA) KX    PTA Visit #: 0/5     Subjective     Patient reports: He feels like he is getting stronger and stronger  He was compliant with home exercise program.  Response to previous treatment: general soreness  Functional change: ongoing    Pain: 0/10  Location:  lumbar     Objective      Objective Measures updated at progress report unless specified.     12/27/25    2 Minute walk test: 214 ft with rollator  Treatment     Will received the treatments listed below:      neuromuscular re-education activities to improve: Balance, Coordination, and Proprioception for 8 minutes. The following activities were included:  Toe taps on blue block 2 x 10 in // bars PT blocked right lower extremity NT  6" forward weight shifts  (lunge) 2x10 B NT  Nustep x 8 min level 3 for B UE / LE HILLS PROGRAM reciprocal AROM  (cardio vascular training)    therapeutic activities to improve functional performance for 50 minutes, including:  - standing marches 3x10 (1 upper extremity assist) NT  - sit to stand from raised mat  PT blocking Right knee 2x10 (hands on thighs)  -lateral sidestepping in // bars with UE assist as needed 10 ft B x 4 trials "  NT  Standing hip abduction 3x10 ea NT  Cybex LAQ 3x15 15 # B Vc's on technique and pace  Cybex long arc quad 3x10 Right 10#  Cybex knee flexion 4 plates 3x10   Cybex leg press 5 plates 3x10 bilateral  Cybex single leg leg press 4 plates 3x10 ea    Patient Education and Home Exercises       Education provided:   - cont HEP    Written Home Exercises Provided: Pt instructed to continue prior HEP. Exercises were reviewed and Will was able to demonstrate them prior to the end of the session.  Will demonstrated good  understanding of the education provided. See Electronic Medical Record under Patient Instructions for exercises provided during therapy sessions    Assessment     PT progressed today's session with single leg long arc quad with appropriate challenge noted by patient. Patient displayed improve body mechanics with sit to stand with decrease in extensor thrust on Right with standing. PT to continue to work towards improving overall strength and functional tasks.   Patient prognosis is Good.     Patient will continue to benefit from skilled outpatient physical therapy to address the deficits listed in the problem list box on initial evaluation, provide pt/family education and to maximize pt's level of independence in the home and community environment.     Patient's spiritual, cultural and educational needs considered and pt agreeable to plan of care and goals.     Anticipated barriers to physical therapy:  transportation, history of stroke with residual right lower extremity      Goals:  Short Term Goals: 6 weeks   Patient will be independent with Home exercise program to supplement therapy met   Patient will be able to perform 5x sit to  30 seconds to improve functional strength and mobility met  Patient will be able to perform Timed up and Go in 24 seconds with LRAD to improve mobility progressing, not met      Long Term Goals: 12 weeks   Patient will improve PABLO to 38% to improve ability to perform  functional tasks met  Patient will be able to perform Timed up and Go in 12 seconds or less with LRAD to improve mobility progressing, not met  Patient will be able to perform 10 reps with 30 second sit to stand to improve functional strength progressing, not met  Patient will be able to ambulate 255 ft with 2 Minute walk test to improve endurance progressing, not met    Plan   Improve functional strength and endurance to return to prior level of function.     Loida Negrete, PT ,DPT,OCS  1/2/2025

## 2025-01-06 ENCOUNTER — CLINICAL SUPPORT (OUTPATIENT)
Dept: REHABILITATION | Facility: HOSPITAL | Age: 80
End: 2025-01-06
Payer: MEDICARE

## 2025-01-06 DIAGNOSIS — Z74.09 DECREASED STRENGTH, ENDURANCE, AND MOBILITY: Primary | ICD-10-CM

## 2025-01-06 DIAGNOSIS — M53.86 DECREASED ROM OF LUMBAR SPINE: ICD-10-CM

## 2025-01-06 DIAGNOSIS — R53.1 DECREASED STRENGTH, ENDURANCE, AND MOBILITY: Primary | ICD-10-CM

## 2025-01-06 DIAGNOSIS — R68.89 DECREASED STRENGTH, ENDURANCE, AND MOBILITY: Primary | ICD-10-CM

## 2025-01-06 PROCEDURE — 97112 NEUROMUSCULAR REEDUCATION: CPT | Mod: PN,CQ

## 2025-01-06 PROCEDURE — 97530 THERAPEUTIC ACTIVITIES: CPT | Mod: PN,CQ

## 2025-01-06 NOTE — PROGRESS NOTES
"[facility]   Physical Therapy Treatment Note      Name: Robi Donovan  Clinic Number: [id number]    Therapy Diagnosis:   Encounter Diagnoses   Name Primary?    Decreased strength, endurance, and mobility Yes    Decreased ROM of lumbar spine      Physician: Sandi Callaway PA-C    Visit Date: 1/6/2025  Physician Orders: PT Eval and Treat   Medical Diagnosis from Referral: Z98.890 (ICD-10-CM) - Status post lumbar surgery   Evaluation Date: 10/29/2024  Authorization Period Expiration: 10/01/2025  Plan of Care Expiration: 1/29/25  Progress Note Due: 1/29/25  Date of Surgery: 9/20/24 L4-S1 TLIF  Visit # / Visits authorized: 2/20 +17  FOTO: 2/ 3     Precautions: Standard, Diabetes, and HTN, HX of stroke       Time In: 1000 AM   Time Out: 1100 AM  Total Billable Time: 58 minutes (1 NMR, 3TA)    PTA Visit #: 1/5     Subjective     Patient reports: "I'm feeling ok today". Pt agreeable to PT session.  He was compliant with home exercise program.  Response to previous treatment: no adverse response  Functional change: ongoing    Pain: 0/10  Location:  lumbar     Objective      Objective Measures updated at progress report unless specified.     12/27/25    2 Minute walk test: 214 ft with rollator  Treatment     Will received the treatments listed below:      neuromuscular re-education activities to improve: Balance, Coordination, and Proprioception for 8 minutes. The following activities were included:  Toe taps on blue block 2 x 10 in // bars PT blocked right lower extremity NT  6" forward weight shifts  (lunge) 2x10 B NT  Nustep x 8 min level 3 for B UE / LE HILLS PROGRAM reciprocal AROM  (cardio vascular training)    therapeutic activities to improve functional performance for 50 minutes, including:  - standing marches 3x10 (1 upper extremity assist)   - sit to stand from raised mat  PT blocking Right knee 2x10 (hands on thighs)  -lateral sidestepping in // bars with UE assist as needed 10 ft B x 4 " trials  NT  Standing hip abduction 3x10 ea NT  Cybex LAQ 3x15 15 # B Vc's on technique and pace  Cybex long arc quad 3x10 Right 10#  Cybex knee flexion 4 plates 3x10   Cybex leg press 5 plates 3x10 bilateral  Cybex single leg leg press 4 plates 3x10 ea    Patient Education and Home Exercises       Education provided:   - cont HEP    Written Home Exercises Provided: Pt instructed to continue prior HEP. Exercises were reviewed and Will was able to demonstrate them prior to the end of the session.  Will demonstrated good  understanding of the education provided. See Electronic Medical Record under Patient Instructions for exercises provided during therapy sessions    Assessment     Will completed today's session focusing on L LE strengthening/ ROM and general balance activities as charted in treatment log. Pt demonstrated improved tolerance in standing with balance and LE strengthening activities. Seated rest breaks granted for general fatigue, pt cooperative throughout treatment. No adverse response noted at end of treatment.    Patient prognosis is Good.     Patient will continue to benefit from skilled outpatient physical therapy to address the deficits listed in the problem list box on initial evaluation, provide pt/family education and to maximize pt's level of independence in the home and community environment.     Patient's spiritual, cultural and educational needs considered and pt agreeable to plan of care and goals.     Anticipated barriers to physical therapy:  transportation, history of stroke with residual right lower extremity      Goals:  Short Term Goals: 6 weeks   Patient will be independent with Home exercise program to supplement therapy met   Patient will be able to perform 5x sit to  30 seconds to improve functional strength and mobility met  Patient will be able to perform Timed up and Go in 24 seconds with LRAD to improve mobility progressing, not met      Long Term Goals: 12 weeks    Patient will improve PABLO to 38% to improve ability to perform functional tasks met  Patient will be able to perform Timed up and Go in 12 seconds or less with LRAD to improve mobility progressing, not met  Patient will be able to perform 10 reps with 30 second sit to stand to improve functional strength progressing, not met  Patient will be able to ambulate 255 ft with 2 Minute walk test to improve endurance progressing, not met    Plan   Improve functional strength and endurance to return to prior level of function.     James Zhu, PTA   1/6/2025

## 2025-01-08 ENCOUNTER — CLINICAL SUPPORT (OUTPATIENT)
Dept: REHABILITATION | Facility: HOSPITAL | Age: 80
End: 2025-01-08
Payer: MEDICARE

## 2025-01-08 DIAGNOSIS — Z74.09 DECREASED STRENGTH, ENDURANCE, AND MOBILITY: Primary | ICD-10-CM

## 2025-01-08 DIAGNOSIS — R53.1 DECREASED STRENGTH, ENDURANCE, AND MOBILITY: Primary | ICD-10-CM

## 2025-01-08 DIAGNOSIS — R68.89 DECREASED STRENGTH, ENDURANCE, AND MOBILITY: Primary | ICD-10-CM

## 2025-01-08 DIAGNOSIS — M53.86 DECREASED ROM OF LUMBAR SPINE: ICD-10-CM

## 2025-01-08 PROCEDURE — 97112 NEUROMUSCULAR REEDUCATION: CPT | Mod: PN,CQ

## 2025-01-08 PROCEDURE — 97530 THERAPEUTIC ACTIVITIES: CPT | Mod: PN,CQ

## 2025-01-08 NOTE — PROGRESS NOTES
"[facility]   Physical Therapy Treatment Note      Name: Robi Donovan  Clinic Number: [id number]    Therapy Diagnosis:   Encounter Diagnoses   Name Primary?    Decreased strength, endurance, and mobility Yes    Decreased ROM of lumbar spine      Physician: Sandi Callaway PA-C    Visit Date: 1/8/2025  Physician Orders: PT Eval and Treat   Medical Diagnosis from Referral: Z98.890 (ICD-10-CM) - Status post lumbar surgery   Evaluation Date: 10/29/2024  Authorization Period Expiration: 10/01/2025  Plan of Care Expiration: 1/29/25  Progress Note Due: 1/29/25  Date of Surgery: 9/20/24 L4-S1 TLIF  Visit # / Visits authorized: 3/20 +17  FOTO: 2/ 3     Precautions: Standard, Diabetes, and HTN, HX of stroke       Time In: 1:00 PM   Time Out: 2:00 PM  Total Billable Time: 55 minutes (1 NMR, 3TA)    PTA Visit #: 2/5     Subjective     Patient reports: "I'm feeling ok today". Pt agreeable to PT session.  He was compliant with home exercise program.  Response to previous treatment: no adverse response  Functional change: ongoing    Pain: 0/10  Location:  lumbar     Objective      Objective Measures updated at progress report unless specified.     12/27/25    2 Minute walk test: 214 ft with rollator  Treatment     Will received the treatments listed below:      neuromuscular re-education activities to improve: Balance, Coordination, and Proprioception for 8 minutes. The following activities were included:  Toe taps on blue block 2 x 10 in // bars PT blocked right lower extremity NT  6" forward weight shifts  (lunge) 2x10 B NT  Nustep x 8 min level 3 for B UE / LE HILLS PROGRAM reciprocal AROM  (cardio vascular training)    therapeutic activities to improve functional performance for 47 minutes, including:  - standing marches 3x10 (1 upper extremity assist)   - sit to stand from raised mat  PT blocking Right knee 2x10 (hands on thighs)  -lateral sidestepping in // bars with UE assist as needed 10 ft B x 4 " trials  NT  Standing hip abduction 3x10 ea NT  Cybex LAQ 3x15 15 # B Vc's on technique and pace  Cybex long arc quad 3x10 Right 10#  Cybex knee flexion 4 plates 3x10   Cybex leg press 5 plates 3x10 bilateral  Cybex single leg leg press 4 plates 3x10 ea    Patient Education and Home Exercises       Education provided:   - cont HEP    Written Home Exercises Provided: Pt instructed to continue prior HEP. Exercises were reviewed and Will was able to demonstrate them prior to the end of the session.  Will demonstrated good  understanding of the education provided. See Electronic Medical Record under Patient Instructions for exercises provided during therapy sessions    Assessment     Will completed today's session focusing on L LE strengthening/ ROM and general balance activities as charted in treatment log. Pt demonstrated improved tolerance in standing with balance and LE strengthening activities, improved tolerance with sit to stand activities from low table (verbal instructions provided on strategy).    Patient prognosis is Good.     Patient will continue to benefit from skilled outpatient physical therapy to address the deficits listed in the problem list box on initial evaluation, provide pt/family education and to maximize pt's level of independence in the home and community environment.     Patient's spiritual, cultural and educational needs considered and pt agreeable to plan of care and goals.     Anticipated barriers to physical therapy:  transportation, history of stroke with residual right lower extremity      Goals:  Short Term Goals: 6 weeks   Patient will be independent with Home exercise program to supplement therapy met   Patient will be able to perform 5x sit to  30 seconds to improve functional strength and mobility met  Patient will be able to perform Timed up and Go in 24 seconds with LRAD to improve mobility progressing, not met      Long Term Goals: 12 weeks   Patient will improve APBLO to  38% to improve ability to perform functional tasks met  Patient will be able to perform Timed up and Go in 12 seconds or less with LRAD to improve mobility progressing, not met  Patient will be able to perform 10 reps with 30 second sit to stand to improve functional strength progressing, not met  Patient will be able to ambulate 255 ft with 2 Minute walk test to improve endurance progressing, not met    Plan   Improve functional strength and endurance to return to prior level of function.     James Zhu, PTA   1/8/2025

## 2025-01-14 ENCOUNTER — CLINICAL SUPPORT (OUTPATIENT)
Dept: REHABILITATION | Facility: HOSPITAL | Age: 80
End: 2025-01-14
Payer: MEDICARE

## 2025-01-14 DIAGNOSIS — R68.89 DECREASED STRENGTH, ENDURANCE, AND MOBILITY: Primary | ICD-10-CM

## 2025-01-14 DIAGNOSIS — R53.1 DECREASED STRENGTH, ENDURANCE, AND MOBILITY: Primary | ICD-10-CM

## 2025-01-14 DIAGNOSIS — M53.86 DECREASED ROM OF LUMBAR SPINE: ICD-10-CM

## 2025-01-14 DIAGNOSIS — Z74.09 DECREASED STRENGTH, ENDURANCE, AND MOBILITY: Primary | ICD-10-CM

## 2025-01-14 PROCEDURE — 97530 THERAPEUTIC ACTIVITIES: CPT | Mod: PN,CQ

## 2025-01-14 NOTE — PROGRESS NOTES
"[facility]   Physical Therapy Treatment Note      Name: Robi Donovan  Clinic Number: [id number]    Therapy Diagnosis:   Encounter Diagnoses   Name Primary?    Decreased strength, endurance, and mobility Yes    Decreased ROM of lumbar spine      Physician: Sandi Callaway PA-C    Visit Date: 1/14/2025  Physician Orders: PT Eval and Treat   Medical Diagnosis from Referral: Z98.890 (ICD-10-CM) - Status post lumbar surgery   Evaluation Date: 10/29/2024  Authorization Period Expiration: 10/01/2025  Plan of Care Expiration: 1/29/25  Progress Note Due: 1/29/25  Date of Surgery: 9/20/24 L4-S1 TLIF  Visit # / Visits authorized: 5/20 +17  FOTO: 2/ 3     Precautions: Standard, Diabetes, and HTN, HX of stroke       Time In: 1:00 PM   Time Out: 2:00 PM  Total Billable Time: 55 minutes (2TA)    PTA Visit #: 3/5     Subjective     Patient reports: "I'm feeling ok today". Pt agreeable to PT session.  He was compliant with home exercise program.  Response to previous treatment: no adverse response  Functional change: ongoing    Pain: 0/10  Location:  lumbar     Objective      Objective Measures updated at progress report unless specified.     12/27/25    2 Minute walk test: 214 ft with rollator  Treatment     Will received the treatments listed below:      neuromuscular re-education activities to improve: Balance, Coordination, and Proprioception for 8 minutes. The following activities were included:  Toe taps on blue block 2 x 10 in // bars PT blocked right lower extremity NT  6" forward weight shifts  (lunge) 2x10 B NT  Nustep x 8 min level 3 for B UE / LE HILLS PROGRAM reciprocal AROM  (cardio vascular training)    therapeutic activities to improve functional performance for 47 minutes, including:  - standing marches 3x10 (1 upper extremity assist)   - sit to stand from raised mat  PT blocking Right knee 2x10 (hands on thighs)  -lateral sidestepping in // bars with UE assist as needed 10 ft B x 4 trials "  NT  Standing hip abduction 3x10 ea NT  Cybex LAQ 3x15 15 # B Vc's on technique and pace  Cybex long arc quad 3x10 Right 10#  Cybex knee flexion 4 plates 3x10   Cybex leg press 5 plates 3x10 bilateral  Cybex single leg leg press 4 plates 3x10 ea    Patient Education and Home Exercises       Education provided:   - cont HEP    Written Home Exercises Provided: Pt instructed to continue prior HEP. Exercises were reviewed and Will was able to demonstrate them prior to the end of the session.  Will demonstrated good  understanding of the education provided. See Electronic Medical Record under Patient Instructions for exercises provided during therapy sessions    Assessment     Will completed today's session focusing on L LE strengthening/ ROM and general balance activities as charted in treatment log. Pt demonstrated improved tolerance in standing with balance and LE strengthening activities, improved tolerance with sit to stand activities from low table (verbal instructions provided on strategy).    Patient prognosis is Good.     Patient will continue to benefit from skilled outpatient physical therapy to address the deficits listed in the problem list box on initial evaluation, provide pt/family education and to maximize pt's level of independence in the home and community environment.     Patient's spiritual, cultural and educational needs considered and pt agreeable to plan of care and goals.     Anticipated barriers to physical therapy:  transportation, history of stroke with residual right lower extremity      Goals:  Short Term Goals: 6 weeks   Patient will be independent with Home exercise program to supplement therapy met   Patient will be able to perform 5x sit to  30 seconds to improve functional strength and mobility met  Patient will be able to perform Timed up and Go in 24 seconds with LRAD to improve mobility progressing, not met      Long Term Goals: 12 weeks   Patient will improve PABLO to 38% to  improve ability to perform functional tasks met  Patient will be able to perform Timed up and Go in 12 seconds or less with LRAD to improve mobility progressing, not met  Patient will be able to perform 10 reps with 30 second sit to stand to improve functional strength progressing, not met  Patient will be able to ambulate 255 ft with 2 Minute walk test to improve endurance progressing, not met    Plan   Improve functional strength and endurance to return to prior level of function.     James Zhu, PTA   1/14/2025

## 2025-01-17 ENCOUNTER — CLINICAL SUPPORT (OUTPATIENT)
Dept: REHABILITATION | Facility: HOSPITAL | Age: 80
End: 2025-01-17
Payer: MEDICARE

## 2025-01-17 DIAGNOSIS — R68.89 DECREASED STRENGTH, ENDURANCE, AND MOBILITY: Primary | ICD-10-CM

## 2025-01-17 DIAGNOSIS — R53.1 DECREASED STRENGTH, ENDURANCE, AND MOBILITY: Primary | ICD-10-CM

## 2025-01-17 DIAGNOSIS — M53.86 DECREASED ROM OF LUMBAR SPINE: ICD-10-CM

## 2025-01-17 DIAGNOSIS — Z74.09 DECREASED STRENGTH, ENDURANCE, AND MOBILITY: Primary | ICD-10-CM

## 2025-01-17 PROCEDURE — 97112 NEUROMUSCULAR REEDUCATION: CPT | Mod: PN

## 2025-01-17 PROCEDURE — 97530 THERAPEUTIC ACTIVITIES: CPT | Mod: PN

## 2025-01-17 NOTE — PROGRESS NOTES
[facility]   Physical Therapy Treatment Note      Name: Robi Donovan  Clinic Number: [id number]    Therapy Diagnosis:   Encounter Diagnoses   Name Primary?    Decreased strength, endurance, and mobility Yes    Decreased ROM of lumbar spine      Physician: Sandi Callaway PA-C    Visit Date: 1/17/2025  Physician Orders: PT Eval and Treat   Medical Diagnosis from Referral: Z98.890 (ICD-10-CM) - Status post lumbar surgery   Evaluation Date: 10/29/2024  Authorization Period Expiration: 10/01/2025  Plan of Care Expiration: 1/29/25  Progress Note Due: 1/29/25  Date of Surgery: 9/20/24 L4-S1 TLIF  Visit # / Visits authorized: 3/20 +17  FOTO: 2/ 3     Precautions: Standard, Diabetes, and HTN, HX of stroke       Time In: 11:00 AM   Time Out: 1200 PM  Total Billable Time: 60 minutes (1 NMR, 3TA)    PTA Visit #: 0/5     Subjective     Patient reports: He will get the occasional back pain when he is bending forward to wash his face or brush his teeth and then trying to move to stand back upright.  He was compliant with home exercise program.  Response to previous treatment: no adverse response  Functional change: ongoing    Pain: 0/10  Location:  lumbar     Objective      Objective Measures updated at progress report unless specified.     12/27/25    2 Minute walk test: 214 ft with rollator  Treatment     Will received the treatments listed below:      neuromuscular re-education activities to improve: Balance, Coordination, and Proprioception for 8 minutes. The following activities were included:  Nustep x 8 min level 3 for B UE / LE HILLS PROGRAM reciprocal AROM  (cardio vascular training)    therapeutic activities to improve functional performance for 42 minutes, including:  - standing marches 3x10 (1 upper extremity assist)   - sit to stand from raised mat  PT blocking Right knee 2x10 (hands on thighs) NT  -lateral sidestepping in // bars with UE assist as needed 10 ft B x 2 trials  Red theraband    Cybex LAQ 3x15 15 # B Vc's on technique and pace  Cybex long arc quad 3x10 Right 5#  Cybex knee flexion  5 plates 3x10   Cybex leg press 6 plates 3x10 bilateral  Cybex single leg leg press 4 plates 3x10 ea    Patient Education and Home Exercises       Education provided:   - cont HEP    Written Home Exercises Provided: Pt instructed to continue prior HEP. Exercises were reviewed and Will was able to demonstrate them prior to the end of the session.  Will demonstrated good  understanding of the education provided. See Electronic Medical Record under Patient Instructions for exercises provided during therapy sessions    Assessment     Progressions were made with various weights for strengthening exercises. He was appropriately challenged and fatigued and required rest breaks throughout session. He continues to require some cues for slow and controlled movements with exercises. He required some assistance getting off of leg press today due to some back pain with sitting up.     Patient prognosis is Good.     Patient will continue to benefit from skilled outpatient physical therapy to address the deficits listed in the problem list box on initial evaluation, provide pt/family education and to maximize pt's level of independence in the home and community environment.     Patient's spiritual, cultural and educational needs considered and pt agreeable to plan of care and goals.     Anticipated barriers to physical therapy:  transportation, history of stroke with residual right lower extremity      Goals:  Short Term Goals: 6 weeks   Patient will be independent with Home exercise program to supplement therapy met   Patient will be able to perform 5x sit to  30 seconds to improve functional strength and mobility met  Patient will be able to perform Timed up and Go in 24 seconds with LRAD to improve mobility progressing, not met      Long Term Goals: 12 weeks   Patient will improve PABLO to 38% to improve ability to  perform functional tasks met  Patient will be able to perform Timed up and Go in 12 seconds or less with LRAD to improve mobility progressing, not met  Patient will be able to perform 10 reps with 30 second sit to stand to improve functional strength progressing, not met  Patient will be able to ambulate 255 ft with 2 Minute walk test to improve endurance progressing, not met    Plan   Improve functional strength and endurance to return to prior level of function.     Loida Negrete, PT ,DPT,OCS  1/17/2025

## 2025-01-27 ENCOUNTER — CLINICAL SUPPORT (OUTPATIENT)
Dept: REHABILITATION | Facility: HOSPITAL | Age: 80
End: 2025-01-27
Payer: MEDICARE

## 2025-01-27 DIAGNOSIS — R53.1 DECREASED STRENGTH, ENDURANCE, AND MOBILITY: Primary | ICD-10-CM

## 2025-01-27 DIAGNOSIS — Z74.09 DECREASED STRENGTH, ENDURANCE, AND MOBILITY: Primary | ICD-10-CM

## 2025-01-27 DIAGNOSIS — M53.86 DECREASED ROM OF LUMBAR SPINE: ICD-10-CM

## 2025-01-27 DIAGNOSIS — R68.89 DECREASED STRENGTH, ENDURANCE, AND MOBILITY: Primary | ICD-10-CM

## 2025-01-27 PROCEDURE — 97530 THERAPEUTIC ACTIVITIES: CPT | Mod: PN

## 2025-01-27 PROCEDURE — 97112 NEUROMUSCULAR REEDUCATION: CPT | Mod: PN

## 2025-01-27 NOTE — PROGRESS NOTES
[facility]   Physical Therapy Treatment Note      Name: Roib Donovan  Clinic Number: [id number]    Therapy Diagnosis:   Encounter Diagnoses   Name Primary?    Decreased strength, endurance, and mobility Yes    Decreased ROM of lumbar spine        Physician: Sandi Callaway PA-C    Visit Date: 1/27/2025  Physician Orders: PT Eval and Treat   Medical Diagnosis from Referral: Z98.890 (ICD-10-CM) - Status post lumbar surgery   Evaluation Date: 10/29/2024  Authorization Period Expiration: 10/01/2025  Plan of Care Expiration: 1/29/25  Progress Note Due: 1/29/25  Date of Surgery: 9/20/24 L4-S1 TLIF  Visit # / Visits authorized: 6/20 +17  FOTO: 2/ 3     Precautions: Standard, Diabetes, and HTN, HX of stroke       Time In: 1253 PM   Time Out: 1358 PM  Total Billable Time: 65 minutes (1 NMR, 3TA)    PTA Visit #: 0/5     Subjective     Patient reports: He is doing good without any complaints today.   He was compliant with home exercise program.  Response to previous treatment: no adverse response  Functional change: getting up from a chair easier    Pain: 0/10  Location:  lumbar     Objective      Objective Measures updated at progress report unless specified.     1/27/25    2 Minute walk test: 234 ft with rollator   Timed up and Go: 25 seconds with rollator  30 second sit to stand: 5 reps with definite use of hands and staggered stance  Treatment     Will received the treatments listed below:      neuromuscular re-education activities to improve: Balance, Coordination, and Proprioception for 8 minutes. The following activities were included:  Nustep x 8 min level 3 for B UE / LE HILLS PROGRAM reciprocal AROM  (cardio vascular training)    therapeutic activities to improve functional performance for 57 minutes, including:  - standing marches 3x10 (1 upper extremity assist)   - sit to stand from raised mat  PT blocking Right knee 2x10 (hands on thighs) NT  -lateral sidestepping in // bars with UE  assist as needed 10 ft B x 2 trials  Red theraband   Cybex LAQ 3x15 15 # B Vc's on technique and pace  Cybex long arc quad 3x10 Right 5#  Cybex knee flexion  5 plates 3x10   Cybex leg press  6 plates 3x10 bilateral  Cybex single leg leg press 4 plates 3x10 ea  reassessment    Patient Education and Home Exercises       Education provided:   - cont HEP    Written Home Exercises Provided: Pt instructed to continue prior HEP. Exercises were reviewed and Will was able to demonstrate them prior to the end of the session.  Will demonstrated good  understanding of the education provided. See Electronic Medical Record under Patient Instructions for exercises provided during therapy sessions    Assessment     Mr. Donovan was reassessed today and displayed no change in functional strength or mobility with Timed up and Go or 30 second sit to stand. He did improve distance with 2 Minute walk test by 20 feet but did not meet the minimal detectable change to be significant. Patient has hit a plateau in his overall progress. PT and patient agreed to discharge at his next visit.     Patient prognosis is Good.     Patient will continue to benefit from skilled outpatient physical therapy to address the deficits listed in the problem list box on initial evaluation, provide pt/family education and to maximize pt's level of independence in the home and community environment.     Patient's spiritual, cultural and educational needs considered and pt agreeable to plan of care and goals.     Anticipated barriers to physical therapy:  transportation, history of stroke with residual right lower extremity      Goals:  Short Term Goals: 6 weeks   Patient will be independent with Home exercise program to supplement therapy met   Patient will be able to perform 5x sit to  30 seconds to improve functional strength and mobility met  Patient will be able to perform Timed up and Go in 24 seconds with LRAD to improve mobility not met      Long  Term Goals: 12 weeks   Patient will improve PABLO to 38% to improve ability to perform functional tasks met  Patient will be able to perform Timed up and Go in 12 seconds or less with LRAD to improve mobility  met  Patient will be able to perform 10 reps with 30 second sit to stand to improve functional strength progressing, not met  Patient will be able to ambulate 255 ft with 2 Minute walk test to improve endurance progressing, not met    Plan   Discharge at next visit.     Loida Negrete, PT ,DPT,OCS  1/27/2025

## 2025-01-30 ENCOUNTER — HOSPITAL ENCOUNTER (OUTPATIENT)
Dept: RADIOLOGY | Facility: HOSPITAL | Age: 80
Discharge: HOME | End: 2025-01-30
Attending: STUDENT IN AN ORGANIZED HEALTH CARE EDUCATION/TRAINING PROGRAM
Payer: MEDICARE

## 2025-01-30 ENCOUNTER — OFFICE VISIT (OUTPATIENT)
Dept: NEUROSURGERY | Facility: CLINIC | Age: 80
End: 2025-01-30
Payer: MEDICARE

## 2025-01-30 ENCOUNTER — CLINICAL SUPPORT (OUTPATIENT)
Dept: REHABILITATION | Facility: HOSPITAL | Age: 80
End: 2025-01-30
Payer: MEDICARE

## 2025-01-30 DIAGNOSIS — Z98.1 S/P LUMBAR SPINAL FUSION: Primary | ICD-10-CM

## 2025-01-30 DIAGNOSIS — Z74.09 DECREASED STRENGTH, ENDURANCE, AND MOBILITY: Primary | ICD-10-CM

## 2025-01-30 DIAGNOSIS — R53.1 DECREASED STRENGTH, ENDURANCE, AND MOBILITY: Primary | ICD-10-CM

## 2025-01-30 DIAGNOSIS — R68.89 DECREASED STRENGTH, ENDURANCE, AND MOBILITY: Primary | ICD-10-CM

## 2025-01-30 DIAGNOSIS — Z98.1 S/P LUMBAR SPINAL FUSION: ICD-10-CM

## 2025-01-30 DIAGNOSIS — M53.86 DECREASED ROM OF LUMBAR SPINE: ICD-10-CM

## 2025-01-30 PROCEDURE — 3288F FALL RISK ASSESSMENT DOCD: CPT | Mod: CPTII,S$GLB,, | Performed by: STUDENT IN AN ORGANIZED HEALTH CARE EDUCATION/TRAINING PROGRAM

## 2025-01-30 PROCEDURE — 99999 PR PBB SHADOW E&M-EST. PATIENT-LVL III: CPT | Mod: PBBFAC,,, | Performed by: STUDENT IN AN ORGANIZED HEALTH CARE EDUCATION/TRAINING PROGRAM

## 2025-01-30 PROCEDURE — 1125F AMNT PAIN NOTED PAIN PRSNT: CPT | Mod: CPTII,S$GLB,, | Performed by: STUDENT IN AN ORGANIZED HEALTH CARE EDUCATION/TRAINING PROGRAM

## 2025-01-30 PROCEDURE — 72131 CT LUMBAR SPINE W/O DYE: CPT | Mod: TC

## 2025-01-30 PROCEDURE — 97530 THERAPEUTIC ACTIVITIES: CPT | Mod: PN

## 2025-01-30 PROCEDURE — 72131 CT LUMBAR SPINE W/O DYE: CPT | Mod: 26,,, | Performed by: RADIOLOGY

## 2025-01-30 PROCEDURE — 99214 OFFICE O/P EST MOD 30 MIN: CPT | Mod: S$GLB,,, | Performed by: STUDENT IN AN ORGANIZED HEALTH CARE EDUCATION/TRAINING PROGRAM

## 2025-01-30 PROCEDURE — 1101F PT FALLS ASSESS-DOCD LE1/YR: CPT | Mod: CPTII,S$GLB,, | Performed by: STUDENT IN AN ORGANIZED HEALTH CARE EDUCATION/TRAINING PROGRAM

## 2025-01-30 PROCEDURE — 97112 NEUROMUSCULAR REEDUCATION: CPT | Mod: PN

## 2025-01-30 PROCEDURE — 1159F MED LIST DOCD IN RCRD: CPT | Mod: CPTII,S$GLB,, | Performed by: STUDENT IN AN ORGANIZED HEALTH CARE EDUCATION/TRAINING PROGRAM

## 2025-01-30 NOTE — PROGRESS NOTES
Neurosurgery  Established Patient    SUBJECTIVE:     History of Present Illness:  78 M with hx of prior CVA with residual right sided weakness presents for eval of worsening low back pain and BLE radicular leg pain. He endorses pain which starts in upper lumbar area and then radiates down to lower lumbar area down his legs right greater than left into his feet. He endorses right foot weakness and has a right foot drop which has been present for many months. He has difficulty walking. 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 He has been to pain mgmt for injections without significant relief.      Interval fu 8/8/24:  Pt presents in fu.  No significant changes in symptoms.  Today he is most concerned with his balance difficulties.     Interval fu 8/29/24: No changes in symptoms.     Interval fu 11/14/24:  Pt presents in fu.  He is doing very well with mild to moderate back pain which was acutely exacerbated following PT yesterday.  He has resolution of his preop leg pain.  His wound has healed well and he is using his brace and bone growth stimulator.  He has noticed some left foot swelling which responds to elevation and to a diuretic prescribed by his PCP.    Interval fu 1/30/25:  Pt presents in fu.  He endorses low back pain which is worse when he bends over.  If he stands up straight he doesn't experience low back pain.  He has no pain with sitting.  He has no return of the radicular leg pain.  He feels that initially following surgery he had some improvement in his right foot weakness however now it has returned to preop baseline.  He has been compliant with PT and bone growth stimulator.    Review of patient's allergies indicates:  No Known Allergies    Current Outpatient Medications   Medication Sig Dispense Refill    amLODIPine (NORVASC) 10 MG tablet Take by mouth once daily.      aspirin (ECOTRIN) 81 MG EC tablet Take 81 mg by mouth once  daily.      carvediloL (COREG) 6.25 MG tablet Take by mouth 2 (two) times daily.      docusate sodium (COLACE) 50 MG capsule Take 50 mg by mouth 2 (two) times daily.      finasteride (PROSCAR) 5 mg tablet Take by mouth once daily.      fluticasone propionate (FLONASE) 50 mcg/actuation nasal spray by Each Nostril route as needed.      glipiZIDE (GLUCOTROL) 10 MG tablet Take by mouth 2 (two) times daily with meals.      losartan (COZAAR) 100 MG tablet Take by mouth.      metFORMIN (GLUCOPHAGE) 1000 MG tablet Take by mouth 2 (two) times daily with meals.      oxyCODONE-acetaminophen (PERCOCET) 5-325 mg per tablet Take 1 tablet by mouth every 12 (twelve) hours as needed for Pain. 14 tablet 0    rosuvastatin (CRESTOR) 40 MG Tab Take by mouth every evening.      tamsulosin (FLOMAX) 0.4 mg Cap Take by mouth once daily. (Patient taking differently: Take by mouth 2 (two) times daily.)      traMADoL (ULTRAM) 50 mg tablet Take 50 mg by mouth every 12 (twelve) hours as needed for Pain.      TRUE METRIX GLUCOSE TEST STRIP Strp 1 strip 2 (two) times daily.       No current facility-administered medications for this visit.       Past Medical History:   Diagnosis Date    Diabetes mellitus type I     Hypertension     Stroke 2002     Past Surgical History:   Procedure Laterality Date    ROBOTIC FUSION,SPINE,LUMBAR,TLIF N/A 9/20/2024    Procedure: OPEN ROBOTIC L4 -S1 TLIF;  Surgeon: Juan Luis Mcbride DO;  Location: [facility] OR [address] [place] [address];  Service: Neurosurgery;  Laterality: N/A;    TRANSFORAMINAL EPIDURAL INJECTION OF STEROID Bilateral 4/9/2024    Procedure: LUMBAR TRANSFORAMINAL BILATERAL L4/5;  Surgeon: Aria Orozco MD;  Location: [facility];  Service: Pain Management;  Laterality: Bilateral;  [phone]  2 WK F/U LM     Family History    None       Social History     Socioeconomic History    Marital status:    Tobacco Use    Smoking status: Former     Current packs/day: 0.00     Types: Cigarettes     Start date:       Quit date:      Years since quittin.0    Smokeless tobacco: Never   Substance and Sexual Activity    Alcohol use: Not Currently     Social Drivers of Health     Financial Resource Strain: Medium Risk (2024)    Overall Financial Resource Strain (CARDIA)     Difficulty of Paying Living Expenses: Somewhat hard   Food Insecurity: Food Insecurity Present (2024)    Hunger Vital Sign     Worried About Running Out of Food in the Last Year: Sometimes true     Ran Out of Food in the Last Year: Never true   Transportation Needs: No Transportation Needs (2024)    PRAPARE - Transportation     Lack of Transportation (Medical): No     Lack of Transportation (Non-Medical): No   Physical Activity: Unknown (2024)    Exercise Vital Sign     Days of Exercise per Week: 0 days   Stress: No Stress Concern Present (2024)    [employer] - Occupational Stress Questionnaire     Feeling of Stress : Not at all       Review of Systems  14 point ROS was negative    OBJECTIVE:     Vital Signs  Pain Score:   5  There is no height or weight on file to calculate BMI.    Neurosurgery Physical Exam  Constitutional: He appears well-developed and well-nourished.      Eyes: Pupils are equal, round, and reactive to light.      Cardiovascular: Normal rate and regular rhythm.      Abdominal: Soft.      Psych/Behavior: He is alert. He is oriented to person, place, and time. He has a normal mood and affect.      Musculoskeletal: Gait is abnormal.        Neck: Range of motion is limited.        Back: Range of motion is limited.      Neurological:        Coordination: He has an abnormal Romberg Test and abnormal tandem walking coordination.        Sensory: There is no sensory deficit in the trunk. There is no sensory deficit in the extremities.        DTRs: DTRs are DTRS NORMAL AND SYMMETRICnormal and symmetric.        Cranial nerves: Cranial nerve(s) II, III, IV, V, VI, VII, VIII, IX, X, XI  and XII are intact.      4/5 in right hemibody except 2/5 in right DF/EHL  3/5 in right PF  5/5 in LUE except 4/5 in left HI, difficulty with left hand dexterity  5/5 in LLE     Lumbar incision well healed, wo TTP    Diagnostic Results:  CT L: s/p L4-S1 fusion.  No overt interbody fusion present.  Mild haloing of the right S1 pedicle screw.  Hardware in good position.  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  He is now s/p L4-S1 TLIFs on 9/22/24.  His updated CT L spine is described above which is only notable for mild haloing of the right S1 pedicle screw.  Will plan to get another MRI L spine since the patient feels that his right foot is weaker however, his strength seems relatively stable since my last assessment.  Will plan to get another CT L in 3 mo.  I have encouraged him to continue using bone growth stimulator and will reorder PT to work on his core strength.

## 2025-01-30 NOTE — PROGRESS NOTES
Outpatient Rehab    Physical Therapy Progress Note : Updated Plan of Care    Patient Name: Robi Donovan  MRN: [id number]  YOB: 1945  Today's Date: 1/31/2025    Therapy Diagnosis: No diagnosis found.  Physician: Sandi Callaway PA-C    Physician Orders: Eval and Treat  Medical Diagnosis: Z98.890 (ICD-10-CM) - Status post lumbar surgery     Visit # / Visits Authorized:  7 / 20 +17   Date of Evaluation: 10/29/2024  Insurance Authorization Period: 1/1/2025 to 12/31/2025  Plan of Care Certification:  1/31/25 to 2/28/25      Time In: 1303   Time Out: 1403  Total Time: 60   Total Billable Time: 30         Subjective   History of Present Illness  Robi is a 79 y.o. male  According to the patient's chart, Robi has a past medical history of Diabetes mellitus type I, Hypertension, and Stroke. Robi has a past surgical history that includes Transforaminal epidural injection of steroid (Bilateral, 4/9/2024) and robotic fusion,spine,lumbar,tlif (N/A, 9/20/2024).                History of Present Condition/Illness: He saw his back surgeon today and he wants him to continue with more therapy. He feels like he still does not have great balance and is fearful of falling. He has not had any pain and he feels like he has gotten stronger overall.    Pain     Patient reports a current pain level of 0/10.                 Objective         Fall Risk  Functional mobility test results suggest the patient is: At Risk for Falls  Timed Up & Go (TUG)  Time: 25 seconds  Observations: Short strides  An older adult who takes >=12 seconds to complete the TUG is at risk for falling.    Use of rollator  Sit to Stand Testing      The patient completed 25 repetitions of a sit to stand transfer in 30 seconds. definite use of hands and staggered stance            2 Minute walk test: 234 ft with rollator     Intake Outcome Measure for FOTO Survey    Therapist reviewed FOTO scores for Robi Donovan on 1/30/2025.   FOTO report - see Media section  or FOTO account episode details.     Intake Score: 41%  Survey Score 1: 72%  Survey Score 2:  %    Treatment:  Balance/Neuromuscular Re-Education  Balance/Neuromuscular Re-Education Activity 1: Paloff press green theraband 2x10 ea    Therapeutic Activity  Therapeutic Activity 1: standing marches 3x10 (1 upper extremity assist)  Therapeutic Activity 2: lateral sidestepping in // bars with UE assist as needed 10 ft B x 2 trials  Red theraband  Therapeutic Activity 3: Cybex LAQ 3x15 15 # B  Therapeutic Activity 4: Cybex long arc quad 3x10 Right 5#  Therapeutic Activity 5: Cybex knee flexion  5 plates 3x10  Therapeutic Activity 6: Cybex leg press  6 plates 3x10 bilateral  Therapeutic Activity 7: Cybex single leg leg press 4 plates 3x10 ea    Patient's spiritual, cultural, and educational needs considered and patient agreeable to plan of care and goals.     Assessment & Plan   Assessment  Will presents with a condition of Moderate complexity.   Presentation of Symptoms: Changing       Functional Limitations: Activity tolerance, Ambulating on uneven surfaces, Decreased ambulation distance/endurance, Functional mobility, Gait limitations, Gross motor coordination  Impairments: Impaired physical strength, Abnormal gait    Prognosis: Fair  Prognosis Details: Patient is limited due to history of chronic stroke with Right sided weakness  Assessment Details: Mr. Donovan continues to display decrease in functional mobility and strength as seen with increase in time required to perform Timed up and Go as well as decrease in reps performed with 30 second sit to stand. He also continues to display decrease in mobility as seen with limited distance walked with 2 Minute walk test. PT and mentioned a discharge from therapy at patient's last visit due to a plateau in patient's overall progress. However, patient's referring provider would like for patient to continue therapy for core strengthening. PT to extend Plan of Care for one more  month and then reassess if patient would continue to benefit from skilled PT.     Plan  From a physical therapy perspective, the patient would benefit from: Skilled Rehab Services    Planned therapy interventions include: Therapeutic exercise, Therapeutic activities, Neuromuscular re-education, Manual therapy, and Gait training.            Visit Frequency: 2 times Per Week for 1 Months.          Discussion participants: Agreed Upon Plan of Care             Goals:   Active       short term goal       Patient will be able to perform Timed up and Go in 24 seconds with LRAD to improve mobility        Start:  01/31/25    Expected End:  02/28/25            Patient will be able to perform 10 reps with 30 second sit to stand to improve functional strength       Start:  01/31/25    Expected End:  02/28/25            Patient will be able to ambulate 255 ft with 2 Minute walk test to improve endurance       Start:  01/31/25    Expected End:  02/28/25

## 2025-02-03 ENCOUNTER — CLINICAL SUPPORT (OUTPATIENT)
Dept: REHABILITATION | Facility: HOSPITAL | Age: 80
End: 2025-02-03
Payer: MEDICARE

## 2025-02-03 DIAGNOSIS — R68.89 DECREASED STRENGTH, ENDURANCE, AND MOBILITY: Primary | ICD-10-CM

## 2025-02-03 DIAGNOSIS — Z74.09 DECREASED STRENGTH, ENDURANCE, AND MOBILITY: Primary | ICD-10-CM

## 2025-02-03 DIAGNOSIS — R53.1 DECREASED STRENGTH, ENDURANCE, AND MOBILITY: Primary | ICD-10-CM

## 2025-02-03 DIAGNOSIS — M53.86 DECREASED ROM OF LUMBAR SPINE: ICD-10-CM

## 2025-02-03 PROCEDURE — 97530 THERAPEUTIC ACTIVITIES: CPT | Mod: PN,CQ

## 2025-02-03 PROCEDURE — 97112 NEUROMUSCULAR REEDUCATION: CPT | Mod: PN,CQ

## 2025-02-03 NOTE — PROGRESS NOTES
"  Outpatient Rehab    Physical Therapy Visit    Patient Name: Robi Donovan  MRN: [id number]  YOB: 1945  Today's Date: 2/10/2025    Therapy Diagnosis:   Encounter Diagnoses   Name Primary?    Decreased strength, endurance, and mobility Yes    Decreased ROM of lumbar spine      Physician: Sandi Callaway PA-C    Physician Orders: Eval and Treat  Medical Diagnosis: Z98.890 (ICD-10-CM) - Status post lumbar surgery      Visit # / Visits Authorized:  7 / 20 +17   Date of Evaluation: 10/29/2024  Insurance Authorization Period: 1/1/2025 to 12/31/2025  Plan of Care Certification:  1/31/25 to 2/28/25      Time In: 1300   Time Out: 1355  Total Time: 55   Total Billable Time: 55 minutes 3TA, 1 NMR         Subjective   "Im feeling ok today". Pt agreeable to PT session.         Objective            Treatment:  Balance/Neuromuscular Re-Education  Balance/Neuromuscular Re-Education Activity 1: Paloff press green theraband 2x10 ea  Balance/Neuromuscular Re-Education Activity 2: Rows w/ Green TB 2x10 B  Balance/Neuromuscular Re-Education Activity 3: SAPD Green T band 2x10 standing    Therapeutic Activity  Therapeutic Activity 1: standing marches 3x10 (1 upper extremity assist)  Therapeutic Activity 2: lateral sidestepping in // bars with UE assist as needed 10 ft B x 2 trials  Red theraband  Therapeutic Activity 3: Cybex LAQ 3x15 15 # B  Therapeutic Activity 4: Cybex long arc quad 3x10 Right 5#  Therapeutic Activity 5: Cybex knee flexion  5 plates 3x10  Therapeutic Activity 6: Cybex leg press  6 plates 3x10 bilateral  Therapeutic Activity 7: Cybex single leg leg press 4 plates 3x10 ea    Patient's spiritual, cultural, and educational needs considered and patient agreeable to plan of care and goals.     Assessment & Plan   Assessment: pt able to complete standing balance and continued LE strengthening as per logged in treatment. He was able to perform Paloff with SBA for safety and to prevent loss of balance. Pt overall " tolerated session well, will continue to progress as appropriate.           Plan: Monitor response to session, progress as tolerated.    Goals:   Active       short term goal       Patient will be able to perform Timed up and Go in 24 seconds with LRAD to improve mobility        Start:  01/31/25    Expected End:  02/28/25            Patient will be able to perform 10 reps with 30 second sit to stand to improve functional strength       Start:  01/31/25    Expected End:  02/28/25            Patient will be able to ambulate 255 ft with 2 Minute walk test to improve endurance       Start:  01/31/25    Expected End:  02/28/25                James Zhu PTA

## 2025-02-05 ENCOUNTER — CLINICAL SUPPORT (OUTPATIENT)
Dept: REHABILITATION | Facility: HOSPITAL | Age: 80
End: 2025-02-05
Payer: MEDICARE

## 2025-02-05 DIAGNOSIS — R68.89 DECREASED STRENGTH, ENDURANCE, AND MOBILITY: Primary | ICD-10-CM

## 2025-02-05 DIAGNOSIS — Z74.09 DECREASED STRENGTH, ENDURANCE, AND MOBILITY: Primary | ICD-10-CM

## 2025-02-05 DIAGNOSIS — R53.1 DECREASED STRENGTH, ENDURANCE, AND MOBILITY: Primary | ICD-10-CM

## 2025-02-05 DIAGNOSIS — M53.86 DECREASED ROM OF LUMBAR SPINE: ICD-10-CM

## 2025-02-05 PROCEDURE — 97112 NEUROMUSCULAR REEDUCATION: CPT | Mod: PN,CQ

## 2025-02-05 PROCEDURE — 97530 THERAPEUTIC ACTIVITIES: CPT | Mod: PN,CQ

## 2025-02-05 NOTE — PROGRESS NOTES
"  Outpatient Rehab    Physical Therapy Visit    Patient Name: Robi Donovan  MRN: [id number]  YOB: 1945  Today's Date: 2/5/2025    Therapy Diagnosis:   Encounter Diagnoses   Name Primary?    Decreased strength, endurance, and mobility Yes    Decreased ROM of lumbar spine      Physician: Sandi Callaway PA-C    Physician Orders: Eval and Treat  Medical Diagnosis: Z98.890 (ICD-10-CM) - Status post lumbar surgery      Visit # / Visits Authorized:  7 / 20 +17   Date of Evaluation: 10/29/2024  Insurance Authorization Period: 1/1/2025 to 12/31/2025  Plan of Care Certification:  1/31/25 to 2/28/25      Time In: 1400   Time Out: 1455  Total Time: 55   Total Billable Time: 55 3 TA, 1 NMR         Subjective   "Im feeling ok today". Pt agreeable to PT session.  Pain reported as 0/10.      Objective            Treatment:  Balance/Neuromuscular Re-Education  Balance/Neuromuscular Re-Education Activity 1: Paloff press green theraband 2x10 ea    Therapeutic Activity  Therapeutic Activity 1: standing marches 3x10 (1 upper extremity assist)  Therapeutic Activity 2: lateral sidestepping in // bars with UE assist as needed 10 ft B x 2 trials  Red theraband  Therapeutic Activity 3: Cybex LAQ 3x15 15 # B  Therapeutic Activity 4: Cybex long arc quad 3x10 Right 5#  Therapeutic Activity 5: Cybex knee flexion  5 plates 3x10  Therapeutic Activity 6: Cybex leg press  6 plates 3x10 bilateral  Therapeutic Activity 7: Cybex single leg leg press 4 plates 3x10 ea    Patient's spiritual, cultural, and educational needs considered and patient agreeable to plan of care and goals.     Assessment & Plan   Assessment: pt able to complete standing balance and continued LE strengthening as per logged in treatment. He was able to perform Paloff with SBA for safety and to prevent loss of balance. Pt overall tolerated session well, will continue to progress as appropriate.  Evaluation/Treatment Tolerance: Patient tolerated treatment well    "     Plan:      Goals:   Active       short term goal       Patient will be able to perform Timed up and Go in 24 seconds with LRAD to improve mobility        Start:  01/31/25    Expected End:  02/28/25            Patient will be able to perform 10 reps with 30 second sit to stand to improve functional strength       Start:  01/31/25    Expected End:  02/28/25            Patient will be able to ambulate 255 ft with 2 Minute walk test to improve endurance       Start:  01/31/25    Expected End:  02/28/25                James Zhu PTA

## 2025-02-11 ENCOUNTER — CLINICAL SUPPORT (OUTPATIENT)
Dept: REHABILITATION | Facility: HOSPITAL | Age: 80
End: 2025-02-11
Payer: MEDICARE

## 2025-02-11 DIAGNOSIS — R53.1 DECREASED STRENGTH, ENDURANCE, AND MOBILITY: Primary | ICD-10-CM

## 2025-02-11 DIAGNOSIS — R68.89 DECREASED STRENGTH, ENDURANCE, AND MOBILITY: Primary | ICD-10-CM

## 2025-02-11 DIAGNOSIS — Z74.09 DECREASED STRENGTH, ENDURANCE, AND MOBILITY: Primary | ICD-10-CM

## 2025-02-11 DIAGNOSIS — M53.86 DECREASED ROM OF LUMBAR SPINE: ICD-10-CM

## 2025-02-11 PROCEDURE — 97530 THERAPEUTIC ACTIVITIES: CPT | Mod: PN,CQ

## 2025-02-11 PROCEDURE — 97112 NEUROMUSCULAR REEDUCATION: CPT | Mod: PN,CQ

## 2025-02-13 ENCOUNTER — CLINICAL SUPPORT (OUTPATIENT)
Dept: REHABILITATION | Facility: HOSPITAL | Age: 80
End: 2025-02-13
Payer: MEDICARE

## 2025-02-13 DIAGNOSIS — M53.86 DECREASED ROM OF LUMBAR SPINE: ICD-10-CM

## 2025-02-13 DIAGNOSIS — R68.89 DECREASED STRENGTH, ENDURANCE, AND MOBILITY: Primary | ICD-10-CM

## 2025-02-13 DIAGNOSIS — Z74.09 DECREASED STRENGTH, ENDURANCE, AND MOBILITY: Primary | ICD-10-CM

## 2025-02-13 DIAGNOSIS — R53.1 DECREASED STRENGTH, ENDURANCE, AND MOBILITY: Primary | ICD-10-CM

## 2025-02-13 PROCEDURE — 97112 NEUROMUSCULAR REEDUCATION: CPT | Mod: PN,CQ

## 2025-02-13 PROCEDURE — 97530 THERAPEUTIC ACTIVITIES: CPT | Mod: PN,CQ

## 2025-02-13 NOTE — PROGRESS NOTES
"  Outpatient Rehab    Physical Therapy Visit    Patient Name: Robi Donovan  MRN: [id number]  YOB: 1945  Today's Date: 2/13/2025    Therapy Diagnosis: No diagnosis found.  Physician: Sandi Callaway PA-C    Physician Orders: Eval and Treat  Medical Diagnosis: Z98.890 (ICD-10-CM) - Status post lumbar surgery      Visit # / Visits Authorized:  7 / 20 +17   Date of Evaluation: 10/29/2024  Insurance Authorization Period: 1/1/2025 to 12/31/2025  Plan of Care Certification:  1/31/25 to 2/28/25   PTA VISIT: 5/5  SUPERVISORY VISIT NEXT  Time In: 1500   Time Out: 1555  Total Time: 55   Total Billable Time: 55 2NMR, 2 TA    FOTO:  Intake Score:  %  Survey Score 1:  %  Survey Score 2:  %         Subjective   "Im feeling ok today". Pt agreeable to PT session.         Objective            Treatment:  Balance/Neuromuscular Re-Education  Balance/Neuromuscular Re-Education Activity 1: Paloff press green theraband 2x10 ea  Balance/Neuromuscular Re-Education Activity 2: Rows w/ Green TB 2x10 B  Balance/Neuromuscular Re-Education Activity 3: SAPD Green T band 2x10 standing  Balance/Neuromuscular Re-Education Activity 4: PNF lifts seated w/ RTB 2x10    Therapeutic Activity  Therapeutic Activity 1: standing marches 3x10 (1 upper extremity assist)  Therapeutic Activity 2: lateral sidestepping in // bars with UE assist as needed 10 ft B x 2 trials  Red theraband  Therapeutic Activity 3: Cybex LAQ 3x15 15 # B  Therapeutic Activity 4: Cybex long arc quad 3x10 Right 5#  Therapeutic Activity 5: Cybex knee flexion  5 plates 3x10  Therapeutic Activity 6: Cybex leg press  6 plates 3x10 bilateral  Therapeutic Activity 7: Cybex single leg leg press 4 plates 3x10 ea  Therapeutic Activity 8: Cybex Ham curls 4 plates 2x15 B    Assessment & Plan   Assessment:         Patient will continue to benefit from skilled outpatient physical therapy to address the deficits listed in the problem list box on initial evaluation, provide pt/family " education and to maximize pt's level of independence in the home and community environment.     Patient's spiritual, cultural, and educational needs considered and patient agreeable to plan of care and goals.           Plan: continue to progress PT as per POC    Goals:   Active       short term goal       Patient will be able to perform Timed up and Go in 24 seconds with LRAD to improve mobility        Start:  01/31/25    Expected End:  02/28/25            Patient will be able to perform 10 reps with 30 second sit to stand to improve functional strength       Start:  01/31/25    Expected End:  02/28/25            Patient will be able to ambulate 255 ft with 2 Minute walk test to improve endurance       Start:  01/31/25    Expected End:  02/28/25                James Zhu, PTA

## 2025-02-13 NOTE — PROGRESS NOTES
"  Outpatient Rehab    Physical Therapy Visit    Patient Name: Robi Donovan  MRN: [id number]  YOB: 1945  Today's Date: 2/13/2025    Therapy Diagnosis: No diagnosis found.  Physician: Sandi Callaway PA-C    Physician Orders: Eval and Treat  Medical Diagnosis: Z98.890 (ICD-10-CM) - Status post lumbar surgery      Visit # / Visits Authorized:  7 / 20 +17   Date of Evaluation: 10/29/2024  Insurance Authorization Period: 1/1/2025 to 12/31/2025  Plan of Care Certification:  1/31/25 to 2/28/25   PTA VISIT: #3  Time In: 1300   Time Out: 1355  Total Time: 55   Total Billable Time: 30 1NMR, 1 TA    FOTO:  Intake Score:  %  Survey Score 1:  %  Survey Score 2:  %         Subjective   "Im feeling ok today". Pt agreeable to PT session.         Objective            Treatment:  Balance/Neuromuscular Re-Education  Balance/Neuromuscular Re-Education Activity 1: Paloff press green theraband 2x10 ea  Balance/Neuromuscular Re-Education Activity 2: Rows w/ Green TB 2x10 B  Balance/Neuromuscular Re-Education Activity 3: SAPD Green T band 2x10 standing    Therapeutic Activity  Therapeutic Activity 1: standing marches 3x10 (1 upper extremity assist)  Therapeutic Activity 2: lateral sidestepping in // bars with UE assist as needed 10 ft B x 2 trials  Red theraband  Therapeutic Activity 3: Cybex LAQ 3x15 15 # B  Therapeutic Activity 4: Cybex long arc quad 3x10 Right 5#  Therapeutic Activity 5: Cybex knee flexion  5 plates 3x10  Therapeutic Activity 6: Cybex leg press  6 plates 3x10 bilateral  Therapeutic Activity 7: Cybex single leg leg press 4 plates 3x10 ea    Assessment & Plan   Assessment: completed session focusing on B LE strengthening and core stability activities as tolerated. Pt is cooperative and motivated throughout treatment. He ambulates with personal rollator mod independently.       Patient will continue to benefit from skilled outpatient physical therapy to address the deficits listed in the problem list box on " initial evaluation, provide pt/family education and to maximize pt's level of independence in the home and community environment.     Patient's spiritual, cultural, and educational needs considered and patient agreeable to plan of care and goals.           Plan: continue to progress PT as per POC    Goals:   Active       short term goal       Patient will be able to perform Timed up and Go in 24 seconds with LRAD to improve mobility        Start:  01/31/25    Expected End:  02/28/25            Patient will be able to perform 10 reps with 30 second sit to stand to improve functional strength       Start:  01/31/25    Expected End:  02/28/25            Patient will be able to ambulate 255 ft with 2 Minute walk test to improve endurance       Start:  01/31/25    Expected End:  02/28/25                James Zhu PTA

## 2025-02-18 ENCOUNTER — CLINICAL SUPPORT (OUTPATIENT)
Dept: REHABILITATION | Facility: HOSPITAL | Age: 80
End: 2025-02-18
Payer: MEDICARE

## 2025-02-18 DIAGNOSIS — R68.89 DECREASED STRENGTH, ENDURANCE, AND MOBILITY: Primary | ICD-10-CM

## 2025-02-18 DIAGNOSIS — R53.1 DECREASED STRENGTH, ENDURANCE, AND MOBILITY: Primary | ICD-10-CM

## 2025-02-18 DIAGNOSIS — M53.86 DECREASED ROM OF LUMBAR SPINE: ICD-10-CM

## 2025-02-18 DIAGNOSIS — Z74.09 DECREASED STRENGTH, ENDURANCE, AND MOBILITY: Primary | ICD-10-CM

## 2025-02-18 PROCEDURE — 97530 THERAPEUTIC ACTIVITIES: CPT | Mod: PN,CQ

## 2025-02-18 PROCEDURE — 97112 NEUROMUSCULAR REEDUCATION: CPT | Mod: PN,CQ

## 2025-02-18 NOTE — PROGRESS NOTES
Outpatient Rehab    Physical Therapy Visit    Patient Name: Robi Donovan  MRN: [id number]  YOB: 1945  Encounter Date: 2/18/2025    Therapy Diagnosis:   Encounter Diagnoses   Name Primary?    Decreased strength, endurance, and mobility Yes    Decreased ROM of lumbar spine      Physician: Sandi Callaway PA-C    Physician Orders: Eval and Treat  Medical Diagnosis: Z98.890 (ICD-10-CM) - Status post lumbar surgery      Visit # / Visits Authorized:  7 / 20 +17   Date of Evaluation: 10/29/2024  Insurance Authorization Period: 1/1/2025 to 12/31/2025  Plan of Care Certification:  1/31/25 to 2/28/25   PTA VISIT: 5/5  SUPERVISORY VISIT NEXT     Time In: 1300   Time Out: 1355  Total Time: 55   Total Billable Time: 30 minutes (1NMR,1 TA)    FOTO:  Intake Score:  %  Survey Score 1:  %  Survey Score 2:  %         Subjective   agreeable to PT session.         Objective            Treatment:  Balance/Neuromuscular Re-Education  Balance/Neuromuscular Re-Education Activity 1: Paloff press green theraband x10 ea  Balance/Neuromuscular Re-Education Activity 2: Rows w/ Green TB 2x10 B  Balance/Neuromuscular Re-Education Activity 3: SAPD Gren T ban 2x10 B  Balance/Neuromuscular Re-Education Activity 4: PNF lifts seated w/ RTB 2x10 B    Therapeutic Activity  Therapeutic Activity 1: standing marches 3x10 (1 upper extremity assist)  Therapeutic Activity 2: lateral sidestepping in // bars with UE assist as needed 10 ft B x 4 trials  Red theraband  Therapeutic Activity 3: Cybex LAQ 3x15 15 # B  Therapeutic Activity 4: Cybex long arc quad 3x15 Right 5#  Therapeutic Activity 5: Cybex knee flexion  5 plates 3x10  Therapeutic Activity 6: Cybex leg press 5 plates 3x15 bilateral  Therapeutic Activity 7: Cybex single leg leg press 4 plates 3x10 ea    Assessment & Plan   Assessment: completed today's session focusing on core / LE strengthening within tolerance. pt able to perform PNF lifts with RTB seated today, no reports of adverse  event. He does exhibit episodes of general fatigue (which he recovers with seated rest breaks)       Patient will continue to benefit from skilled outpatient physical therapy to address the deficits listed in the problem list box on initial evaluation, provide pt/family education and to maximize pt's level of independence in the home and community environment.     Patient's spiritual, cultural, and educational needs considered and patient agreeable to plan of care and goals.           Plan: cont PT as per POC    Goals:   Active       short term goal       Patient will be able to perform Timed up and Go in 24 seconds with LRAD to improve mobility  (Met)       Start:  01/31/25    Expected End:  02/28/25    Resolved:  02/27/25         Patient will be able to perform 10 reps with 30 second sit to stand to improve functional strength (Unable to Meet)       Start:  01/31/25    Expected End:  02/28/25            Patient will be able to ambulate 255 ft with 2 Minute walk test to improve endurance (Unable to Meet)       Start:  01/31/25    Expected End:  02/28/25                James Zhu, PTA

## 2025-02-20 ENCOUNTER — CLINICAL SUPPORT (OUTPATIENT)
Dept: REHABILITATION | Facility: HOSPITAL | Age: 80
End: 2025-02-20
Payer: MEDICARE

## 2025-02-20 DIAGNOSIS — Z74.09 DECREASED STRENGTH, ENDURANCE, AND MOBILITY: Primary | ICD-10-CM

## 2025-02-20 DIAGNOSIS — M53.86 DECREASED ROM OF LUMBAR SPINE: ICD-10-CM

## 2025-02-20 DIAGNOSIS — R53.1 DECREASED STRENGTH, ENDURANCE, AND MOBILITY: Primary | ICD-10-CM

## 2025-02-20 DIAGNOSIS — R68.89 DECREASED STRENGTH, ENDURANCE, AND MOBILITY: Primary | ICD-10-CM

## 2025-02-20 PROCEDURE — 97530 THERAPEUTIC ACTIVITIES: CPT | Mod: PN

## 2025-02-20 PROCEDURE — 97112 NEUROMUSCULAR REEDUCATION: CPT | Mod: PN

## 2025-02-20 NOTE — PROGRESS NOTES
"  Outpatient Rehab    Physical Therapy Visit    Patient Name: Robi Donovan  MRN: [id number]  YOB: 1945  Today's Date: 2/20/2025    Therapy Diagnosis:   Encounter Diagnoses   Name Primary?    Decreased strength, endurance, and mobility Yes    Decreased ROM of lumbar spine      Physician: Sandi Callaway PA-C    Physician Orders: Eval and Treat  Medical Diagnosis: Z98.890 (ICD-10-CM) - Status post lumbar surgery      Visit # / Visits Authorized:  7 / 20 +17   Date of Evaluation: 10/29/2024  Insurance Authorization Period: 1/1/2025 to 12/31/2025  Plan of Care Certification:  1/31/25 to 2/28/25     Time In: 1400   Time Out: 1456  Total Time: 56   Total Billable Time: 56 minutes         Subjective   He has been feeling good and not really having any pain..         Objective            Treatment:       Balance/Neuromuscular Re-Education  Balance/Neuromuscular Re-Education Activity 1: Paloff press green theraband x10 ea  Balance/Neuromuscular Re-Education Activity 5: standing without UE support 30"x3 +Airex pad    Therapeutic Activity  Therapeutic Activity 2: lateral sidestepping in // bars with UE assist as needed 10 ft B x 4 trials  Red theraband  Therapeutic Activity 3: Cybex LAQ 3x15 15 # B  Therapeutic Activity 4: Cybex long arc quad 3x15 Right 5#    Assessment & Plan   Assessment: Patient arrived to today's session without reports of pain. PT progressed standing balance on Airex without UE support. Patient displayed minor postural sway with reports of fatigue. Patient was also challenged with standing paloff press for core stabilization and balance.       Patient will continue to benefit from skilled outpatient physical therapy to address the deficits listed in the problem list box on initial evaluation, provide pt/family education and to maximize pt's level of independence in the home and community environment.     Patient's spiritual, cultural, and educational needs considered and patient agreeable to " plan of care and goals.           Plan: continue to improve standing balance and core strength    Goals:   Active       short term goal       Patient will be able to perform Timed up and Go in 24 seconds with LRAD to improve mobility        Start:  01/31/25    Expected End:  02/28/25            Patient will be able to perform 10 reps with 30 second sit to stand to improve functional strength       Start:  01/31/25    Expected End:  02/28/25            Patient will be able to ambulate 255 ft with 2 Minute walk test to improve endurance       Start:  01/31/25    Expected End:  02/28/25                Loida Negrete, PT ,DPT,OCS  02/20/2025

## 2025-02-25 ENCOUNTER — CLINICAL SUPPORT (OUTPATIENT)
Dept: REHABILITATION | Facility: HOSPITAL | Age: 80
End: 2025-02-25
Payer: MEDICARE

## 2025-02-25 DIAGNOSIS — M53.86 DECREASED ROM OF LUMBAR SPINE: ICD-10-CM

## 2025-02-25 DIAGNOSIS — Z74.09 DECREASED STRENGTH, ENDURANCE, AND MOBILITY: Primary | ICD-10-CM

## 2025-02-25 DIAGNOSIS — R53.1 DECREASED STRENGTH, ENDURANCE, AND MOBILITY: Primary | ICD-10-CM

## 2025-02-25 DIAGNOSIS — R68.89 DECREASED STRENGTH, ENDURANCE, AND MOBILITY: Primary | ICD-10-CM

## 2025-02-25 PROCEDURE — 97530 THERAPEUTIC ACTIVITIES: CPT | Mod: PN

## 2025-02-25 PROCEDURE — 97112 NEUROMUSCULAR REEDUCATION: CPT | Mod: PN

## 2025-02-25 NOTE — PROGRESS NOTES
"  Outpatient Rehab    Physical Therapy Visit    Patient Name: Robi Donovan  MRN: [id number]  YOB: 1945  Today's Date: 2/25/2025    Therapy Diagnosis:   Encounter Diagnoses   Name Primary?    Decreased strength, endurance, and mobility Yes    Decreased ROM of lumbar spine        Physician: Sandi Callaway PA-C    Physician Orders: Eval and Treat  Medical Diagnosis: Z98.890 (ICD-10-CM) - Status post lumbar surgery      Visit # / Visits Authorized:  8 / 20 +17   Date of Evaluation: 10/29/2024  Insurance Authorization Period: 1/1/2025 to 12/31/2025  Plan of Care Certification:  1/31/25 to 2/28/25     Time In: 1400   Time Out: 1500  Total Time: 60   Total Billable Time: 30 minutes         Subjective   He is doing good without pain. He is feeling a little lazy today..         Objective            Treatment:       Balance/Neuromuscular Re-Education  Balance/Neuromuscular Re-Education Activity 1: Paloff press green theraband x10 ea  Balance/Neuromuscular Re-Education Activity 5: standing without UE support 30"x3 +Airex pad    Therapeutic Activity  Therapeutic Activity 3: Cybex LAQ 3x15 15 # B  Therapeutic Activity 4: Cybex long arc quad 3x15 Right 5#  Therapeutic Activity 6: Cybex leg press 5 plates 3x15 bilateral  Therapeutic Activity 7: Cybex single leg leg press 4 plates 3x10 ea  Therapeutic Activity 9: Nustep hills lvl 2 x 8 minuts    Assessment & Plan   Assessment: Patient arrived to today's session with increase in fatigue. He was slow to complete his exercises and required rest breaks throught session. He continues to have greatest challenge with standing balance and core exercises. PT to continue to progress as tolerated by patient.       Patient will continue to benefit from skilled outpatient physical therapy to address the deficits listed in the problem list box on initial evaluation, provide pt/family education and to maximize pt's level of independence in the home and community environment. "     Patient's spiritual, cultural, and educational needs considered and patient agreeable to plan of care and goals.           Plan: continue to improve standing balance and core strength    Goals:   Active       short term goal       Patient will be able to perform Timed up and Go in 24 seconds with LRAD to improve mobility  (Progressing)       Start:  01/31/25    Expected End:  02/28/25            Patient will be able to perform 10 reps with 30 second sit to stand to improve functional strength (Progressing)       Start:  01/31/25    Expected End:  02/28/25            Patient will be able to ambulate 255 ft with 2 Minute walk test to improve endurance (Progressing)       Start:  01/31/25    Expected End:  02/28/25                Loida Negrete, PT ,DPT,OCS  02/25/2025

## 2025-02-26 NOTE — PROGRESS NOTES
"  Outpatient Rehab    Physical Therapy Discharge    Patient Name: Robi Donovan  MRN: [id number]  YOB: 1945  Encounter Date: 2/27/2025    Therapy Diagnosis:   Encounter Diagnoses   Name Primary?    Decreased strength, endurance, and mobility Yes    Decreased ROM of lumbar spine      Physician: Sandi Callaway PA-C    Physician Orders: Eval and Treat  Medical Diagnosis: Z98.890 (ICD-10-CM) - Status post lumbar surgery      Visit # / Visits Authorized:  8 / 20 +17   Date of Evaluation: 10/29/2024  Insurance Authorization Period: 1/1/2025 to 12/31/2025  Plan of Care Certification:  1/31/25 to 2/28/25      Time In: 1255   Time Out: 1355  Total Time: 60   Total Billable Time: 30 minutes    FOTO:  Intake Score: 41%  Survey Score 1: 72%  Survey Score 2:  %         Subjective   He is doing well today and has no complaints. He felt good after his last visit..         Objective         Timed Up & Go (TUG)  Time: 20 seconds     An older adult who takes >=12 seconds to complete the TUG is at risk for falling.       Sit to Stand Testing      The patient completed 7 repetitions of a sit to stand transfer in 30 seconds.              2 Minute walk test: 216 with rollator     Treatment:  Balance/Neuromuscular Re-Education  Balance/Neuromuscular Re-Education Activity 5: standing without UE support 30"x3 +Airex pad    Therapeutic Activity  Therapeutic Activity 3: Cybex LAQ 3x15 15 # B  Therapeutic Activity 4: Cybex long arc quad 3x15 Right 5#  Therapeutic Activity 5: Cybex knee flexion  5 plates 3x10  Therapeutic Activity 6: Cybex leg press 5 plates 3x15 bilateral  Therapeutic Activity 7: Cybex single leg leg press 4 plates 3x10 ea  Therapeutic Activity 9: [facility] lvl 2 x 8 minuts    Assessment & Plan   Assessment:         Patient's spiritual, cultural, and educational needs considered and patient agreeable to plan of care and goals.           Plan: discharged    Goals:   Active       short term goal       Patient " will be able to perform Timed up and Go in 24 seconds with LRAD to improve mobility  (Progressing)       Start:  01/31/25    Expected End:  02/28/25            Patient will be able to perform 10 reps with 30 second sit to stand to improve functional strength (Progressing)       Start:  01/31/25    Expected End:  02/28/25            Patient will be able to ambulate 255 ft with 2 Minute walk test to improve endurance (Progressing)       Start:  01/31/25    Expected End:  02/28/25                Loida Negrete, PT ,DPT,OCS  02/27/2025

## 2025-02-27 ENCOUNTER — CLINICAL SUPPORT (OUTPATIENT)
Dept: REHABILITATION | Facility: HOSPITAL | Age: 80
End: 2025-02-27
Payer: MEDICARE

## 2025-02-27 DIAGNOSIS — M53.86 DECREASED ROM OF LUMBAR SPINE: ICD-10-CM

## 2025-02-27 DIAGNOSIS — Z74.09 DECREASED STRENGTH, ENDURANCE, AND MOBILITY: Primary | ICD-10-CM

## 2025-02-27 DIAGNOSIS — R68.89 DECREASED STRENGTH, ENDURANCE, AND MOBILITY: Primary | ICD-10-CM

## 2025-02-27 DIAGNOSIS — R53.1 DECREASED STRENGTH, ENDURANCE, AND MOBILITY: Primary | ICD-10-CM

## 2025-02-27 PROCEDURE — 97530 THERAPEUTIC ACTIVITIES: CPT | Mod: PN

## 2025-03-11 ENCOUNTER — DOCUMENTATION ONLY (OUTPATIENT)
Dept: REHABILITATION | Facility: HOSPITAL | Age: 80
End: 2025-03-11
Payer: MEDICARE

## 2025-03-11 NOTE — PROGRESS NOTES
Face to face meeting completed with Loida Negrete DPT regarding current status and progress of   Zion, Will .         James Zhu, PTA

## 2025-04-13 ENCOUNTER — HOSPITAL ENCOUNTER (INPATIENT)
Facility: HOSPITAL | Age: 80
LOS: 3 days | Discharge: HOME | DRG: 638 | End: 2025-04-17
Attending: EMERGENCY MEDICINE
Payer: MEDICARE

## 2025-04-13 DIAGNOSIS — N17.9 AKI (ACUTE KIDNEY INJURY): ICD-10-CM

## 2025-04-13 DIAGNOSIS — R68.89 DECREASED STRENGTH, ENDURANCE, AND MOBILITY: ICD-10-CM

## 2025-04-13 DIAGNOSIS — R05.9 COUGH: ICD-10-CM

## 2025-04-13 DIAGNOSIS — Z74.09 DECREASED STRENGTH, ENDURANCE, AND MOBILITY: ICD-10-CM

## 2025-04-13 DIAGNOSIS — R53.1 DECREASED STRENGTH, ENDURANCE, AND MOBILITY: ICD-10-CM

## 2025-04-13 DIAGNOSIS — R55 NEAR SYNCOPE: ICD-10-CM

## 2025-04-13 DIAGNOSIS — S91.105D OPEN WOUND OF FOURTH TOE OF LEFT FOOT, SUBSEQUENT ENCOUNTER: ICD-10-CM

## 2025-04-13 DIAGNOSIS — J11.1 INFLUENZA: Primary | ICD-10-CM

## 2025-04-13 DIAGNOSIS — R07.9 CHEST PAIN: ICD-10-CM

## 2025-04-13 DIAGNOSIS — R73.9 HYPERGLYCEMIA: ICD-10-CM

## 2025-04-13 PROBLEM — E11.65 TYPE 2 DIABETES MELLITUS WITH HYPERGLYCEMIA: Status: ACTIVE | Noted: 2025-04-13

## 2025-04-13 PROBLEM — E78.5 HLD (HYPERLIPIDEMIA): Status: ACTIVE | Noted: 2025-04-13

## 2025-04-13 PROBLEM — N18.9 ACUTE KIDNEY INJURY SUPERIMPOSED ON CKD: Status: ACTIVE | Noted: 2025-04-13

## 2025-04-13 PROBLEM — I10 HTN (HYPERTENSION): Status: ACTIVE | Noted: 2025-04-13

## 2025-04-13 LAB
ABSOLUTE NEUTROPHIL MANUAL (OHS): 11.4 K/UL
ALBUMIN SERPL BCP-MCNC: 3 G/DL (ref 3.5–5.2)
ALP SERPL-CCNC: 70 UNIT/L (ref 40–150)
ALT SERPL W/O P-5'-P-CCNC: 31 UNIT/L (ref 10–44)
ANION GAP (OHS): 16 MMOL/L (ref 8–16)
AST SERPL-CCNC: 40 UNIT/L (ref 11–45)
BILIRUB SERPL-MCNC: 0.5 MG/DL (ref 0.1–1)
BUN SERPL-MCNC: 39 MG/DL (ref 8–23)
CALCIUM SERPL-MCNC: 9.1 MG/DL (ref 8.7–10.5)
CHLORIDE SERPL-SCNC: 100 MMOL/L (ref 95–110)
CO2 SERPL-SCNC: 18 MMOL/L (ref 23–29)
CREAT SERPL-MCNC: 3 MG/DL (ref 0.5–1.4)
ERYTHROCYTE [DISTWIDTH] IN BLOOD BY AUTOMATED COUNT: 14.4 % (ref 11.5–14.5)
FIO2: 21 %
GFR SERPLBLD CREATININE-BSD FMLA CKD-EPI: 20 ML/MIN/1.73/M2
GLUCOSE SERPL-MCNC: 501 MG/DL (ref 70–110)
HCT VFR BLD AUTO: 36.6 % (ref 40–54)
HGB BLD-MCNC: 12.3 GM/DL (ref 14–18)
LYMPHOCYTES NFR BLD MANUAL: 13 % (ref 18–48)
MAGNESIUM SERPL-MCNC: 1.9 MG/DL (ref 1.6–2.6)
MCH RBC QN AUTO: 29.3 PG (ref 27–31)
MCHC RBC AUTO-ENTMCNC: 33.6 G/DL (ref 32–36)
MCV RBC AUTO: 87 FL (ref 82–98)
MONOCYTES NFR BLD MANUAL: 6 % (ref 4–15)
NEUTROPHILS NFR BLD MANUAL: 81 % (ref 38–73)
NUCLEATED RBC (/100WBC) (OHS): 0 /100 WBC
PCO2 BLDA: 36.3 MMHG (ref 35–45)
PH SMN: 7.43 [PH] (ref 7.35–7.45)
PLATELET # BLD AUTO: 174 K/UL (ref 150–450)
PLATELET BLD QL SMEAR: ABNORMAL
PMV BLD AUTO: 10.9 FL (ref 9.2–12.9)
PO2 BLDA: 50.2 MMHG (ref 40–60)
POC BASE DEFICIT: 0 MMOL/L (ref -2–2)
POC HCO3: 24 MMOL/L (ref 24–28)
POC PERFORMED BY: ABNORMAL
POC SATURATED O2: 86.9 % (ref 95–100)
POCT GLUCOSE: 396 MG/DL (ref 70–110)
POCT GLUCOSE: 407 MG/DL (ref 70–110)
POCT GLUCOSE: 452 MG/DL (ref 70–110)
POCT GLUCOSE: 470 MG/DL (ref 70–110)
POTASSIUM SERPL-SCNC: 4.4 MMOL/L (ref 3.5–5.1)
PROT SERPL-MCNC: 7.8 GM/DL (ref 6–8.4)
RBC # BLD AUTO: 4.2 M/UL (ref 4.6–6.2)
SODIUM SERPL-SCNC: 134 MMOL/L (ref 136–145)
SPECIMEN SOURCE: ABNORMAL
WBC # BLD AUTO: 14.06 K/UL (ref 3.9–12.7)

## 2025-04-13 PROCEDURE — 83735 ASSAY OF MAGNESIUM: CPT | Performed by: EMERGENCY MEDICINE

## 2025-04-13 PROCEDURE — 93005 ELECTROCARDIOGRAM TRACING: CPT

## 2025-04-13 PROCEDURE — 85610 PROTHROMBIN TIME: CPT | Performed by: FAMILY MEDICINE

## 2025-04-13 PROCEDURE — G0378 HOSPITAL OBSERVATION PER HR: HCPCS

## 2025-04-13 PROCEDURE — 96361 HYDRATE IV INFUSION ADD-ON: CPT

## 2025-04-13 PROCEDURE — 82962 GLUCOSE BLOOD TEST: CPT

## 2025-04-13 PROCEDURE — 85027 COMPLETE CBC AUTOMATED: CPT | Performed by: EMERGENCY MEDICINE

## 2025-04-13 PROCEDURE — 80053 COMPREHEN METABOLIC PANEL: CPT | Performed by: EMERGENCY MEDICINE

## 2025-04-13 PROCEDURE — 84145 PROCALCITONIN (PCT): CPT | Performed by: FAMILY MEDICINE

## 2025-04-13 PROCEDURE — 96374 THER/PROPH/DIAG INJ IV PUSH: CPT

## 2025-04-13 PROCEDURE — 83036 HEMOGLOBIN GLYCOSYLATED A1C: CPT | Performed by: FAMILY MEDICINE

## 2025-04-13 PROCEDURE — 99291 CRITICAL CARE FIRST HOUR: CPT

## 2025-04-13 PROCEDURE — 63600175 PHARM REV CODE 636 W HCPCS: Performed by: EMERGENCY MEDICINE

## 2025-04-13 PROCEDURE — 93010 ELECTROCARDIOGRAM REPORT: CPT | Mod: ,,, | Performed by: INTERNAL MEDICINE

## 2025-04-13 RX ORDER — SODIUM CHLORIDE 9 MG/ML
INJECTION, SOLUTION INTRAVENOUS CONTINUOUS
Status: DISCONTINUED | OUTPATIENT
Start: 2025-04-14 | End: 2025-04-14

## 2025-04-13 RX ORDER — CARVEDILOL 6.25 MG/1
6.25 TABLET ORAL 2 TIMES DAILY
Status: DISCONTINUED | OUTPATIENT
Start: 2025-04-14 | End: 2025-04-17 | Stop reason: HOSPADM

## 2025-04-13 RX ORDER — FINASTERIDE 5 MG/1
5 TABLET, FILM COATED ORAL DAILY
Status: DISCONTINUED | OUTPATIENT
Start: 2025-04-14 | End: 2025-04-17 | Stop reason: HOSPADM

## 2025-04-13 RX ORDER — ACETAMINOPHEN 325 MG/1
650 TABLET ORAL EVERY 6 HOURS PRN
Status: DISCONTINUED | OUTPATIENT
Start: 2025-04-14 | End: 2025-04-17 | Stop reason: HOSPADM

## 2025-04-13 RX ORDER — ATORVASTATIN CALCIUM 40 MG/1
40 TABLET, FILM COATED ORAL DAILY
Status: DISCONTINUED | OUTPATIENT
Start: 2025-04-14 | End: 2025-04-17 | Stop reason: HOSPADM

## 2025-04-13 RX ORDER — IBUPROFEN 200 MG
24 TABLET ORAL
Status: DISCONTINUED | OUTPATIENT
Start: 2025-04-14 | End: 2025-04-17 | Stop reason: HOSPADM

## 2025-04-13 RX ORDER — INSULIN ASPART 100 [IU]/ML
0-10 INJECTION, SOLUTION INTRAVENOUS; SUBCUTANEOUS EVERY 6 HOURS PRN
Status: DISCONTINUED | OUTPATIENT
Start: 2025-04-14 | End: 2025-04-14

## 2025-04-13 RX ORDER — IBUPROFEN 200 MG
16 TABLET ORAL
Status: DISCONTINUED | OUTPATIENT
Start: 2025-04-14 | End: 2025-04-17 | Stop reason: HOSPADM

## 2025-04-13 RX ORDER — SODIUM CHLORIDE 0.9 % (FLUSH) 0.9 %
10 SYRINGE (ML) INJECTION EVERY 12 HOURS PRN
Status: DISCONTINUED | OUTPATIENT
Start: 2025-04-14 | End: 2025-04-17 | Stop reason: HOSPADM

## 2025-04-13 RX ORDER — TAMSULOSIN HYDROCHLORIDE 0.4 MG/1
0.4 CAPSULE ORAL 2 TIMES DAILY
Status: DISCONTINUED | OUTPATIENT
Start: 2025-04-14 | End: 2025-04-14

## 2025-04-13 RX ORDER — GLUCAGON 1 MG
1 KIT INJECTION
Status: DISCONTINUED | OUTPATIENT
Start: 2025-04-14 | End: 2025-04-14

## 2025-04-13 RX ORDER — AMLODIPINE BESYLATE 5 MG/1
10 TABLET ORAL DAILY
Status: DISCONTINUED | OUTPATIENT
Start: 2025-04-14 | End: 2025-04-17 | Stop reason: HOSPADM

## 2025-04-13 RX ORDER — ONDANSETRON HYDROCHLORIDE 2 MG/ML
4 INJECTION, SOLUTION INTRAVENOUS EVERY 8 HOURS PRN
Status: DISCONTINUED | OUTPATIENT
Start: 2025-04-14 | End: 2025-04-17 | Stop reason: HOSPADM

## 2025-04-13 RX ORDER — NALOXONE HCL 0.4 MG/ML
0.02 VIAL (ML) INJECTION
Status: DISCONTINUED | OUTPATIENT
Start: 2025-04-14 | End: 2025-04-17 | Stop reason: HOSPADM

## 2025-04-13 RX ORDER — GLUCAGON 1 MG
1 KIT INJECTION
Status: DISCONTINUED | OUTPATIENT
Start: 2025-04-14 | End: 2025-04-17 | Stop reason: HOSPADM

## 2025-04-13 RX ADMIN — SODIUM CHLORIDE, SODIUM LACTATE, POTASSIUM CHLORIDE, AND CALCIUM CHLORIDE 1000 ML: .6; .31; .03; .02 INJECTION, SOLUTION INTRAVENOUS at 10:04

## 2025-04-13 RX ADMIN — HUMAN INSULIN 8 UNITS: 100 INJECTION, SOLUTION SUBCUTANEOUS at 11:04

## 2025-04-13 NOTE — Clinical Note
Diagnosis: MARGOTH (acute kidney injury) [[id number]]   Future Attending Provider: DAVIDE GONZALEZ [[id number]]   Is the patient being sent to ED Observation?: No   Special Needs:: No Special Needs [1]

## 2025-04-14 PROBLEM — D64.9 ANEMIA: Status: ACTIVE | Noted: 2025-04-14

## 2025-04-14 PROBLEM — E87.1 HYPONATREMIA: Status: ACTIVE | Noted: 2025-04-14

## 2025-04-14 LAB
ABSOLUTE NEUTROPHIL MANUAL (OHS): 11.1 K/UL
ANION GAP (OHS): 11 MMOL/L (ref 8–16)
BASOPHILS NFR BLD MANUAL: 1 %
BUN SERPL-MCNC: 38 MG/DL (ref 8–23)
CALCIUM SERPL-MCNC: 8.5 MG/DL (ref 8.7–10.5)
CHLORIDE SERPL-SCNC: 103 MMOL/L (ref 95–110)
CO2 SERPL-SCNC: 21 MMOL/L (ref 23–29)
CREAT SERPL-MCNC: 2.6 MG/DL (ref 0.5–1.4)
EAG (OHS): 246 MG/DL (ref 68–131)
ERYTHROCYTE [DISTWIDTH] IN BLOOD BY AUTOMATED COUNT: 14.6 % (ref 11.5–14.5)
GFR SERPLBLD CREATININE-BSD FMLA CKD-EPI: 24 ML/MIN/1.73/M2
GLUCOSE SERPL-MCNC: 298 MG/DL (ref 70–110)
HBA1C MFR BLD: 10.2 % (ref 4–5.6)
HCT VFR BLD AUTO: 32.9 % (ref 40–54)
HGB BLD-MCNC: 11 GM/DL (ref 14–18)
INR PPP: 1 (ref 0.8–1.2)
LYMPHOCYTES NFR BLD MANUAL: 11 % (ref 18–48)
MCH RBC QN AUTO: 29.3 PG (ref 27–31)
MCHC RBC AUTO-ENTMCNC: 33.4 G/DL (ref 32–36)
MCV RBC AUTO: 88 FL (ref 82–98)
MONOCYTES NFR BLD MANUAL: 8 % (ref 4–15)
NEUTROPHILS NFR BLD MANUAL: 80 % (ref 38–73)
NUCLEATED RBC (/100WBC) (OHS): 0 /100 WBC
PLATELET # BLD AUTO: 163 K/UL (ref 150–450)
PLATELET BLD QL SMEAR: ABNORMAL
PMV BLD AUTO: 10.7 FL (ref 9.2–12.9)
POCT GLUCOSE: 282 MG/DL (ref 70–110)
POCT GLUCOSE: 296 MG/DL (ref 70–110)
POCT GLUCOSE: 301 MG/DL (ref 70–110)
POCT GLUCOSE: 311 MG/DL (ref 70–110)
POCT GLUCOSE: 358 MG/DL (ref 70–110)
POTASSIUM SERPL-SCNC: 4 MMOL/L (ref 3.5–5.1)
PROCALCITONIN SERPL-MCNC: 9.25 NG/ML
PROTHROMBIN TIME: 11.9 SECONDS (ref 9–12.5)
RBC # BLD AUTO: 3.75 M/UL (ref 4.6–6.2)
SODIUM SERPL-SCNC: 135 MMOL/L (ref 136–145)
WBC # BLD AUTO: 13.92 K/UL (ref 3.9–12.7)

## 2025-04-14 PROCEDURE — 96372 THER/PROPH/DIAG INJ SC/IM: CPT

## 2025-04-14 PROCEDURE — 51798 US URINE CAPACITY MEASURE: CPT

## 2025-04-14 PROCEDURE — 11000001 HC ACUTE MED/SURG PRIVATE ROOM

## 2025-04-14 PROCEDURE — 63600175 PHARM REV CODE 636 W HCPCS

## 2025-04-14 PROCEDURE — 82962 GLUCOSE BLOOD TEST: CPT

## 2025-04-14 PROCEDURE — 25000003 PHARM REV CODE 250: Performed by: FAMILY MEDICINE

## 2025-04-14 PROCEDURE — 96361 HYDRATE IV INFUSION ADD-ON: CPT

## 2025-04-14 PROCEDURE — 80048 BASIC METABOLIC PNL TOTAL CA: CPT | Performed by: FAMILY MEDICINE

## 2025-04-14 PROCEDURE — 25000003 PHARM REV CODE 250

## 2025-04-14 PROCEDURE — 85007 BL SMEAR W/DIFF WBC COUNT: CPT | Performed by: FAMILY MEDICINE

## 2025-04-14 PROCEDURE — 27000207 HC ISOLATION

## 2025-04-14 PROCEDURE — 36415 COLL VENOUS BLD VENIPUNCTURE: CPT | Performed by: FAMILY MEDICINE

## 2025-04-14 PROCEDURE — 25000003 PHARM REV CODE 250: Performed by: HOSPITALIST

## 2025-04-14 RX ORDER — SODIUM CHLORIDE, SODIUM LACTATE, POTASSIUM CHLORIDE, CALCIUM CHLORIDE 600; 310; 30; 20 MG/100ML; MG/100ML; MG/100ML; MG/100ML
INJECTION, SOLUTION INTRAVENOUS CONTINUOUS
Status: DISCONTINUED | OUTPATIENT
Start: 2025-04-14 | End: 2025-04-17 | Stop reason: HOSPADM

## 2025-04-14 RX ORDER — HEPARIN SODIUM 5000 [USP'U]/ML
5000 INJECTION, SOLUTION INTRAVENOUS; SUBCUTANEOUS EVERY 8 HOURS
Status: DISCONTINUED | OUTPATIENT
Start: 2025-04-14 | End: 2025-04-17 | Stop reason: HOSPADM

## 2025-04-14 RX ORDER — GUAIFENESIN AND DEXTROMETHORPHAN HYDROBROMIDE 10; 100 MG/5ML; MG/5ML
5 SYRUP ORAL EVERY 4 HOURS PRN
Status: DISPENSED | OUTPATIENT
Start: 2025-04-14 | End: 2025-04-16

## 2025-04-14 RX ORDER — OSELTAMIVIR PHOSPHATE 75 MG/1
75 CAPSULE ORAL 2 TIMES DAILY
Status: DISCONTINUED | OUTPATIENT
Start: 2025-04-14 | End: 2025-04-14

## 2025-04-14 RX ORDER — IBUPROFEN 200 MG
24 TABLET ORAL
Status: DISCONTINUED | OUTPATIENT
Start: 2025-04-14 | End: 2025-04-17 | Stop reason: HOSPADM

## 2025-04-14 RX ORDER — TAMSULOSIN HYDROCHLORIDE 0.4 MG/1
0.4 CAPSULE ORAL DAILY
Status: DISCONTINUED | OUTPATIENT
Start: 2025-04-14 | End: 2025-04-17 | Stop reason: HOSPADM

## 2025-04-14 RX ORDER — INSULIN GLARGINE 100 [IU]/ML
10 INJECTION, SOLUTION SUBCUTANEOUS DAILY
Status: DISCONTINUED | OUTPATIENT
Start: 2025-04-14 | End: 2025-04-15

## 2025-04-14 RX ORDER — INSULIN ASPART 100 [IU]/ML
5 INJECTION, SOLUTION INTRAVENOUS; SUBCUTANEOUS
Status: DISCONTINUED | OUTPATIENT
Start: 2025-04-14 | End: 2025-04-15

## 2025-04-14 RX ORDER — PROMETHAZINE HYDROCHLORIDE AND DEXTROMETHORPHAN HYDROBROMIDE 6.25; 15 MG/5ML; MG/5ML
5 SYRUP ORAL EVERY 6 HOURS PRN
COMMUNITY
Start: 2025-04-09

## 2025-04-14 RX ORDER — HYDROCHLOROTHIAZIDE 12.5 MG/1
12.5 TABLET ORAL DAILY
Status: ON HOLD | COMMUNITY
Start: 2025-01-24 | End: 2025-04-17 | Stop reason: HOSPADM

## 2025-04-14 RX ORDER — GLUCAGON 1 MG
1 KIT INJECTION
Status: DISCONTINUED | OUTPATIENT
Start: 2025-04-14 | End: 2025-04-17 | Stop reason: HOSPADM

## 2025-04-14 RX ORDER — OSELTAMIVIR PHOSPHATE 30 MG/1
30 CAPSULE ORAL 2 TIMES DAILY
Status: COMPLETED | OUTPATIENT
Start: 2025-04-14 | End: 2025-04-14

## 2025-04-14 RX ORDER — IBUPROFEN 200 MG
16 TABLET ORAL
Status: DISCONTINUED | OUTPATIENT
Start: 2025-04-14 | End: 2025-04-17 | Stop reason: HOSPADM

## 2025-04-14 RX ORDER — ALENDRONATE SODIUM 70 MG/1
70 TABLET ORAL
COMMUNITY
Start: 2025-02-25

## 2025-04-14 RX ADMIN — INSULIN ASPART 5 UNITS: 100 INJECTION, SOLUTION INTRAVENOUS; SUBCUTANEOUS at 12:04

## 2025-04-14 RX ADMIN — GUAIFENESIN AND DEXTROMETHORPHAN 5 ML: 100; 10 SYRUP ORAL at 11:04

## 2025-04-14 RX ADMIN — OSELTAMIVIR PHOSPHATE 30 MG: 30 CAPSULE ORAL at 08:04

## 2025-04-14 RX ADMIN — CARVEDILOL 6.25 MG: 6.25 TABLET, FILM COATED ORAL at 08:04

## 2025-04-14 RX ADMIN — INSULIN ASPART 5 UNITS: 100 INJECTION, SOLUTION INTRAVENOUS; SUBCUTANEOUS at 05:04

## 2025-04-14 RX ADMIN — AMLODIPINE BESYLATE 10 MG: 5 TABLET ORAL at 08:04

## 2025-04-14 RX ADMIN — ACETAMINOPHEN 650 MG: 325 TABLET ORAL at 04:04

## 2025-04-14 RX ADMIN — HEPARIN SODIUM 5000 UNITS: 5000 INJECTION INTRAVENOUS; SUBCUTANEOUS at 09:04

## 2025-04-14 RX ADMIN — FINASTERIDE 5 MG: 5 TABLET, FILM COATED ORAL at 08:04

## 2025-04-14 RX ADMIN — HEPARIN SODIUM 5000 UNITS: 5000 INJECTION INTRAVENOUS; SUBCUTANEOUS at 12:04

## 2025-04-14 RX ADMIN — TAMSULOSIN HYDROCHLORIDE 0.4 MG: 0.4 CAPSULE ORAL at 08:04

## 2025-04-14 RX ADMIN — OSELTAMIVIR PHOSPHATE 30 MG: 30 CAPSULE ORAL at 12:04

## 2025-04-14 RX ADMIN — GUAIFENESIN AND DEXTROMETHORPHAN 5 ML: 100; 10 SYRUP ORAL at 03:04

## 2025-04-14 RX ADMIN — SODIUM CHLORIDE, POTASSIUM CHLORIDE, SODIUM LACTATE AND CALCIUM CHLORIDE: 600; 310; 30; 20 INJECTION, SOLUTION INTRAVENOUS at 10:04

## 2025-04-14 RX ADMIN — TAMSULOSIN HYDROCHLORIDE 0.4 MG: 0.4 CAPSULE ORAL at 12:04

## 2025-04-14 RX ADMIN — INSULIN GLARGINE 10 UNITS: 100 INJECTION, SOLUTION SUBCUTANEOUS at 08:04

## 2025-04-14 RX ADMIN — SODIUM CHLORIDE: 9 INJECTION, SOLUTION INTRAVENOUS at 12:04

## 2025-04-14 RX ADMIN — SODIUM CHLORIDE, POTASSIUM CHLORIDE, SODIUM LACTATE AND CALCIUM CHLORIDE: 600; 310; 30; 20 INJECTION, SOLUTION INTRAVENOUS at 08:04

## 2025-04-14 RX ADMIN — ATORVASTATIN CALCIUM 40 MG: 40 TABLET, FILM COATED ORAL at 08:04

## 2025-04-14 NOTE — HPI
Robi Donovan is a 79-year-old male with a past medical history of CVA with right side residual weakness, hypertension, DM2, hyperlipidemia and BPH who presents to emergency department for evaluation of hyperglycemia.  Patient's blood glucose was around the 500 ranges.  Patient states that he had influenza recently and is currently taking Tamiflu.  As per family patient had a spell where he seemed to space out however he did not fall or have any loss of consciousness.  In ED chest x-ray shows no acute infiltrate.  Labs showed blood glucose of 501, creatinine of 3 and WBCs 14.06.  Due to patient's presenting symptoms he will require admission for further evaluation and management. At  time my examination patient denied any headache, fever, chills, chest pain or shortness of breath.

## 2025-04-14 NOTE — ED PROVIDER NOTES
"Encounter Date: 4/13/2025       History     Chief Complaint   Patient presents with    Hyperglycemia     Pt presents to the ED r/t hyperglycemia. In triage glucose 470. Asymptotic- Pt states " I feel fine".      79-year-old male brought to the emergency department for evaluation of hyperglycemia.  Family member notes that earlier today he had a spell where he seemed kind of spaced out.  No loss of consciousness reported.  Patient is being treated for influenza at this time.  Notes some persistent cough and congestion.  Denies any vomiting, diarrhea, or abdominal pain.  Denies any lightheadedness or dizziness.  No other symptoms reported at this time.      Review of patient's allergies indicates:  No Known Allergies  Past Medical History:   Diagnosis Date    Diabetes mellitus type I     Hypertension     Stroke 2002     Past Surgical History:   Procedure Laterality Date    ROBOTIC FUSION,SPINE,LUMBAR,TLIF N/A 9/20/2024    Procedure: OPEN ROBOTIC L4 -S1 TLIF;  Surgeon: Juan Luis Mcbride DO;  Location: [facility] OR [address] [place] [address];  Service: Neurosurgery;  Laterality: N/A;    TRANSFORAMINAL EPIDURAL INJECTION OF STEROID Bilateral 4/9/2024    Procedure: LUMBAR TRANSFORAMINAL BILATERAL L4/5;  Surgeon: Aria Orozco MD;  Location: [facility];  Service: Pain Management;  Laterality: Bilateral;  [phone]  2 WK F/U LM     No family history on file.  Social History[1]  Review of Systems   Constitutional:  Positive for fatigue. Negative for chills.   HENT:  Positive for congestion.    Respiratory:  Positive for cough. Negative for shortness of breath.    Cardiovascular:  Negative for chest pain.   Gastrointestinal:  Negative for abdominal pain.   Musculoskeletal:  Negative for back pain.   Neurological:  Negative for headaches.       Physical Exam     Initial Vitals [04/13/25 2050]   BP Pulse Resp Temp SpO2   (!) 143/65 98 17 99 °F (37.2 °C) 97 %      MAP       --         Physical Exam    Nursing note and vitals " reviewed.  Constitutional: He appears well-developed and well-nourished. No distress.   HENT:   Head: Normocephalic and atraumatic.   Eyes: Conjunctivae and EOM are normal. Pupils are equal, round, and reactive to light.   Neck: Neck supple. No tracheal deviation present.   Normal range of motion.  Cardiovascular:  Normal rate and intact distal pulses.           Pulmonary/Chest: No respiratory distress.   Abdominal: Abdomen is soft. He exhibits no distension. There is no abdominal tenderness.   Musculoskeletal:         General: No tenderness or edema. Normal range of motion.      Cervical back: Normal range of motion and neck supple.     Neurological: He is alert and oriented to person, place, and time. He has normal strength. No cranial nerve deficit. GCS score is 15. GCS eye subscore is 4. GCS verbal subscore is 5. GCS motor subscore is 6.   Skin: Skin is warm and dry. No pallor.         ED Course   Critical Care    Date/Time: 4/13/2025 10:51 PM    Performed by: Lawrence Guevara MD  Authorized by: Lawrence Guevara MD  Direct patient critical care time: 15 minutes  Additional history critical care time: 15 minutes  Ordering / reviewing critical care time: 15 minutes  Documentation critical care time: 15 minutes  Consulting other physicians critical care time: 15 minutes  Consult with family critical care time: 10 minutes  Total critical care time (exclusive of procedural time) : 85 minutes  Critical care time was exclusive of separately billable procedures and treating other patients.  Critical care was necessary to treat or prevent imminent or life-threatening deterioration of the following conditions: renal failure and endocrine crisis.  Critical care was time spent personally by me on the following activities: development of treatment plan with patient or surrogate, interpretation of cardiac output measurements, evaluation of patient's response to treatment, examination of patient, obtaining history from  patient or surrogate, ordering and performing treatments and interventions, ordering and review of laboratory studies, ordering and review of radiographic studies, pulse oximetry, re-evaluation of patient's condition and review of old charts.        Labs Reviewed   COMPREHENSIVE METABOLIC PANEL - Abnormal       Result Value    Sodium 134 (*)     Potassium 4.4      Chloride 100      CO2 18 (*)     Glucose 501 (*)     BUN 39 (*)     Creatinine 3.0 (*)     Calcium 9.1      Protein Total 7.8      Albumin 3.0 (*)     Bilirubin Total 0.5      ALP 70      AST 40      ALT 31      Anion Gap 16      eGFR 20 (*)    CBC WITH DIFFERENTIAL - Abnormal    WBC 14.06 (*)     RBC 4.20 (*)     HGB 12.3 (*)     HCT 36.6 (*)     MCV 87      MCH 29.3      MCHC 33.6      RDW 14.4      Platelet Count 174      MPV 10.9      Nucleated RBC 0     MANUAL DIFFERENTIAL - Abnormal    Gran # (ANC) 11.4      Segmented Neutrophil % 81.0 (*)     Lymphocyte % 13.0 (*)     Monocyte % 6.0      Platelet Estimate Appears Normal     POCT GLUCOSE - Abnormal    POCT Glucose 470 (*)    POCT GLUCOSE - Abnormal    POCT Glucose 452 (*)    POCT GLUCOSE - Abnormal    POCT Glucose 407 (*)    POCT GLUCOSE - Abnormal    POCT Glucose 396 (*)    MAGNESIUM - Normal    Magnesium  1.9     CBC W/ AUTO DIFFERENTIAL    Narrative:     The following orders were created for panel order CBC auto differential.  Procedure                               Abnormality         Status                     ---------                               -----------         ------                     CBC with Differential[[id number]]       Abnormal            Final result               Manual Differential[[id number]]         Abnormal            Final result                 Please view results for these tests on the individual orders.   PROTIME-INR   PROCALCITONIN   HEMOGLOBIN A1C     EKG Readings: (Independently Interpreted)   Initial Reading: No STEMI. Previous EKG: Compared with most recent EKG  Previous EKG Date: 9/29/2024 (Nonspecific change). Rhythm: Normal Sinus Rhythm. Heart Rate: 85. Ectopy: PVCs. ST Segments: Normal ST Segments. Axis: Normal.   EKG independently interpreted by me pending Cardiology review           X-Rays:   Independently Interpreted Readings:   Other Readings:  Chest x-ray independently interpreted by me pending radiology review: No acute infiltrate, no pneumothorax, no effusion    Medications   sodium chloride 0.9% flush 10 mL (has no administration in time range)   acetaminophen tablet 650 mg (has no administration in time range)   naloxone 0.4 mg/mL injection 0.02 mg (has no administration in time range)   glucose chewable tablet 16 g (has no administration in time range)   glucose chewable tablet 24 g (has no administration in time range)   dextrose 50% injection 12.5 g (has no administration in time range)   dextrose 50% injection 25 g (has no administration in time range)   glucagon (human recombinant) injection 1 mg (has no administration in time range)   0.9% NaCl infusion (has no administration in time range)   ondansetron injection 4 mg (has no administration in time range)   dextrose 50% injection 12.5 g (has no administration in time range)   glucagon (human recombinant) injection 1 mg (has no administration in time range)   insulin aspart U-100 pen 0-10 Units (has no administration in time range)   amLODIPine tablet 10 mg (has no administration in time range)   carvediloL tablet 6.25 mg (has no administration in time range)   finasteride tablet 5 mg (has no administration in time range)   atorvastatin tablet 40 mg (has no administration in time range)   tamsulosin 24 hr capsule 0.4 mg (has no administration in time range)   lactated ringers bolus 1,000 mL (1,000 mLs Intravenous New Bag 4/13/25 3715)   insulin regular injection 8 Units 0.08 mL (8 Units Intravenous Given 4/13/25 4442)     Medical Decision Making  79-year-old male brought to the emergency department  complaining of hyperglycemia, cough, congestion      Differential: DKA, hyperglycemia, electrolyte dyscrasias, MARGOTH, URI, COVID, influenza, viral syndrome, pneumonia      Patient given IV fluid and insulin.  Unfortunately his CMP is concerning for MARGOTH.  Informed patient and family of results and plan to admit for observation.  Discussed with [facility] hospitalist who will see and admit the patient for further evaluation and management.    Problems Addressed:  MARGOTH (acute kidney injury): acute illness or injury  Cough: acute illness or injury  Hyperglycemia: acute illness or injury    Amount and/or Complexity of Data Reviewed  External Data Reviewed: notes.     Details: Reviewed most recent neurosurgery note documenting baseline medications and past medical history  Labs: ordered.     Details: CBC with leukocytosis, anemia similar to baseline; CMP with MARGOTH, normal LFTs, pseudo hyponatremia; VBG without acidosis  Radiology: ordered and independent interpretation performed. Decision-making details documented in ED Course.  Discussion of management or test interpretation with external provider(s): Discussed with [facility] hospitalist regarding patient's past medical history, presentation, labs, imaging, interventions, plan to admit    Risk  OTC drugs.  Prescription drug management.  Decision regarding hospitalization.    Critical Care  Total time providing critical care: 85 minutes                                      Clinical Impression:  Final diagnoses:  [R05.9] Cough  [J11.1] Influenza (Primary)  [N17.9] MARGOTH (acute kidney injury)  [R73.9] Hyperglycemia          ED Disposition Condition    Observation                   Lawrence Guevara MD  25 [id number]         [1]   Social History  Tobacco Use    Smoking status: Former     Current packs/day: 0.00     Types: Cigarettes     Start date:      Quit date:      Years since quittin.2    Smokeless tobacco: Never   Substance Use Topics    Alcohol use: Not Currently         Lawrence Guevara MD  04/13/25 9079

## 2025-04-14 NOTE — SUBJECTIVE & OBJECTIVE
Interval History:  Patient was seen in the morning complaining of fatigue and low energy, blood glucose was still above 300 at is scheduled insulin plus p.r.n., patient was educated about using steroids.  MARGOTH is gradually improving patient to continue on IV hydration      Review of Systems   Constitutional:  Positive for activity change and fatigue.   HENT:  Negative for congestion.    Respiratory: Negative.     Cardiovascular: Negative.    Gastrointestinal: Negative.    Genitourinary: Negative.    Musculoskeletal: Negative.    Neurological: Negative.    Psychiatric/Behavioral: Negative.       Objective:     Vital Signs (Most Recent):  Temp: 99.8 °F (37.7 °C) (04/14/25 1127)  Pulse: 80 (04/14/25 1208)  Resp: 16 (04/14/25 1127)  BP: 127/64 (04/14/25 1127)  SpO2: 100 % (04/14/25 1127) Vital Signs (24h Range):  Temp:  [98.6 °F (37 °C)-100 °F (37.8 °C)] 99.8 °F (37.7 °C)  Pulse:  [74-98] 80  Resp:  [16-33] 16  SpO2:  [94 %-100 %] 100 %  BP: (122-148)/(56-65) 127/64     Weight: 90.2 kg (198 lb 13.7 oz)  Body mass index is 26.24 kg/m².    Intake/Output Summary (Last 24 hours) at 4/14/2025 1442  Last data filed at 4/14/2025 0429  Gross per 24 hour   Intake 999 ml   Output 220 ml   Net 779 ml         Physical Exam  Constitutional:       Appearance: He is ill-appearing.   HENT:      Head: Normocephalic and atraumatic.      Nose: Nose normal.      Mouth/Throat:      Mouth: Mucous membranes are moist.   Eyes:      Extraocular Movements: Extraocular movements intact.      Pupils: Pupils are equal, round, and reactive to light.   Cardiovascular:      Rate and Rhythm: Regular rhythm.      Heart sounds: Normal heart sounds.   Pulmonary:      Breath sounds: Normal breath sounds.   Abdominal:      Palpations: Abdomen is soft.   Musculoskeletal:         General: Normal range of motion.      Cervical back: Neck supple.   Skin:     General: Skin is warm.   Neurological:      General: No focal deficit present.      Mental Status: He is  alert.   Psychiatric:         Mood and Affect: Mood normal.               Significant Labs: All pertinent labs within the past 24 hours have been reviewed.  Recent Lab Results  (Last 5 results in the past 24 hours)        04/14/25  1129   04/14/25  0628   04/14/25  0403   04/14/25  0139   04/13/25  2355        Procalcitonin         9.25  Comment: A concentration < 0.25 ng/mL represents a low risk of bacterial infection.  Procalcitonin may not be accurate among patients with localized   infection, recent trauma or major surgery, immunosuppressed state,   invasive fungal infection, renal dysfunction. Decisions regarding   initiation or continuation of antibiotic therapy should not be based   solely on procalcitonin levels.       Anion Gap   11             Basophil %   1.0             BUN   38             Calcium   8.5             Chloride   103             CO2   21             Creatinine   2.6             eGFR   24  Comment: Estimated GFR calculated using the CKD-EPI creatinine (2021) equation.             Estimated Avg Glucose         246       Glucose   298             Gran # (ANC)   11.1             Hematocrit   32.9             Hemoglobin   11.0             Hemoglobin A1C External         10.2  Comment: ADA Screening Guidelines:  5.7-6.4%  Consistent with prediabetes  >=6.5%  Consistent with diabetes    High levels of fetal hemoglobin interfere with the HbA1C  assay. Heterozygous hemoglobin variants (HbS, HgC, etc)do  not significantly interfere with this assay.   However, presence of multiple variants may affect accuracy.       INR         1.0  Comment: Coumadin Therapy:    2.0 - 3.0 for INR for all indicators except mechanical heart valves    and antiphospholipid syndromes which should use 2.5 - 3.5.       Lymph %   11.0             MCH   29.3             MCHC   33.4             MCV   88             Mono %   8.0             MPV   10.7             nRBC   0             Platelet Estimate   Appears Normal              Platelet Count   163             POCT Glucose 301     296   358         Potassium   4.0             PT         11.9       RBC   3.75             RDW   14.6             Segmented Neutrophil %   80.0             Sodium   135             WBC   13.92                                    Significant Imaging: I have reviewed all pertinent imaging results/findings within the past 24 hours.      Imaging Results              X-Ray Chest AP Portable (Final result)  Result time 04/14/25 01:29:39      Final result by Princess Powers MD (04/14/25 01:29:39)                   Impression:      Subtle interstitial prominence bilaterally more so in the perihilar regions and lower lungs.  Question is raised of CHF versus pneumonitis.  No consolidation or other significant acute finding.      Electronically signed by: Princess Powers  Date:    04/14/2025  Time:    01:29               Narrative:    EXAMINATION:  XR CHEST AP PORTABLE    CLINICAL HISTORY:  Cough, unspecified    TECHNIQUE:  Single frontal view of the chest was performed.    COMPARISON:  09/30/2024, 09/26/2024 chest x-ray    FINDINGS:  The cardiac silhouette is stable and not significantly enlarged.  The aorta is tortuous.  There is subtle increase in the interstitial prominence in the perihilar regions.  Basilar atelectasis is suspected however question is raised of mild pneumonitis or pulmonary edema.  There is no consolidation, pleural effusion or pneumothorax.  Osseous structures demonstrate degenerative changes of the shoulders and spine.                        Wet Read by Lawrence Guevara MD (04/13/25 22:51:48, [facility] Emergency Dept, Emergency Medicine)    Chest x-ray independently interpreted by me pending radiology review: No acute infiltrate, no pneumothorax, no effusion

## 2025-04-14 NOTE — ED NOTES
Pt. awake, denies pain and sob. Cough has subsided after medication given. IV NS running at 100 ml/hr. Family at bedside .

## 2025-04-14 NOTE — ASSESSMENT & PLAN NOTE
Anemia is likely due to chronic blood loss. Most recent hemoglobin and hematocrit are listed below.  Recent Labs     04/13/25  2150 04/14/25  0628   HGB 12.3* 11.0*   HCT 36.6* 32.9*     Plan  - Monitor serial CBC: Daily  - Transfuse PRBC if patient becomes hemodynamically unstable, symptomatic or H/H drops below 7/21.  - Patient has not received any PRBC transfusions to date  - Patient's anemia is currently stable

## 2025-04-14 NOTE — PLAN OF CARE
Problem: Adult Inpatient Plan of Care  Goal: Plan of Care Review  Outcome: Progressing  Goal: Patient-Specific Goal (Individualized)  Outcome: Progressing  Goal: Absence of Hospital-Acquired Illness or Injury  Outcome: Progressing  Goal: Optimal Comfort and Wellbeing  Outcome: Progressing  Goal: Readiness for Transition of Care  Outcome: Progressing     Problem: Diabetes Comorbidity  Goal: Blood Glucose Level Within Targeted Range  Outcome: Progressing     Problem: Acute Kidney Injury/Impairment  Goal: Fluid and Electrolyte Balance  Outcome: Progressing  Goal: Improved Oral Intake  Outcome: Progressing  Goal: Effective Renal Function  Outcome: Progressing     Problem: Fall Injury Risk  Goal: Absence of Fall and Fall-Related Injury  Outcome: Progressing     Problem: Comorbidity Management  Goal: Blood Pressure in Desired Range  Outcome: Progressing     Problem: Fatigue  Goal: Improved Activity Tolerance  Outcome: Progressing     Problem: Urinary Retention  Goal: Effective Urinary Elimination  Outcome: Progressing     Problem: Wound  Goal: Optimal Coping  Outcome: Progressing  Goal: Optimal Functional Ability  Outcome: Progressing  Goal: Absence of Infection Signs and Symptoms  Outcome: Progressing  Goal: Improved Oral Intake  Outcome: Progressing  Goal: Optimal Pain Control and Function  Outcome: Progressing  Goal: Skin Health and Integrity  Outcome: Progressing  Goal: Optimal Wound Healing  Outcome: Progressing

## 2025-04-14 NOTE — ASSESSMENT & PLAN NOTE
Admit to medical floor with telemetry  Patient's FSGs are uncontrolled due to hyperglycemia on current medication regimen.  Last A1c reviewed-   Lab Results   Component Value Date    HGBA1C 7.8 (H) 09/09/2024     Most recent fingerstick glucose reviewed-   Recent Labs   Lab 04/13/25 2049 04/13/25 2232 04/13/25  2315 04/13/25  2349   POCTGLUCOSE 470* 452* 407* 396*     Current correctional scale  Medium  Maintain anti-hyperglycemic dose as follows-   Antihyperglycemics (From admission, onward)      Start     Stop Route Frequency Ordered    04/14/25 0026  insulin aspart U-100 pen 0-10 Units         -- SubQ Every 6 hours PRN 04/13/25 2326          Hold Oral hypoglycemics while patient is in the hospital.

## 2025-04-14 NOTE — SUBJECTIVE & OBJECTIVE
Past Medical History:   Diagnosis Date    Diabetes mellitus type I     Hypertension     Stroke 2002       Past Surgical History:   Procedure Laterality Date    ROBOTIC FUSION,SPINE,LUMBAR,TLIF N/A 9/20/2024    Procedure: OPEN ROBOTIC L4 -S1 TLIF;  Surgeon: Jaun Luis Mcbride DO;  Location: [facility] OR [address] [place] [address];  Service: Neurosurgery;  Laterality: N/A;    TRANSFORAMINAL EPIDURAL INJECTION OF STEROID Bilateral 4/9/2024    Procedure: LUMBAR TRANSFORAMINAL BILATERAL L4/5;  Surgeon: Aria Orozco MD;  Location: [facility], [place] PAIN T;  Service: Pain Management;  Laterality: Bilateral;  [phone]  2 WK F/U LM       Review of patient's allergies indicates:  No Known Allergies    No current facility-administered medications on file prior to encounter.     Current Outpatient Medications on File Prior to Encounter   Medication Sig    amLODIPine (NORVASC) 10 MG tablet Take by mouth once daily.    aspirin (ECOTRIN) 81 MG EC tablet Take 81 mg by mouth once daily.    carvediloL (COREG) 6.25 MG tablet Take by mouth 2 (two) times daily.    docusate sodium (COLACE) 50 MG capsule Take 50 mg by mouth 2 (two) times daily.    finasteride (PROSCAR) 5 mg tablet Take by mouth once daily.    fluticasone propionate (FLONASE) 50 mcg/actuation nasal spray by Each Nostril route as needed.    glipiZIDE (GLUCOTROL) 10 MG tablet Take by mouth 2 (two) times daily with meals.    losartan (COZAAR) 100 MG tablet Take by mouth.    metFORMIN (GLUCOPHAGE) 1000 MG tablet Take by mouth 2 (two) times daily with meals.    oxyCODONE-acetaminophen (PERCOCET) 5-325 mg per tablet Take 1 tablet by mouth every 12 (twelve) hours as needed for Pain.    rosuvastatin (CRESTOR) 40 MG Tab Take by mouth every evening.    tamsulosin (FLOMAX) 0.4 mg Cap Take by mouth once daily. (Patient taking differently: Take by mouth 2 (two) times daily.)    traMADoL (ULTRAM) 50 mg tablet Take 50 mg by mouth every 12 (twelve) hours as needed for Pain.    TRUE METRIX GLUCOSE TEST STRIP  Strp 1 strip 2 (two) times daily.     Family History    None       Tobacco Use    Smoking status: Former     Current packs/day: 0.00     Types: Cigarettes     Start date:      Quit date:      Years since quittin.2    Smokeless tobacco: Never   Substance and Sexual Activity    Alcohol use: Not Currently    Drug use: Not on file    Sexual activity: Not on file     Review of Systems   Constitutional: Negative.    HENT:  Positive for congestion.    Respiratory: Negative.     Cardiovascular: Negative.    Gastrointestinal: Negative.    Genitourinary: Negative.    Musculoskeletal: Negative.    Neurological: Negative.    Psychiatric/Behavioral: Negative.       Objective:     Vital Signs (Most Recent):  Temp: 98.9 °F (37.2 °C) (25)  Pulse: 84 (25)  Resp: (!) 27 (25)  BP: 130/62 (25)  SpO2: 98 % (25) Vital Signs (24h Range):  Temp:  [98.9 °F (37.2 °C)-99 °F (37.2 °C)] 98.9 °F (37.2 °C)  Pulse:  [84-98] 84  Resp:  [17-33] 27  SpO2:  [97 %-99 %] 98 %  BP: (127-143)/(62-65) 130/62     Weight: 90.7 kg (199 lb 15.3 oz)  Body mass index is 26.38 kg/m².     Physical Exam  HENT:      Head: Normocephalic and atraumatic.      Nose: Nose normal.      Mouth/Throat:      Mouth: Mucous membranes are moist.   Eyes:      Extraocular Movements: Extraocular movements intact.      Pupils: Pupils are equal, round, and reactive to light.   Cardiovascular:      Rate and Rhythm: Regular rhythm.      Heart sounds: Normal heart sounds.   Pulmonary:      Breath sounds: Normal breath sounds.   Abdominal:      Palpations: Abdomen is soft.   Musculoskeletal:         General: Normal range of motion.      Cervical back: Neck supple.   Skin:     General: Skin is warm.   Neurological:      General: No focal deficit present.      Mental Status: He is alert.   Psychiatric:         Mood and Affect: Mood normal.              CRANIAL NERVES     CN III, IV, VI   Pupils are equal, round, and  reactive to light.       Significant Labs: All pertinent labs within the past 24 hours have been reviewed.  Recent Lab Results  (Last 5 results in the past 24 hours)        04/13/25  2349   04/13/25  2315   04/13/25  2232   04/13/25  2200   04/13/25  2150        Base Deficit       0.0         Performed By:       Planet Payment         Specimen source       Venous         Albumin         3.0       ALP         70       ALT         31       Anion Gap         16       AST         40       BILIRUBIN TOTAL         0.5  Comment: For infants and newborns, interpretation of results should be based   on gestational age, weight and in agreement with clinical   observations.    Premature Infant recommended reference ranges:   0-24 hours:  <8.0 mg/dL   24-48 hours: <12.0 mg/dL   3-5 days:    <15.0 mg/dL   6-29 days:   <15.0 mg/dL       BUN         39       Calcium         9.1       Chloride         100       CO2         18       Creatinine         3.0       eGFR         20  Comment: Estimated GFR calculated using the CKD-EPI creatinine (2021) equation.       FiO2       21.0         Glucose         501       Gran # (ANC)         11.4       Hematocrit         36.6       Hemoglobin         12.3       Lymph %         13.0       Magnesium          1.9       MCH         29.3       MCHC         33.6       MCV         87       Mono %         6.0       MPV         10.9       nRBC         0       Platelet Estimate         Appears Normal       Platelet Count         174       POC HCO3       24.0         POC PCO2       36.3         POC PH       7.429         POC PO2       50.2         POC SATURATED O2       86.9  Comment:  notified  at    read back  Value below critical limit           POCT Glucose 396   407   452           Potassium         4.4       PROTEIN TOTAL         7.8       RBC         4.20       RDW         14.4       Segmented Neutrophil %         81.0       Sodium         134       WBC         14.06                               Significant Imaging: I have reviewed all pertinent imaging results/findings within the past 24 hours.

## 2025-04-14 NOTE — PROGRESS NOTES
[facility] Medicine  Progress Note    Patient Name: Robi Donovan  MRN: [id number]  Patient Class: OP- Observation   Admission Date: 4/13/2025  Length of Stay: 0 days  Attending Physician: Tyson Car MD  Primary Care Provider: Nia, Primary Doctor        Subjective     Principal Problem:Type 2 diabetes mellitus with hyperglycemia        HPI:  Robi Donovan is a 79-year-old male with a past medical history of CVA with right side residual weakness, hypertension, DM2, hyperlipidemia and BPH who presents to emergency department for evaluation of hyperglycemia.  Patient's blood glucose was around the 500 ranges.  Patient states that he had influenza recently and is currently taking Tamiflu.  As per family patient had a spell where he seemed to space out however he did not fall or have any loss of consciousness.  In ED chest x-ray shows no acute infiltrate.  Labs showed blood glucose of 501, creatinine of 3 and WBCs 14.06.  Due to patient's presenting symptoms he will require admission for further evaluation and management. At  time my examination patient denied any headache, fever, chills, chest pain or shortness of breath.    Overview/Hospital Course:  No notes on file    Interval History:  Patient was seen in the morning complaining of fatigue and low energy, blood glucose was still above 300 at is scheduled insulin plus p.r.n., patient was educated about using steroids.  MARGOTH is gradually improving patient to continue on IV hydration      Review of Systems   Constitutional:  Positive for activity change and fatigue.   HENT:  Negative for congestion.    Respiratory: Negative.     Cardiovascular: Negative.    Gastrointestinal: Negative.    Genitourinary: Negative.    Musculoskeletal: Negative.    Neurological: Negative.    Psychiatric/Behavioral: Negative.       Objective:     Vital Signs (Most Recent):  Temp: 99.8 °F (37.7 °C) (04/14/25 1127)  Pulse: 80 (04/14/25 1208)  Resp: 16 (04/14/25 1127)  BP:  127/64 (04/14/25 1127)  SpO2: 100 % (04/14/25 1127) Vital Signs (24h Range):  Temp:  [98.6 °F (37 °C)-100 °F (37.8 °C)] 99.8 °F (37.7 °C)  Pulse:  [74-98] 80  Resp:  [16-33] 16  SpO2:  [94 %-100 %] 100 %  BP: (122-148)/(56-65) 127/64     Weight: 90.2 kg (198 lb 13.7 oz)  Body mass index is 26.24 kg/m².    Intake/Output Summary (Last 24 hours) at 4/14/2025 1442  Last data filed at 4/14/2025 0429  Gross per 24 hour   Intake 999 ml   Output 220 ml   Net 779 ml         Physical Exam  Constitutional:       Appearance: He is ill-appearing.   HENT:      Head: Normocephalic and atraumatic.      Nose: Nose normal.      Mouth/Throat:      Mouth: Mucous membranes are moist.   Eyes:      Extraocular Movements: Extraocular movements intact.      Pupils: Pupils are equal, round, and reactive to light.   Cardiovascular:      Rate and Rhythm: Regular rhythm.      Heart sounds: Normal heart sounds.   Pulmonary:      Breath sounds: Normal breath sounds.   Abdominal:      Palpations: Abdomen is soft.   Musculoskeletal:         General: Normal range of motion.      Cervical back: Neck supple.   Skin:     General: Skin is warm.   Neurological:      General: No focal deficit present.      Mental Status: He is alert.   Psychiatric:         Mood and Affect: Mood normal.               Significant Labs: All pertinent labs within the past 24 hours have been reviewed.  Recent Lab Results  (Last 5 results in the past 24 hours)        04/14/25  1129   04/14/25  0628   04/14/25  0403   04/14/25  0139   04/13/25  2355        Procalcitonin         9.25  Comment: A concentration < 0.25 ng/mL represents a low risk of bacterial infection.  Procalcitonin may not be accurate among patients with localized   infection, recent trauma or major surgery, immunosuppressed state,   invasive fungal infection, renal dysfunction. Decisions regarding   initiation or continuation of antibiotic therapy should not be based   solely on procalcitonin levels.       Anion  Gap   11             Basophil %   1.0             BUN   38             Calcium   8.5             Chloride   103             CO2   21             Creatinine   2.6             eGFR   24  Comment: Estimated GFR calculated using the CKD-EPI creatinine (2021) equation.             Estimated Avg Glucose         246       Glucose   298             Gran # (ANC)   11.1             Hematocrit   32.9             Hemoglobin   11.0             Hemoglobin A1C External         10.2  Comment: ADA Screening Guidelines:  5.7-6.4%  Consistent with prediabetes  >=6.5%  Consistent with diabetes    High levels of fetal hemoglobin interfere with the HbA1C  assay. Heterozygous hemoglobin variants (HbS, HgC, etc)do  not significantly interfere with this assay.   However, presence of multiple variants may affect accuracy.       INR         1.0  Comment: Coumadin Therapy:    2.0 - 3.0 for INR for all indicators except mechanical heart valves    and antiphospholipid syndromes which should use 2.5 - 3.5.       Lymph %   11.0             MCH   29.3             MCHC   33.4             MCV   88             Mono %   8.0             MPV   10.7             nRBC   0             Platelet Estimate   Appears Normal             Platelet Count   163             POCT Glucose 301     296   358         Potassium   4.0             PT         11.9       RBC   3.75             RDW   14.6             Segmented Neutrophil %   80.0             Sodium   135             WBC   13.92                                    Significant Imaging: I have reviewed all pertinent imaging results/findings within the past 24 hours.      Imaging Results              X-Ray Chest AP Portable (Final result)  Result time 04/14/25 01:29:39      Final result by Princess Powers MD (04/14/25 01:29:39)                   Impression:      Subtle interstitial prominence bilaterally more so in the perihilar regions and lower lungs.  Question is raised of CHF versus pneumonitis.  No  consolidation or other significant acute finding.      Electronically signed by: Princess Gio  Date:    04/14/2025  Time:    01:29               Narrative:    EXAMINATION:  XR CHEST AP PORTABLE    CLINICAL HISTORY:  Cough, unspecified    TECHNIQUE:  Single frontal view of the chest was performed.    COMPARISON:  09/30/2024, 09/26/2024 chest x-ray    FINDINGS:  The cardiac silhouette is stable and not significantly enlarged.  The aorta is tortuous.  There is subtle increase in the interstitial prominence in the perihilar regions.  Basilar atelectasis is suspected however question is raised of mild pneumonitis or pulmonary edema.  There is no consolidation, pleural effusion or pneumothorax.  Osseous structures demonstrate degenerative changes of the shoulders and spine.                        Wet Read by Lawrence Guevara MD (04/13/25 22:51:48, [facility] Emergency Dept, Emergency Medicine)    Chest x-ray independently interpreted by me pending radiology review: No acute infiltrate, no pneumothorax, no effusion                                      Assessment & Plan  Type 2 diabetes mellitus with hyperglycemia  Admit to medical floor with telemetry  Patient's FSGs are uncontrolled due to hyperglycemia on current medication regimen.  Last A1c reviewed-   Lab Results   Component Value Date    HGBA1C 10.2 (H) 04/13/2025     Most recent fingerstick glucose reviewed-   Recent Labs   Lab 04/13/25  2349 04/14/25  0139 04/14/25  0403 04/14/25  1129   POCTGLUCOSE 396* 358* 296* 301*     Current correctional scale  Medium  Maintain anti-hyperglycemic dose as follows-   Antihyperglycemics (From admission, onward)      Start     Stop Route Frequency Ordered    04/14/25 1130  insulin aspart U-100 pen 5 Units         -- SubQ 3 times daily with meals 04/14/25 0748    04/14/25 0900  insulin glargine U-100 (Lantus) pen 10 Units         -- SubQ Daily 04/14/25 0748          Hold Oral hypoglycemics while patient is in the  hospital.  Acute kidney injury superimposed on CKD  MARGOTH is likely due to pre-renal azotemia due to intravascular volume depletion. Baseline creatinine is  1.9 . Most recent creatinine and eGFR are listed below.  Recent Labs     04/13/25 2150 04/14/25  0628   CREATININE 3.0* 2.6*   EGFRNORACEVR 20* 24*      Plan  - MARGOTH is improving. Will continue current treatment  - Avoid nephrotoxins and renally dose meds for GFR listed above  - Monitor urine output, serial BMP, and adjust therapy as needed  - monitor renal function closely.  HLD (hyperlipidemia)  Continue with home dose of statin.    Influenza  Finish course of Tamiflu.  4/14 last day    HTN (hypertension)  Patient's blood pressure range in the last 24 hours was: BP  Min: 122/61  Max: 148/65.The patient's inpatient anti-hypertensive regimen is listed below:  Current Antihypertensives  amLODIPine tablet 10 mg, Daily, Oral  carvediloL tablet 6.25 mg, 2 times daily, Oral  , Daily, Oral    Plan  - BP is controlled, no changes needed to their regimen  - monitor blood pressure closely.  Anemia  Anemia is likely due to chronic blood loss. Most recent hemoglobin and hematocrit are listed below.  Recent Labs     04/13/25 2150 04/14/25  0628   HGB 12.3* 11.0*   HCT 36.6* 32.9*     Plan  - Monitor serial CBC: Daily  - Transfuse PRBC if patient becomes hemodynamically unstable, symptomatic or H/H drops below 7/21.  - Patient has not received any PRBC transfusions to date  - Patient's anemia is currently stable     Hyponatremia  Hyponatremia is likely due to Dehydration/hypovolemia. The patient's most recent sodium results are listed below.  Recent Labs     04/13/25 2150 04/14/25  0628   * 135*     Plan  - Correct the sodium by 4-6mEq in 24 hours.   - Obtain the following studies: Urine sodium, urine osmolality, serum osmolality.  - Will treat the hyponatremia with IV fluids as follows:   mL/hour/gradually improving  - Monitor sodium Daily.   - Patient  hyponatremia is improving     VTE Risk Mitigation (From admission, onward)           Ordered     heparin (porcine) injection 5,000 Units  Every 8 hours         04/14/25 0915     IP VTE HIGH RISK PATIENT  Once         04/13/25 2325     Place sequential compression device  Until discontinued         04/13/25 2325                    Discharge Planning   SUSY:      Code Status: Full Code   Medical Readiness for Discharge Date:                            Tyson Parish MD  Department of [facility] Medicine   [facility]

## 2025-04-14 NOTE — ASSESSMENT & PLAN NOTE
Admit to medical floor with telemetry  Patient's FSGs are uncontrolled due to hyperglycemia on current medication regimen.  Last A1c reviewed-   Lab Results   Component Value Date    HGBA1C 10.2 (H) 04/13/2025     Most recent fingerstick glucose reviewed-   Recent Labs   Lab 04/13/25  2349 04/14/25  0139 04/14/25  0403 04/14/25  1129   POCTGLUCOSE 396* 358* 296* 301*     Current correctional scale  Medium  Maintain anti-hyperglycemic dose as follows-   Antihyperglycemics (From admission, onward)      Start     Stop Route Frequency Ordered    04/14/25 1130  insulin aspart U-100 pen 5 Units         -- SubQ 3 times daily with meals 04/14/25 0748    04/14/25 0900  insulin glargine U-100 (Lantus) pen 10 Units         -- SubQ Daily 04/14/25 0748          Hold Oral hypoglycemics while patient is in the hospital.

## 2025-04-14 NOTE — ASSESSMENT & PLAN NOTE
MARGOTH is likely due to pre-renal azotemia due to intravascular volume depletion. Baseline creatinine is 1.9. Most recent creatinine and eGFR are listed below.  Recent Labs     04/13/25  2150   CREATININE 3.0*   EGFRNORACEVR 20*      Plan  - MARGOTH is worsening. Will continue current treatment  - Avoid nephrotoxins and renally dose meds for GFR listed above  - Monitor urine output, serial BMP, and adjust therapy as needed  - monitor renal function closely.

## 2025-04-14 NOTE — ASSESSMENT & PLAN NOTE
Patient's blood pressure range in the last 24 hours was: BP  Min: 127/64  Max: 143/65.The patient's inpatient anti-hypertensive regimen is listed below:  Current Antihypertensives  amLODIPine tablet 10 mg, Daily, Oral  carvediloL tablet 6.25 mg, 2 times daily, Oral    Plan  - BP is controlled, no changes needed to their regimen  - monitor blood pressure closely.

## 2025-04-14 NOTE — ASSESSMENT & PLAN NOTE
Patient's blood pressure range in the last 24 hours was: BP  Min: 122/61  Max: 148/65.The patient's inpatient anti-hypertensive regimen is listed below:  Current Antihypertensives  amLODIPine tablet 10 mg, Daily, Oral  carvediloL tablet 6.25 mg, 2 times daily, Oral  , Daily, Oral    Plan  - BP is controlled, no changes needed to their regimen  - monitor blood pressure closely.

## 2025-04-14 NOTE — PHARMACY MED REC
"    [facility]           Pharmacy  Admission Medication History     The home medication history was taken by Sasha Fatima.      Medication history obtained from Medications listed below were obtained from: Patient/family.    Based on information gathered for medication list, you may go to "Admission" then "Reconcile Home Medications" tabs to review and/or act upon those items.     The home medication list has been updated by the Pharmacy department.   Please read ALL comments highlighted in yellow.   Please address this information as you see fit.    Feel free to contact us if you have any questions or require assistance.    The medications listed below were removed from the home medication list.  Please reorder if appropriate:    Patient reports NOT TAKING the following medication(s):  Percocet 5-325 mg per tab  Tramadol 50 mg tab    No current facility-administered medications on file prior to encounter.     Current Outpatient Medications on File Prior to Encounter   Medication Sig Dispense Refill    alendronate (FOSAMAX) 70 MG tablet Take 70 mg by mouth every 7 days.      amLODIPine (NORVASC) 10 MG tablet Take 10 mg by mouth once daily.      aspirin (ECOTRIN) 81 MG EC tablet Take 81 mg by mouth once daily.      carvediloL (COREG) 6.25 MG tablet Take 6.25 mg by mouth 2 (two) times daily.      finasteride (PROSCAR) 5 mg tablet Take 5 mg by mouth once daily.      fluticasone propionate (FLONASE) 50 mcg/actuation nasal spray 1 spray by Each Nostril route as needed.      glipiZIDE (GLUCOTROL) 10 MG tablet Take 10 mg by mouth 2 (two) times daily with meals.      hydroCHLOROthiazide 12.5 MG Tab Take 12.5 mg by mouth once daily.      metFORMIN (GLUCOPHAGE) 1000 MG tablet Take 1,000 mg by mouth 2 (two) times daily with meals.      promethazine-dextromethorphan (PROMETHAZINE-DM) 6.25-15 mg/5 mL Syrp Take 5 mLs by mouth every 6 (six) hours as needed.      rosuvastatin (CRESTOR) 40 MG Tab Take 40 mg by " mouth every evening.      tamsulosin (FLOMAX) 0.4 mg Cap Take by mouth once daily. (Patient taking differently: Take 0.4 mg by mouth 2 (two) times daily.)      docusate sodium (COLACE) 50 MG capsule Take 50 mg by mouth 2 (two) times daily.      losartan (COZAAR) 100 MG tablet Take 100 mg by mouth once daily.         Please address this information as you see fit.  Feel free to contact us if you have any questions or require assistance.    Sasha Fatima  [phone]              .

## 2025-04-14 NOTE — ASSESSMENT & PLAN NOTE
Hyponatremia is likely due to Dehydration/hypovolemia. The patient's most recent sodium results are listed below.  Recent Labs     04/13/25  2150 04/14/25  0628   * 135*     Plan  - Correct the sodium by 4-6mEq in 24 hours.   - Obtain the following studies: Urine sodium, urine osmolality, serum osmolality.  - Will treat the hyponatremia with IV fluids as follows:   mL/hour/gradually improving  - Monitor sodium Daily.   - Patient hyponatremia is improving

## 2025-04-14 NOTE — ASSESSMENT & PLAN NOTE
MARGOTH is likely due to pre-renal azotemia due to intravascular volume depletion. Baseline creatinine is 1.9. Most recent creatinine and eGFR are listed below.  Recent Labs     04/13/25  2150 04/14/25  0628   CREATININE 3.0* 2.6*   EGFRNORACEVR 20* 24*      Plan  - MARGOTH is improving. Will continue current treatment  - Avoid nephrotoxins and renally dose meds for GFR listed above  - Monitor urine output, serial BMP, and adjust therapy as needed  - monitor renal function closely.

## 2025-04-14 NOTE — H&P
"[facility] Medicine  History & Physical    Patient Name: Robi Donovan  MRN: [id number]  Patient Class: OP- Observation  Admission Date: 4/13/2025  Attending Physician: Juice Ocampo,*   Primary Care Provider: [facility] Primary [facility]         Patient information was obtained from patient, past medical records, and ER records.     Subjective:     Principal Problem:Type 2 diabetes mellitus with hyperglycemia    Chief Complaint:   Chief Complaint   Patient presents with    Hyperglycemia     Pt presents to the ED r/t hyperglycemia. In triage glucose 470. Asymptotic- Pt states " I feel fine".         HPI: Robi Donovan is a 79-year-old male with a past medical history of CVA with right side residual weakness, hypertension, DM2, hyperlipidemia and BPH who presents to emergency department for evaluation of hyperglycemia.  Patient's blood glucose was around the 500 ranges.  Patient states that he had influenza recently and is currently taking Tamiflu.  As per family patient had a spell where he seemed to space out however he did not fall or have any loss of consciousness.  In ED chest x-ray shows no acute infiltrate.  Labs showed blood glucose of 501, creatinine of 3 and WBCs 14.06.  Due to patient's presenting symptoms he will require admission for further evaluation and management. At  time my examination patient denied any headache, fever, chills, chest pain or shortness of breath.    Past Medical History:   Diagnosis Date    Diabetes mellitus type I     Hypertension     Stroke 2002       Past Surgical History:   Procedure Laterality Date    ROBOTIC FUSION,SPINE,LUMBAR,TLIF N/A 9/20/2024    Procedure: OPEN ROBOTIC L4 -S1 TLIF;  Surgeon: Juan Luis Mcbride DO;  Location: [facility] OR [address] [place] [address];  Service: Neurosurgery;  Laterality: N/A;    TRANSFORAMINAL EPIDURAL INJECTION OF STEROID Bilateral 4/9/2024    Procedure: LUMBAR TRANSFORAMINAL BILATERAL L4/5;  Surgeon: Aria Orozco MD;  Location: [facility]" PAIN MGT;  Service: Pain Management;  Laterality: Bilateral;  [phone]  2 WK F/U LM       Review of patient's allergies indicates:  No Known Allergies    No current facility-administered medications on file prior to encounter.     Current Outpatient Medications on File Prior to Encounter   Medication Sig    amLODIPine (NORVASC) 10 MG tablet Take by mouth once daily.    aspirin (ECOTRIN) 81 MG EC tablet Take 81 mg by mouth once daily.    carvediloL (COREG) 6.25 MG tablet Take by mouth 2 (two) times daily.    docusate sodium (COLACE) 50 MG capsule Take 50 mg by mouth 2 (two) times daily.    finasteride (PROSCAR) 5 mg tablet Take by mouth once daily.    fluticasone propionate (FLONASE) 50 mcg/actuation nasal spray by Each Nostril route as needed.    glipiZIDE (GLUCOTROL) 10 MG tablet Take by mouth 2 (two) times daily with meals.    losartan (COZAAR) 100 MG tablet Take by mouth.    metFORMIN (GLUCOPHAGE) 1000 MG tablet Take by mouth 2 (two) times daily with meals.    oxyCODONE-acetaminophen (PERCOCET) 5-325 mg per tablet Take 1 tablet by mouth every 12 (twelve) hours as needed for Pain.    rosuvastatin (CRESTOR) 40 MG Tab Take by mouth every evening.    tamsulosin (FLOMAX) 0.4 mg Cap Take by mouth once daily. (Patient taking differently: Take by mouth 2 (two) times daily.)    traMADoL (ULTRAM) 50 mg tablet Take 50 mg by mouth every 12 (twelve) hours as needed for Pain.    TRUE METRIX GLUCOSE TEST STRIP Strp 1 strip 2 (two) times daily.     Family History    None       Tobacco Use    Smoking status: Former     Current packs/day: 0.00     Types: Cigarettes     Start date:      Quit date:      Years since quittin.2    Smokeless tobacco: Never   Substance and Sexual Activity    Alcohol use: Not Currently    Drug use: Not on file    Sexual activity: Not on file     Review of Systems   Constitutional: Negative.    HENT:  Positive for congestion.    Respiratory: Negative.     Cardiovascular: Negative.     Gastrointestinal: Negative.    Genitourinary: Negative.    Musculoskeletal: Negative.    Neurological: Negative.    Psychiatric/Behavioral: Negative.       Objective:     Vital Signs (Most Recent):  Temp: 98.9 °F (37.2 °C) (04/13/25 2207)  Pulse: 84 (04/13/25 2207)  Resp: (!) 27 (04/13/25 2207)  BP: 130/62 (04/13/25 2207)  SpO2: 98 % (04/13/25 2207) Vital Signs (24h Range):  Temp:  [98.9 °F (37.2 °C)-99 °F (37.2 °C)] 98.9 °F (37.2 °C)  Pulse:  [84-98] 84  Resp:  [17-33] 27  SpO2:  [97 %-99 %] 98 %  BP: (127-143)/(62-65) 130/62     Weight: 90.7 kg (199 lb 15.3 oz)  Body mass index is 26.38 kg/m².     Physical Exam  HENT:      Head: Normocephalic and atraumatic.      Nose: Nose normal.      Mouth/Throat:      Mouth: Mucous membranes are moist.   Eyes:      Extraocular Movements: Extraocular movements intact.      Pupils: Pupils are equal, round, and reactive to light.   Cardiovascular:      Rate and Rhythm: Regular rhythm.      Heart sounds: Normal heart sounds.   Pulmonary:      Breath sounds: Normal breath sounds.   Abdominal:      Palpations: Abdomen is soft.   Musculoskeletal:         General: Normal range of motion.      Cervical back: Neck supple.   Skin:     General: Skin is warm.   Neurological:      General: No focal deficit present.      Mental Status: He is alert.   Psychiatric:         Mood and Affect: Mood normal.              CRANIAL NERVES     CN III, IV, VI   Pupils are equal, round, and reactive to light.       Significant Labs: All pertinent labs within the past 24 hours have been reviewed.  Recent Lab Results  (Last 5 results in the past 24 hours)        04/13/25  2349   04/13/25  2315   04/13/25 2232 04/13/25 2200 04/13/25 2150        Base Deficit       0.0         Performed By:       acousins         Specimen source       Venous         Albumin         3.0       ALP         70       ALT         31       Anion Gap         16       AST         40       BILIRUBIN TOTAL         0.5  Comment:  For infants and newborns, interpretation of results should be based   on gestational age, weight and in agreement with clinical   observations.    Premature Infant recommended reference ranges:   0-24 hours:  <8.0 mg/dL   24-48 hours: <12.0 mg/dL   3-5 days:    <15.0 mg/dL   6-29 days:   <15.0 mg/dL       BUN         39       Calcium         9.1       Chloride         100       CO2         18       Creatinine         3.0       eGFR         20  Comment: Estimated GFR calculated using the CKD-EPI creatinine (2021) equation.       FiO2       21.0         Glucose         501       Gran # (ANC)         11.4       Hematocrit         36.6       Hemoglobin         12.3       Lymph %         13.0       Magnesium          1.9       MCH         29.3       MCHC         33.6       MCV         87       Mono %         6.0       MPV         10.9       nRBC         0       Platelet Estimate         Appears Normal       Platelet Count         174       POC HCO3       24.0         POC PCO2       36.3         POC PH       7.429         POC PO2       50.2         POC SATURATED O2       86.9  Comment:  notified  at    read back  Value below critical limit           POCT Glucose 396   407   452           Potassium         4.4       PROTEIN TOTAL         7.8       RBC         4.20       RDW         14.4       Segmented Neutrophil %         81.0       Sodium         134       WBC         14.06                              Significant Imaging: I have reviewed all pertinent imaging results/findings within the past 24 hours.  Assessment/Plan:     Assessment & Plan  Type 2 diabetes mellitus with hyperglycemia  Admit to medical floor with telemetry  Patient's FSGs are uncontrolled due to hyperglycemia on current medication regimen.  Last A1c reviewed-   Lab Results   Component Value Date    HGBA1C 7.8 (H) 09/09/2024     Most recent fingerstick glucose reviewed-   Recent Labs   Lab 04/13/25 2049 04/13/25  2232 04/13/25  2315 04/13/25  3502    POCTGLUCOSE 470* 452* 407* 396*     Current correctional scale  Medium  Maintain anti-hyperglycemic dose as follows-   Antihyperglycemics (From admission, onward)      Start     Stop Route Frequency Ordered    04/14/25 0026  insulin aspart U-100 pen 0-10 Units         -- SubQ Every 6 hours PRN 04/13/25 2326          Hold Oral hypoglycemics while patient is in the hospital.  Acute kidney injury superimposed on CKD  MARGOTH is likely due to pre-renal azotemia due to intravascular volume depletion. Baseline creatinine is  1.9 . Most recent creatinine and eGFR are listed below.  Recent Labs     04/13/25 2150   CREATININE 3.0*   EGFRNORACEVR 20*      Plan  - MARGOTH is worsening. Will continue current treatment  - Avoid nephrotoxins and renally dose meds for GFR listed above  - Monitor urine output, serial BMP, and adjust therapy as needed  - monitor renal function closely.  HLD (hyperlipidemia)  Continue with home dose of statin.    Influenza  Finish course of Tamiflu.    HTN (hypertension)  Patient's blood pressure range in the last 24 hours was: BP  Min: 127/64  Max: 143/65.The patient's inpatient anti-hypertensive regimen is listed below:  Current Antihypertensives  amLODIPine tablet 10 mg, Daily, Oral  carvediloL tablet 6.25 mg, 2 times daily, Oral    Plan  - BP is controlled, no changes needed to their regimen  - monitor blood pressure closely.    VTE Risk Mitigation (From admission, onward)           Ordered     IP VTE HIGH RISK PATIENT  Once         04/13/25 2325     Place sequential compression device  Until discontinued         04/13/25 2325                       On 04/14/2025, patient should be placed in hospital observation services under my care.    Further recommendations, diagnosis and orders will be made by day team hospitalist who will assume care in the morning.         Ezra Murphy MD  Department of Hospital Medicine  [place] - Emergency Dept

## 2025-04-15 LAB
ABSOLUTE NEUTROPHIL MANUAL (OHS): 9.6 K/UL
ANION GAP (OHS): 12 MMOL/L (ref 8–16)
BUN SERPL-MCNC: 34 MG/DL (ref 8–23)
CALCIUM SERPL-MCNC: 8.6 MG/DL (ref 8.7–10.5)
CHLORIDE SERPL-SCNC: 101 MMOL/L (ref 95–110)
CO2 SERPL-SCNC: 20 MMOL/L (ref 23–29)
CREAT SERPL-MCNC: 2.5 MG/DL (ref 0.5–1.4)
EOSINOPHIL NFR BLD MANUAL: 1 % (ref 0–8)
ERYTHROCYTE [DISTWIDTH] IN BLOOD BY AUTOMATED COUNT: 14.9 % (ref 11.5–14.5)
GFR SERPLBLD CREATININE-BSD FMLA CKD-EPI: 25 ML/MIN/1.73/M2
GLUCOSE SERPL-MCNC: 295 MG/DL (ref 70–110)
HCT VFR BLD AUTO: 33.8 % (ref 40–54)
HGB BLD-MCNC: 11.2 GM/DL (ref 14–18)
LYMPHOCYTES NFR BLD MANUAL: 20 % (ref 18–48)
MCH RBC QN AUTO: 29.3 PG (ref 27–31)
MCHC RBC AUTO-ENTMCNC: 33.1 G/DL (ref 32–36)
MCV RBC AUTO: 89 FL (ref 82–98)
MONOCYTES NFR BLD MANUAL: 8 % (ref 4–15)
NEUTROPHILS NFR BLD MANUAL: 71 % (ref 38–73)
NUCLEATED RBC (/100WBC) (OHS): 0 /100 WBC
PLATELET # BLD AUTO: 174 K/UL (ref 150–450)
PLATELET BLD QL SMEAR: NORMAL
PMV BLD AUTO: 11.3 FL (ref 9.2–12.9)
POCT GLUCOSE: 266 MG/DL (ref 70–110)
POCT GLUCOSE: 279 MG/DL (ref 70–110)
POCT GLUCOSE: 290 MG/DL (ref 70–110)
POCT GLUCOSE: 307 MG/DL (ref 70–110)
POTASSIUM SERPL-SCNC: 4.1 MMOL/L (ref 3.5–5.1)
RBC # BLD AUTO: 3.82 M/UL (ref 4.6–6.2)
SODIUM SERPL-SCNC: 133 MMOL/L (ref 136–145)
WBC # BLD AUTO: 13.46 K/UL (ref 3.9–12.7)

## 2025-04-15 PROCEDURE — 25000003 PHARM REV CODE 250

## 2025-04-15 PROCEDURE — 25000003 PHARM REV CODE 250: Performed by: FAMILY MEDICINE

## 2025-04-15 PROCEDURE — 36415 COLL VENOUS BLD VENIPUNCTURE: CPT | Performed by: FAMILY MEDICINE

## 2025-04-15 PROCEDURE — 85027 COMPLETE CBC AUTOMATED: CPT | Performed by: FAMILY MEDICINE

## 2025-04-15 PROCEDURE — 63600175 PHARM REV CODE 636 W HCPCS

## 2025-04-15 PROCEDURE — 11000001 HC ACUTE MED/SURG PRIVATE ROOM

## 2025-04-15 PROCEDURE — 82310 ASSAY OF CALCIUM: CPT | Performed by: FAMILY MEDICINE

## 2025-04-15 PROCEDURE — 63600175 PHARM REV CODE 636 W HCPCS: Performed by: FAMILY MEDICINE

## 2025-04-15 RX ORDER — INSULIN GLARGINE 100 [IU]/ML
20 INJECTION, SOLUTION SUBCUTANEOUS DAILY
Status: DISCONTINUED | OUTPATIENT
Start: 2025-04-16 | End: 2025-04-17 | Stop reason: HOSPADM

## 2025-04-15 RX ORDER — INSULIN ASPART 100 [IU]/ML
0-5 INJECTION, SOLUTION INTRAVENOUS; SUBCUTANEOUS
Status: DISCONTINUED | OUTPATIENT
Start: 2025-04-15 | End: 2025-04-17 | Stop reason: HOSPADM

## 2025-04-15 RX ORDER — POLYETHYLENE GLYCOL 3350 17 G/17G
17 POWDER, FOR SOLUTION ORAL 2 TIMES DAILY
Status: DISCONTINUED | OUTPATIENT
Start: 2025-04-15 | End: 2025-04-17 | Stop reason: HOSPADM

## 2025-04-15 RX ORDER — INSULIN ASPART 100 [IU]/ML
7 INJECTION, SOLUTION INTRAVENOUS; SUBCUTANEOUS
Status: DISCONTINUED | OUTPATIENT
Start: 2025-04-15 | End: 2025-04-17 | Stop reason: HOSPADM

## 2025-04-15 RX ORDER — ELECTROLYTES/DEXTROSE
400 SOLUTION, ORAL ORAL
Status: DISCONTINUED | OUTPATIENT
Start: 2025-04-15 | End: 2025-04-17 | Stop reason: HOSPADM

## 2025-04-15 RX ADMIN — GUAIFENESIN AND DEXTROMETHORPHAN 5 ML: 100; 10 SYRUP ORAL at 08:04

## 2025-04-15 RX ADMIN — INSULIN ASPART 3 UNITS: 100 INJECTION, SOLUTION INTRAVENOUS; SUBCUTANEOUS at 04:04

## 2025-04-15 RX ADMIN — Medication 400 ML: at 06:04

## 2025-04-15 RX ADMIN — INSULIN GLARGINE 10 UNITS: 100 INJECTION, SOLUTION SUBCUTANEOUS at 08:04

## 2025-04-15 RX ADMIN — TAMSULOSIN HYDROCHLORIDE 0.4 MG: 0.4 CAPSULE ORAL at 08:04

## 2025-04-15 RX ADMIN — ACETAMINOPHEN 650 MG: 325 TABLET ORAL at 08:04

## 2025-04-15 RX ADMIN — ATORVASTATIN CALCIUM 40 MG: 40 TABLET, FILM COATED ORAL at 08:04

## 2025-04-15 RX ADMIN — HEPARIN SODIUM 5000 UNITS: 5000 INJECTION INTRAVENOUS; SUBCUTANEOUS at 09:04

## 2025-04-15 RX ADMIN — SODIUM CHLORIDE, POTASSIUM CHLORIDE, SODIUM LACTATE AND CALCIUM CHLORIDE: 600; 310; 30; 20 INJECTION, SOLUTION INTRAVENOUS at 07:04

## 2025-04-15 RX ADMIN — Medication 400 ML: at 09:04

## 2025-04-15 RX ADMIN — AMLODIPINE BESYLATE 10 MG: 5 TABLET ORAL at 08:04

## 2025-04-15 RX ADMIN — INSULIN ASPART 3 UNITS: 100 INJECTION, SOLUTION INTRAVENOUS; SUBCUTANEOUS at 08:04

## 2025-04-15 RX ADMIN — CARVEDILOL 6.25 MG: 6.25 TABLET, FILM COATED ORAL at 08:04

## 2025-04-15 RX ADMIN — SODIUM CHLORIDE, POTASSIUM CHLORIDE, SODIUM LACTATE AND CALCIUM CHLORIDE: 600; 310; 30; 20 INJECTION, SOLUTION INTRAVENOUS at 05:04

## 2025-04-15 RX ADMIN — Medication 400 ML: at 02:04

## 2025-04-15 RX ADMIN — INSULIN ASPART 5 UNITS: 100 INJECTION, SOLUTION INTRAVENOUS; SUBCUTANEOUS at 08:04

## 2025-04-15 RX ADMIN — HEPARIN SODIUM 5000 UNITS: 5000 INJECTION INTRAVENOUS; SUBCUTANEOUS at 06:04

## 2025-04-15 RX ADMIN — FINASTERIDE 5 MG: 5 TABLET, FILM COATED ORAL at 08:04

## 2025-04-15 RX ADMIN — GUAIFENESIN AND DEXTROMETHORPHAN 5 ML: 100; 10 SYRUP ORAL at 04:04

## 2025-04-15 RX ADMIN — INSULIN ASPART 3 UNITS: 100 INJECTION, SOLUTION INTRAVENOUS; SUBCUTANEOUS at 12:04

## 2025-04-15 RX ADMIN — INSULIN ASPART 7 UNITS: 100 INJECTION, SOLUTION INTRAVENOUS; SUBCUTANEOUS at 04:04

## 2025-04-15 RX ADMIN — INSULIN ASPART 2 UNITS: 100 INJECTION, SOLUTION INTRAVENOUS; SUBCUTANEOUS at 08:04

## 2025-04-15 RX ADMIN — HEPARIN SODIUM 5000 UNITS: 5000 INJECTION INTRAVENOUS; SUBCUTANEOUS at 02:04

## 2025-04-15 RX ADMIN — INSULIN ASPART 5 UNITS: 100 INJECTION, SOLUTION INTRAVENOUS; SUBCUTANEOUS at 12:04

## 2025-04-15 NOTE — ASSESSMENT & PLAN NOTE
Patient's blood pressure range in the last 24 hours was: BP  Min: 137/65  Max: 162/71.The patient's inpatient anti-hypertensive regimen is listed below:  Current Antihypertensives  amLODIPine tablet 10 mg, Daily, Oral  carvediloL tablet 6.25 mg, 2 times daily, Oral  , Daily, Oral    Plan  - BP is controlled, no changes needed to their regimen  - monitor blood pressure closely.

## 2025-04-15 NOTE — ASSESSMENT & PLAN NOTE
Anemia is likely due to chronic blood loss. Most recent hemoglobin and hematocrit are listed below.  Recent Labs     04/13/25  2150 04/14/25  0628 04/15/25  0454   HGB 12.3* 11.0* 11.2*   HCT 36.6* 32.9* 33.8*     Plan  - Monitor serial CBC: Daily  - Transfuse PRBC if patient becomes hemodynamically unstable, symptomatic or H/H drops below 7/21.  - Patient has not received any PRBC transfusions to date  - Patient's anemia is currently stable

## 2025-04-15 NOTE — ASSESSMENT & PLAN NOTE
Hyponatremia is likely due to Dehydration/hypovolemia. The patient's most recent sodium results are listed below.  Recent Labs     04/13/25  2150 04/14/25  0628 04/15/25  0455   * 135* 133*     Plan  - Correct the sodium by 4-6mEq in 24 hours.   - Obtain the following studies: Urine sodium, urine osmolality, serum osmolality.  - Will treat the hyponatremia with IV fluids as follows:   mL/hour/gradually improving  - Monitor sodium Daily.   - Patient hyponatremia is improving

## 2025-04-15 NOTE — PLAN OF CARE
Problem: Adult Inpatient Plan of Care  Goal: Plan of Care Review  Outcome: Progressing      VIRTUAL NURSE: Pt arrived to unit. Permission received per patient to turn camera to view patient. VIP model explained; patient informed this VN will be working with bedside nurse and the rest of the care team. Plan of care reviewed with patient.  Educated patient on VTE, fall risk and fall risk precautions in place. Call light within reach, side rails up x2. Admission questions completed. Patient instucted to ask staff for assistance. Patient verbalized complete understanding. Patient denies complaints or any needs at this time. Initiated care plan.

## 2025-04-15 NOTE — PLAN OF CARE
Problem: Adult Inpatient Plan of Care  Goal: Plan of Care Review  Outcome: Not Progressing  Goal: Patient-Specific Goal (Individualized)  Outcome: Not Progressing  Goal: Absence of Hospital-Acquired Illness or Injury  Outcome: Not Progressing  Goal: Optimal Comfort and Wellbeing  Outcome: Not Progressing  Goal: Readiness for Transition of Care  Outcome: Not Progressing     Problem: Diabetes Comorbidity  Goal: Blood Glucose Level Within Targeted Range  Outcome: Not Progressing     Problem: Acute Kidney Injury/Impairment  Goal: Fluid and Electrolyte Balance  Outcome: Not Progressing  Goal: Improved Oral Intake  Outcome: Not Progressing  Goal: Effective Renal Function  Outcome: Not Progressing     Problem: Fall Injury Risk  Goal: Absence of Fall and Fall-Related Injury  Outcome: Not Progressing     Problem: Comorbidity Management  Goal: Blood Pressure in Desired Range  Outcome: Not Progressing     Problem: Fatigue  Goal: Improved Activity Tolerance  Outcome: Not Progressing     Problem: Urinary Retention  Goal: Effective Urinary Elimination  Outcome: Not Progressing     Problem: Wound  Goal: Optimal Coping  Outcome: Not Progressing  Goal: Optimal Functional Ability  Outcome: Not Progressing  Goal: Absence of Infection Signs and Symptoms  Outcome: Not Progressing  Goal: Improved Oral Intake  Outcome: Not Progressing  Goal: Optimal Pain Control and Function  Outcome: Not Progressing  Goal: Skin Health and Integrity  Outcome: Not Progressing  Goal: Optimal Wound Healing  Outcome: Not Progressing

## 2025-04-15 NOTE — ASSESSMENT & PLAN NOTE
Admit to medical floor with telemetry  Patient's FSGs are uncontrolled due to hyperglycemia on current medication regimen.  Last A1c reviewed-   Lab Results   Component Value Date    HGBA1C 10.2 (H) 04/13/2025     Most recent fingerstick glucose reviewed-   Recent Labs   Lab 04/14/25  1606 04/14/25  2028 04/15/25  0628 04/15/25  1152   POCTGLUCOSE 311* 282* 266* 290*     Current correctional scale  Medium  Maintain anti-hyperglycemic dose as follows-   Antihyperglycemics (From admission, onward)      Start     Stop Route Frequency Ordered    04/16/25 0900  insulin glargine U-100 (Lantus) pen 20 Units         -- SubQ Daily 04/15/25 1256    04/15/25 1645  insulin aspart U-100 pen 7 Units         -- SubQ 3 times daily with meals 04/15/25 1256    04/15/25 0849  insulin aspart U-100 pen 0-5 Units         -- SubQ Before meals & nightly PRN 04/15/25 0749          Hold Oral hypoglycemics while patient is in the hospital.

## 2025-04-15 NOTE — SUBJECTIVE & OBJECTIVE
"Interval History: awake and alert, sitting up in bed, tolerated breakfast   Renal function slowly improving-continue IV fluid  Blood glucose still elevated-up titrate insulin  PT/OT    Review of Systems   Constitutional:  Positive for activity change.   Respiratory:  Negative for cough and shortness of breath.    Gastrointestinal:  Negative for nausea.   Psychiatric/Behavioral:  Negative for agitation.      Objective:     Vital Signs (Most Recent):  Temp: 99.8 °F (37.7 °C) (04/15/25 0313)  Pulse: 76 (04/15/25 0414)  Resp: 18 (04/15/25 0313)  BP: (!) 152/71 (04/15/25 0313)  SpO2: 96 % (04/15/25 0313) Vital Signs (24h Range):  Temp:  [98.7 °F (37.1 °C)-100.6 °F (38.1 °C)] 99.8 °F (37.7 °C)  Pulse:  [71-90] 76  Resp:  [16-24] 18  SpO2:  [94 %-100 %] 96 %  BP: (122-152)/(56-71) 152/71     Weight: 90.2 kg (198 lb 13.7 oz)  Body mass index is 26.24 kg/m².    Intake/Output Summary (Last 24 hours) at 4/15/2025 0743  Last data filed at 4/15/2025 0430  Gross per 24 hour   Intake 460 ml   Output 900 ml   Net -440 ml         Physical Exam  Constitutional:       Appearance: He is ill-appearing.   HENT:      Head: Normocephalic and atraumatic.   Cardiovascular:      Rate and Rhythm: Regular rhythm.      Heart sounds: Normal heart sounds.   Pulmonary:      Breath sounds: Normal breath sounds.   Abdominal:      Palpations: Abdomen is soft.   Musculoskeletal:         General: Normal range of motion.      Cervical back: Neck supple.   Skin:     General: Skin is warm.   Neurological:      General: No focal deficit present.      Mental Status: He is alert and oriented to person, place, and time.   Psychiatric:         Mood and Affect: Mood normal.               Significant Labs: A1C:   Recent Labs   Lab 04/13/25  2355   HGBA1C 10.2*     ABGs:   Recent Labs   Lab 04/13/25  2200   PH 7.429   PCO2 36.3   HCO3 24.0   POCSATURATED 86.9*   PO2 50.2     Blood Culture: No results for input(s): "LABBLOO" in the last 48 hours.  CBC:   Recent " "Labs   Lab 04/13/25 2150 04/14/25  0628 04/15/25  0454   WBC 14.06* 13.92* 13.46*   HGB 12.3* 11.0* 11.2*   HCT 36.6* 32.9* 33.8*    163 174     CMP:   Recent Labs   Lab 04/13/25 2150 04/14/25  0628   * 135*   K 4.4 4.0    103   CO2 18* 21*   BUN 39* 38*   CREATININE 3.0* 2.6*   CALCIUM 9.1 8.5*   ALBUMIN 3.0*  --    BILITOT 0.5  --    ALKPHOS 70  --    AST 40  --    ALT 31  --    ANIONGAP 16 11     Lactic Acid: No results for input(s): "LACTATE" in the last 48 hours.  Lipase: No results for input(s): "LIPASE" in the last 48 hours.  Lipid Panel: No results for input(s): "CHOL", "HDL", "LDLCALC", "TRIG", "CHOLHDL" in the last 48 hours.  Magnesium:   Recent Labs   Lab 04/13/25 2150   MG 1.9     Troponin: No results for input(s): "TROPONINI", "TROPONINIHS" in the last 48 hours.  TSH: No results for input(s): "TSH" in the last 4320 hours.  Urine Culture: No results for input(s): "LABURIN" in the last 48 hours.  Urine Studies: No results for input(s): "COLORU", "APPEARANCEUA", "PHUR", "SPECGRAV", "PROTEINUA", "GLUCUA", "KETONESU", "BILIRUBINUA", "OCCULTUA", "NITRITE", "UROBILINOGEN", "LEUKOCYTESUR", "RBCUA", "WBCUA", "BACTERIA", "SQUAMEPITHEL", "HYALINECASTS" in the last 48 hours.    Invalid input(s): "WRIGHTSUR"    Significant Imaging: I have reviewed all pertinent imaging results/findings within the past 24 hours.  "

## 2025-04-15 NOTE — ASSESSMENT & PLAN NOTE
MARGOTH is likely due to pre-renal azotemia due to intravascular volume depletion. Baseline creatinine is 1.9. Most recent creatinine and eGFR are listed below.  Recent Labs     04/13/25  2150 04/14/25  0628 04/15/25  0455   CREATININE 3.0* 2.6* 2.5*   EGFRNORACEVR 20* 24* 25*      Plan  - MARGOTH is improving. Will continue current treatment  - Avoid nephrotoxins and renally dose meds for GFR listed above  - Monitor urine output, serial BMP, and adjust therapy as needed  - monitor renal function closely.

## 2025-04-15 NOTE — PROGRESS NOTES
[facility] Medicine  Progress Note    Patient Name: Robi Donovan  MRN: [id number]  Patient Class: IP- Inpatient   Admission Date: 4/13/2025  Length of Stay: 1 days  Attending Physician: Velma Gilman*  Primary Care Provider: Nia, Primary Doctor        Subjective     Principal Problem:Type 2 diabetes mellitus with hyperglycemia        HPI:  Robi Donovan is a 79-year-old male with a past medical history of CVA with right side residual weakness, hypertension, DM2, hyperlipidemia and BPH who presents to emergency department for evaluation of hyperglycemia.  Patient's blood glucose was around the 500 ranges.  Patient states that he had influenza recently and is currently taking Tamiflu.  As per family patient had a spell where he seemed to space out however he did not fall or have any loss of consciousness.  In ED chest x-ray shows no acute infiltrate.  Labs showed blood glucose of 501, creatinine of 3 and WBCs 14.06.  Due to patient's presenting symptoms he will require admission for further evaluation and management. At  time my examination patient denied any headache, fever, chills, chest pain or shortness of breath.    Overview/Hospital Course:  No notes on file    Interval History: awake and alert, sitting up in bed, tolerated breakfast   Renal function slowly improving-continue IV fluid  Blood glucose still elevated-up titrate insulin  PT/OT    Review of Systems   Constitutional:  Positive for activity change.   Respiratory:  Negative for cough and shortness of breath.    Gastrointestinal:  Negative for nausea.   Psychiatric/Behavioral:  Negative for agitation.      Objective:     Vital Signs (Most Recent):  Temp: 99.8 °F (37.7 °C) (04/15/25 0313)  Pulse: 76 (04/15/25 0414)  Resp: 18 (04/15/25 0313)  BP: (!) 152/71 (04/15/25 0313)  SpO2: 96 % (04/15/25 0313) Vital Signs (24h Range):  Temp:  [98.7 °F (37.1 °C)-100.6 °F (38.1 °C)] 99.8 °F (37.7 °C)  Pulse:  [71-90] 76  Resp:  [16-24] 18  SpO2:   "[94 %-100 %] 96 %  BP: (122-152)/(56-71) 152/71     Weight: 90.2 kg (198 lb 13.7 oz)  Body mass index is 26.24 kg/m².    Intake/Output Summary (Last 24 hours) at 4/15/2025 0743  Last data filed at 4/15/2025 0430  Gross per 24 hour   Intake 460 ml   Output 900 ml   Net -440 ml         Physical Exam  Constitutional:       Appearance: He is ill-appearing.   HENT:      Head: Normocephalic and atraumatic.   Cardiovascular:      Rate and Rhythm: Regular rhythm.      Heart sounds: Normal heart sounds.   Pulmonary:      Breath sounds: Normal breath sounds.   Abdominal:      Palpations: Abdomen is soft.   Musculoskeletal:         General: Normal range of motion.      Cervical back: Neck supple.   Skin:     General: Skin is warm.   Neurological:      General: No focal deficit present.      Mental Status: He is alert and oriented to person, place, and time.   Psychiatric:         Mood and Affect: Mood normal.               Significant Labs: A1C:   Recent Labs   Lab 04/13/25  2355   HGBA1C 10.2*     ABGs:   Recent Labs   Lab 04/13/25  2200   PH 7.429   PCO2 36.3   HCO3 24.0   POCSATURATED 86.9*   PO2 50.2     Blood Culture: No results for input(s): "LABBLOO" in the last 48 hours.  CBC:   Recent Labs   Lab 04/13/25 2150 04/14/25  0628 04/15/25  0454   WBC 14.06* 13.92* 13.46*   HGB 12.3* 11.0* 11.2*   HCT 36.6* 32.9* 33.8*    163 174     CMP:   Recent Labs   Lab 04/13/25 2150 04/14/25  0628   * 135*   K 4.4 4.0    103   CO2 18* 21*   BUN 39* 38*   CREATININE 3.0* 2.6*   CALCIUM 9.1 8.5*   ALBUMIN 3.0*  --    BILITOT 0.5  --    ALKPHOS 70  --    AST 40  --    ALT 31  --    ANIONGAP 16 11     Lactic Acid: No results for input(s): "LACTATE" in the last 48 hours.  Lipase: No results for input(s): "LIPASE" in the last 48 hours.  Lipid Panel: No results for input(s): "CHOL", "HDL", "LDLCALC", "TRIG", "CHOLHDL" in the last 48 hours.  Magnesium:   Recent Labs   Lab 04/13/25  2150   MG 1.9     Troponin: No results " "for input(s): "TROPONINI", "TROPONINIHS" in the last 48 hours.  TSH: No results for input(s): "TSH" in the last 4320 hours.  Urine Culture: No results for input(s): "LABURIN" in the last 48 hours.  Urine Studies: No results for input(s): "COLORU", "APPEARANCEUA", "PHUR", "SPECGRAV", "PROTEINUA", "GLUCUA", "KETONESU", "BILIRUBINUA", "OCCULTUA", "NITRITE", "UROBILINOGEN", "LEUKOCYTESUR", "RBCUA", "WBCUA", "BACTERIA", "SQUAMEPITHEL", "HYALINECASTS" in the last 48 hours.    Invalid input(s): "WRIGHTSUR"    Significant Imaging: I have reviewed all pertinent imaging results/findings within the past 24 hours.      Assessment & Plan  Type 2 diabetes mellitus with hyperglycemia  Admit to medical floor with telemetry  Patient's FSGs are uncontrolled due to hyperglycemia on current medication regimen.  Last A1c reviewed-   Lab Results   Component Value Date    HGBA1C 10.2 (H) 04/13/2025     Most recent fingerstick glucose reviewed-   Recent Labs   Lab 04/14/25  1606 04/14/25  2028 04/15/25  0628 04/15/25  1152   POCTGLUCOSE 311* 282* 266* 290*     Current correctional scale  Medium  Maintain anti-hyperglycemic dose as follows-   Antihyperglycemics (From admission, onward)      Start     Stop Route Frequency Ordered    04/16/25 0900  insulin glargine U-100 (Lantus) pen 20 Units         -- SubQ Daily 04/15/25 1256    04/15/25 1645  insulin aspart U-100 pen 7 Units         -- SubQ 3 times daily with meals 04/15/25 1256    04/15/25 0849  insulin aspart U-100 pen 0-5 Units         -- SubQ Before meals & nightly PRN 04/15/25 0749          Hold Oral hypoglycemics while patient is in the hospital.  Acute kidney injury superimposed on CKD  MARGOTH is likely due to pre-renal azotemia due to intravascular volume depletion. Baseline creatinine is  1.9 . Most recent creatinine and eGFR are listed below.  Recent Labs     04/13/25  2150 04/14/25  0628 04/15/25  0455   CREATININE 3.0* 2.6* 2.5*   EGFRNORACEVR 20* 24* 25*      Plan  - MARGOTH is " improving. Will continue current treatment  - Avoid nephrotoxins and renally dose meds for GFR listed above  - Monitor urine output, serial BMP, and adjust therapy as needed  - monitor renal function closely.  HLD (hyperlipidemia)  Continue with home dose of statin.    Influenza  Finish course of Tamiflu.  4/14 last day    HTN (hypertension)  Patient's blood pressure range in the last 24 hours was: BP  Min: 137/65  Max: 162/71.The patient's inpatient anti-hypertensive regimen is listed below:  Current Antihypertensives  amLODIPine tablet 10 mg, Daily, Oral  carvediloL tablet 6.25 mg, 2 times daily, Oral  , Daily, Oral    Plan  - BP is controlled, no changes needed to their regimen  - monitor blood pressure closely.  Anemia  Anemia is likely due to chronic blood loss. Most recent hemoglobin and hematocrit are listed below.  Recent Labs     04/13/25  2150 04/14/25  0628 04/15/25  0454   HGB 12.3* 11.0* 11.2*   HCT 36.6* 32.9* 33.8*     Plan  - Monitor serial CBC: Daily  - Transfuse PRBC if patient becomes hemodynamically unstable, symptomatic or H/H drops below 7/21.  - Patient has not received any PRBC transfusions to date  - Patient's anemia is currently stable     Hyponatremia  Hyponatremia is likely due to Dehydration/hypovolemia. The patient's most recent sodium results are listed below.  Recent Labs     04/13/25  2150 04/14/25  0628 04/15/25  0455   * 135* 133*     Plan  - Correct the sodium by 4-6mEq in 24 hours.   - Obtain the following studies: Urine sodium, urine osmolality, serum osmolality.  - Will treat the hyponatremia with IV fluids as follows:   mL/hour/gradually improving  - Monitor sodium Daily.   - Patient hyponatremia is improving     VTE Risk Mitigation (From admission, onward)           Ordered     heparin (porcine) injection 5,000 Units  Every 8 hours         04/14/25 0915     IP VTE HIGH RISK PATIENT  Once         04/13/25 1753     Place sequential compression device  Until  discontinued         04/13/25 2325                    Discharge Planning   SUSY: 4/16/2025     Code Status: Full Code   Medical Readiness for Discharge Date:                    Please place Justification for DME        Velma Gilman MD  Department of Hospital Medicine   [facility]

## 2025-04-15 NOTE — PLAN OF CARE
went to meet with patient. Patient's daughter Lyla at bedside. Lyla lives with patient. Patient reports he is independent, but he does have a rolling walker and glucometer that he uses. Patient does not have Home Health, but he has done OP PT in the past. Patient's PCP is at [facility].  to message [facility] nurse for follow-up appointment. Patient still drives, but family will transport home at discharge. Patient and daughter encouraged to call with any questions or concerns.  will continue to follow patient through transitions of care and assist with any discharge needs.     Other Contacts    Name Relation Home Work Mobile   JEOVANY OSORIO Daughter   [phone]   LYLA HOROWITZ Daughter   [phone]     Future Appointments   Date Time Provider Department Center   5/6/2025 10:00 AM [facility]  CT1 OC [facility]SCAN [place]   5/6/2025 10:30 AM [facility]  MRI1 OC MRI [place]   5/8/2025 11:30 AM Juan Luis Mcbrdie DO [facility] NEUSRG [place]         04/15/25 1400   Discharge Assessment   Assessment Type Discharge Planning Assessment   Confirmed/corrected address, phone number and insurance Yes   Confirmed Demographics Correct on Facesheet   Source of Information patient;family;health record   People in Home child(luis), adult   Do you expect to return to your current living situation? Yes   Do you have help at home or someone to help you manage your care at home? Yes   Who are your caregiver(s) and their phone number(s)? JEOVANY OSORIO Daughter   [phone]  LYLA HOROWITZ Daughter   [phone]   Prior to hospitilization cognitive status: Alert/Oriented   Current cognitive status: Alert/Oriented   Walking or Climbing Stairs Difficulty yes   Walking or Climbing Stairs ambulation difficulty, requires equipment   Dressing/Bathing Difficulty no   Equipment Currently Used at Home glucometer;walker, rolling   Do you take prescription medications? Yes   Do you have prescription coverage? Yes   Do  you have any problems affording any of your prescribed medications? No   Is the patient taking medications as prescribed? yes   How do you get to doctors appointments? car, drives self;family or friend will provide   Are you on dialysis? No   Do you take coumadin? No   Discharge Plan A Home with family   Discharge Plan B Home with family;Home Health   DME Needed Upon Discharge  none   Discharge Plan discussed with: Patient;Adult children   Transition of Care Barriers None   Physical Activity   On average, how many days per week do you engage in moderate to strenuous exercise (like a brisk walk)? Pt Declined   On average, how many minutes do you engage in exercise at this level? Pt Declined   Financial Resource Strain   How hard is it for you to pay for the very basics like food, housing, medical care, and heating? Not hard   Housing Stability   In the last 12 months, was there a time when you were not able to pay the mortgage or rent on time? N   At any time in the past 12 months, were you homeless or living in a shelter (including now)? N   Transportation Needs   In the past 12 months, has lack of transportation kept you from medical appointments or from getting medications? no   In the past 12 months, has lack of transportation kept you from meetings, work, or from getting things needed for daily living? No   Stress   Do you feel stress - tense, restless, nervous, or anxious, or unable to sleep at night because your mind is troubled all the time - these days? Pt Declined     Idalia Kang RN    [phone]

## 2025-04-15 NOTE — PROGRESS NOTES
04/15/25 0636   Admission   Initial VN Admission Questions Complete   Communication Issues? None   Shift   Virtual Nurse - Patient Verbalized Approval Of Camera Use   Type of Frequent Check   Type Other (see comments)   Safety/Activity   Patient Rounds bed in low position   Pain/Comfort/Sleep   Sleep/Rest/Relaxation awake

## 2025-04-16 PROBLEM — R23.4 SCAB: Status: ACTIVE | Noted: 2025-04-16

## 2025-04-16 PROBLEM — S91.106A: Status: ACTIVE | Noted: 2025-04-16

## 2025-04-16 LAB
ABSOLUTE NEUTROPHIL MANUAL (OHS): 7 K/UL
ANION GAP (OHS): 10 MMOL/L (ref 8–16)
BASOPHILS NFR BLD MANUAL: 2 %
BUN SERPL-MCNC: 26 MG/DL (ref 8–23)
CALCIUM SERPL-MCNC: 8.5 MG/DL (ref 8.7–10.5)
CHLORIDE SERPL-SCNC: 101 MMOL/L (ref 95–110)
CO2 SERPL-SCNC: 21 MMOL/L (ref 23–29)
CREAT SERPL-MCNC: 2.2 MG/DL (ref 0.5–1.4)
EOSINOPHIL NFR BLD MANUAL: 3 % (ref 0–8)
ERYTHROCYTE [DISTWIDTH] IN BLOOD BY AUTOMATED COUNT: 14.5 % (ref 11.5–14.5)
GFR SERPLBLD CREATININE-BSD FMLA CKD-EPI: 30 ML/MIN/1.73/M2
GLUCOSE SERPL-MCNC: 283 MG/DL (ref 70–110)
HCT VFR BLD AUTO: 30.8 % (ref 40–54)
HGB BLD-MCNC: 10.5 GM/DL (ref 14–18)
LYMPHOCYTES NFR BLD MANUAL: 31 % (ref 18–48)
MCH RBC QN AUTO: 29.7 PG (ref 27–31)
MCHC RBC AUTO-ENTMCNC: 34.1 G/DL (ref 32–36)
MCV RBC AUTO: 87 FL (ref 82–98)
MONOCYTES NFR BLD MANUAL: 5 % (ref 4–15)
NEUTROPHILS NFR BLD MANUAL: 59 % (ref 38–73)
NUCLEATED RBC (/100WBC) (OHS): 0 /100 WBC
PLATELET # BLD AUTO: 197 K/UL (ref 150–450)
PLATELET BLD QL SMEAR: ABNORMAL
PMV BLD AUTO: 10.8 FL (ref 9.2–12.9)
POCT GLUCOSE: 281 MG/DL (ref 70–110)
POCT GLUCOSE: 299 MG/DL (ref 70–110)
POCT GLUCOSE: 311 MG/DL (ref 70–110)
POCT GLUCOSE: 326 MG/DL (ref 70–110)
POCT GLUCOSE: 339 MG/DL (ref 70–110)
POTASSIUM SERPL-SCNC: 3.9 MMOL/L (ref 3.5–5.1)
RBC # BLD AUTO: 3.53 M/UL (ref 4.6–6.2)
SODIUM SERPL-SCNC: 132 MMOL/L (ref 136–145)
WBC # BLD AUTO: 11.82 K/UL (ref 3.9–12.7)

## 2025-04-16 PROCEDURE — 97161 PT EVAL LOW COMPLEX 20 MIN: CPT

## 2025-04-16 PROCEDURE — 63600175 PHARM REV CODE 636 W HCPCS

## 2025-04-16 PROCEDURE — 25000003 PHARM REV CODE 250: Performed by: NURSE PRACTITIONER

## 2025-04-16 PROCEDURE — 97165 OT EVAL LOW COMPLEX 30 MIN: CPT

## 2025-04-16 PROCEDURE — 97530 THERAPEUTIC ACTIVITIES: CPT

## 2025-04-16 PROCEDURE — 11000001 HC ACUTE MED/SURG PRIVATE ROOM

## 2025-04-16 PROCEDURE — 99222 1ST HOSP IP/OBS MODERATE 55: CPT | Mod: ,,, | Performed by: PODIATRIST

## 2025-04-16 PROCEDURE — 25000003 PHARM REV CODE 250

## 2025-04-16 PROCEDURE — 97535 SELF CARE MNGMENT TRAINING: CPT

## 2025-04-16 PROCEDURE — 85025 COMPLETE CBC W/AUTO DIFF WBC: CPT | Performed by: FAMILY MEDICINE

## 2025-04-16 PROCEDURE — 25000003 PHARM REV CODE 250: Performed by: FAMILY MEDICINE

## 2025-04-16 PROCEDURE — 80048 BASIC METABOLIC PNL TOTAL CA: CPT | Performed by: FAMILY MEDICINE

## 2025-04-16 PROCEDURE — 36415 COLL VENOUS BLD VENIPUNCTURE: CPT | Performed by: FAMILY MEDICINE

## 2025-04-16 RX ORDER — GUAIFENESIN 100 MG/5ML
200 LIQUID ORAL EVERY 4 HOURS PRN
Status: DISCONTINUED | OUTPATIENT
Start: 2025-04-16 | End: 2025-04-17 | Stop reason: HOSPADM

## 2025-04-16 RX ORDER — ALUMINUM HYDROXIDE, MAGNESIUM HYDROXIDE, AND SIMETHICONE 1200; 120; 1200 MG/30ML; MG/30ML; MG/30ML
30 SUSPENSION ORAL EVERY 6 HOURS PRN
Status: DISCONTINUED | OUTPATIENT
Start: 2025-04-16 | End: 2025-04-17 | Stop reason: HOSPADM

## 2025-04-16 RX ADMIN — SODIUM CHLORIDE, POTASSIUM CHLORIDE, SODIUM LACTATE AND CALCIUM CHLORIDE: 600; 310; 30; 20 INJECTION, SOLUTION INTRAVENOUS at 03:04

## 2025-04-16 RX ADMIN — Medication 400 ML: at 01:04

## 2025-04-16 RX ADMIN — Medication 400 ML: at 06:04

## 2025-04-16 RX ADMIN — HEPARIN SODIUM 5000 UNITS: 5000 INJECTION INTRAVENOUS; SUBCUTANEOUS at 09:04

## 2025-04-16 RX ADMIN — ALUMINUM HYDROXIDE, MAGNESIUM HYDROXIDE, AND SIMETHICONE 30 ML: 200; 200; 20 SUSPENSION ORAL at 09:04

## 2025-04-16 RX ADMIN — ACETAMINOPHEN 650 MG: 325 TABLET ORAL at 03:04

## 2025-04-16 RX ADMIN — INSULIN GLARGINE 20 UNITS: 100 INJECTION, SOLUTION SUBCUTANEOUS at 09:04

## 2025-04-16 RX ADMIN — ACETAMINOPHEN 650 MG: 325 TABLET ORAL at 04:04

## 2025-04-16 RX ADMIN — CARVEDILOL 6.25 MG: 6.25 TABLET, FILM COATED ORAL at 09:04

## 2025-04-16 RX ADMIN — INSULIN ASPART 3 UNITS: 100 INJECTION, SOLUTION INTRAVENOUS; SUBCUTANEOUS at 09:04

## 2025-04-16 RX ADMIN — Medication 400 ML: at 02:04

## 2025-04-16 RX ADMIN — Medication 400 ML: at 09:04

## 2025-04-16 RX ADMIN — ATORVASTATIN CALCIUM 40 MG: 40 TABLET, FILM COATED ORAL at 09:04

## 2025-04-16 RX ADMIN — TAMSULOSIN HYDROCHLORIDE 0.4 MG: 0.4 CAPSULE ORAL at 09:04

## 2025-04-16 RX ADMIN — SODIUM CHLORIDE, POTASSIUM CHLORIDE, SODIUM LACTATE AND CALCIUM CHLORIDE: 600; 310; 30; 20 INJECTION, SOLUTION INTRAVENOUS at 01:04

## 2025-04-16 RX ADMIN — AMLODIPINE BESYLATE 10 MG: 5 TABLET ORAL at 09:04

## 2025-04-16 RX ADMIN — INSULIN ASPART 7 UNITS: 100 INJECTION, SOLUTION INTRAVENOUS; SUBCUTANEOUS at 12:04

## 2025-04-16 RX ADMIN — HEPARIN SODIUM 5000 UNITS: 5000 INJECTION INTRAVENOUS; SUBCUTANEOUS at 06:04

## 2025-04-16 RX ADMIN — GUAIFENESIN 200 MG: 200 SOLUTION ORAL at 01:04

## 2025-04-16 RX ADMIN — INSULIN ASPART 7 UNITS: 100 INJECTION, SOLUTION INTRAVENOUS; SUBCUTANEOUS at 09:04

## 2025-04-16 RX ADMIN — INSULIN ASPART 4 UNITS: 100 INJECTION, SOLUTION INTRAVENOUS; SUBCUTANEOUS at 05:04

## 2025-04-16 RX ADMIN — INSULIN ASPART 1 UNITS: 100 INJECTION, SOLUTION INTRAVENOUS; SUBCUTANEOUS at 09:04

## 2025-04-16 RX ADMIN — FINASTERIDE 5 MG: 5 TABLET, FILM COATED ORAL at 09:04

## 2025-04-16 RX ADMIN — HEPARIN SODIUM 5000 UNITS: 5000 INJECTION INTRAVENOUS; SUBCUTANEOUS at 02:04

## 2025-04-16 RX ADMIN — INSULIN ASPART 4 UNITS: 100 INJECTION, SOLUTION INTRAVENOUS; SUBCUTANEOUS at 12:04

## 2025-04-16 RX ADMIN — INSULIN ASPART 7 UNITS: 100 INJECTION, SOLUTION INTRAVENOUS; SUBCUTANEOUS at 05:04

## 2025-04-16 NOTE — ASSESSMENT & PLAN NOTE
Left toe x-ray   Consult podiatry-appreciate recs-given history of diabetes and nonpalpable pulse, concern for high-risk for nonhealing wounds-recs  Arterial ultrasound-Areas of focal probable severe stenosis about the left greater than right mid superficial femoral arteries.  Scattered biphasic and triphasic waveforms throughout the visualized lower extremities, in keeping with peripheral vascular disease  -Outpatient follow-up with Interventional Cardiology or vascular surgery if abnormal  Lives CAD intact with Betadine paint p.r.n.  Outpatient follow-up with podiatrist

## 2025-04-16 NOTE — ASSESSMENT & PLAN NOTE
Hyponatremia is likely due to Dehydration/hypovolemia. The patient's most recent sodium results are listed below.  Recent Labs     04/14/25  0628 04/15/25  0455 04/16/25  0600   * 133* 132*     Plan  - Correct the sodium by 4-6mEq in 24 hours.   - Obtain the following studies: Urine sodium, urine osmolality, serum osmolality.  - Will treat the hyponatremia with IV fluids as follows:   mL/hour/gradually improving  - Monitor sodium Daily.   - Patient hyponatremia is improving

## 2025-04-16 NOTE — PROGRESS NOTES
[facility] Medicine  Progress Note    Patient Name: Robi Donovan  MRN: [id number]  Patient Class: IP- Inpatient   Admission Date: 4/13/2025  Length of Stay: 2 days  Attending Physician: Velma Gilman*  Primary Care Provider: Nia, Primary Doctor        Subjective     Principal Problem:Type 2 diabetes mellitus with hyperglycemia        HPI:  Robi Donovan is a 79-year-old male with a past medical history of CVA with right side residual weakness, hypertension, DM2, hyperlipidemia and BPH who presents to emergency department for evaluation of hyperglycemia.  Patient's blood glucose was around the 500 ranges.  Patient states that he had influenza recently and is currently taking Tamiflu.  As per family patient had a spell where he seemed to space out however he did not fall or have any loss of consciousness.  In ED chest x-ray shows no acute infiltrate.  Labs showed blood glucose of 501, creatinine of 3 and WBCs 14.06.  Due to patient's presenting symptoms he will require admission for further evaluation and management. At  time my examination patient denied any headache, fever, chills, chest pain or shortness of breath.    Overview/Hospital Course:  No notes on file    Interval History: awake and alert, sitting up in bed, eating.   Reported fever overnight, WBC downtrending.  No pending cultures.  Recently completed Tamiflu on 04/14  Report dark urine today, patient with increased intake and IV fluid ongoing   Renal function slowly improving-continue IV fluid  Blood glucose slowly downtrending-continue present insulin regimen  PT/OT  Updated family by bedside, questions and concerns were addressed    Review of Systems   Constitutional:  Positive for activity change.   Respiratory:  Negative for cough and shortness of breath.    Gastrointestinal:  Negative for nausea.   Psychiatric/Behavioral:  Negative for agitation.      Objective:     Vital Signs (Most Recent):  Temp: 99.3 °F (37.4 °C) (04/16/25  "0804)  Pulse: 75 (04/16/25 1138)  Resp: 18 (04/16/25 1138)  BP: 133/71 (04/16/25 1138)  SpO2: 98 % (04/16/25 1138) Vital Signs (24h Range):  Temp:  [98.9 °F (37.2 °C)-102.6 °F (39.2 °C)] 99.3 °F (37.4 °C)  Pulse:  [64-95] 75  Resp:  [15-18] 18  SpO2:  [96 %-99 %] 98 %  BP: (133-164)/(68-76) 133/71     Weight: 90.2 kg (198 lb 13.7 oz)  Body mass index is 26.24 kg/m².    Intake/Output Summary (Last 24 hours) at 4/16/2025 1203  Last data filed at 4/16/2025 0616  Gross per 24 hour   Intake 5005.43 ml   Output 2350 ml   Net 2655.43 ml         Physical Exam  Constitutional:       Appearance: He is ill-appearing.   HENT:      Head: Normocephalic and atraumatic.   Cardiovascular:      Rate and Rhythm: Regular rhythm.      Heart sounds: Normal heart sounds.   Pulmonary:      Breath sounds: Normal breath sounds.   Abdominal:      Palpations: Abdomen is soft.   Musculoskeletal:         General: Normal range of motion.      Cervical back: Neck supple.   Skin:     General: Skin is warm.   Neurological:      General: No focal deficit present.      Mental Status: He is alert and oriented to person, place, and time.   Psychiatric:         Mood and Affect: Mood normal.               Significant Labs: A1C:   Recent Labs   Lab 04/13/25  2355   HGBA1C 10.2*     ABGs:   No results for input(s): "PH", "PCO2", "HCO3", "POCSATURATED", "BE", "TOTALHB", "COHB", "METHB", "O2HB", "POCFIO2", "PO2" in the last 48 hours.    Blood Culture: No results for input(s): "LABBLOO" in the last 48 hours.  CBC:   Recent Labs   Lab 04/15/25  0454 04/16/25  0600   WBC 13.46* 11.82   HGB 11.2* 10.5*   HCT 33.8* 30.8*    197     CMP:   Recent Labs   Lab 04/15/25  0455 04/16/25  0600   * 132*   K 4.1 3.9    101   CO2 20* 21*   BUN 34* 26*   CREATININE 2.5* 2.2*   CALCIUM 8.6* 8.5*   ANIONGAP 12 10     Lactic Acid: No results for input(s): "LACTATE" in the last 48 hours.  Lipase: No results for input(s): "LIPASE" in the last 48 hours.  Lipid " "Panel: No results for input(s): "CHOL", "HDL", "LDLCALC", "TRIG", "CHOLHDL" in the last 48 hours.  Magnesium:   No results for input(s): "MG" in the last 48 hours.    Troponin: No results for input(s): "TROPONINI", "TROPONINIHS" in the last 48 hours.  TSH: No results for input(s): "TSH" in the last 4320 hours.  Urine Culture: No results for input(s): "LABURIN" in the last 48 hours.  Urine Studies: No results for input(s): "COLORU", "APPEARANCEUA", "PHUR", "SPECGRAV", "PROTEINUA", "GLUCUA", "KETONESU", "BILIRUBINUA", "OCCULTUA", "NITRITE", "UROBILINOGEN", "LEUKOCYTESUR", "RBCUA", "WBCUA", "BACTERIA", "SQUAMEPITHEL", "HYALINECASTS" in the last 48 hours.    Invalid input(s): "WRIGHTSUR"    Significant Imaging: I have reviewed all pertinent imaging results/findings within the past 24 hours.      Assessment & Plan  Type 2 diabetes mellitus with hyperglycemia  Admit to medical floor with telemetry  Patient's FSGs are uncontrolled due to hyperglycemia on current medication regimen.  Last A1c reviewed-   Lab Results   Component Value Date    HGBA1C 10.2 (H) 04/13/2025     Most recent fingerstick glucose reviewed-   Recent Labs   Lab 04/15/25  1646 04/15/25  2036 04/16/25  0611 04/16/25  1136   POCTGLUCOSE 279* 307* 281* 311*     Current correctional scale  Medium  Maintain anti-hyperglycemic dose as follows-   Antihyperglycemics (From admission, onward)      Start     Stop Route Frequency Ordered    04/16/25 0900  insulin glargine U-100 (Lantus) pen 20 Units         -- SubQ Daily 04/15/25 1256    04/15/25 1645  insulin aspart U-100 pen 7 Units         -- SubQ 3 times daily with meals 04/15/25 1256    04/15/25 0849  insulin aspart U-100 pen 0-5 Units         -- SubQ Before meals & nightly PRN 04/15/25 0749          Hold Oral hypoglycemics while patient is in the hospital.  Acute kidney injury superimposed on CKD  MARGOTH is likely due to pre-renal azotemia due to intravascular volume depletion. Baseline creatinine is  1.9 . Most " recent creatinine and eGFR are listed below.  Recent Labs     04/14/25  0628 04/15/25  0455 04/16/25  0600   CREATININE 2.6* 2.5* 2.2*   EGFRNORACEVR 24* 25* 30*      Plan  - MARGOTH is improving. Will continue current treatment  - Avoid nephrotoxins and renally dose meds for GFR listed above  - Monitor urine output, serial BMP, and adjust therapy as needed  - monitor renal function closely.  HLD (hyperlipidemia)  Continue with home dose of statin.    Influenza  Finish course of Tamiflu.  4/14 last day    HTN (hypertension)  Patient's blood pressure range in the last 24 hours was: BP  Min: 133/71  Max: 164/76.The patient's inpatient anti-hypertensive regimen is listed below:  Current Antihypertensives  amLODIPine tablet 10 mg, Daily, Oral  carvediloL tablet 6.25 mg, 2 times daily, Oral  , Daily, Oral    Plan  - BP is controlled, no changes needed to their regimen  - monitor blood pressure closely.  Anemia  Anemia is likely due to chronic blood loss. Most recent hemoglobin and hematocrit are listed below.  Recent Labs     04/14/25 0628 04/15/25  0454 04/16/25  0600   HGB 11.0* 11.2* 10.5*   HCT 32.9* 33.8* 30.8*     Plan  - Monitor serial CBC: Daily  - Transfuse PRBC if patient becomes hemodynamically unstable, symptomatic or H/H drops below 7/21.  - Patient has not received any PRBC transfusions to date  - Patient's anemia is currently stable     Hyponatremia  Hyponatremia is likely due to Dehydration/hypovolemia. The patient's most recent sodium results are listed below.  Recent Labs     04/14/25 0628 04/15/25  0455 04/16/25  0600   * 133* 132*     Plan  - Correct the sodium by 4-6mEq in 24 hours.   - Obtain the following studies: Urine sodium, urine osmolality, serum osmolality.  - Will treat the hyponatremia with IV fluids as follows:   mL/hour/gradually improving  - Monitor sodium Daily.   - Patient hyponatremia is improving     VTE Risk Mitigation (From admission, onward)           Ordered     heparin  (porcine) injection 5,000 Units  Every 8 hours         04/14/25 0915     IP VTE HIGH RISK PATIENT  Once         04/13/25 2325     Place sequential compression device  Until discontinued         04/13/25 2325                    Discharge Planning   SUSY: 4/17/2025     Code Status: Full Code   Medical Readiness for Discharge Date:   Discharge Plan A: Home with family                Please place Justification for DME        Velma Gilman MD  Department of Hospital Medicine   [facility]

## 2025-04-16 NOTE — ASSESSMENT & PLAN NOTE
MARGOTH is likely due to pre-renal azotemia due to intravascular volume depletion. Baseline creatinine is 1.9. Most recent creatinine and eGFR are listed below.  Recent Labs     04/14/25  0628 04/15/25  0455 04/16/25  0600   CREATININE 2.6* 2.5* 2.2*   EGFRNORACEVR 24* 25* 30*      Plan  - MARGOTH is improving. Will continue current treatment  - Avoid nephrotoxins and renally dose meds for GFR listed above  - Monitor urine output, serial BMP, and adjust therapy as needed  - monitor renal function closely.

## 2025-04-16 NOTE — SUBJECTIVE & OBJECTIVE
Interval History: awake and alert, sitting up in bed, eating.   Reported fever overnight, WBC downtrending.  No pending cultures.  Recently completed Tamiflu on 04/14  Report dark urine today, patient with increased intake and IV fluid ongoing   Renal function slowly improving-continue IV fluid  Blood glucose slowly downtrending-continue present insulin regimen  PT/OT  Updated family by bedside, questions and concerns were addressed    Review of Systems   Constitutional:  Positive for activity change.   Respiratory:  Negative for cough and shortness of breath.    Gastrointestinal:  Negative for nausea.   Psychiatric/Behavioral:  Negative for agitation.      Objective:     Vital Signs (Most Recent):  Temp: 99.3 °F (37.4 °C) (04/16/25 0804)  Pulse: 75 (04/16/25 1138)  Resp: 18 (04/16/25 1138)  BP: 133/71 (04/16/25 1138)  SpO2: 98 % (04/16/25 1138) Vital Signs (24h Range):  Temp:  [98.9 °F (37.2 °C)-102.6 °F (39.2 °C)] 99.3 °F (37.4 °C)  Pulse:  [64-95] 75  Resp:  [15-18] 18  SpO2:  [96 %-99 %] 98 %  BP: (133-164)/(68-76) 133/71     Weight: 90.2 kg (198 lb 13.7 oz)  Body mass index is 26.24 kg/m².    Intake/Output Summary (Last 24 hours) at 4/16/2025 1203  Last data filed at 4/16/2025 0616  Gross per 24 hour   Intake 5005.43 ml   Output 2350 ml   Net 2655.43 ml         Physical Exam  Constitutional:       Appearance: He is ill-appearing.   HENT:      Head: Normocephalic and atraumatic.   Cardiovascular:      Rate and Rhythm: Regular rhythm.      Heart sounds: Normal heart sounds.   Pulmonary:      Breath sounds: Normal breath sounds.   Abdominal:      Palpations: Abdomen is soft.   Musculoskeletal:         General: Normal range of motion.      Cervical back: Neck supple.   Skin:     General: Skin is warm.   Neurological:      General: No focal deficit present.      Mental Status: He is alert and oriented to person, place, and time.   Psychiatric:         Mood and Affect: Mood normal.               Significant Labs:  "A1C:   Recent Labs   Lab 04/13/25  2355   HGBA1C 10.2*     ABGs:   No results for input(s): "PH", "PCO2", "HCO3", "POCSATURATED", "BE", "TOTALHB", "COHB", "METHB", "O2HB", "POCFIO2", "PO2" in the last 48 hours.    Blood Culture: No results for input(s): "LABBLOO" in the last 48 hours.  CBC:   Recent Labs   Lab 04/15/25  0454 04/16/25  0600   WBC 13.46* 11.82   HGB 11.2* 10.5*   HCT 33.8* 30.8*    197     CMP:   Recent Labs   Lab 04/15/25  0455 04/16/25  0600   * 132*   K 4.1 3.9    101   CO2 20* 21*   BUN 34* 26*   CREATININE 2.5* 2.2*   CALCIUM 8.6* 8.5*   ANIONGAP 12 10     Lactic Acid: No results for input(s): "LACTATE" in the last 48 hours.  Lipase: No results for input(s): "LIPASE" in the last 48 hours.  Lipid Panel: No results for input(s): "CHOL", "HDL", "LDLCALC", "TRIG", "CHOLHDL" in the last 48 hours.  Magnesium:   No results for input(s): "MG" in the last 48 hours.    Troponin: No results for input(s): "TROPONINI", "TROPONINIHS" in the last 48 hours.  TSH: No results for input(s): "TSH" in the last 4320 hours.  Urine Culture: No results for input(s): "LABURIN" in the last 48 hours.  Urine Studies: No results for input(s): "COLORU", "APPEARANCEUA", "PHUR", "SPECGRAV", "PROTEINUA", "GLUCUA", "KETONESU", "BILIRUBINUA", "OCCULTUA", "NITRITE", "UROBILINOGEN", "LEUKOCYTESUR", "RBCUA", "WBCUA", "BACTERIA", "SQUAMEPITHEL", "HYALINECASTS" in the last 48 hours.    Invalid input(s): "WRIGHTSUR"    Significant Imaging: I have reviewed all pertinent imaging results/findings within the past 24 hours.  "

## 2025-04-16 NOTE — PLAN OF CARE
went to meet with patient. Patient's daughter Lyla at bedside. We discussed discharge plans after therapy worked with patient. I educated both of SNF placement and insurance approval. Patient and daughter are declining placement. They reported patient did not have his walker with him and his boot he normally wears. Even though patient has steps to his home (5), both patient and family are ok with going home. They both agreed on Outpatient Therapy that patient has recently finished. Patient prefers to go to [facility] for his therapy. I also told them I have been speaking with Laurita Warren. She will schedule follow-up once we have a discharge date. Patient and daughter encouraged to call with any questions or concerns.  will continue to follow patient through transitions of care and assist with any discharge needs.     Other Contacts    Name Relation Home Work Mobile   JEOVANY OSORIO Daughter   [phone]   LYLA HOROWITZ Daughter   [phone]     Future Appointments   Date Time Provider Department Center   5/6/2025 10:00 AM [facility]  CT1 [facility] CTSCAN [place]   5/6/2025 10:30 AM [facility]  MRI1 [facility] [place]   5/8/2025 11:30 AM Juan Luis Mcbride,  OC[facility] NEUSRG [place]         04/16/25 1317   Discharge Reassessment   Assessment Type Discharge Planning Reassessment   Did the patient's condition or plan change since previous assessment? Yes   Discharge Plan discussed with: Patient;Adult children   Discharge Plan A Home with family  (OP PT/OT)   Discharge Plan B Home with family;Home Health   DME Needed Upon Discharge  other (see comments)  (TBD)   Transition of Care Barriers None   Why the patient remains in the hospital Requires continued medical care   Post-Acute Status   Discharge Delays None known at this time     Idalia Kang RN    [phone]    Patient is here with mom and dad.    If any information or results need to be relayed from today's visit, the best way to contact the patient is via [facility]

## 2025-04-16 NOTE — PLAN OF CARE
OT evaluation completed, coeval with PT 2/2 anticipated low activity tolerance. Patient with baseline PLOF per patient report and per daughter report whom he resides with of being modified independent with bed mobility via mechanical bed with no rails, and able to perform functional mobility / ADLs with modified independence via furniture surf methods/ via use of rollator in the home, and rollator outside the home. 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 Patient endorses no recent falls, but does endorse decreased energy levels from recent flu illness. On evaluation noted to have L fourth toe bandaged - states last week went to podiatry and has not unbandaged since (escalated via secure chat to primary hospitalist Dr Kline). On evaluation patient able to perform bed mobility with increased time, SBA, use of compensatory movements, sit to stand from bed level and minimal functional mobility to bedside chair with rolling walker and minimal assist (with makeshift ace wrap R dorsiflexion assist device), sit to stand from lower surface with moderate assist, and LB ADLs with minimal to moderate assist. Will continue to follow. At this time recommend moderate intensity therapy as patient at safety/fall risk and has 5 steps to enter home.    Problem: Occupational Therapy  Goal: Occupational Therapy Goal  Description: Goals to be met by: 5/14/2025     Patient will increase functional independence with ADLs by performing:    UE Dressing with Set-up Assistance.  LE Dressing with Stand-by Assistance, increased time, inclusive of donning easy on footwear  Grooming while standing with Stand-by Assistance and minimal verbal cues  Toileting from toilet with Stand-by Assistance for hygiene and clothing management.   Toilet transfer to bedside commode with Stand-by  Assistance.  Functional mobility inclusive of transfer to / from toilet with SBA and use of least restrictive device.    Outcome: Progressing

## 2025-04-16 NOTE — ASSESSMENT & PLAN NOTE
Anemia is likely due to chronic blood loss. Most recent hemoglobin and hematocrit are listed below.  Recent Labs     04/14/25  0628 04/15/25  0454 04/16/25  0600   HGB 11.0* 11.2* 10.5*   HCT 32.9* 33.8* 30.8*     Plan  - Monitor serial CBC: Daily  - Transfuse PRBC if patient becomes hemodynamically unstable, symptomatic or H/H drops below 7/21.  - Patient has not received any PRBC transfusions to date  - Patient's anemia is currently stable

## 2025-04-16 NOTE — ASSESSMENT & PLAN NOTE
Admit to medical floor with telemetry  Patient's FSGs are uncontrolled due to hyperglycemia on current medication regimen.  Last A1c reviewed-   Lab Results   Component Value Date    HGBA1C 10.2 (H) 04/13/2025     Most recent fingerstick glucose reviewed-   Recent Labs   Lab 04/15/25  1646 04/15/25  2036 04/16/25  0611 04/16/25  1136   POCTGLUCOSE 279* 307* 281* 311*     Current correctional scale  Medium  Maintain anti-hyperglycemic dose as follows-   Antihyperglycemics (From admission, onward)      Start     Stop Route Frequency Ordered    04/16/25 0900  insulin glargine U-100 (Lantus) pen 20 Units         -- SubQ Daily 04/15/25 1256    04/15/25 1645  insulin aspart U-100 pen 7 Units         -- SubQ 3 times daily with meals 04/15/25 1256    04/15/25 0849  insulin aspart U-100 pen 0-5 Units         -- SubQ Before meals & nightly PRN 04/15/25 0749          Hold Oral hypoglycemics while patient is in the hospital.

## 2025-04-16 NOTE — ASSESSMENT & PLAN NOTE
Patient's blood pressure range in the last 24 hours was: BP  Min: 133/71  Max: 164/76.The patient's inpatient anti-hypertensive regimen is listed below:  Current Antihypertensives  amLODIPine tablet 10 mg, Daily, Oral  carvediloL tablet 6.25 mg, 2 times daily, Oral  , Daily, Oral    Plan  - BP is controlled, no changes needed to their regimen  - monitor blood pressure closely.

## 2025-04-16 NOTE — PT/OT/SLP EVAL
Physical Therapy Evaluation    Patient Name:  Robi Donovan   MRN:  [id number]    Recommendations:     Discharge Recommendations: Moderate Intensity Therapy   Discharge Equipment Recommendations: to be determined by next level of care   Barriers to discharge:  decline from baseline of function; fall risk; 5 steps to enter home    Assessment:     Robi Donovan is a 79 y.o. male admitted with a medical diagnosis of Type 2 diabetes mellitus with hyperglycemia.  He presents with the following impairments/functional limitations: weakness, impaired endurance, impaired self care skills, impaired functional mobility, gait instability, impaired sensation, impaired balance, impaired cognition, visual deficits, pain, decreased lower extremity function, decreased upper extremity function, decreased coordination Co-eval with OT for efficacy of outcomes, anticipation of low activity tolerance and safety; per pt and dtr's report, at baseline, pt is Chaim with bed mobility using mechanical bed with no rails, Chaim for amb and ADLs using furniture as touch points for balance or rollator, use of rollator outside the home; pt has h/o CVA 20 yrs ago with residual R sided deficits with use of AFO (not present in room at eval time) on RLE; pt reports that since having the flu, he has had low energy but no recent falls; also noted bandage on L 4th toe that has not been unbandaged since last week at the podiatry office; this was escalated by OT to Dr Kline via secure chat; during today's eval, pt performed sup to sit with SBA, increased time, and use of compensatory movements; sit to stand from EOB with Sugey and increased time for preparation in positioning of feet/body; Sugey to use RW to amb ~5ft from bed to chair with unsafe management of RW and forward trunk; fashioned ace wrap to act as a dorsiflexor assist on R leg/foot; sit to stand from lower chair surface with modA; will cont with POC and recommend moderate intensity therapy as pt is at  this time a safety/fall risk with 5 steps to enter his home. .    Rehab Prognosis: Good; patient would benefit from acute skilled PT services to address these deficits and reach maximum level of function.    Recent Surgery: * No surgery found *      Plan:     During this hospitalization, patient to be seen 5 x/week to address the identified rehab impairments via gait training, therapeutic activities, therapeutic exercises, neuromuscular re-education and progress toward the following goals:    Plan of Care Expires:  05/16/25    Subjective     Chief Complaint: back pain  Patient/Family Comments/goals: to return to PLOF  Pain/Comfort:  Pain Rating 1: 5/10 (back pain -discomfort from positioning in bed)  Pain Addressed 1: Reposition, Cessation of Activity, Nurse notified  Pain Rating Post-Intervention 1:  (did not rate but reports comfortable in chair)    Patients cultural, spiritual, Jainism conflicts given the current situation: no    Living Environment:  Patient lives with daughter in [facility] with 5 GETACHEW, L handrail, tub shower combo  Prior to admission, patients level of function was Chaim with bed mobility-has mechanical bed with no rails; furniture surfs or uses rollator for amb and ADLs, uses rollator outside home; has residual deficits RLE from previous CVA (20 yrs ago); uses AFO on R foot; also has intermittent continence issues; pt reports no recent falls, energy levels have decreased since recent dx of flu.  Equipment used at home: rollator (mechanical bed R AFO).Upon discharge, patient will have assistance from daughter (she has lived with pt since pt's prior back sx in Sept 2024).    Objective:     Communicated with nurse prior to session.  Patient found HOB elevated with bed alarm, telemetry, peripheral IV  upon PT entry to room.    General Precautions: Standard, fall  Orthopedic Precautions:N/A   Braces: N/A  Respiratory Status: Room air    Exams:  Cognitive Exam:  Patient is oriented to Person, Place,  Situation, and time with 3 choices for year; follows commands but occasionally needs repetition of directions/visual demonstration  Gross Motor Coordination:  slow movement of RLE, decreased fluidity  Postural Exam:  Patient presented with the following abnormalities:    -       Rounded shoulders  -       Forward head  Sensation: impaired chronic B foot numbness/tingling-R foot worse than L foot  Skin Integrity/Edema:      -       L fourth toe bandaged and has not been unbandaged since last week at podiatrist's office-OT secure chatted hospitalist to inform  RLE ROM: WFL passively at ankle, actively at hip/knee  RLE Strength: hip~4-, knee~4- to 4, ankle~0/5  LLE ROM: WFL  LLE Strength: ~4/5 at hip/knee, 4- to 4/5 at ankle    Functional Mobility:  Bed Mobility:     Supine to Sit: stand by assistance and increased time, use of compensatory techniques to come to EOB  Transfers:     Sit to Stand:  minimum assistance with rolling walker from bed level and required modA from lower recliner chair with VCs for forward wt shift using RW  Bed to Chair: minimum assistance with  rolling walker  using  Step Transfer and increased time to complete  Gait: Patient stepped in place x 10 steps each foot and amb ~5ft from bed to recliner chair using RW with Sugey, RW management assist and minimal safety cues for keeping within boundaries of RW; R foot fashioned with ace wrap df assist; VCs for more forward placement of R toes during stepping, and more upright posture    AM-PAC 6 CLICK MOBILITY  Total Score:16    Treatment & Education:  -pt and dtr educated on purpose of PT/POC  -pt and dtr reported that pt was able to sit up in chair ~2 hrs yesterday- motivated to increase activity  -bed mobility performed as above with increased time  -fashioned ace wrap df assist to R leg/foot  -performed transfers and functional mobility as above; educated pt on keeping RW close during amb trials  -added pillow in seat of recliner chair to assist  with elevating the surface for improvement of sit to stand from lower surface  -educated on recs for ongoing therapy   -pt/dtr verbalized understanding of education topics  -no c/o dizziness/SOB throughout session; /60 HR 68 O2 sats 97% following activity    Patient left up in chair with all lines intact, call button in reach, chair alarm on, and nurse notified.    GOALS:   Multidisciplinary Problems       Physical Therapy Goals          Problem: Physical Therapy    Goal Priority Disciplines Outcome Interventions   Physical Therapy Goal     PT, PT/OT Progressing    Description: Goals to be met by: 2025     Patient will increase functional independence with mobility by performin. Supine to sit with Modified Sac  2. Sit to supine with Modified Sac  3. Rolling with Modified Sac.  4. Sit to stand transfer with Stand by Assitance using LRAD  5. Bed to chair transfer with Stand by Assitance using LRAD  6. Gait  x 100 feet with Stand by Assitance using LRAD.   7. Ascend/descend 5 stair with left Handrails Stand-by Assistance using LRAD as needed.   Amend goals as appropriate                           History:     Past Medical History:   Diagnosis Date    Diabetes mellitus type I     Hypertension     Stroke        Past Surgical History:   Procedure Laterality Date    ROBOTIC FUSION,SPINE,LUMBAR,TLIF N/A 2024    Procedure: OPEN ROBOTIC L4 -S1 TLIF;  Surgeon: Juan Luis Mcbride DO;  Location: [facility] OR [address] [place] [address];  Service: Neurosurgery;  Laterality: N/A;    TRANSFORAMINAL EPIDURAL INJECTION OF STEROID Bilateral 2024    Procedure: LUMBAR TRANSFORAMINAL BILATERAL L4/5;  Surgeon: Aria Orozco MD;  Location: [facility];  Service: Pain Management;  Laterality: Bilateral;  [phone]  2 WK F/U LM       Time Tracking:     PT Received On: 25  PT Start Time: 944     PT Stop Time: 1033  PT Total Time (min): 49 min     Billable Minutes: Evaluation 20 and  Therapeutic Activity 29      04/16/2025

## 2025-04-16 NOTE — PT/OT/SLP EVAL
Occupational Therapy   Evaluation and treatment    Name: Robi Donovan  MRN: [id number]  Admitting Diagnosis: Type 2 diabetes mellitus with hyperglycemia  Recent Surgery: * No surgery found *      Recommendations:     Discharge Recommendations: Moderate Intensity Therapy  Discharge Equipment Recommendations:  to be determined by next level of care  Barriers to discharge:  Other (Comment) (fall risk; multi steps to enter home)    Assessment:     Robi Donovan is a 79 y.o. male with a medical diagnosis of Type 2 diabetes mellitus with hyperglycemia.  He presents with .The primary encounter diagnosis was Influenza. Diagnoses of Cough, MARGOTH (acute kidney injury), Hyperglycemia, and Chest pain were also pertinent to this visit.  . Performance deficits affecting function: weakness, impaired endurance, impaired self care skills, impaired functional mobility, decreased lower extremity function, decreased upper extremity function, impaired cognition, visual deficits, gait instability, impaired balance, pain, decreased safety awareness, decreased coordination.      OT evaluation completed, coeval with PT 2/2 anticipated low activity tolerance.  On evaluation noted to have L fourth toe bandaged - states last week went to podiatry and has not unbandaged since (escalated via secure chat to primary hospitalist Dr Kline). On evaluation patient able to perform bed mobility with increased time, SBA, use of compensatory movements, sit to stand from bed level and minimal functional mobility to bedside chair with rolling walker and minimal assist (with makeshift ace wrap R dorsiflexion assist device), sit to stand from lower surface with moderate assist, and LB ADLs with minimal to moderate assist. Will continue to follow. At this time recommend moderate intensity therapy as patient at safety/fall risk and has 5 steps to enter home.        Rehab Prognosis: Good; patient would benefit from acute skilled OT services to address these deficits  and reach maximum level of function.       Plan:     Patient to be seen 5 x/week to address the above listed problems via self-care/home management, therapeutic activities, therapeutic exercises, neuromuscular re-education  Plan of Care Expires: 05/14/25  Plan of Care Reviewed with: patient, daughter    Subjective     Chief Complaint: min fatigue/ discomfort in bed  Patient/Family Comments/goals: accepting to education on recommendation for ongoing therapy    Occupational Profile:  Living Environment: resides with daughter in a single story home, 5 steps to enter, L handrail, tub shower  Previous level of function: Patient with baseline PLOF per patient report and per daughter report whom he resides with of being modified independent with bed mobility via mechanical bed with no rails, and able to perform functional mobility / ADLs with modified independence via furniture surf methods/ via use of rollator in the home, and rollator outside the home. 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 Patient endorses no recent falls, but does endorse decreased energy levels from recent flu illness. Daughter indicates patient had gotten back to ability to step over tub wall, and stand to shower.  Equipment Used at Home: rollator, other (see comments) (mechanical bed; R AFO)  Assistance upon Discharge: daughter (states she has lived with patient since patient's prior back sx in Sept 2024)    Pain/Comfort:  Pain Rating 1: 5/10 (back pain ; states discomfort with position in bed)  Pain Addressed 1: Reposition, Cessation of Activity, Nurse notified  Pain Rating Post-Intervention 1: other (see comments) (not rated, although does report agreeableness/ comfort in chair)      Objective:     Communicated with: nursing prior to session.  Patient found HOB elevated with bed  alarm, telemetry, peripheral IV upon OT entry to room.    General Precautions: Standard, fall  Orthopedic Precautions: N/A  Braces: N/A  Respiratory Status: Room air    Occupational Performance:    Bed Mobility:    Patient completed Supine to Sit with SBA, increased time, use of compensatory techniques to bring self forward    Functional Mobility/Transfers:  Patient completed Sit <> Stand Transfer with minimal assist with rolling walker from bed level; requiring lifting moderate assist from low recliner chair  with  cues for anterior weightshift with rolling walker   Patient completed Bed <> Chair Transfer using Step Transfer technique with minimal assist  with rolling walker, increased time  Functional Mobility: Minimal steps from bed to bedside chair with rolling walker, minimal assist, rolling walker management techniques and minimal safety cues     Activities of Daily Living:  Grooming: SBA ; sitting; min verbal cues  for initiation  Upper Body Dressing: minimal assist; IV  Lower Body Dressing: moderate assistance ; increased time; self initiates R weaker LE first  Toileting: grossly requiring dependence from condom cath/purwick    Cognitive/Visual Perceptual:  Cognitive/Psychosocial Skills:     -       Oriented to: person, place, situation, time with 3 choices for year   -       Follows Commands/attention:follows commands; occasionally benefits from repeat instructions / visual demos  -       Safety awareness/insight to disability: impaired during functional mobility   -       Mood/Affect/Coping skills/emotional control: Cooperative  Visual/Perceptual:      -decreased fluidity of tracking/convergence; able to read clock accurately on wall; daughter reports baseline; patient indicates he does see an eye professional at least every other year     Physical Exam:  Skin integrity:  L fourth toe bandaged - states last week went to podiatry and has not unbandaged since (escalated via secure chat to primary hospitalist  Dr Kline)  Sensation:    -       Impaired  chronic B feet numbness/ tingling; R worse than L  Dominant hand:    -       R (however, indicates since CVA ~20 years ago has switched handedness to L)  Upper Extremity Range of Motion:     -       Right Upper Extremity: grossly WFL  -       Left Upper Extremity: WFL  Upper Extremity Strength:    -       Right Upper Extremity: hx of R hemiparesis; grossly 3+/5- 2+/5 strength  -       Left Upper Extremity: WFL   Strength:    -       Right Upper Extremity: gross grasp intact  -       Left Upper Extremity: WFL  Fine Motor Coordination: decreased speed/ in hand manipulation R hand  R ankle dorsiflexion active: poor (baseline since CVA)    AMPAC 6 Click ADL:  AMPAC Total Score: 17    Treatment & Education:  OT joined in PT session for evaluation.   Educated patient/ daughter on OT role.  Affirmed patient/ daughter's desire for increase in activity, patient indicates was able to get out of bed to chair on day prior with assistance.  Bed mobility as above with increased time.  Assessment seated eob and additional profile questions completed.  ADL / functional mobility training as above with use of [employer] ace wrap R dorsiflexion assist device.  Added pillow under recliner chair to elevate surface to improve efficacy of sit to stand from lower surface at this time.  Educated on importance of oral care.  Educated on integrating R UE/ LE into all efforts (in spite of extended time since initial CVA) to maximize bilateral integration.  Safety cues during mobility 2/2 pushing walker away from self during efforts.  Assisted up in chair.  Educated on current status and recommendation for ongoing therapy with agreement from patient and daughter.    Denies any dizziness / SOB during session, vital signs stable.    Patient left up in chair with all lines intact, chair alarm on, and nursing notified    GOALS:   Multidisciplinary Problems       Occupational Therapy Goals           Problem: Occupational Therapy    Goal Priority Disciplines Outcome Interventions   Occupational Therapy Goal     OT, PT/OT Progressing    Description: Goals to be met by: 5/14/2025     Patient will increase functional independence with ADLs by performing:    UE Dressing with Set-up Assistance.  LE Dressing with Stand-by Assistance, increased time, inclusive of donning easy on footwear  Grooming while standing with Stand-by Assistance and minimal verbal cues  Toileting from toilet with Stand-by Assistance for hygiene and clothing management.   Toilet transfer to bedside commode with Stand-by Assistance.  Functional mobility inclusive of transfer to / from toilet with SBA and use of least restrictive device.                           History:     Past Medical History:   Diagnosis Date    Diabetes mellitus type I     Hypertension     Stroke 2002         Past Surgical History:   Procedure Laterality Date    ROBOTIC FUSION,SPINE,LUMBAR,TLIF N/A 9/20/2024    Procedure: OPEN ROBOTIC L4 -S1 TLIF;  Surgeon: Juan Luis Mcbride DO;  Location: [facility] OR [address] [place] [address];  Service: Neurosurgery;  Laterality: N/A;    TRANSFORAMINAL EPIDURAL INJECTION OF STEROID Bilateral 4/9/2024    Procedure: LUMBAR TRANSFORAMINAL BILATERAL L4/5;  Surgeon: Aria Orozco MD;  Location: [facility];  Service: Pain Management;  Laterality: Bilateral;  [phone]  2 WK F/U LM       Time Tracking:     OT Date of Treatment: 04/16/25  OT Start Time: 0948  OT Stop Time: 1033  OT Total Time (min): 45 min total time    Billable Minutes:Evaluation 15 min  Self Care/Home Management 15 min  Therapeutic Activity 10 min    4/16/2025

## 2025-04-16 NOTE — PLAN OF CARE
Problem: Physical Therapy  Goal: Physical Therapy Goal  Description: Goals to be met by: 2025     Patient will increase functional independence with mobility by performin. Supine to sit with Modified [place]  2. Sit to supine with Modified [place]  3. Rolling with Modified [place].  4. Sit to stand transfer with Stand by Assitance using LRAD  5. Bed to chair transfer with Stand by Assitance using LRAD  6. Gait  x 100 feet with Stand by Assitance using LRAD.   7. Ascend/descend 5 stair with left Handrails Stand-by Assistance using LRAD as needed.   Amend goals as appropriate    Outcome: Progressing   Co-eval with OT for efficacy of outcomes, anticipation of low activity tolerance and safety; per pt and dtr's report, at baseline, pt is [place] with bed mobility using mechanical bed with no rails, [place] for amb and ADLs using furniture as touch points for balance or rollator, use of rollator outside the home; pt has h/o CVA 20 yrs ago with residual R sided deficits with use of AFO (not present in room at eval time) on RLE; pt reports that since having the flu, he has had low energy but no recent falls; also noted bandage on L 4th toe that has not been unbandaged since last week at the podiatry office; this was escalated by OT to Dr Kline via secure chat; during today's eval, pt performed sup to sit with SBA, increased time, and use of compensatory movements; sit to stand from EOB with [place] and increased time for preparation in positioning of feet/body; [place] to use RW to amb ~5ft from bed to chair with unsafe management of RW and forward trunk; fashioned ace wrap to act as a dorsiflexor assist on R leg/foot; sit to stand from lower chair surface with modA; will cont with POC and recommend moderate intensity therapy as pt is at this time a safety/fall risk with 5 steps to enter his home.

## 2025-04-17 VITALS
WEIGHT: 198.88 LBS | HEART RATE: 70 BPM | TEMPERATURE: 99 F | OXYGEN SATURATION: 99 % | HEIGHT: 73 IN | DIASTOLIC BLOOD PRESSURE: 71 MMHG | BODY MASS INDEX: 26.36 KG/M2 | RESPIRATION RATE: 18 BRPM | SYSTOLIC BLOOD PRESSURE: 139 MMHG

## 2025-04-17 LAB
ANION GAP (OHS): 9 MMOL/L (ref 8–16)
BUN SERPL-MCNC: 19 MG/DL (ref 8–23)
CALCIUM SERPL-MCNC: 8.4 MG/DL (ref 8.7–10.5)
CHLORIDE SERPL-SCNC: 101 MMOL/L (ref 95–110)
CO2 SERPL-SCNC: 25 MMOL/L (ref 23–29)
CREAT SERPL-MCNC: 1.6 MG/DL (ref 0.5–1.4)
GFR SERPLBLD CREATININE-BSD FMLA CKD-EPI: 44 ML/MIN/1.73/M2
GLUCOSE SERPL-MCNC: 228 MG/DL (ref 70–110)
POCT GLUCOSE: 204 MG/DL (ref 70–110)
POCT GLUCOSE: 212 MG/DL (ref 70–110)
POCT GLUCOSE: 233 MG/DL (ref 70–110)
POTASSIUM SERPL-SCNC: 3.6 MMOL/L (ref 3.5–5.1)
SODIUM SERPL-SCNC: 135 MMOL/L (ref 136–145)

## 2025-04-17 PROCEDURE — 97530 THERAPEUTIC ACTIVITIES: CPT

## 2025-04-17 PROCEDURE — 97116 GAIT TRAINING THERAPY: CPT | Mod: CQ

## 2025-04-17 PROCEDURE — 97530 THERAPEUTIC ACTIVITIES: CPT | Mod: CQ

## 2025-04-17 PROCEDURE — 36415 COLL VENOUS BLD VENIPUNCTURE: CPT | Performed by: FAMILY MEDICINE

## 2025-04-17 PROCEDURE — 63600175 PHARM REV CODE 636 W HCPCS

## 2025-04-17 PROCEDURE — 97535 SELF CARE MNGMENT TRAINING: CPT

## 2025-04-17 PROCEDURE — 25000003 PHARM REV CODE 250

## 2025-04-17 PROCEDURE — 25000003 PHARM REV CODE 250: Performed by: FAMILY MEDICINE

## 2025-04-17 PROCEDURE — 82435 ASSAY OF BLOOD CHLORIDE: CPT | Performed by: FAMILY MEDICINE

## 2025-04-17 RX ORDER — TAMSULOSIN HYDROCHLORIDE 0.4 MG/1
0.4 CAPSULE ORAL DAILY
Qty: 30 CAPSULE | Refills: 11 | Status: SHIPPED | OUTPATIENT
Start: 2025-04-18 | End: 2026-04-18

## 2025-04-17 RX ORDER — INSULIN GLARGINE 100 [IU]/ML
20 INJECTION, SOLUTION SUBCUTANEOUS DAILY
Qty: 15 ML | Refills: 5 | Status: SHIPPED | OUTPATIENT
Start: 2025-04-18 | End: 2026-04-18

## 2025-04-17 RX ORDER — INSULIN ASPART 100 [IU]/ML
7 INJECTION, SOLUTION INTRAVENOUS; SUBCUTANEOUS 3 TIMES DAILY
Qty: 15 ML | Refills: 6 | Status: SHIPPED | OUTPATIENT
Start: 2025-04-17 | End: 2026-04-17

## 2025-04-17 RX ORDER — LOSARTAN POTASSIUM 25 MG/1
25 TABLET ORAL DAILY
Qty: 90 TABLET | Refills: 3 | Status: SHIPPED | OUTPATIENT
Start: 2025-04-17 | End: 2026-04-17

## 2025-04-17 RX ORDER — GUAIFENESIN 100 MG/5ML
200 LIQUID ORAL EVERY 4 HOURS PRN
Qty: 118 ML | Refills: 0 | Status: SHIPPED | OUTPATIENT
Start: 2025-04-17 | End: 2025-04-27

## 2025-04-17 RX ADMIN — AMLODIPINE BESYLATE 10 MG: 5 TABLET ORAL at 08:04

## 2025-04-17 RX ADMIN — SODIUM CHLORIDE, POTASSIUM CHLORIDE, SODIUM LACTATE AND CALCIUM CHLORIDE: 600; 310; 30; 20 INJECTION, SOLUTION INTRAVENOUS at 01:04

## 2025-04-17 RX ADMIN — INSULIN ASPART 7 UNITS: 100 INJECTION, SOLUTION INTRAVENOUS; SUBCUTANEOUS at 05:04

## 2025-04-17 RX ADMIN — Medication 400 ML: at 10:04

## 2025-04-17 RX ADMIN — INSULIN ASPART 7 UNITS: 100 INJECTION, SOLUTION INTRAVENOUS; SUBCUTANEOUS at 08:04

## 2025-04-17 RX ADMIN — TAMSULOSIN HYDROCHLORIDE 0.4 MG: 0.4 CAPSULE ORAL at 08:04

## 2025-04-17 RX ADMIN — FINASTERIDE 5 MG: 5 TABLET, FILM COATED ORAL at 08:04

## 2025-04-17 RX ADMIN — INSULIN ASPART 2 UNITS: 100 INJECTION, SOLUTION INTRAVENOUS; SUBCUTANEOUS at 12:04

## 2025-04-17 RX ADMIN — ACETAMINOPHEN 650 MG: 325 TABLET ORAL at 08:04

## 2025-04-17 RX ADMIN — Medication 400 ML: at 06:04

## 2025-04-17 RX ADMIN — INSULIN ASPART 7 UNITS: 100 INJECTION, SOLUTION INTRAVENOUS; SUBCUTANEOUS at 12:04

## 2025-04-17 RX ADMIN — HEPARIN SODIUM 5000 UNITS: 5000 INJECTION INTRAVENOUS; SUBCUTANEOUS at 06:04

## 2025-04-17 RX ADMIN — INSULIN ASPART 2 UNITS: 100 INJECTION, SOLUTION INTRAVENOUS; SUBCUTANEOUS at 05:04

## 2025-04-17 RX ADMIN — Medication 400 ML: at 01:04

## 2025-04-17 RX ADMIN — ATORVASTATIN CALCIUM 40 MG: 40 TABLET, FILM COATED ORAL at 08:04

## 2025-04-17 RX ADMIN — INSULIN GLARGINE 20 UNITS: 100 INJECTION, SOLUTION SUBCUTANEOUS at 08:04

## 2025-04-17 RX ADMIN — CARVEDILOL 6.25 MG: 6.25 TABLET, FILM COATED ORAL at 08:04

## 2025-04-17 NOTE — ASSESSMENT & PLAN NOTE
Patient's blood pressure range in the last 24 hours was: BP  Min: 133/71  Max: 169/84.The patient's inpatient anti-hypertensive regimen is listed below:  Current Antihypertensives  amLODIPine tablet 10 mg, Daily, Oral  carvediloL tablet 6.25 mg, 2 times daily, Oral  , Daily, Oral    Plan  - BP is controlled, no changes needed to their regimen  - monitor blood pressure closely.

## 2025-04-17 NOTE — NURSING
Diabetic teaching done and instructions on insulin pen given to patient and wife, verbalized understanding

## 2025-04-17 NOTE — SUBJECTIVE & OBJECTIVE
Scheduled Meds:   amLODIPine  10 mg Oral Daily    atorvastatin  40 mg Oral Daily    carvediloL  6.25 mg Oral BID    electrolytes-dextrose  400 mL Oral Q4H    finasteride  5 mg Oral Daily    heparin (porcine)  5,000 Units Subcutaneous Q8H    insulin aspart U-100  7 Units Subcutaneous TIDWM    insulin glargine U-100  20 Units Subcutaneous Daily    polyethylene glycol  17 g Oral BID    tamsulosin  0.4 mg Oral Daily     Continuous Infusions:   lactated ringers   Intravenous Continuous 100 mL/hr at 04/16/25 1742 Rate Verify at 04/16/25 1742     PRN Meds:  Current Facility-Administered Medications:     acetaminophen, 650 mg, Oral, Q6H PRN    aluminum-magnesium hydroxide-simethicone, 30 mL, Oral, Q6H PRN    dextrose 50%, 12.5 g, Intravenous, PRN    dextrose 50%, 12.5 g, Intravenous, PRN    dextrose 50%, 25 g, Intravenous, PRN    dextrose 50%, 25 g, Intravenous, PRN    glucagon (human recombinant), 1 mg, Intramuscular, PRN    glucagon (human recombinant), 1 mg, Intramuscular, PRN    glucose, 16 g, Oral, PRN    glucose, 16 g, Oral, PRN    glucose, 24 g, Oral, PRN    glucose, 24 g, Oral, PRN    guaiFENesin 100 mg/5 ml, 200 mg, Oral, Q4H PRN    insulin aspart U-100, 0-5 Units, Subcutaneous, QID (AC + HS) PRN    naloxone, 0.02 mg, Intravenous, PRN    ondansetron, 4 mg, Intravenous, Q8H PRN    sodium chloride 0.9%, 10 mL, Intravenous, Q12H PRN    Review of patient's allergies indicates:  No Known Allergies     Past Medical History:   Diagnosis Date    Diabetes mellitus type I     Hypertension     Stroke 2002     Past Surgical History:   Procedure Laterality Date    ROBOTIC FUSION,SPINE,LUMBAR,TLIF N/A 9/20/2024    Procedure: OPEN ROBOTIC L4 -S1 TLIF;  Surgeon: Juan Luis Mcbirde DO;  Location: [facility] OR [address] [place] [address];  Service: Neurosurgery;  Laterality: N/A;    TRANSFORAMINAL EPIDURAL INJECTION OF STEROID Bilateral 4/9/2024    Procedure: LUMBAR TRANSFORAMINAL BILATERAL L4/5;  Surgeon: Aria Orozco MD;  Location: [facility];   Service: Pain Management;  Laterality: Bilateral;  [phone]  2 WK F/U LM       Family History    None       Tobacco Use    Smoking status: Former     Current packs/day: 0.00     Types: Cigarettes     Start date:      Quit date:      Years since quittin.3    Smokeless tobacco: Never   Substance and Sexual Activity    Alcohol use: Not Currently    Drug use: Not on file    Sexual activity: Not on file     Review of Systems   Constitutional:  Positive for activity change.   Respiratory:  Negative for cough and shortness of breath.    Gastrointestinal:  Negative for nausea.   Psychiatric/Behavioral:  Negative for agitation.      Objective:     Vital Signs (Most Recent):  Temp: 100.1 °F (37.8 °C) (25)  Pulse: 78 (25)  Resp: 18 (25)  BP: (!) 153/72 (25)  SpO2: 96 % (25) Vital Signs (24h Range):  Temp:  [98.8 °F (37.1 °C)-102.2 °F (39 °C)] 100.1 °F (37.8 °C)  Pulse:  [70-95] 78  Resp:  [15-18] 18  SpO2:  [96 %-99 %] 96 %  BP: (133-164)/(68-76) 153/72     Weight: 90.2 kg (198 lb 13.7 oz)  Body mass index is 26.24 kg/m².    Foot Exam    General  Orientation: alert and oriented to person, place, and time   Affect: appropriate       Right Foot/Ankle     Neurovascular  Saphenous nerve sensation: diminished  Tibial nerve sensation: diminished  Superficial peroneal nerve sensation: diminished  Deep peroneal nerve sensation: diminished  Sural nerve sensation: diminished    Muscle Strength  Ankle dorsiflexion: 3  Ankle plantar flexion: 3  Ankle inversion: 3  Ankle eversion: 3  Great toe extension: 3  Great toe flexion: 3    Comments  Right foot with weakness and diminished sensation 2/2 right sides CVA    Left Foot/Ankle      Inspection and Palpation  Skin Exam: no cellulitis, no ulcer and no erythema     Muscle Strength  Ankle dorsiflexion: 5  Ankle plantar flexion: 5  Ankle inversion: 5  Ankle eversion: 5  Great toe extension: 5  Great toe flexion:  "5    Comments  Left foot: 4th toe scab, stable. No erythema, edema, malodor, crepitus or fluctuance.             Laboratory:  A1C:   Recent Labs   Lab 04/13/25  2355   HGBA1C 10.2*     CBC:   Recent Labs   Lab 04/16/25  0600   WBC 11.82   RBC 3.53*   HGB 10.5*   HCT 30.8*      MCV 87   MCH 29.7   MCHC 34.1     CMP:   Recent Labs   Lab 04/13/25  2150 04/14/25  0628 04/16/25  0600   CALCIUM 9.1   < > 8.5*   ALBUMIN 3.0*  --   --    *   < > 132*   K 4.4   < > 3.9   CO2 18*   < > 21*      < > 101   BUN 39*   < > 26*   CREATININE 3.0*   < > 2.2*   ALKPHOS 70  --   --    ALT 31  --   --    AST 40  --   --    BILITOT 0.5  --   --     < > = values in this interval not displayed.     CRP: No results for input(s): "CRP" in the last 168 hours.  ESR: No results for input(s): "SEDRATE" in the last 168 hours.  Wound Cultures: No results for input(s): "LABAERO" in the last 4320 hours.  Microbiology Results (last 7 days)       ** No results found for the last 168 hours. **          Specimen (24h ago, onward)      None            Diagnostic Results:  I have reviewed all pertinent imaging results/findings within the past 24 hours.  "

## 2025-04-17 NOTE — ASSESSMENT & PLAN NOTE
MARGOTH is likely due to pre-renal azotemia due to intravascular volume depletion. Baseline creatinine is 1.9. Most recent creatinine and eGFR are listed below.  Recent Labs     04/15/25  0455 04/16/25  0600   CREATININE 2.5* 2.2*   EGFRNORACEVR 25* 30*      Plan  - MARGOTH is improving. Will continue current treatment  - Avoid nephrotoxins and renally dose meds for GFR listed above  - Monitor urine output, serial BMP, and adjust therapy as needed  - monitor renal function closely.

## 2025-04-17 NOTE — SUBJECTIVE & OBJECTIVE
Interval History: awake and alert, sitting up in bed.    Appreciate podiatry recs-outpatient follow-up with Interventional Cardiology vascular surgery if arterial ultrasound abnormal.  [place] with Betadine p.r.n.    Reported fever overnight, WBC downtrending.  No pending cultures.  Recently completed Tamiflu on 04/14    Renal function slowly improving  Blood glucose slowly downtrending-continue present insulin regimen    Possible discharge today    Review of Systems   Constitutional:  Positive for activity change.   Respiratory:  Negative for cough and shortness of breath.    Gastrointestinal:  Negative for nausea.   Psychiatric/Behavioral:  Negative for agitation.      Objective:     Vital Signs (Most Recent):  Temp: 99.9 °F (37.7 °C) (04/17/25 0359)  Pulse: 77 (04/17/25 0417)  Resp: 20 (04/17/25 0359)  BP: (!) 159/78 (04/17/25 0359)  SpO2: 97 % (04/17/25 0359) Vital Signs (24h Range):  Temp:  [98.8 °F (37.1 °C)-101.9 °F (38.8 °C)] 99.9 °F (37.7 °C)  Pulse:  [70-95] 77  Resp:  [18-20] 20  SpO2:  [94 %-98 %] 97 %  BP: (133-159)/(70-78) 159/78     Weight: 90.2 kg (198 lb 13.7 oz)  Body mass index is 26.24 kg/m².    Intake/Output Summary (Last 24 hours) at 4/17/2025 0728  Last data filed at 4/17/2025 0610  Gross per 24 hour   Intake 4521.59 ml   Output 1400 ml   Net 3121.59 ml         Physical Exam  Constitutional:       Appearance: He is not ill-appearing.   HENT:      Head: Normocephalic and atraumatic.   Cardiovascular:      Rate and Rhythm: Regular rhythm.      Heart sounds: Normal heart sounds.   Pulmonary:      Breath sounds: Normal breath sounds.   Abdominal:      Palpations: Abdomen is soft.   Musculoskeletal:         General: Normal range of motion.      Cervical back: Neck supple.   Skin:     General: Skin is warm.   Neurological:      General: No focal deficit present.      Mental Status: He is alert and oriented to person, place, and time.   Psychiatric:         Mood and Affect: Mood normal.          "      Significant Labs: A1C:   Recent Labs   Lab 04/13/25  2355   HGBA1C 10.2*     ABGs:   No results for input(s): "PH", "PCO2", "HCO3", "POCSATURATED", "BE", "TOTALHB", "COHB", "METHB", "O2HB", "POCFIO2", "PO2" in the last 48 hours.    Blood Culture: No results for input(s): "LABBLOO" in the last 48 hours.  CBC:   Recent Labs   Lab 04/16/25  0600   WBC 11.82   HGB 10.5*   HCT 30.8*        CMP:   Recent Labs   Lab 04/16/25  0600   *   K 3.9      CO2 21*   BUN 26*   CREATININE 2.2*   CALCIUM 8.5*   ANIONGAP 10     Lactic Acid: No results for input(s): "LACTATE" in the last 48 hours.  Lipase: No results for input(s): "LIPASE" in the last 48 hours.  Lipid Panel: No results for input(s): "CHOL", "HDL", "LDLCALC", "TRIG", "CHOLHDL" in the last 48 hours.  Magnesium:   No results for input(s): "MG" in the last 48 hours.    Troponin: No results for input(s): "TROPONINI", "TROPONINIHS" in the last 48 hours.  TSH: No results for input(s): "TSH" in the last 4320 hours.  Urine Culture: No results for input(s): "LABURIN" in the last 48 hours.  Urine Studies: No results for input(s): "COLORU", "APPEARANCEUA", "PHUR", "SPECGRAV", "PROTEINUA", "GLUCUA", "KETONESU", "BILIRUBINUA", "OCCULTUA", "NITRITE", "UROBILINOGEN", "LEUKOCYTESUR", "RBCUA", "WBCUA", "BACTERIA", "SQUAMEPITHEL", "HYALINECASTS" in the last 48 hours.    Invalid input(s): "WRIGHTSUR"    Significant Imaging: I have reviewed all pertinent imaging results/findings within the past 24 hours.  "

## 2025-04-17 NOTE — PT/OT/SLP PROGRESS
"Occupational Therapy   Treatment    Name: Robi Donovan  MRN: [id number]  Admitting Diagnosis:  Type 2 diabetes mellitus with hyperglycemia       Recommendations:     Discharge Recommendations: Moderate Intensity Therapy  Discharge Equipment Recommendations:  to be determined by next level of care, bath bench  Barriers to discharge:  Other (Comment) (fall risk; multi steps to enter home)    Assessment:     Robi Donovan is a 79 y.o. male with a medical diagnosis of Type 2 diabetes mellitus with hyperglycemia.  He presents with ..The primary encounter diagnosis was Influenza. Diagnoses of Cough, MARGOTH (acute kidney injury), Hyperglycemia, Chest pain, Open wound of fourth toe of left foot, subsequent encounter, Decreased strength, endurance, and mobility, and Near syncope were also pertinent to this visit.  . Performance deficits affecting function are weakness, gait instability, decreased upper extremity function, decreased lower extremity function, decreased ROM, impaired coordination, impaired endurance, impaired sensation, impaired balance, impaired self care skills, impaired functional mobility, decreased coordination, decreased safety awareness, impaired cognition.       Progressing as able. Family/ daughter is supportive. Continued recommendation for moderate intensity therapy at this time; however, patient/family desiring to bring patient home and patient will have good support from daughter and other family per report.    Rehab Prognosis:  Good; patient would benefit from acute skilled OT services to address these deficits and reach maximum level of function.       Plan:     Patient to be seen 5 x/week to address the above listed problems via self-care/home management, therapeutic activities, therapeutic exercises, neuromuscular re-education  Plan of Care Expires: 05/14/25  Plan of Care Reviewed with: patient, daughter    Subjective     Chief Complaint: "need to go to the bathroom"  Patient/Family Comments/goals: " daughter reports that she feels comfortable with assisting patient at current level, agrees that rolling walker is safe to use at this time rather than  rollator, and affirms that her brother in addition to she will be assisting patient in ascending stairs to enter their home  Pain/Comfort:  Pain Rating 1:  (none reported)  Pain Addressed 1: Reposition  Pain Rating Post-Intervention 1:  (none reported)    Objective:     Communicated with: nursing prior to session.  Patient found up in chair with telemetry, peripheral IV upon OT entry to room.    General Precautions: Standard, fall    Orthopedic Precautions:N/A  Braces:  (R AFO)  Respiratory Status: Room air     Occupational Performance:     Functional Mobility/Transfers:  Patient completed Sit <> Stand Transfer with moderate assist from low chair/ minimal assist from elevated commode (frame over toilet)  with  rolling wallker, and cues to shift weight forward   Patient completed Bed <> Chair Transfer using Step Transfer technique with moderate assist trial one, minimal assist trial two  with rolling walker  Patient completed Toilet Transfer Step Transfer technique with minimal assist with  rolling walker and min verbal cues for safety  Functional Mobility: initial functional mobility trial with rolling walker from chair to bathroom with decreased safety/ quick step pattern, needing minimal  assist and constant safety cues due to imminent urge to use bathroom; however, on second trial return from bathroom to chair, significantly improved performance to CGA with rolling walker, and min verbal cues for step sequencing    Activities of Daily Living:  Upper Body Dressing: increased time ; SBA  Lower Body Dressing: doffs footwear with increased time SBA; moderate assist R AFO ; dons LB pants with minimal assist  Toileting: minimal assist - daughter present and performs assist for hygiene; min coaching cues from OT to stand on patient's weaker right side      AMPA 6 Click  ADL: 17    Treatment & Education:  Patient and daughter re-educated on role of OT.  Patient/ daughter expressing their decision to go home, and states will have ample family support, including patient's son who will also be present to assist patient in ascending 5 stairs to get into home. Therapist provided reflective understanding response.  ADL / functional mobility  training as above.   Patient with quick movements/ decreased safety during urgent need to void; significantly improved performance as above post voiding.  Educated on importance of slowing down/ fall prevention techniques and proper sequencing and management with rolling walker.  Educated on continued recommendation for inpatient therapy, with understanding of patient/family decision for going home; thus provided patient / daughter with gait safety belt to use at home.   Answered inquiries in scope.  100% reciprocation for call light.              Patient left up in chair with all lines intact, call button in reach, and daughter present    GOALS:   Multidisciplinary Problems       Occupational Therapy Goals          Problem: Occupational Therapy    Goal Priority Disciplines Outcome Interventions   Occupational Therapy Goal     OT, PT/OT Progressing    Description: Goals to be met by: 5/14/2025     Patient will increase functional independence with ADLs by performing:    UE Dressing with Set-up Assistance.  LE Dressing with Stand-by Assistance, increased time, inclusive of donning easy on footwear  Grooming while standing with Stand-by Assistance and minimal verbal cues  Toileting from toilet with Stand-by Assistance for hygiene and clothing management.   Toilet transfer to bedside commode with Stand-by Assistance.  Functional mobility inclusive of transfer to / from toilet with SBA and use of least restrictive device.                           Time Tracking:     OT Date of Treatment: 04/17/25  OT Start Time: 1453  OT Stop Time: 1517  OT Total Time  (min): 24 min    Billable Minutes:Self Care/Home Management 15 min  Therapeutic Activity 9 min    OT/SHERRELL: OT          4/17/2025

## 2025-04-17 NOTE — PT/OT/SLP PROGRESS
Physical Therapy Treatment    Patient Name:  Robi Donovan   MRN:  [id number]    Recommendations:     Discharge Recommendations: Moderate Intensity Therapy  Discharge Equipment Recommendations: to be determined by next level of care  Barriers to discharge:  decreased strength/stability; fall risk    Assessment:     Robi Donovan is a 79 y.o. male admitted with a medical diagnosis of Type 2 diabetes mellitus with hyperglycemia.  He presents with the following impairments/functional limitations: weakness, impaired endurance, impaired self care skills, impaired functional mobility, gait instability, impaired balance, decreased ROM, decreased coordination, decreased lower extremity function, abnormal tone, decreased safety awareness Pt would continue to benefit from P.T. To address impairments listed above.  .    Rehab Prognosis: Fair; patient would benefit from acute skilled PT services to address these deficits and reach maximum level of function.    Recent Surgery: * No surgery found *      Plan:     During this hospitalization, patient to be seen 5 x/week to address the identified rehab impairments via gait training, therapeutic activities, therapeutic exercises, neuromuscular re-education and progress toward the following goals:    Plan of Care Expires:  05/16/25    Subjective       Patient/Family Comments/goals: Pt agreed to tx  Pain/Comfort:  Pain Rating 1: 0/10  Pain Rating Post-Intervention 1: 0/10      Objective:     Communicated with RN prior to session.  Patient found HOB elevated with telemetry, peripheral IV upon PT entry to room.     General Precautions: Standard, fall  Orthopedic Precautions: N/A  Braces: N/A  Respiratory Status: Room air     Functional Mobility:  Bed Mobility:     Rolling Left:  stand by assistance   Scooting: stand by assistance and EOB and back/fwd in chair  Supine to Sit: stand by assistance and HOB elevated  Transfers:     Sit to Stand:  1st stand from EOB, pt stood without warning to go  to b/s chair.  Family member and PTA held pt's hands and assisted Klaus of 2(PTA assessment).  After sitting in chair, 2nd stand from b/s chair with RW and Klaus (lower surface), vc's for hand placement.  Bed to Chair: minimum assistance, of 2 persons with  no AD and hand-held assist  using  Step Transfer  Toilet Transfer: minimum assistance with  rolling walker and grab bars  using  Step Transfer  Gait: 16ft x 2 with RW and Klaus/close CGA with vc's for closer proximity to R, assist with RW management on turns, mild instability, and upright posture.  (Ambulated to and from bathroom).  Pt ambulated with R AFO and B shoes.  Balance: sitting good-, standing fair/fair- Rw, gait fair/fair- RW      AM-PAC 6 CLICK MOBILITY  Turning over in bed (including adjusting bedclothes, sheets and blankets)?: 3  Sitting down on and standing up from a chair with arms (e.g., wheelchair, bedside commode, etc.): 2  Moving from lying on back to sitting on the side of the bed?: 3  Moving to and from a bed to a chair (including a wheelchair)?: 3  Need to walk in hospital room?: 3  Climbing 3-5 steps with a railing?: 2  Basic Mobility Total Score: 16       Treatment & Education:  Pt sat EOB with S.  PTA brought pt his shoes with R AFO.  Pt stated he could yuniel his AFO and shoes.  AFter having some difficulty donning R AFO/Shoe, pt stated the bed was not low enough and stood without warning. PTA and family member held onto pt as he began to ambulate (~4ft) to b/s chair), see above.  PTA instructed pt to use a RW at this time for gait secondary to weakness and instability.  Pt sat in b/s chair and still had some difficulty donning his R AFO and shoe.  PTA assisted pt with Total A.  Pt was able to yuniel L shoe.  Pt requested to use the bathroom.  PTA placed b/s commode over toilet for increased height and ease of pt sitting/standing.  Transfer as above.  Static standing with RW and CGA while PTA assisted pt with hygiene needs secondary to BM.  Pt  then ambulated back to b/s chair with Rw and CGA/Klaus for RW management when turning and assist secondary to mild instability.  PTA spoke with pt and family member about assistance available when pt goes home secondary to decreased strength/stability compared to prior to admit.  Pt's family member present and stated she would assist and will be assisting pt to go to OP PT.      Patient left up in chair with all lines intact, call button in reach, chair alarm on, and RN notified..    GOALS:   Multidisciplinary Problems       Physical Therapy Goals          Problem: Physical Therapy    Goal Priority Disciplines Outcome Interventions   Physical Therapy Goal     PT, PT/OT Progressing    Description: Goals to be met by: 2025     Patient will increase functional independence with mobility by performin. Supine to sit with Modified [place]  2. Sit to supine with Modified [place]  3. Rolling with Modified [place].  4. Sit to stand transfer with Stand by Assitance using LRAD  5. Bed to chair transfer with Stand by Assitance using LRAD  6. Gait  x 100 feet with Stand by Assitance using LRAD.   7. Ascend/descend 5 stair with left Handrails Stand-by Assistance using LRAD as needed.   Amend goals as appropriate                             Time Tracking:     PT Received On: 25  PT Start Time: 1255     PT Stop Time: 1339  PT Total Time (min): 44 min     Billable Minutes: Gait Training 10 and Therapeutic Activity 34    Treatment Type: Treatment  PT/PTA: PTA     Number of PTA visits since last PT visit: 2025

## 2025-04-17 NOTE — ASSESSMENT & PLAN NOTE
Anemia is likely due to chronic blood loss. Most recent hemoglobin and hematocrit are listed below.  Recent Labs     04/15/25  0454 04/16/25  0600   HGB 11.2* 10.5*   HCT 33.8* 30.8*     Plan  - Monitor serial CBC: Daily  - Transfuse PRBC if patient becomes hemodynamically unstable, symptomatic or H/H drops below 7/21.  - Patient has not received any PRBC transfusions to date  - Patient's anemia is currently stable

## 2025-04-17 NOTE — PLAN OF CARE
Discharge orders noted. No DME ordered. I met with patient and daughter Lyla. They have declined placement and would prefer Outpatient Therapy instead of Home Health. Patient has done Outpatient Therapy in the past.  also messaged Laurita Warren to schedule PCP follow-up appointment. Awaiting response.    PCP and Cardiology appointments scheduled. No further Case Management needs.    Other Contacts    Name Relation Home Work Mobile   JEOVANY OSORIO Daughter   [phone]   LYLA HOROWITZ Daughter   [phone]     Future Appointments   Date Time Provider Department Center   5/6/2025 10:00 AM OC  CT1 OC CTSCAN [place]   5/6/2025 10:30 AM OC  MRI1 OC MRI [place]   5/8/2025 11:30 AM Juan Luis Mcbride, DO OC NEUSRG [place]      [facility]   [phone] [address] [place] [address]      Next Steps: Follow up on 4/21/2025  Instructions: @ 10:10am with Asya Santamaria NP at the [facility]. He has a cardiologist appointment scheduled for 5/2/25 @ 130pm with Dr Lund also at the [facility]. [facility] [address] [place] [address] - # [phone]    [facility]  Physical Therapy Occupational Therapy [phone] [address] [place] [address]     Next Steps: Follow up  Instructions: Outpatient Physical and Occupation Therapy ordered.         04/17/25 1000   Final Note   Assessment Type Final Discharge Note   Anticipated Discharge Disposition Home  (Outpatient PT/OT)   Hospital Resources/Appts/Education Provided Appointments scheduled and added to AVS   Post-Acute Status   Discharge Delays None known at this time     Idalia Kang RN    [phone]

## 2025-04-17 NOTE — PLAN OF CARE
Problem: Adult Inpatient Plan of Care  Goal: Plan of Care Review  Outcome: Met  Goal: Patient-Specific Goal (Individualized)  Outcome: Met  Goal: Absence of Hospital-Acquired Illness or Injury  Outcome: Met  Goal: Optimal Comfort and Wellbeing  Outcome: Met  Goal: Readiness for Transition of Care  Outcome: Met     Problem: Diabetes Comorbidity  Goal: Blood Glucose Level Within Targeted Range  Outcome: Met     Problem: Acute Kidney Injury/Impairment  Goal: Fluid and Electrolyte Balance  Outcome: Met  Goal: Improved Oral Intake  Outcome: Met  Goal: Effective Renal Function  Outcome: Met     Problem: Fall Injury Risk  Goal: Absence of Fall and Fall-Related Injury  Outcome: Met     Problem: Comorbidity Management  Goal: Blood Pressure in Desired Range  Outcome: Met     Problem: Fatigue  Goal: Improved Activity Tolerance  Outcome: Met     Problem: Urinary Retention  Goal: Effective Urinary Elimination  Outcome: Met     Problem: Wound  Goal: Optimal Coping  Outcome: Met  Goal: Optimal Functional Ability  Outcome: Met  Goal: Absence of Infection Signs and Symptoms  Outcome: Met  Goal: Improved Oral Intake  Outcome: Met  Goal: Optimal Pain Control and Function  Outcome: Met  Goal: Skin Health and Integrity  Outcome: Met  Goal: Optimal Wound Healing  Outcome: Met     Problem: Occupational Therapy  Goal: Occupational Therapy Goal  Description: Goals to be met by: 5/14/2025     Patient will increase functional independence with ADLs by performing:    UE Dressing with Set-up Assistance.  LE Dressing with Stand-by Assistance, increased time, inclusive of donning easy on footwear  Grooming while standing with Stand-by Assistance and minimal verbal cues  Toileting from toilet with Stand-by Assistance for hygiene and clothing management.   Toilet transfer to bedside commode with Stand-by Assistance.  Functional mobility inclusive of transfer to / from toilet with SBA and use of least restrictive device.    Outcome: Met      Problem: Physical Therapy  Goal: Physical Therapy Goal  Description: Goals to be met by: 2025     Patient will increase functional independence with mobility by performin. Supine to sit with Modified [place]  2. Sit to supine with Modified [place]  3. Rolling with Modified [place].  4. Sit to stand transfer with Stand by Assitance using LRAD  5. Bed to chair transfer with Stand by Assitance using LRAD  6. Gait  x 100 feet with Stand by Assitance using LRAD.   7. Ascend/descend 5 stair with left Handrails Stand-by Assistance using LRAD as needed.   Amend goals as appropriate    Outcome: Met

## 2025-04-17 NOTE — PROGRESS NOTES
[facility] Medicine  Progress Note    Patient Name: Robi Donovan  MRN: [id number]  Patient Class: IP- Inpatient   Admission Date: 4/13/2025  Length of Stay: 3 days  Attending Physician: Velma Gilman*  Primary Care Provider: Nia, Primary Doctor        Subjective     Principal Problem:Type 2 diabetes mellitus with hyperglycemia        HPI:  Robi Donovan is a 79-year-old male with a past medical history of CVA with right side residual weakness, hypertension, DM2, hyperlipidemia and BPH who presents to emergency department for evaluation of hyperglycemia.  Patient's blood glucose was around the 500 ranges.  Patient states that he had influenza recently and is currently taking Tamiflu.  As per family patient had a spell where he seemed to space out however he did not fall or have any loss of consciousness.  In ED chest x-ray shows no acute infiltrate.  Labs showed blood glucose of 501, creatinine of 3 and WBCs 14.06.  Due to patient's presenting symptoms he will require admission for further evaluation and management. At  time my examination patient denied any headache, fever, chills, chest pain or shortness of breath.    Overview/Hospital Course:  No notes on file    Interval History: awake and alert, sitting up in bed.    Appreciate podiatry recs-outpatient follow-up with Interventional Cardiology vascular surgery if arterial ultrasound abnormal.  [place] with Betadine p.r.n.    Reported fever overnight, WBC downtrending.  No pending cultures.  Recently completed Tamiflu on 04/14    Renal function slowly improving  Blood glucose slowly downtrending-continue present insulin regimen    Possible discharge today    Review of Systems   Constitutional:  Positive for activity change.   Respiratory:  Negative for cough and shortness of breath.    Gastrointestinal:  Negative for nausea.   Psychiatric/Behavioral:  Negative for agitation.      Objective:     Vital Signs (Most Recent):  Temp: 99.9 °F (37.7  "°C) (04/17/25 0359)  Pulse: 77 (04/17/25 0417)  Resp: 20 (04/17/25 0359)  BP: (!) 159/78 (04/17/25 0359)  SpO2: 97 % (04/17/25 0359) Vital Signs (24h Range):  Temp:  [98.8 °F (37.1 °C)-101.9 °F (38.8 °C)] 99.9 °F (37.7 °C)  Pulse:  [70-95] 77  Resp:  [18-20] 20  SpO2:  [94 %-98 %] 97 %  BP: (133-159)/(70-78) 159/78     Weight: 90.2 kg (198 lb 13.7 oz)  Body mass index is 26.24 kg/m².    Intake/Output Summary (Last 24 hours) at 4/17/2025 0748  Last data filed at 4/17/2025 0610  Gross per 24 hour   Intake 4521.59 ml   Output 1400 ml   Net 3121.59 ml         Physical Exam  Constitutional:       Appearance: He is not ill-appearing.   HENT:      Head: Normocephalic and atraumatic.   Cardiovascular:      Rate and Rhythm: Regular rhythm.      Heart sounds: Normal heart sounds.   Pulmonary:      Breath sounds: Normal breath sounds.   Abdominal:      Palpations: Abdomen is soft.   Musculoskeletal:         General: Normal range of motion.      Cervical back: Neck supple.   Skin:     General: Skin is warm.   Neurological:      General: No focal deficit present.      Mental Status: He is alert and oriented to person, place, and time.   Psychiatric:         Mood and Affect: Mood normal.               Significant Labs: A1C:   Recent Labs   Lab 04/13/25  2355   HGBA1C 10.2*     ABGs:   No results for input(s): "PH", "PCO2", "HCO3", "POCSATURATED", "BE", "TOTALHB", "COHB", "METHB", "O2HB", "POCFIO2", "PO2" in the last 48 hours.    Blood Culture: No results for input(s): "LABBLOO" in the last 48 hours.  CBC:   Recent Labs   Lab 04/16/25  0600   WBC 11.82   HGB 10.5*   HCT 30.8*        CMP:   Recent Labs   Lab 04/16/25  0600   *   K 3.9      CO2 21*   BUN 26*   CREATININE 2.2*   CALCIUM 8.5*   ANIONGAP 10     Lactic Acid: No results for input(s): "LACTATE" in the last 48 hours.  Lipase: No results for input(s): "LIPASE" in the last 48 hours.  Lipid Panel: No results for input(s): "CHOL", "HDL", "LDLCALC", "TRIG", " ""CHOLHDL" in the last 48 hours.  Magnesium:   No results for input(s): "MG" in the last 48 hours.    Troponin: No results for input(s): "TROPONINI", "TROPONINIHS" in the last 48 hours.  TSH: No results for input(s): "TSH" in the last 4320 hours.  Urine Culture: No results for input(s): "LABURIN" in the last 48 hours.  Urine Studies: No results for input(s): "COLORU", "APPEARANCEUA", "PHUR", "SPECGRAV", "PROTEINUA", "GLUCUA", "KETONESU", "BILIRUBINUA", "OCCULTUA", "NITRITE", "UROBILINOGEN", "LEUKOCYTESUR", "RBCUA", "WBCUA", "BACTERIA", "SQUAMEPITHEL", "HYALINECASTS" in the last 48 hours.    Invalid input(s): "WRIGHTSUR"    Significant Imaging: I have reviewed all pertinent imaging results/findings within the past 24 hours.      Assessment & Plan  Type 2 diabetes mellitus with hyperglycemia  Admit to medical floor with telemetry  Patient's FSGs are uncontrolled due to hyperglycemia on current medication regimen.  Last A1c reviewed-   Lab Results   Component Value Date    HGBA1C 10.2 (H) 04/13/2025     Most recent fingerstick glucose reviewed-   Recent Labs   Lab 04/16/25  1622 04/16/25  1854 04/16/25  2041 04/17/25  0610   POCTGLUCOSE 339* 326* 299* 212*     Current correctional scale  Medium  Maintain anti-hyperglycemic dose as follows-   Antihyperglycemics (From admission, onward)      Start     Stop Route Frequency Ordered    04/16/25 0900  insulin glargine U-100 (Lantus) pen 20 Units         -- SubQ Daily 04/15/25 1256    04/15/25 1645  insulin aspart U-100 pen 7 Units         -- SubQ 3 times daily with meals 04/15/25 1256    04/15/25 0849  insulin aspart U-100 pen 0-5 Units         -- SubQ Before meals & nightly PRN 04/15/25 0749          Hold Oral hypoglycemics while patient is in the hospital.  Acute kidney injury superimposed on CKD  MARGOTH is likely due to pre-renal azotemia due to intravascular volume depletion. Baseline creatinine is  1.9 . Most recent creatinine and eGFR are listed below.  Recent Labs     " 04/15/25  0455 04/16/25  0600   CREATININE 2.5* 2.2*   EGFRNORACEVR 25* 30*      Plan  - MARGOTH is improving. Will continue current treatment  - Avoid nephrotoxins and renally dose meds for GFR listed above  - Monitor urine output, serial BMP, and adjust therapy as needed  - monitor renal function closely.  HLD (hyperlipidemia)  Continue with home dose of statin.    Influenza  Finish course of Tamiflu.  4/14 last day    HTN (hypertension)  Patient's blood pressure range in the last 24 hours was: BP  Min: 133/71  Max: 169/84.The patient's inpatient anti-hypertensive regimen is listed below:  Current Antihypertensives  amLODIPine tablet 10 mg, Daily, Oral  carvediloL tablet 6.25 mg, 2 times daily, Oral  , Daily, Oral    Plan  - BP is controlled, no changes needed to their regimen  - monitor blood pressure closely.  Anemia  Anemia is likely due to chronic blood loss. Most recent hemoglobin and hematocrit are listed below.  Recent Labs     04/15/25  0454 04/16/25  0600   HGB 11.2* 10.5*   HCT 33.8* 30.8*     Plan  - Monitor serial CBC: Daily  - Transfuse PRBC if patient becomes hemodynamically unstable, symptomatic or H/H drops below 7/21.  - Patient has not received any PRBC transfusions to date  - Patient's anemia is currently stable     Hyponatremia  Hyponatremia is likely due to Dehydration/hypovolemia. The patient's most recent sodium results are listed below.  Recent Labs     04/15/25  0455 04/16/25  0600   * 132*     Plan  - Correct the sodium by 4-6mEq in 24 hours.   - Obtain the following studies: Urine sodium, urine osmolality, serum osmolality.  - Will treat the hyponatremia with IV fluids as follows:   mL/hour/gradually improving  - Monitor sodium Daily.   - Patient hyponatremia is improving     Open wound of fourth toe  Left toe x-ray   Consult podiatry-appreciate recs-given history of diabetes and nonpalpable pulse, concern for high-risk for nonhealing wounds-recs  Arterial ultrasound-Areas of  focal probable severe stenosis about the left greater than right mid superficial femoral arteries.  Scattered biphasic and triphasic waveforms throughout the visualized lower extremities, in keeping with peripheral vascular disease  -Outpatient follow-up with Interventional Cardiology or vascular surgery if abnormal  Lives CAD intact with Betadine paint p.r.n.  Outpatient follow-up with podiatrist    [place]      VTE Risk Mitigation (From admission, onward)           Ordered     heparin (porcine) injection 5,000 Units  Every 8 hours         04/14/25 0915     IP VTE HIGH RISK PATIENT  Once         04/13/25 2325     Place sequential compression device  Until discontinued         04/13/25 2325                    Discharge Planning   SUSY: 4/17/2025     Code Status: Full Code   Medical Readiness for Discharge Date: 4/17/2025  Discharge Plan A: Home with family (OP PT/OT)   Discharge Delays: None known at this time            Please place Justification for DME        Velma Gilman MD  Department of Hospital Medicine   [place] [facility]

## 2025-04-17 NOTE — PROGRESS NOTES
[facility] Medicine  Progress Note    Patient Name: Robi Donovan  MRN: [id number]  Patient Class: IP- Inpatient   Admission Date: 4/13/2025  Length of Stay: 3 days  Attending Physician: Velma Gilman*  Primary Care Provider: Nia, Primary Doctor        Subjective     Principal Problem:Type 2 diabetes mellitus with hyperglycemia      HPI:  Robi Donovan is a 79-year-old male with a past medical history of CVA with right side residual weakness, hypertension, DM2, hyperlipidemia and BPH who presents to emergency department for evaluation of hyperglycemia.  Patient's blood glucose was around the 500 ranges.  Patient states that he had influenza recently and is currently taking Tamiflu.  As per family patient had a spell where he seemed to space out however he did not fall or have any loss of consciousness.  In ED chest x-ray shows no acute infiltrate.  Labs showed blood glucose of 501, creatinine of 3 and WBCs 14.06.  Due to patient's presenting symptoms he will require admission for further evaluation and management. At  time my examination patient denied any headache, fever, chills, chest pain or shortness of breath.    Overview/Hospital Course:  No notes on file    Interval History: awake and alert, sitting out on the chair.    Appreciate podiatry recs-outpatient follow-up with Interventional Cardiology vascular surgery if arterial ultrasound abnormal.  [place] with Betadine p.r.n.    Renal function slowly improving- hold nephrotoxic meds with PCP FU  Blood glucose slowly downtrending-continue present insulin regimen    Possible discharge today    Review of Systems   Constitutional:  Positive for activity change.   Respiratory:  Negative for cough and shortness of breath.    Gastrointestinal:  Negative for nausea.   Psychiatric/Behavioral:  Negative for agitation.      Objective:     Vital Signs (Most Recent):  Temp: 98.5 °F (36.9 °C) (04/17/25 1133)  Pulse: 70 (04/17/25 1133)  Resp: 18 (04/17/25  "1133)  BP: 139/71 (04/17/25 1133)  SpO2: 99 % (04/17/25 1133) Vital Signs (24h Range):  Temp:  [98.5 °F (36.9 °C)-101.9 °F (38.8 °C)] 98.5 °F (36.9 °C)  Pulse:  [70-83] 70  Resp:  [18-20] 18  SpO2:  [94 %-99 %] 99 %  BP: (139-169)/(71-84) 139/71     Weight: 90.2 kg (198 lb 13.7 oz)  Body mass index is 26.24 kg/m².    Intake/Output Summary (Last 24 hours) at 4/17/2025 1454  Last data filed at 4/17/2025 0610  Gross per 24 hour   Intake 4521.59 ml   Output 1400 ml   Net 3121.59 ml         Physical Exam  Constitutional:       Appearance: He is not ill-appearing.   HENT:      Head: Normocephalic and atraumatic.   Cardiovascular:      Rate and Rhythm: Regular rhythm.      Heart sounds: Normal heart sounds.   Pulmonary:      Breath sounds: Normal breath sounds.   Abdominal:      Palpations: Abdomen is soft.   Musculoskeletal:         General: Normal range of motion.      Cervical back: Neck supple.   Skin:     General: Skin is warm.   Neurological:      General: No focal deficit present.      Mental Status: He is alert and oriented to person, place, and time.   Psychiatric:         Mood and Affect: Mood normal.               Significant Labs: A1C:   Recent Labs   Lab 04/13/25  2355   HGBA1C 10.2*     ABGs:   No results for input(s): "PH", "PCO2", "HCO3", "POCSATURATED", "BE", "TOTALHB", "COHB", "METHB", "O2HB", "POCFIO2", "PO2" in the last 48 hours.    Blood Culture: No results for input(s): "LABBLOO" in the last 48 hours.  CBC:   Recent Labs   Lab 04/16/25  0600   WBC 11.82   HGB 10.5*   HCT 30.8*        CMP:   Recent Labs   Lab 04/16/25  0600 04/17/25  0801   * 135*   K 3.9 3.6    101   CO2 21* 25   BUN 26* 19   CREATININE 2.2* 1.6*   CALCIUM 8.5* 8.4*   ANIONGAP 10 9     Lactic Acid: No results for input(s): "LACTATE" in the last 48 hours.  Lipase: No results for input(s): "LIPASE" in the last 48 hours.  Lipid Panel: No results for input(s): "CHOL", "HDL", "LDLCALC", "TRIG", "CHOLHDL" in the last 48 " "hours.  Magnesium:   No results for input(s): "MG" in the last 48 hours.    Troponin: No results for input(s): "TROPONINI", "TROPONINIHS" in the last 48 hours.  TSH: No results for input(s): "TSH" in the last 4320 hours.  Urine Culture: No results for input(s): "LABURIN" in the last 48 hours.  Urine Studies: No results for input(s): "COLORU", "APPEARANCEUA", "PHUR", "SPECGRAV", "PROTEINUA", "GLUCUA", "KETONESU", "BILIRUBINUA", "OCCULTUA", "NITRITE", "UROBILINOGEN", "LEUKOCYTESUR", "RBCUA", "WBCUA", "BACTERIA", "SQUAMEPITHEL", "HYALINECASTS" in the last 48 hours.    Invalid input(s): "WRIGHTSUR"    Significant Imaging: I have reviewed all pertinent imaging results/findings within the past 24 hours.      Assessment & Plan  Type 2 diabetes mellitus with hyperglycemia  Admit to medical floor with telemetry  Patient's FSGs are uncontrolled due to hyperglycemia on current medication regimen.  Last A1c reviewed-   Lab Results   Component Value Date    HGBA1C 10.2 (H) 04/13/2025     Most recent fingerstick glucose reviewed-   Recent Labs   Lab 04/16/25  1854 04/16/25  2041 04/17/25  0610 04/17/25  1132   POCTGLUCOSE 326* 299* 212* 204*     Current correctional scale  Medium  Maintain anti-hyperglycemic dose as follows-   Antihyperglycemics (From admission, onward)      Start     Stop Route Frequency Ordered    04/16/25 0900  insulin glargine U-100 (Lantus) pen 20 Units         -- SubQ Daily 04/15/25 1256    04/15/25 1645  insulin aspart U-100 pen 7 Units         -- SubQ 3 times daily with meals 04/15/25 1256    04/15/25 0849  insulin aspart U-100 pen 0-5 Units         -- SubQ Before meals & nightly PRN 04/15/25 0749          Hold Oral hypoglycemics while patient is in the hospital.  Acute kidney injury superimposed on CKD  MARGOTH is likely due to pre-renal azotemia due to intravascular volume depletion. Baseline creatinine is  1.9 . Most recent creatinine and eGFR are listed below.  Recent Labs     04/15/25  0455 " 04/16/25  0600 04/17/25  0801   CREATININE 2.5* 2.2* 1.6*   EGFRNORACEVR 25* 30* 44*      Plan  - MARGOTH is improving. Will continue current treatment  - Avoid nephrotoxins and renally dose meds for GFR listed above  - Monitor urine output, serial BMP, and adjust therapy as needed  - monitor renal function closely.  HLD (hyperlipidemia)  Continue with home dose of statin.    Influenza  Finish course of Tamiflu.  4/14 last day    HTN (hypertension)  Patient's blood pressure range in the last 24 hours was: BP  Min: 139/71  Max: 169/84.The patient's inpatient anti-hypertensive regimen is listed below:  Current Antihypertensives  amLODIPine tablet 10 mg, Daily, Oral  carvediloL tablet 6.25 mg, 2 times daily, Oral  losartan (COZAAR) tablet, Daily, Oral    Plan  - BP is controlled, no changes needed to their regimen  - monitor blood pressure closely.  Anemia  Anemia is likely due to chronic blood loss. Most recent hemoglobin and hematocrit are listed below.  Recent Labs     04/15/25  0454 04/16/25  0600   HGB 11.2* 10.5*   HCT 33.8* 30.8*     Plan  - Monitor serial CBC: Daily  - Transfuse PRBC if patient becomes hemodynamically unstable, symptomatic or H/H drops below 7/21.  - Patient has not received any PRBC transfusions to date  - Patient's anemia is currently stable     Hyponatremia  Hyponatremia is likely due to Dehydration/hypovolemia. The patient's most recent sodium results are listed below.  Recent Labs     04/15/25  0455 04/16/25  0600 04/17/25  0801   * 132* 135*     Plan  - Correct the sodium by 4-6mEq in 24 hours.   - Obtain the following studies: Urine sodium, urine osmolality, serum osmolality.  - Will treat the hyponatremia with IV fluids as follows:   mL/hour/gradually improving  - Monitor sodium Daily.   - Patient hyponatremia is improving     Open wound of fourth toe  Left toe x-ray   Consult podiatry-appreciate recs-given history of diabetes and nonpalpable pulse, concern for high-risk for  nonhealing wounds-recs  Arterial ultrasound-Areas of focal probable severe stenosis about the left greater than right mid superficial femoral arteries.  Scattered biphasic and triphasic waveforms throughout the visualized lower extremities, in keeping with peripheral vascular disease  -Outpatient follow-up with Interventional Cardiology or vascular surgery if abnormal  Lives CAD intact with Betadine paint p.r.n.  Outpatient follow-up with podiatrist    [place]      VTE Risk Mitigation (From admission, onward)           Ordered     heparin (porcine) injection 5,000 Units  Every 8 hours         04/14/25 0915     IP VTE HIGH RISK PATIENT  Once         04/13/25 2325     Place sequential compression device  Until discontinued         04/13/25 2325                    Discharge Planning   SUSY: 4/17/2025     Code Status: Full Code   Medical Readiness for Discharge Date: 4/17/2025  Discharge Plan A: Home with family (OP PT/OT)   Discharge Delays: None known at this time        Please place Justification for DME        Velma Gilman MD  Department of Hospital Medicine   [place] - Telemetry

## 2025-04-17 NOTE — ASSESSMENT & PLAN NOTE
Admit to medical floor with telemetry  Patient's FSGs are uncontrolled due to hyperglycemia on current medication regimen.  Last A1c reviewed-   Lab Results   Component Value Date    HGBA1C 10.2 (H) 04/13/2025     Most recent fingerstick glucose reviewed-   Recent Labs   Lab 04/16/25  1854 04/16/25  2041 04/17/25  0610 04/17/25  1132   POCTGLUCOSE 326* 299* 212* 204*     Current correctional scale  Medium  Maintain anti-hyperglycemic dose as follows-   Antihyperglycemics (From admission, onward)      Start     Stop Route Frequency Ordered    04/16/25 0900  insulin glargine U-100 (Lantus) pen 20 Units         -- SubQ Daily 04/15/25 1256    04/15/25 1645  insulin aspart U-100 pen 7 Units         -- SubQ 3 times daily with meals 04/15/25 1256    04/15/25 0849  insulin aspart U-100 pen 0-5 Units         -- SubQ Before meals & nightly PRN 04/15/25 0749          Hold Oral hypoglycemics while patient is in the hospital.

## 2025-04-17 NOTE — ASSESSMENT & PLAN NOTE
Patient's blood pressure range in the last 24 hours was: BP  Min: 139/71  Max: 169/84.The patient's inpatient anti-hypertensive regimen is listed below:  Current Antihypertensives  amLODIPine tablet 10 mg, Daily, Oral  carvediloL tablet 6.25 mg, 2 times daily, Oral  losartan (COZAAR) tablet, Daily, Oral    Plan  - BP is controlled, no changes needed to their regimen  - monitor blood pressure closely.

## 2025-04-17 NOTE — CONSULTS
[facility] - Telemetry  Podiatry  Consult Note    Patient Name: Robi Donovan  MRN: [id number]  Admission Date: 4/13/2025  Hospital Length of Stay: 2 days  Attending Physician: Velma Gilman*  Primary Care Provider: Nia, Primary Doctor     Inpatient consult to Podiatry  Consult performed by: Isma Elliott DPM  Consult ordered by: Velma Gilman MD  Reason for consult: left 4th digit scab.        Subjective:     History of Present Illness:  Robi Donovan is a 79-year-old male with a past medical history of CVA with right side residual weakness, hypertension, DM2, hyperlipidemia and BPH who presents to emergency department for evaluation of hyperglycemia. Patient's blood glucose was around the 500 ranges. Patient states that he had influenza recently and is currently taking Tamiflu. As per family patient had a spell where he seemed to space out however he did not fall or have any loss of consciousness.     Podiatry consulted for left 4th toe wound. Patient follows outside podiatrist, recently had nails clipped and small piece of skin was accidentally cut, now with stable scab. Happy with his outside podiatrist. No drainage, no malodor, and patient leaving it to [facility].    Scheduled Meds:   amLODIPine  10 mg Oral Daily    atorvastatin  40 mg Oral Daily    carvediloL  6.25 mg Oral BID    electrolytes-dextrose  400 mL Oral Q4H    finasteride  5 mg Oral Daily    heparin (porcine)  5,000 Units Subcutaneous Q8H    insulin aspart U-100  7 Units Subcutaneous TIDWM    insulin glargine U-100  20 Units Subcutaneous Daily    polyethylene glycol  17 g Oral BID    tamsulosin  0.4 mg Oral Daily     Continuous Infusions:   lactated ringers   Intravenous Continuous 100 mL/hr at 04/16/25 1742 Rate Verify at 04/16/25 1742     PRN Meds:  Current Facility-Administered Medications:     acetaminophen, 650 mg, Oral, Q6H PRN    aluminum-magnesium hydroxide-simethicone, 30 mL, Oral, Q6H PRN    dextrose 50%, 12.5 g, Intravenous,  PRN    dextrose 50%, 12.5 g, Intravenous, PRN    dextrose 50%, 25 g, Intravenous, PRN    dextrose 50%, 25 g, Intravenous, PRN    glucagon (human recombinant), 1 mg, Intramuscular, PRN    glucagon (human recombinant), 1 mg, Intramuscular, PRN    glucose, 16 g, Oral, PRN    glucose, 16 g, Oral, PRN    glucose, 24 g, Oral, PRN    glucose, 24 g, Oral, PRN    guaiFENesin 100 mg/5 ml, 200 mg, Oral, Q4H PRN    insulin aspart U-100, 0-5 Units, Subcutaneous, QID (AC + HS) PRN    naloxone, 0.02 mg, Intravenous, PRN    ondansetron, 4 mg, Intravenous, Q8H PRN    sodium chloride 0.9%, 10 mL, Intravenous, Q12H PRN    Review of patient's allergies indicates:  No Known Allergies     Past Medical History:   Diagnosis Date    Diabetes mellitus type I     Hypertension     Stroke      Past Surgical History:   Procedure Laterality Date    ROBOTIC FUSION,SPINE,LUMBAR,TLIF N/A 2024    Procedure: OPEN ROBOTIC L4 -S1 TLIF;  Surgeon: Juan Luis Mcbride DO;  Location: [facility] OR [address] [place] [address];  Service: Neurosurgery;  Laterality: N/A;    TRANSFORAMINAL EPIDURAL INJECTION OF STEROID Bilateral 2024    Procedure: LUMBAR TRANSFORAMINAL BILATERAL L4/5;  Surgeon: Aria Orozco MD;  Location: [facility];  Service: Pain Management;  Laterality: Bilateral;  [phone]  2 WK F/U LM       Family History    None       Tobacco Use    Smoking status: Former     Current packs/day: 0.00     Types: Cigarettes     Start date:      Quit date:      Years since quittin.3    Smokeless tobacco: Never   Substance and Sexual Activity    Alcohol use: Not Currently    Drug use: Not on file    Sexual activity: Not on file     Review of Systems   Constitutional:  Positive for activity change.   Respiratory:  Negative for cough and shortness of breath.    Gastrointestinal:  Negative for nausea.   Psychiatric/Behavioral:  Negative for agitation.      Objective:     Vital Signs (Most Recent):  Temp: 100.1 °F (37.8 °C) (25)  Pulse:  "78 (04/16/25 1951)  Resp: 18 (04/16/25 1951)  BP: (!) 153/72 (04/16/25 1951)  SpO2: 96 % (04/16/25 1951) Vital Signs (24h Range):  Temp:  [98.8 °F (37.1 °C)-102.2 °F (39 °C)] 100.1 °F (37.8 °C)  Pulse:  [70-95] 78  Resp:  [15-18] 18  SpO2:  [96 %-99 %] 96 %  BP: (133-164)/(68-76) 153/72     Weight: 90.2 kg (198 lb 13.7 oz)  Body mass index is 26.24 kg/m².    Foot Exam    General  Orientation: alert and oriented to person, place, and time   Affect: appropriate       Right Foot/Ankle     Neurovascular  Saphenous nerve sensation: diminished  Tibial nerve sensation: diminished  Superficial peroneal nerve sensation: diminished  Deep peroneal nerve sensation: diminished  Sural nerve sensation: diminished    Muscle Strength  Ankle dorsiflexion: 3  Ankle plantar flexion: 3  Ankle inversion: 3  Ankle eversion: 3  Great toe extension: 3  Great toe flexion: 3    Comments  Right foot with weakness and diminished sensation 2/2 right sides CVA    Left Foot/Ankle      Inspection and Palpation  Skin Exam: no cellulitis, no ulcer and no erythema     Muscle Strength  Ankle dorsiflexion: 5  Ankle plantar flexion: 5  Ankle inversion: 5  Ankle eversion: 5  Great toe extension: 5  Great toe flexion: 5    Comments  Left foot: 4th toe scab, stable. No erythema, edema, malodor, crepitus or fluctuance.             Laboratory:  A1C:   Recent Labs   Lab 04/13/25  2355   HGBA1C 10.2*     CBC:   Recent Labs   Lab 04/16/25  0600   WBC 11.82   RBC 3.53*   HGB 10.5*   HCT 30.8*      MCV 87   MCH 29.7   MCHC 34.1     CMP:   Recent Labs   Lab 04/13/25  2150 04/14/25  0628 04/16/25  0600   CALCIUM 9.1   < > 8.5*   ALBUMIN 3.0*  --   --    *   < > 132*   K 4.4   < > 3.9   CO2 18*   < > 21*      < > 101   BUN 39*   < > 26*   CREATININE 3.0*   < > 2.2*   ALKPHOS 70  --   --    ALT 31  --   --    AST 40  --   --    BILITOT 0.5  --   --     < > = values in this interval not displayed.     CRP: No results for input(s): "CRP" in the last " "168 hours.  ESR: No results for input(s): "SEDRATE" in the last 168 hours.  Wound Cultures: No results for input(s): "LABAERO" in the last 4320 hours.  Microbiology Results (last 7 days)       ** No results found for the last 168 hours. **          Specimen (24h ago, onward)      None            Diagnostic Results:  I have reviewed all pertinent imaging results/findings within the past 24 hours.  Assessment/Plan:     Derm  Scab  Small scab noted at left 4th digit, stable. No concerns for infection. X-ray with no concerns for infection.     - Non palpable pulse, hx of diabetes, high risk for non healing wounds. Art US ordered, can follow up outpatient with interventional cardio or vascular surgery if abnormal  - Leave scab intact with betadine paint PRN. Can wear sock  - Patient to follow with outside podiatrist as he is happy with current care.   - Podiatry to sign off, no acute concerns or intervention.         Thank you for your consult. I will sign off. Please contact us if you have any additional questions.    Isma Elliott DPM  Podiatry  [facility] - Telemetry  "

## 2025-04-17 NOTE — ASSESSMENT & PLAN NOTE
Hyponatremia is likely due to Dehydration/hypovolemia. The patient's most recent sodium results are listed below.  Recent Labs     04/15/25  0455 04/16/25  0600   * 132*     Plan  - Correct the sodium by 4-6mEq in 24 hours.   - Obtain the following studies: Urine sodium, urine osmolality, serum osmolality.  - Will treat the hyponatremia with IV fluids as follows:   mL/hour/gradually improving  - Monitor sodium Daily.   - Patient hyponatremia is improving

## 2025-04-17 NOTE — ASSESSMENT & PLAN NOTE
Admit to medical floor with telemetry  Patient's FSGs are uncontrolled due to hyperglycemia on current medication regimen.  Last A1c reviewed-   Lab Results   Component Value Date    HGBA1C 10.2 (H) 04/13/2025     Most recent fingerstick glucose reviewed-   Recent Labs   Lab 04/16/25  1622 04/16/25  1854 04/16/25  2041 04/17/25  0610   POCTGLUCOSE 339* 326* 299* 212*     Current correctional scale  Medium  Maintain anti-hyperglycemic dose as follows-   Antihyperglycemics (From admission, onward)      Start     Stop Route Frequency Ordered    04/16/25 0900  insulin glargine U-100 (Lantus) pen 20 Units         -- SubQ Daily 04/15/25 1256    04/15/25 1645  insulin aspart U-100 pen 7 Units         -- SubQ 3 times daily with meals 04/15/25 1256    04/15/25 0849  insulin aspart U-100 pen 0-5 Units         -- SubQ Before meals & nightly PRN 04/15/25 0749          Hold Oral hypoglycemics while patient is in the hospital.

## 2025-04-17 NOTE — DISCHARGE SUMMARY
[facility] Medicine  Discharge Summary      Patient Name: Robi Donovan  MRN: [id number]  GIUSEPPE: [id number]  Patient Class: IP- Inpatient  Admission Date: 4/13/2025  Hospital Length of Stay: 3 days  Discharge Date and Time: 4/17/2025  5:52 PM  Attending Physician: Velma Varma*   Discharging Provider: Velma Varma MD  Primary Care Provider: Nia, Primary Doctor    Primary Care Team: Networked reference to record PCT     HPI:   Robi Donovan is a 79-year-old male with a past medical history of CVA with right side residual weakness, hypertension, DM2, hyperlipidemia and BPH who presents to emergency department for evaluation of hyperglycemia.  Patient's blood glucose was around the 500 ranges.  Patient states that he had influenza recently and is currently taking Tamiflu.  As per family patient had a spell where he seemed to space out however he did not fall or have any loss of consciousness.  In ED chest x-ray shows no acute infiltrate.  Labs showed blood glucose of 501, creatinine of 3 and WBCs 14.06.  Due to patient's presenting symptoms he will require admission for further evaluation and management. At  time my examination patient denied any headache, fever, chills, chest pain or shortness of breath.    * No surgery found *      Hospital Course:   No notes on file     Goals of Care Treatment Preferences:  Code Status: Full Code      SDOH Screening:  The patient was screened for utility difficulties, food insecurity, transport difficulties, housing insecurity, and interpersonal safety and there were no concerns identified this admission.     Consults:   Consults (From admission, onward)          Status Ordering Provider     Inpatient consult to Podiatry  Once        Provider:  (Not yet assigned)    Completed VELMA VARMA            Assessment & Plan  Type 2 diabetes mellitus with hyperglycemia  Admit to medical floor with telemetry  Patient's FSGs are uncontrolled due  to hyperglycemia on current medication regimen.  Last A1c reviewed-   Lab Results   Component Value Date    HGBA1C 10.2 (H) 04/13/2025     Most recent fingerstick glucose reviewed-   Recent Labs   Lab 04/16/25  1622 04/16/25  1854 04/16/25  2041 04/17/25  0610   POCTGLUCOSE 339* 326* 299* 212*     Current correctional scale  Medium  Maintain anti-hyperglycemic dose as follows-   Antihyperglycemics (From admission, onward)      Start     Stop Route Frequency Ordered    04/16/25 0900  insulin glargine U-100 (Lantus) pen 20 Units         -- SubQ Daily 04/15/25 1256    04/15/25 1645  insulin aspart U-100 pen 7 Units         -- SubQ 3 times daily with meals 04/15/25 1256    04/15/25 0849  insulin aspart U-100 pen 0-5 Units         -- SubQ Before meals & nightly PRN 04/15/25 0749          Hold Oral hypoglycemics while patient is in the hospital.  Acute kidney injury superimposed on CKD  MARGOTH is likely due to pre-renal azotemia due to intravascular volume depletion. Baseline creatinine is  1.9 . Most recent creatinine and eGFR are listed below.  Recent Labs     04/15/25  0455 04/16/25  0600   CREATININE 2.5* 2.2*   EGFRNORACEVR 25* 30*      Plan  - MARGOTH is improving. Will continue current treatment  - Avoid nephrotoxins and renally dose meds for GFR listed above  - Monitor urine output, serial BMP, and adjust therapy as needed  - monitor renal function closely.  HLD (hyperlipidemia)  Continue with home dose of statin.    Influenza  Finish course of Tamiflu.  4/14 last day    HTN (hypertension)  Patient's blood pressure range in the last 24 hours was: BP  Min: 133/71  Max: 169/84.The patient's inpatient anti-hypertensive regimen is listed below:  Current Antihypertensives  amLODIPine tablet 10 mg, Daily, Oral  carvediloL tablet 6.25 mg, 2 times daily, Oral  , Daily, Oral    Plan  - BP is controlled, no changes needed to their regimen  - monitor blood pressure closely.  Anemia  Anemia is likely due to chronic blood loss. Most  recent hemoglobin and hematocrit are listed below.  Recent Labs     04/15/25  0454 04/16/25  0600   HGB 11.2* 10.5*   HCT 33.8* 30.8*     Plan  - Monitor serial CBC: Daily  - Transfuse PRBC if patient becomes hemodynamically unstable, symptomatic or H/H drops below 7/21.  - Patient has not received any PRBC transfusions to date  - Patient's anemia is currently stable     Hyponatremia  Hyponatremia is likely due to Dehydration/hypovolemia. The patient's most recent sodium results are listed below.  Recent Labs     04/15/25  0455 04/16/25  0600   * 132*     Plan  - Correct the sodium by 4-6mEq in 24 hours.   - Obtain the following studies: Urine sodium, urine osmolality, serum osmolality.  - Will treat the hyponatremia with IV fluids as follows:   mL/hour/gradually improving  - Monitor sodium Daily.   - Patient hyponatremia is improving     Open wound of fourth toe      Scab      Final Active Diagnoses:    Diagnosis Date Noted POA    PRINCIPAL PROBLEM:  Type 2 diabetes mellitus with hyperglycemia [E11.65] 04/13/2025 Yes    Open wound of fourth toe [S91.106A] 04/16/2025 Yes    Scab [R23.4] 04/16/2025 Yes    Anemia [D64.9] 04/14/2025 Yes    Hyponatremia [E87.1] 04/14/2025 Yes    HLD (hyperlipidemia) [E78.5] 04/13/2025 Yes    Influenza [J11.1] 04/13/2025 Yes    HTN (hypertension) [I10] 04/13/2025 Yes    Acute kidney injury superimposed on CKD [N17.9, N18.9] 04/13/2025 Yes      Problems Resolved During this Admission:    Diagnosis Date Noted Date Resolved POA    Near syncope [R55] 04/13/2025 04/13/2025 Yes       Discharged Condition: stable    Disposition: Home or Self Care    Follow Up:    Patient Instructions:      Ambulatory referral/consult to Podiatry   Standing Status: Future   Referral Priority: Routine Referral Type: Consultation   Referral Reason: Specialty Services Required   Requested Specialty: Podiatry   Number of Visits Requested: 1     Diet Cardiac     Diet diabetic     Diet renal     Activity  as tolerated       Significant Diagnostic Studies: N/A    Pending Diagnostic Studies:       Procedure Component Value Units Date/Time    Basic metabolic panel [[id number]] Collected: 04/17/25 0801    Order Status: Sent Lab Status: In process Updated: 04/17/25 0843    Specimen: Blood            Medications:  Reconciled Home Medications:      Medication List        START taking these medications      CHEST CONGESTION RELIEF 100 mg/5 mL syrup  Generic drug: guaiFENesin 100 mg/5 ml  Take 10 mLs (200 mg total) by mouth every 4 (four) hours as needed for Congestion.     LANTUS SOLOSTAR U-100 INSULIN 100 unit/mL (3 mL) Inpn pen  Generic drug: insulin glargine U-100 (Lantus)  Inject 20 Units into the skin once daily.  Start taking on: April 18, 2025     NovoLOG Flexpen U-100 Insulin 100 unit/mL (3 mL) Inpn pen  Generic drug: insulin aspart U-100  Inject 7 Units into the skin 3 (three) times daily.            CHANGE how you take these medications      losartan 25 MG tablet  Commonly known as: COZAAR  Take 1 tablet (25 mg total) by mouth once daily.  What changed:   medication strength  how much to take     tamsulosin 0.4 mg Cap  Commonly known as: FLOMAX  Take 1 capsule (0.4 mg total) by mouth once daily.  Start taking on: April 18, 2025  What changed: how much to take            CONTINUE taking these medications      alendronate 70 MG tablet  Commonly known as: FOSAMAX  Take 70 mg by mouth every 7 days.     amLODIPine 10 MG tablet  Commonly known as: NORVASC  Take 10 mg by mouth once daily.     aspirin 81 MG EC tablet  Commonly known as: ECOTRIN  Take 81 mg by mouth once daily.     carvediloL 6.25 MG tablet  Commonly known as: COREG  Take 6.25 mg by mouth 2 (two) times daily.     docusate sodium 50 MG capsule  Commonly known as: COLACE  Take 50 mg by mouth 2 (two) times daily.     finasteride 5 mg tablet  Commonly known as: PROSCAR  Take 5 mg by mouth once daily.     fluticasone propionate 50 mcg/actuation nasal  "spray  Commonly known as: FLONASE  1 spray by Each Nostril route as needed.     promethazine-dextromethorphan 6.25-15 mg/5 mL Syrp  Commonly known as: PROMETHAZINE-DM  Take 5 mLs by mouth every 6 (six) hours as needed.     rosuvastatin 40 MG Tab  Commonly known as: CRESTOR  Take 40 mg by mouth every evening.     TRUE METRIX GLUCOSE TEST STRIP Strp  Generic drug: blood sugar diagnostic  1 strip 2 (two) times daily.            STOP taking these medications      glipiZIDE 10 MG tablet  Commonly known as: GLUCOTROL     hydroCHLOROthiazide 12.5 MG Tab     metFORMIN 1000 MG tablet  Commonly known as: GLUCOPHAGE            ASK your doctor about these medications      BD ULTRA-FINE ANGEL PEN NEEDLE 32 gauge x 5/32" Ndle  Generic drug: pen needle, diabetic  USE AS DIRECTED TO INJECT INSULIN FOUR TIMES DAILY              Indwelling Lines/Drains at time of discharge:   Lines/Drains/Airways       None                   Time spent on the discharge of patient: 35 minutes         Velma Gilman MD  Department of Hospital Medicine  [facility] - Telemetry  "

## 2025-04-17 NOTE — ASSESSMENT & PLAN NOTE
Small scab noted at left 4th digit, stable. No concerns for infection. X-ray with no concerns for infection.     - Non palpable pulse, hx of diabetes, high risk for non healing wounds. Art US ordered, can follow up outpatient with interventional cardio or vascular surgery if abnormal  - Leave scab intact with betadine paint PRN. Can wear sock  - Patient to follow with outside podiatrist as he is happy with current care.   - Podiatry to sign off, no acute concerns or intervention.

## 2025-04-17 NOTE — SUBJECTIVE & OBJECTIVE
Interval History: awake and alert, sitting out on the chair.    Appreciate podiatry recs-outpatient follow-up with Interventional Cardiology vascular surgery if arterial ultrasound abnormal.  [place] with Betadine p.r.n.    Renal function slowly improving- hold nephrotoxic meds with PCP FU  Blood glucose slowly downtrending-continue present insulin regimen    Possible discharge today    Review of Systems   Constitutional:  Positive for activity change.   Respiratory:  Negative for cough and shortness of breath.    Gastrointestinal:  Negative for nausea.   Psychiatric/Behavioral:  Negative for agitation.      Objective:     Vital Signs (Most Recent):  Temp: 98.5 °F (36.9 °C) (04/17/25 1133)  Pulse: 70 (04/17/25 1133)  Resp: 18 (04/17/25 1133)  BP: 139/71 (04/17/25 1133)  SpO2: 99 % (04/17/25 1133) Vital Signs (24h Range):  Temp:  [98.5 °F (36.9 °C)-101.9 °F (38.8 °C)] 98.5 °F (36.9 °C)  Pulse:  [70-83] 70  Resp:  [18-20] 18  SpO2:  [94 %-99 %] 99 %  BP: (139-169)/(71-84) 139/71     Weight: 90.2 kg (198 lb 13.7 oz)  Body mass index is 26.24 kg/m².    Intake/Output Summary (Last 24 hours) at 4/17/2025 1454  Last data filed at 4/17/2025 0610  Gross per 24 hour   Intake 4521.59 ml   Output 1400 ml   Net 3121.59 ml         Physical Exam  Constitutional:       Appearance: He is not ill-appearing.   HENT:      Head: Normocephalic and atraumatic.   Cardiovascular:      Rate and Rhythm: Regular rhythm.      Heart sounds: Normal heart sounds.   Pulmonary:      Breath sounds: Normal breath sounds.   Abdominal:      Palpations: Abdomen is soft.   Musculoskeletal:         General: Normal range of motion.      Cervical back: Neck supple.   Skin:     General: Skin is warm.   Neurological:      General: No focal deficit present.      Mental Status: He is alert and oriented to person, place, and time.   Psychiatric:         Mood and Affect: Mood normal.               Significant Labs: A1C:   Recent Labs   Lab 04/13/25  2355   HGBA1C  "10.2*     ABGs:   No results for input(s): "PH", "PCO2", "HCO3", "POCSATURATED", "BE", "TOTALHB", "COHB", "METHB", "O2HB", "POCFIO2", "PO2" in the last 48 hours.    Blood Culture: No results for input(s): "LABBLOO" in the last 48 hours.  CBC:   Recent Labs   Lab 04/16/25  0600   WBC 11.82   HGB 10.5*   HCT 30.8*        CMP:   Recent Labs   Lab 04/16/25  0600 04/17/25  0801   * 135*   K 3.9 3.6    101   CO2 21* 25   BUN 26* 19   CREATININE 2.2* 1.6*   CALCIUM 8.5* 8.4*   ANIONGAP 10 9     Lactic Acid: No results for input(s): "LACTATE" in the last 48 hours.  Lipase: No results for input(s): "LIPASE" in the last 48 hours.  Lipid Panel: No results for input(s): "CHOL", "HDL", "LDLCALC", "TRIG", "CHOLHDL" in the last 48 hours.  Magnesium:   No results for input(s): "MG" in the last 48 hours.    Troponin: No results for input(s): "TROPONINI", "TROPONINIHS" in the last 48 hours.  TSH: No results for input(s): "TSH" in the last 4320 hours.  Urine Culture: No results for input(s): "LABURIN" in the last 48 hours.  Urine Studies: No results for input(s): "COLORU", "APPEARANCEUA", "PHUR", "SPECGRAV", "PROTEINUA", "GLUCUA", "KETONESU", "BILIRUBINUA", "OCCULTUA", "NITRITE", "UROBILINOGEN", "LEUKOCYTESUR", "RBCUA", "WBCUA", "BACTERIA", "SQUAMEPITHEL", "HYALINECASTS" in the last 48 hours.    Invalid input(s): "WRIGHTSUR"    Significant Imaging: I have reviewed all pertinent imaging results/findings within the past 24 hours.  "

## 2025-04-17 NOTE — ASSESSMENT & PLAN NOTE
MARGOTH is likely due to pre-renal azotemia due to intravascular volume depletion. Baseline creatinine is 1.9. Most recent creatinine and eGFR are listed below.  Recent Labs     04/15/25  0455 04/16/25  0600 04/17/25  0801   CREATININE 2.5* 2.2* 1.6*   EGFRNORACEVR 25* 30* 44*      Plan  - MARGOTH is improving. Will continue current treatment  - Avoid nephrotoxins and renally dose meds for GFR listed above  - Monitor urine output, serial BMP, and adjust therapy as needed  - monitor renal function closely.

## 2025-04-17 NOTE — HPI
Robi Donovan is a 79-year-old male with a past medical history of CVA with right side residual weakness, hypertension, DM2, hyperlipidemia and BPH who presents to emergency department for evaluation of hyperglycemia. Patient's blood glucose was around the 500 ranges. Patient states that he had influenza recently and is currently taking Tamiflu. As per family patient had a spell where he seemed to space out however he did not fall or have any loss of consciousness.     Podiatry consulted for left 4th toe wound. Patient follows outside podiatrist, recently had nails clipped and small piece of skin was accidentally cut, now with stable scab. Happy with his outside podiatrist. No drainage, no malodor, and patient leaving it to [facility].

## 2025-04-17 NOTE — PROGRESS NOTES
Discharge orders noted. Additional clinical references attached. Patient's discharge instructions given by bedside RN and reviewed via this VN.  Education provided on new and previous medications, diagnosis, and follow-up appointments.  New medications delivered by pharmacy. Patient verbalized understanding and teach back method was used. Patient's ride/transportation home at bedside. All questions answered. Transport to [facility] will be requested. Floor nurse notified.            04/17/25 9990    Notification    Notified Of Discharge Status   Admission   Communication Issues? None   Shift   Virtual Nurse - Rounding Complete   Virtual Nurse - Patient Verbalized Approval Of Camera Use   Safety/Activity   Patient Rounds call light in patient/parent reach;clutter free environment maintained;visualized patient   Safety Promotion/Fall Prevention family to remain at bedside;side rails raised x 2   Activity Management Up in chair - L3   Positioning   Body Position legs elevated

## 2025-04-17 NOTE — ASSESSMENT & PLAN NOTE
Hyponatremia is likely due to Dehydration/hypovolemia. The patient's most recent sodium results are listed below.  Recent Labs     04/15/25  0455 04/16/25  0600 04/17/25  0801   * 132* 135*     Plan  - Correct the sodium by 4-6mEq in 24 hours.   - Obtain the following studies: Urine sodium, urine osmolality, serum osmolality.  - Will treat the hyponatremia with IV fluids as follows:   mL/hour/gradually improving  - Monitor sodium Daily.   - Patient hyponatremia is improving

## 2025-04-20 LAB
OHS QRS DURATION: 98 MS
OHS QTC CALCULATION: 506 MS

## 2025-05-06 ENCOUNTER — HOSPITAL ENCOUNTER (OUTPATIENT)
Dept: RADIOLOGY | Facility: HOSPITAL | Age: 80
Discharge: HOME | End: 2025-05-06
Attending: STUDENT IN AN ORGANIZED HEALTH CARE EDUCATION/TRAINING PROGRAM
Payer: MEDICARE

## 2025-05-06 DIAGNOSIS — I69.351 HEMIPARESIS AFFECTING RIGHT SIDE AS LATE EFFECT OF CEREBROVASCULAR ACCIDENT: Primary | ICD-10-CM

## 2025-05-06 DIAGNOSIS — Z98.1 S/P LUMBAR SPINAL FUSION: ICD-10-CM

## 2025-05-06 DIAGNOSIS — S32.020D COMPRESSION FRACTURE OF L2 VERTEBRA WITH ROUTINE HEALING: ICD-10-CM

## 2025-05-06 DIAGNOSIS — R54 FRAILTY SYNDROME IN GERIATRIC PATIENT: ICD-10-CM

## 2025-05-06 PROCEDURE — 72148 MRI LUMBAR SPINE W/O DYE: CPT | Mod: TC

## 2025-05-06 PROCEDURE — 72131 CT LUMBAR SPINE W/O DYE: CPT | Mod: 26,,, | Performed by: RADIOLOGY

## 2025-05-06 PROCEDURE — 72148 MRI LUMBAR SPINE W/O DYE: CPT | Mod: 26,,, | Performed by: RADIOLOGY

## 2025-05-06 PROCEDURE — 72131 CT LUMBAR SPINE W/O DYE: CPT | Mod: TC

## 2025-05-08 ENCOUNTER — OFFICE VISIT (OUTPATIENT)
Dept: NEUROSURGERY | Facility: CLINIC | Age: 80
End: 2025-05-08
Payer: MEDICARE

## 2025-05-08 DIAGNOSIS — Z98.1 S/P LUMBAR SPINAL FUSION: Primary | ICD-10-CM

## 2025-05-08 PROCEDURE — 1111F DSCHRG MED/CURRENT MED MERGE: CPT | Mod: CPTII,S$GLB,, | Performed by: STUDENT IN AN ORGANIZED HEALTH CARE EDUCATION/TRAINING PROGRAM

## 2025-05-08 PROCEDURE — 1101F PT FALLS ASSESS-DOCD LE1/YR: CPT | Mod: CPTII,S$GLB,, | Performed by: STUDENT IN AN ORGANIZED HEALTH CARE EDUCATION/TRAINING PROGRAM

## 2025-05-08 PROCEDURE — 99214 OFFICE O/P EST MOD 30 MIN: CPT | Mod: S$GLB,,, | Performed by: STUDENT IN AN ORGANIZED HEALTH CARE EDUCATION/TRAINING PROGRAM

## 2025-05-08 PROCEDURE — 99999 PR PBB SHADOW E&M-EST. PATIENT-LVL II: CPT | Mod: PBBFAC,,, | Performed by: STUDENT IN AN ORGANIZED HEALTH CARE EDUCATION/TRAINING PROGRAM

## 2025-05-08 PROCEDURE — 1126F AMNT PAIN NOTED NONE PRSNT: CPT | Mod: CPTII,S$GLB,, | Performed by: STUDENT IN AN ORGANIZED HEALTH CARE EDUCATION/TRAINING PROGRAM

## 2025-05-08 PROCEDURE — 3288F FALL RISK ASSESSMENT DOCD: CPT | Mod: CPTII,S$GLB,, | Performed by: STUDENT IN AN ORGANIZED HEALTH CARE EDUCATION/TRAINING PROGRAM

## 2025-05-08 PROCEDURE — 1159F MED LIST DOCD IN RCRD: CPT | Mod: CPTII,S$GLB,, | Performed by: STUDENT IN AN ORGANIZED HEALTH CARE EDUCATION/TRAINING PROGRAM

## 2025-05-08 NOTE — PROGRESS NOTES
CHIEF COMPLAINT:  No chief complaint on file.      HPI:  Robi Donovan is a 79 y.o.  male with below listed PMH, who presents in follow up after L4-S1 TLIF on 9/20/24.  He is doing very well overall with no current back pain.  He had the flu 1 week ago and was hospitalized for 4 days, and has had some generalized weakness since this time.  Otherwise denies radicular pain or back pain.  Still using his bone growth stimulator.         Review of patient's allergies indicates:  No Known Allergies    Past Medical History:   Diagnosis Date    Diabetes mellitus type I     Hypertension     Stroke 2002     Past Surgical History:   Procedure Laterality Date    ROBOTIC FUSION,SPINE,LUMBAR,TLIF N/A 9/20/2024    Procedure: OPEN ROBOTIC L4 -S1 TLIF;  Surgeon: Juan Luis Mcbride DO;  Location: [facility] OR [address] [place] [address];  Service: Neurosurgery;  Laterality: N/A;    TRANSFORAMINAL EPIDURAL INJECTION OF STEROID Bilateral 4/9/2024    Procedure: LUMBAR TRANSFORAMINAL BILATERAL L4/5;  Surgeon: Aria Orozco MD;  [place] [facility] [place] [facility] [place]  Service: Pain Management;  Laterality: Bilateral;  [phone]  2 WK F/U LM     No family history on file.  Social History[1]     ROS:  CONST: Negative for fever, body aches and chills.  HENT: Negative for neck pain/stiffness, headache, congestion, sore throat, swelling.  EYES: Negative for discharge/pain or vision changes.  RESP: Negative for cough/hemoptysis and shortness of breath.  CV: Negative chest pain, difficulty breathing, palpitations.  ABD: Negative pain, nausea, vomiting.  : Negative increase frequency, dysuria, blood in urine or stool.  MUSC: Negative for muscle aches, edema.  SKIN: Negative rash, lesions/sores.  NEURO: Negative headache, dizziness, weakness.    OBJECTIVE:   Vital Signs:       Physical Exam:  Constitutional: Patient sitting comfortably in chair. Appears well developed and well nourished.  Skin: Exposed areas are intact without abnormal markings, rashes or other  lesions.  HEENT: Normocephalic. Normal conjunctivae.  Cardiovascular: Normal rate and regular rhythm.  Respiratory: Chest wall rises and falls symmetrically, without signs of respiratory distress.  Abdomen: Soft and non-tender.  Extremities: Warm and without edema. Calves supple, non-tender.  Psych/Behavior: Normal affect.    Neurological:    Mental status: Alert and oriented. Conversational and appropriate.       Cranial Nerves: VFF to confrontation. PERRL. EOMI without nystagmus. Facial STLT normal and symmetric. Strong, symmetric muscles of mastication. Facial strength full and symmetric. Hearing equal bilaterally to finger rub. Palate and uvula rise and fall normally in midline. Shoulder shrug 5/5 strength. Tongue midline.     Motor:    Upper:  Deltoids Triceps Biceps WE WF  FA    R 5/5 5/5 5/5 5/5 5/5 5/5 5/5    L 5/5 5/5 5/5 5/5 5/5 5/5 5/5      Lower:  HF KE KF DF PF EHL    R 5/5 5/5 5/5 5/5 5/5 5/5    L 5/5 5/5 5/5 5/5 5/5 5/5     Sensory: Intact sensation to light touch and pinprick in all extremities.     Reflexes:      DTR: 2+ knees and biceps symmetrically.     Mcintosh's: Negative.     Babinski's: Negative.     Clonus: Negative.    Cerebellar: Finger-to-nose and rapid alternating movements normal.     Gait:  Stable, fluid.    Spine:    Posture: Head well aligned over pelvis and shoulders in front and side views.  No focal or global spinal deformity visible on inspection.     Cervical:      ROM: Full with flexion, extension, lateral rotation and ear-to-shoulder bend.      Midline TTP: Negative.     Spurling's test: Negative.     Lhermitte's: Negative.    Thoracic:     Midline TTP: Negative    Lumbar:     Midline TTP: Negative     Straight Leg Test: Negative     Crossed Straight Leg Test: Negative    Other:     SI joint TTP: Negative.     Tenderness with external/internal hip rotation: Negative.    Diagnostic Results:  All imaging was independently reviewed by me.    CT lumbar spine: continued evidence  of posterior lateral fusion L4-S1 with known subsidence of L5-S1 that has not progressed since prior CT imaging. Mild haloing of bilateral S1 screws without malalignment.  MRI Lumbar spine: evidence of decompression L4-S1 with improvement in bilateral neuro foraminal stenosis    ASSESSMENT/PLAN:     Problem List Items Addressed This Visit    None    Will Zion is a very pleasant 80 yo male who presents in follow up after L4-S1 fusion.  He is doing well, no new complaints at this time.  Will follow up in 6 months with X-Rays.              [1]   Social History  Tobacco Use    Smoking status: Former     Current packs/day: 0.00     Types: Cigarettes     Start date:      Quit date:      Years since quittin.3    Smokeless tobacco: Never   Substance Use Topics    Alcohol use: Not Currently

## 2025-05-19 NOTE — PROGRESS NOTES
Outpatient Rehab    Physical Therapy Evaluation (only)    Patient Name: Robi Donovan  MRN: [id number]  YOB: 1945  Encounter Date: 5/20/2025    Therapy Diagnosis:   Encounter Diagnosis   Name Primary?    Decreased strength, endurance, and mobility Yes     Physician: Sandi Callaway PA-C    Physician Orders: Eval and Treat  Medical Diagnosis: Hemiparesis affecting right side as late effect of cerebrovascular accident  Compression fracture of L2 vertebra with routine healing  Frailty syndrome in geriatric patient    Visit # / Visits Authorized:  1 / 1  Insurance Authorization Period: 5/6/2025 to 12/31/2025  Date of Evaluation: 5/20/2025  Plan of Care Certification: 5/20/2025 to 7/20/2025     Time In: 1115   Time Out: 1200  Total Time (in minutes): 45   Total Billable Time (in minutes): 45    Intake Outcome Measure for FOTO Survey    Therapist reviewed FOTO scores for Robi Donovan on 5/20/2025.   FOTO report - see Media section or FOTO account episode details.     Intake Score:  %    Precautions:       Subjective   History of Present Illness  Robi is a 79 y.o. male who reports to physical therapy with a chief concern of decreased functional mobilty.     The patient reports a medical diagnosis of I69.351 (ICD-10-CM) - Hemiparesis affecting right side as late effect of cerebrovascular accident  S32.020D (ICD-10-CM) - Compression fracture of L2 vertebra with routine healing  R54 (ICD-10-CM) - Frailty syndrome in geriatric patient.            History of Present Condition/Illness: I had the flu about a month ago and it set me back some. Pt reports that he feel like he was almost close to getting off the walker but now has had to return to to it due to his lack of strenght. Says he has been pretty sedentary since getting out of the hospital. His main goal is that he would like to try walk without his rollator and try to get back to using the SPC. Since he has had the flu, he reports seeing some black spots in  his vision. Said he will have catarct removal surgery soon    Pain     Patient reports a current pain level of 0/10. Pain at best is reported as 0/10.              Living Arrangements  Living Situation  Housing: Home independently  Living Arrangements: Children    Home Setup  Type of Structure: House  Home Access: Stairs with rails  Entrance Stairs - Number of Steps: 5          Past Medical History/Physical Systems Review:   Robi Donovan  has a past medical history of Diabetes mellitus type I, Hypertension, and Stroke.    Robi Donovan  has a past surgical history that includes Transforaminal epidural injection of steroid (Bilateral, 4/9/2024) and robotic fusion,spine,lumbar,tlif (N/A, 9/20/2024).    Robi has a current medication list which includes the following prescription(s): alendronate, amlodipine, aspirin, carvedilol, docusate sodium, finasteride, fluticasone propionate, insulin aspart u-100, insulin glargine u-100 (lantus), losartan, pen needle, diabetic, promethazine-dextromethorphan, rosuvastatin, tamsulosin, and true metrix glucose test strip.    Review of patient's allergies indicates:  No Known Allergies     Objective   Posture  Patient presents with a Forward head position.     Shoulders are Rounded.                   Hip Strength - Planes of Motion   Right Strength Right Pain Left Strength Left  Pain   Flexion (L2) 3+   4+     Extension 4-   4     ABduction 4-   4+     ADduction 5   5     Internal Rotation           External Rotation               Hip Strength Details  Residual R sided weakness    Knee Strength   Right Strength Right Pain Left Strength Left  Pain   Flexion (S2) 3+   4+     Prone Flexion           Extension (L3) 4+   4+            Ankle/Foot Strength - Planes of Motion   Right Strength Right Pain Left Strength Left  Pain   Dorsiflexion (L4) 1   4     Plantar Flexion (S1) 1   5     Inversion           Eversion           Great Toe Flexion           Great Toe Extension (L5)           Lesser  Toes Flexion           Lesser Toes Extension                     Timed Up & Go (TUG)  Time: 27.5 seconds     An older adult who takes >=12 seconds to complete the TUG is at risk for falling.       Sit to Stand Testing  The patient completed 5 sit to stand transfers in 26.4 sec. UE support             Ambulation Assistance Required  Surface With  Assistive Device Without Assistive Device Details   Level Independent        Uneven         Curb                Time Entry(in minutes):  PT Evaluation (Low) Time Entry: 45    Assessment & Plan   Assessment  Will presents with a condition of Low complexity.   Presentation of Symptoms: Stable       Functional Limitations: Activity tolerance, Ambulating on uneven surfaces, Bed mobility, Decreased ambulation distance/endurance, Completing self-care activities, Gross motor coordination, Increased risk of fall, Maintaining balance, Functional mobility, Getting off the floor, Gait limitations, Performing household chores  Impairments: Abnormal gait, Activity intolerance, Impaired balance, Abnormal or restricted range of motion, Safety issue, Impaired physical strength, Lack of appropriate home exercise program    Prognosis: Fair  Assessment Details: Mr. Donovan is a 79 year old male who presents to the clinic with a diagnosis of I69.351 (ICD-10-CM) - Hemiparesis affecting right side as late effect of cerebrovascular accident S32.020D (ICD-10-CM) - Compression fracture of L2 vertebra with routine healing R54 (ICD-10-CM) - Frailty syndrome in geriatric patient . His TUG score does place him at an increased risk for falls. He presents with impaired strength R>L, impaired balance, impaired coordination, impaired transfers, and overall impaired functional mobility. He continues to show UE deficits which can potentially be improved with Occupational therapy which will be consulted. Pt is a good patient for OPPT.    Plan  From a physical therapy perspective, the patient would benefit from:  Skilled Rehab Services    Planned therapy interventions include: Therapeutic exercise, Therapeutic activities, Neuromuscular re-education, and Gait training.    Planned modalities to include: Cryotherapy (cold pack) and Thermotherapy (hot pack).        Visit Frequency: 2 times Per Week for 2 Months.       This plan was discussed with Patient.   Discussion participants: Agreed Upon Plan of Care             The patient's spiritual, cultural, and educational needs were considered, and the patient is agreeable to the plan of care and goals.           Goals:   Active       LTG       Pt to improve TUG to </=21 seconds to demonstrate improved functional mobility       Start:  05/25/25    Expected End:  07/20/25            Pt to improve 5x STS to </=22 seconds to demonstrate improved functional strength        Start:  05/25/25    Expected End:  07/20/25            Pt to ambulate 0.7 m/s with SPC to demonstrate improved safety with community ambulation       Start:  05/25/25    Expected End:  07/20/25               STG       Pt to demonstrate compliance with HEP       Start:  05/25/25    Expected End:  06/22/25            Pt to improve R hip strength by 1/3 per MMT to demonstrate increased LE strengthening       Start:  05/25/25    Expected End:  06/22/25            Pt to improve TUG to </=24 seconds to demonstrate improved functional mobility       Start:  05/25/25    Expected End:  06/22/25                Alvarez Hernandez, PT

## 2025-05-20 ENCOUNTER — CLINICAL SUPPORT (OUTPATIENT)
Dept: REHABILITATION | Facility: HOSPITAL | Age: 80
End: 2025-05-20
Payer: MEDICARE

## 2025-05-20 DIAGNOSIS — Z74.09 DECREASED STRENGTH, ENDURANCE, AND MOBILITY: Primary | ICD-10-CM

## 2025-05-20 DIAGNOSIS — R53.1 DECREASED STRENGTH, ENDURANCE, AND MOBILITY: Primary | ICD-10-CM

## 2025-05-20 DIAGNOSIS — R68.89 DECREASED STRENGTH, ENDURANCE, AND MOBILITY: Primary | ICD-10-CM

## 2025-05-20 PROCEDURE — 97161 PT EVAL LOW COMPLEX 20 MIN: CPT | Mod: PN

## 2025-05-20 NOTE — LETTER
May 25, 2025  COLTEN Crawford  [address]  [place] [address]    To whom it may concern,     The attached plan of care is being sent to you for review and reference.    You may indicate your approval by signing the document electronically, or by faxing/mailing a signed copy of the final page of this document back to the attention of Alvarez Hernadnez, PT:         Plan of Care 5/25/25   Effective from: 5/25/2025  Effective to: 7/20/2025    Plan ID: [id number]            Participants as of Finalize on 5/25/2025    Name Type Comments Contact Info    COLTEN Crawford Referring Provider  [phone]    Alvarez Hernandez PT Physical Therapist         Last Plan Note     Author: Alvarez Hernandez PT Status: Signed Last edited: 5/20/2025 11:15 AM         Outpatient Rehab    Physical Therapy Evaluation (only)    Patient Name: Robi Donovan  MRN: [id number]  YOB: 1945  Encounter Date: 5/20/2025    Therapy Diagnosis:   Encounter Diagnosis   Name Primary?    Decreased strength, endurance, and mobility Yes     Physician: Sandi Callaway PA-C    Physician Orders: Eval and Treat  Medical Diagnosis: Hemiparesis affecting right side as late effect of cerebrovascular accident  Compression fracture of L2 vertebra with routine healing  Frailty syndrome in geriatric patient    Visit # / Visits Authorized:  1 / 1  Insurance Authorization Period: 5/6/2025 to 12/31/2025  Date of Evaluation: 5/20/2025  Plan of Care Certification: 5/20/2025 to 7/20/2025     Time In: 1115   Time Out: 1200  Total Time (in minutes): 45   Total Billable Time (in minutes): 45    Intake Outcome Measure for FOTO Survey    Therapist reviewed FOTO scores for Robi Donovan on 5/20/2025.   FOTO report - see Media section or FOTO account episode details.     Intake Score:  %    Precautions:       Subjective   History of Present Illness  Robi is a 79 y.o. male who reports to physical therapy with a chief concern of decreased functional mobilty.      The patient reports a medical diagnosis of I69.351 (ICD-10-CM) - Hemiparesis affecting right side as late effect of cerebrovascular accident  S32.020D (ICD-10-CM) - Compression fracture of L2 vertebra with routine healing  R54 (ICD-10-CM) - Frailty syndrome in geriatric patient.            History of Present Condition/Illness: I had the flu about a month ago and it set me back some. Pt reports that he feel like he was almost close to getting off the walker but now has had to return to to it due to his lack of strenght. Says he has been pretty sedentary since getting out of the hospital. His main goal is that he would like to try walk without his rollator and try to get back to using the SPC. Since he has had the flu, he reports seeing some black spots in his vision. Said he will have catarct removal surgery soon    Pain     Patient reports a current pain level of 0/10. Pain at best is reported as 0/10.              Living Arrangements  Living Situation  Housing: Home independently  Living Arrangements: Children    Home Setup  Type of Structure: House  Home Access: Stairs with rails  Entrance Stairs - Number of Steps: 5          Past Medical History/Physical Systems Review:   Robi Donovan  has a past medical history of Diabetes mellitus type I, Hypertension, and Stroke.    Robi Donovan  has a past surgical history that includes Transforaminal epidural injection of steroid (Bilateral, 4/9/2024) and robotic fusion,spine,lumbar,tlif (N/A, 9/20/2024).    Robi has a current medication list which includes the following prescription(s): alendronate, amlodipine, aspirin, carvedilol, docusate sodium, finasteride, fluticasone propionate, insulin aspart u-100, insulin glargine u-100 (lantus), losartan, pen needle, diabetic, promethazine-dextromethorphan, rosuvastatin, tamsulosin, and true metrix glucose test strip.    Review of patient's allergies indicates:  No Known Allergies     Objective   Posture  Patient presents with a  Forward head position.     Shoulders are Rounded.                   Hip Strength - Planes of Motion   Right Strength Right Pain Left Strength Left  Pain   Flexion (L2) 3+   4+     Extension 4-   4     ABduction 4-   4+     ADduction 5   5     Internal Rotation           External Rotation               Hip Strength Details  Residual R sided weakness    Knee Strength   Right Strength Right Pain Left Strength Left  Pain   Flexion (S2) 3+   4+     Prone Flexion           Extension (L3) 4+   4+            Ankle/Foot Strength - Planes of Motion   Right Strength Right Pain Left Strength Left  Pain   Dorsiflexion (L4) 1   4     Plantar Flexion (S1) 1   5     Inversion           Eversion           Great Toe Flexion           Great Toe Extension (L5)           Lesser Toes Flexion           Lesser Toes Extension                     Timed Up & Go (TUG)  Time: 27.5 seconds     An older adult who takes >=12 seconds to complete the TUG is at risk for falling.       Sit to Stand Testing  The patient completed 5 sit to stand transfers in 26.4 sec. UE support             Ambulation Assistance Required  Surface With  Assistive Device Without Assistive Device Details   Level Independent        Uneven         Curb                Time Entry(in minutes):  PT Evaluation (Low) Time Entry: 45    Assessment & Plan   Assessment  Will presents with a condition of Low complexity.   Presentation of Symptoms: Stable       Functional Limitations: Activity tolerance, Ambulating on uneven surfaces, Bed mobility, Decreased ambulation distance/endurance, Completing self-care activities, Gross motor coordination, Increased risk of fall, Maintaining balance, Functional mobility, Getting off the floor, Gait limitations, Performing household chores  Impairments: Abnormal gait, Activity intolerance, Impaired balance, Abnormal or restricted range of motion, Safety issue, Impaired physical strength, Lack of appropriate home exercise program    Prognosis:  Fair  Assessment Details: Mr. Donovan is a 79 year old male who presents to the clinic with a diagnosis of I69.351 (ICD-10-CM) - Hemiparesis affecting right side as late effect of cerebrovascular accident S32.020D (ICD-10-CM) - Compression fracture of L2 vertebra with routine healing R54 (ICD-10-CM) - Frailty syndrome in geriatric patient . His TUG score does place him at an increased risk for falls. He presents with impaired strength R>L, impaired balance, impaired coordination, impaired transfers, and overall impaired functional mobility. He continues to show UE deficits which can potentially be improved with Occupational therapy which will be consulted. Pt is a good patient for OPPT.      The patient's spiritual, cultural, and educational needs were considered, and the patient is agreeable to the plan of care and goals.           Goals:   Active       LTG       Pt to improve TUG to </=21 seconds to demonstrate improved functional mobility       Start:  05/25/25    Expected End:  07/20/25            Pt to improve 5x STS to </=22 seconds to demonstrate improved functional strength        Start:  05/25/25    Expected End:  07/20/25            Pt to ambulate 0.7 m/s with SPC to demonstrate improved safety with community ambulation       Start:  05/25/25    Expected End:  07/20/25               STG       Pt to demonstrate compliance with HEP       Start:  05/25/25    Expected End:  06/22/25            Pt to improve R hip strength by 1/3 per MMT to demonstrate increased LE strengthening       Start:  05/25/25    Expected End:  06/22/25            Pt to improve TUG to </=24 seconds to demonstrate improved functional mobility       Start:  05/25/25    Expected End:  06/22/25                Alvarez Hernandez PT           Current Participants as of 5/25/2025    Name Type Comments Contact Info    COLTEN Crawford Referring Provider  [phone]    Signature pending    Alvarez Hernandez PT Physical Therapist      Signature  pending            Sincerely,      Alvarez Hernandez, PT  [facility]                                                            Dear Alvarez Hernandez, PT,    RE: Mr. Robi Donovan, MRN: [id number]    I certify that I have reviewed the attached plan of care and agree to the details within.        ___________________________  ___________________________  Provider Printed Name   Provider Signed Name      ___________________________  Date and Time

## 2025-05-27 ENCOUNTER — CLINICAL SUPPORT (OUTPATIENT)
Dept: REHABILITATION | Facility: HOSPITAL | Age: 80
End: 2025-05-27
Payer: MEDICARE

## 2025-05-27 DIAGNOSIS — Z74.09 DECREASED STRENGTH, ENDURANCE, AND MOBILITY: Primary | ICD-10-CM

## 2025-05-27 DIAGNOSIS — R68.89 DECREASED STRENGTH, ENDURANCE, AND MOBILITY: Primary | ICD-10-CM

## 2025-05-27 DIAGNOSIS — R53.1 DECREASED STRENGTH, ENDURANCE, AND MOBILITY: Primary | ICD-10-CM

## 2025-05-27 PROCEDURE — 97530 THERAPEUTIC ACTIVITIES: CPT | Mod: PN,CQ

## 2025-05-27 PROCEDURE — 97110 THERAPEUTIC EXERCISES: CPT | Mod: PN,CQ

## 2025-05-27 NOTE — PROGRESS NOTES
"  Outpatient Rehab    Physical Therapy Visit    Patient Name: Robi Donovan  MRN: [id number]  YOB: 1945  Encounter Date: 5/27/2025    Therapy Diagnosis:   Encounter Diagnosis   Name Primary?    Decreased strength, endurance, and mobility Yes     Physician: Asya Santamaria FNP    Physician Orders: Eval and Treat  Medical Diagnosis: Hemiparesis affecting right side as late effect of cerebrovascular accident  Compression fracture of L2 vertebra with routine healing  Frailty syndrome in geriatric patient    Visit # / Visits Authorized:  1 / 20  Insurance Authorization Period: 5/8/2025 to 8/10/2025  Date of Evaluation: 5/20/2025  Plan of Care Certification: 5/25/2025 to 7/20/2025      PT/PTA: PTA   Number of PTA visits since last PT visit:1  Time In: 1600   Time Out: 1645  Total Time (in minutes): 45   Total Billable Time (in minutes): 45    Precautions:       Subjective   not doing any exercise at home, not interested in doing any walking - "I'm 79 years old," isn't sure why they sent him here.  Pain reported as 0/10. NA    Objective            Treatment:  Therapeutic Exercise  TE 1: Nustep level 4.0 ramp setting 8 minutes  TE 2: Heel raises x 10, toe raises x10  TE 3: Hip 3 way with yellow theraband 2x10 B  TE 4: Cybex knee extension: 3x10 DL with 15# engaged  TE 5: Cybex hamstring curl: 3x10 DL with 5 plates engaged  TE 6: Cybex leg press: 3x10 with 5 plates engaged  Therapeutic Activity  TA 1: Sit<>stand: 3x10  TA 2: Lateral up and over 4" step: 2x45" with bilateral E support  TA 3: Lateral steps with yellow theraband: 4 lengths x 10 feet  TA 4: Walking laps with rollator: 300 feet    Time Entry(in minutes):  Therapeutic Activity Time Entry: 20  Therapeutic Exercise Time Entry: 25    Assessment & Plan   Assessment: Will arrived to session without complaints and agreeable to treatment.  Session foused on general lower extremity strengthening and endurance based activity.  Good tolerance of first follow " up session after initial evaluation.  Desired challenge level with all machine based strengthening.  No adverse response to treatment noted.  He would benefit from continued PT servies to achieve functional goals set at evaluation.  Evaluation/Treatment Tolerance: Patient tolerated treatment well    The patient will continue to benefit from skilled outpatient physical therapy in order to address the deficits listed in the problem list on the initial evaluation, provide patient and family education, and maximize the patients level of independence in the home and community environments.     The patient's spiritual, cultural, and educational needs were considered, and the patient is agreeable to the plan of care and goals.           Plan: Plan to cont with progression of PT goals per POC.    Goals:   Active       LTG       Pt to improve TUG to </=21 seconds to demonstrate improved functional mobility       Start:  05/25/25    Expected End:  07/20/25            Pt to improve 5x STS to </=22 seconds to demonstrate improved functional strength        Start:  05/25/25    Expected End:  07/20/25            Pt to ambulate 0.7 m/s with SPC to demonstrate improved safety with community ambulation       Start:  05/25/25    Expected End:  07/20/25               STG       Pt to demonstrate compliance with HEP       Start:  05/25/25    Expected End:  06/22/25            Pt to improve R hip strength by 1/3 per MMT to demonstrate increased LE strengthening       Start:  05/25/25    Expected End:  06/22/25            Pt to improve TUG to </=24 seconds to demonstrate improved functional mobility       Start:  05/25/25    Expected End:  06/22/25                Heidy Castillo PTA

## 2025-05-29 ENCOUNTER — CLINICAL SUPPORT (OUTPATIENT)
Dept: REHABILITATION | Facility: HOSPITAL | Age: 80
End: 2025-05-29
Payer: MEDICARE

## 2025-05-29 DIAGNOSIS — R53.1 DECREASED STRENGTH, ENDURANCE, AND MOBILITY: Primary | ICD-10-CM

## 2025-05-29 DIAGNOSIS — R68.89 DECREASED STRENGTH, ENDURANCE, AND MOBILITY: Primary | ICD-10-CM

## 2025-05-29 DIAGNOSIS — Z74.09 DECREASED STRENGTH, ENDURANCE, AND MOBILITY: Primary | ICD-10-CM

## 2025-05-29 PROCEDURE — 97110 THERAPEUTIC EXERCISES: CPT | Mod: PN

## 2025-05-29 PROCEDURE — 97530 THERAPEUTIC ACTIVITIES: CPT | Mod: PN

## 2025-05-29 PROCEDURE — 97112 NEUROMUSCULAR REEDUCATION: CPT | Mod: PN

## 2025-05-29 NOTE — PROGRESS NOTES
"  Outpatient Rehab    Physical Therapy Visit    Patient Name: Robi Donovan  MRN: [id number]  YOB: 1945  Encounter Date: 5/29/2025    Therapy Diagnosis:   Encounter Diagnosis   Name Primary?    Decreased strength, endurance, and mobility Yes     Physician: Asya Santamaria FNP    Physician Orders: Eval and Treat  Medical Diagnosis: Hemiparesis affecting right side as late effect of cerebrovascular accident  Compression fracture of L2 vertebra with routine healing  Frailty syndrome in geriatric patient    Visit # / Visits Authorized:  2 / 20  Insurance Authorization Period: 5/8/2025 to 8/10/2025  Date of Evaluation: 5/20/2025  Plan of Care Certification: 5/25/2025 to 7/20/2025      PT/PTA:     Number of PTA visits since last PT visit:   Time In: 1300   Time Out: 1345  Total Time (in minutes): 45   Total Billable Time (in minutes): 45    FOTO:  Intake Score:  %  Survey Score 2:  %  Survey Score 3:  %    Precautions:       Subjective   "I can't see too much when Im not at home due to this in eye infections..         Objective            Treatment:  Therapeutic Exercise  TE 1: 2 x 10 standing marches at ballet bar, #2 during last set  TE 2: x 3 laps sidestepping at [email or web address] bar with #2 ankle weights  Balance/Neuromuscular Re-Education  NMR 1: 2 x 30" mddified single limb stance, leading LE on foam fitter, UE touchdown  NMR 2: 3 x 10 step up onto green step, RLE leading, break between each set, no UE support, PT providing manual pressure at knee to avoid hyperextension  Therapeutic Activity  TA 1: multiple sit<>stands throughout treatment session  TA 2: x 90 and x 45 feet ambuation with LBQC, improved coordination and step length with second trial    Time Entry(in minutes):  Neuromuscular Re-Education Time Entry: 15  Therapeutic Activity Time Entry: 18  Therapeutic Exercise Time Entry: 12    Assessment & Plan   Assessment: Mr. Rosenbaum tolerated treatment well this afternoon which had an emphasis functional " strength and ambulating with a LBQC.He required frequent and mildly length rest breaks which took occure ~between each set of exercises. He put forth good effort with all exercises. He demontrate poor motor control of R knee during step ups with it going into hyperextension. Due to patient goals of eventually trying to move to a cane, LBQC was trialed today which patient has never used before. He had mild difficults coordinating movement with the cane but improved much during second trial while he also reported it felt much better. Pt continues to be a good candidate for OPPT  Evaluation/Treatment Tolerance: Patient tolerated treatment well    The patient will continue to benefit from skilled outpatient physical therapy in order to address the deficits listed in the problem list on the initial evaluation, provide patient and family education, and maximize the patients level of independence in the home and community environments.     The patient's spiritual, cultural, and educational needs were considered, and the patient is agreeable to the plan of care and goals.           Plan: Cont LE strenghtening, balance training. progress ambulation with LBQC as tolerated    Goals:   Active       LTG       Pt to improve TUG to </=21 seconds to demonstrate improved functional mobility       Start:  05/25/25    Expected End:  07/20/25            Pt to improve 5x STS to </=22 seconds to demonstrate improved functional strength        Start:  05/25/25    Expected End:  07/20/25            Pt to ambulate 0.7 m/s with SPC to demonstrate improved safety with community ambulation       Start:  05/25/25    Expected End:  07/20/25               STG       Pt to demonstrate compliance with HEP       Start:  05/25/25    Expected End:  06/22/25            Pt to improve R hip strength by 1/3 per MMT to demonstrate increased LE strengthening       Start:  05/25/25    Expected End:  06/22/25            Pt to improve TUG to </=24 seconds to  demonstrate improved functional mobility       Start:  05/25/25    Expected End:  06/22/25                Alvarez Hernandez PT

## 2025-06-03 ENCOUNTER — CLINICAL SUPPORT (OUTPATIENT)
Dept: REHABILITATION | Facility: HOSPITAL | Age: 80
End: 2025-06-03
Payer: MEDICARE

## 2025-06-03 DIAGNOSIS — R53.1 DECREASED STRENGTH, ENDURANCE, AND MOBILITY: Primary | ICD-10-CM

## 2025-06-03 DIAGNOSIS — Z74.09 DECREASED STRENGTH, ENDURANCE, AND MOBILITY: Primary | ICD-10-CM

## 2025-06-03 DIAGNOSIS — R68.89 DECREASED STRENGTH, ENDURANCE, AND MOBILITY: Primary | ICD-10-CM

## 2025-06-03 PROCEDURE — 97110 THERAPEUTIC EXERCISES: CPT | Mod: PN,CQ

## 2025-06-03 PROCEDURE — 97530 THERAPEUTIC ACTIVITIES: CPT | Mod: PN,CQ

## 2025-06-03 PROCEDURE — 97112 NEUROMUSCULAR REEDUCATION: CPT | Mod: PN,CQ

## 2025-06-05 ENCOUNTER — CLINICAL SUPPORT (OUTPATIENT)
Dept: REHABILITATION | Facility: HOSPITAL | Age: 80
End: 2025-06-05
Payer: MEDICARE

## 2025-06-05 DIAGNOSIS — R68.89 DECREASED STRENGTH, ENDURANCE, AND MOBILITY: Primary | ICD-10-CM

## 2025-06-05 DIAGNOSIS — R53.1 DECREASED STRENGTH, ENDURANCE, AND MOBILITY: Primary | ICD-10-CM

## 2025-06-05 DIAGNOSIS — Z74.09 DECREASED STRENGTH, ENDURANCE, AND MOBILITY: Primary | ICD-10-CM

## 2025-06-05 PROCEDURE — 97530 THERAPEUTIC ACTIVITIES: CPT | Mod: PN

## 2025-06-05 PROCEDURE — 97110 THERAPEUTIC EXERCISES: CPT | Mod: PN

## 2025-06-10 ENCOUNTER — CLINICAL SUPPORT (OUTPATIENT)
Dept: REHABILITATION | Facility: HOSPITAL | Age: 80
End: 2025-06-10
Payer: MEDICARE

## 2025-06-10 DIAGNOSIS — R53.1 DECREASED STRENGTH, ENDURANCE, AND MOBILITY: Primary | ICD-10-CM

## 2025-06-10 DIAGNOSIS — R68.89 DECREASED STRENGTH, ENDURANCE, AND MOBILITY: Primary | ICD-10-CM

## 2025-06-10 DIAGNOSIS — Z74.09 DECREASED STRENGTH, ENDURANCE, AND MOBILITY: Primary | ICD-10-CM

## 2025-06-10 PROCEDURE — 97530 THERAPEUTIC ACTIVITIES: CPT | Mod: PN,CQ

## 2025-06-10 PROCEDURE — 97110 THERAPEUTIC EXERCISES: CPT | Mod: PN,CQ

## 2025-06-10 PROCEDURE — 97112 NEUROMUSCULAR REEDUCATION: CPT | Mod: PN,CQ

## 2025-06-10 NOTE — PROGRESS NOTES
"  Outpatient Rehab    Physical Therapy Visit    Patient Name: Robi Donovan  MRN: [id number]  YOB: 1945  Encounter Date: 6/10/2025    Therapy Diagnosis:   Encounter Diagnosis   Name Primary?    Decreased strength, endurance, and mobility Yes     Physician: Asya Santamaria FNP    Physician Orders: Eval and Treat  Medical Diagnosis: Hemiparesis affecting right side as late effect of cerebrovascular accident  Compression fracture of L2 vertebra with routine healing  Frailty syndrome in geriatric patient    Visit # / Visits Authorized:  5 / 20  Insurance Authorization Period: 5/8/2025 to 8/10/2025  Date of Evaluation: 5/20/2025  Plan of Care Certification: 5/25/2025 to 7/20/2025      PT/PTA: PTA   Number of PTA visits since last PT visit:1  Time In: 1345   Time Out: 1430  Total Time (in minutes): 45   Total Billable Time (in minutes): 45    FOTO:  Intake Score:  %  Survey Score 2:  %  Survey Score 3:  %    Precautions:       Subjective   Thinks PT is going really well right not and sees much improvement though cannot think of one at this time.  Feels balance is his biggest area to improve..  Pain reported as 0/10. NA    Objective            Treatment:  Therapeutic Exercise  TE 1: Nustep level 5.0 sprints setting 8 minutes  TE 2: Cybex knee extension: 3x10 DL with 30# engaged  TE 3: Cybex leg press: 3x10 with 8 plates engaged  TE 5: Cybex hamstring curl: 3x10 DL with 5 plates engaged  Balance/Neuromuscular Re-Education  NMR 1: 2 x 30" modified single limb stance, leading LE on 4" step, frequent touchdown support required  Therapeutic Activity  TA 1: x90 feet ambulation with LBQC, SBA  TA 2: x 30 feet, ambulation weaving between (4) cones with LBQC, Min A  TA 3: x 20 step ups, UE support, R only at stairs    Time Entry(in minutes):  Neuromuscular Re-Education Time Entry: 10  Therapeutic Activity Time Entry: 15  Therapeutic Exercise Time Entry: 20    Assessment & Plan   Assessment: Will arrived to session " without complaints and agreeable to treatment.  Session focused on general strengthening, endurance based activity, and gait training with large base quad cane.  Continues to exhibit decreased safety awareness when ambulating with quad cane, keeping it far away from his body and directly in front of him, requiring assistance with turns during coneweaving activity.  Fatigues quickly and unable to walk with large base quad cane for more than 100 feet without a rest breaks.  Overall fair tolerance of treatment and no adverse response to treatment.  Evaluation/Treatment Tolerance: Patient limited by fatigue    The patient will continue to benefit from skilled outpatient physical therapy in order to address the deficits listed in the problem list on the initial evaluation, provide patient and family education, and maximize the patients level of independence in the home and community environments.     The patient's spiritual, cultural, and educational needs were considered, and the patient is agreeable to the plan of care and goals.           Plan: Cont LE strenghtening, balance training. progress ambulation with LBQC as tolerated    Goals:   Active       LTG       Pt to improve TUG to </=21 seconds to demonstrate improved functional mobility       Start:  05/25/25    Expected End:  07/20/25            Pt to improve 5x STS to </=22 seconds to demonstrate improved functional strength        Start:  05/25/25    Expected End:  07/20/25            Pt to ambulate 0.7 m/s with SPC to demonstrate improved safety with community ambulation       Start:  05/25/25    Expected End:  07/20/25               STG       Pt to demonstrate compliance with HEP       Start:  05/25/25    Expected End:  06/22/25            Pt to improve R hip strength by 1/3 per MMT to demonstrate increased LE strengthening       Start:  05/25/25    Expected End:  06/22/25            Pt to improve TUG to </=24 seconds to demonstrate improved functional  mobility       Start:  05/25/25    Expected End:  06/22/25                Heidy Castillo, MARTHA

## 2025-06-12 ENCOUNTER — CLINICAL SUPPORT (OUTPATIENT)
Dept: REHABILITATION | Facility: HOSPITAL | Age: 80
End: 2025-06-12
Payer: MEDICARE

## 2025-06-12 DIAGNOSIS — Z74.09 DECREASED STRENGTH, ENDURANCE, AND MOBILITY: Primary | ICD-10-CM

## 2025-06-12 DIAGNOSIS — R53.1 DECREASED STRENGTH, ENDURANCE, AND MOBILITY: Primary | ICD-10-CM

## 2025-06-12 DIAGNOSIS — R68.89 DECREASED STRENGTH, ENDURANCE, AND MOBILITY: Primary | ICD-10-CM

## 2025-06-12 PROCEDURE — 97110 THERAPEUTIC EXERCISES: CPT | Mod: PN

## 2025-06-12 PROCEDURE — 97530 THERAPEUTIC ACTIVITIES: CPT | Mod: PN

## 2025-06-17 ENCOUNTER — CLINICAL SUPPORT (OUTPATIENT)
Dept: REHABILITATION | Facility: HOSPITAL | Age: 80
End: 2025-06-17
Payer: MEDICARE

## 2025-06-17 DIAGNOSIS — R68.89 DECREASED STRENGTH, ENDURANCE, AND MOBILITY: Primary | ICD-10-CM

## 2025-06-17 DIAGNOSIS — Z74.09 DECREASED STRENGTH, ENDURANCE, AND MOBILITY: Primary | ICD-10-CM

## 2025-06-17 DIAGNOSIS — R53.1 DECREASED STRENGTH, ENDURANCE, AND MOBILITY: Primary | ICD-10-CM

## 2025-06-17 PROCEDURE — 97110 THERAPEUTIC EXERCISES: CPT | Mod: PN,CQ

## 2025-06-17 PROCEDURE — 97530 THERAPEUTIC ACTIVITIES: CPT | Mod: PN,CQ

## 2025-06-17 NOTE — PROGRESS NOTES
Outpatient Rehab    Physical Therapy Visit    Patient Name: Robi Donovan  MRN: [id number]  YOB: 1945  Encounter Date: 2025    Therapy Diagnosis:   Encounter Diagnosis   Name Primary?    Decreased strength, endurance, and mobility Yes     Physician: Asya Santamaria FNP    Physician Orders: Eval and Treat  Medical Diagnosis: Hemiparesis affecting right side as late effect of cerebrovascular accident  Compression fracture of L2 vertebra with routine healing  Frailty syndrome in geriatric patient  Surgical Diagnosis: Not applicable for this Episode   Surgical Date: Not applicable for this Episode  Days Since Last Surgery: Not applicable for this Episode    Visit # / Visits Authorized:    Insurance Authorization Period: 2025 to 8/10/2025  Date of Evaluation: 2025  Plan of Care Certification: 2025 to 2025      PT/PTA: PTA   Number of PTA visits since last PT visit:1  Time In: 1515   Time Out: 1600  Total Time (in minutes): 45   Total Billable Time (in minutes): 45    FOTO:  Intake Score:  %  Survey Score 2:  %  Survey Score 3:  %    Precautions:       Subjective   Feeling good today, no complaints.  Pain reported as 0/10. NA    Objective            Treatment:  Therapeutic Exercise  TE 1: Nustep level 5.0 sprints setting 8 minutes  TE 2: Cybex knee flexion: 3x10 DL with 5 plates engaged  TE 3: Cybex leg press: 3x10 with 10 plates engaged  TE 4: Cybex knee extension: 3x10 DL with 30# engaged  Therapeutic Activity  TA 1: In clinic ambulation with LBQC 2 trials (150 feet, 90 feet), SBA  TA 2: Forward cone weavin lengths x 30 feet with LBQC, SBA  TA 3: Ambulating to diffrent stations around gym using LBQC    Time Entry(in minutes):  Therapeutic Activity Time Entry: 20  Therapeutic Exercise Time Entry: 25    Assessment & Plan   Assessment: Will arrived to session without complaints and agreeable to treatment.  Much improved tolerance of treatment today consisting of gait  training with large base quad cane.  Better safety awareness with transfers and with AD management.  No loss of balance with any obstacle avoidance and improved stability on turns observed.  Endurance gains evident as he was able to complete longest individual walking trial distance and overall distance today.  Remainder of session focused on machine based strengthening with desired challenge level achieved.  No adverse response to treatment.  He would benefit from continued PT services to address strength and endurance deficits to maximize functional potential.     Evaluation/Treatment Tolerance: Patient tolerated treatment well    The patient will continue to benefit from skilled outpatient physical therapy in order to address the deficits listed in the problem list on the initial evaluation, provide patient and family education, and maximize the patients level of independence in the home and community environments.     The patient's spiritual, cultural, and educational needs were considered, and the patient is agreeable to the plan of care and goals.           Plan: Cont LE strenghtening, balance training. progress ambulation with LBQC as tolerated    Goals:   Active       LTG       Pt to improve TUG to </=21 seconds to demonstrate improved functional mobility       Start:  05/25/25    Expected End:  07/20/25            Pt to improve 5x STS to </=22 seconds to demonstrate improved functional strength        Start:  05/25/25    Expected End:  07/20/25            Pt to ambulate 0.7 m/s with SPC to demonstrate improved safety with community ambulation       Start:  05/25/25    Expected End:  07/20/25               STG       Pt to demonstrate compliance with HEP       Start:  05/25/25    Expected End:  06/22/25            Pt to improve R hip strength by 1/3 per MMT to demonstrate increased LE strengthening       Start:  05/25/25    Expected End:  06/22/25            Pt to improve TUG to </=24 seconds to demonstrate  improved functional mobility       Start:  05/25/25    Expected End:  06/22/25                Heidy Castillo PTA

## 2025-06-17 NOTE — PROGRESS NOTES
Outpatient Rehab    Physical Therapy Visit    Patient Name: Robi Donovan  MRN: [id number]  YOB: 1945  Encounter Date: 6/12/2025    Therapy Diagnosis:   Encounter Diagnosis   Name Primary?    Decreased strength, endurance, and mobility Yes     Physician: Asya Santamaria FNP    Physician Orders: Eval and Treat  Medical Diagnosis: Hemiparesis affecting right side as late effect of cerebrovascular accident  Compression fracture of L2 vertebra with routine healing  Frailty syndrome in geriatric patient  Surgical Diagnosis: Not applicable for this Episode   Surgical Date: Not applicable for this Episode  Days Since Last Surgery: Not applicable for this Episode    Visit # / Visits Authorized:  6 / 20  Insurance Authorization Period: 5/8/2025 to 8/10/2025  Date of Evaluation: 5/20/2025  Plan of Care Certification: 5/25/2025 to 7/20/2025      PT/PTA:     Number of PTA visits since last PT visit:   Time In: 1600   Time Out: 1645  Total Time (in minutes): 45   Total Billable Time (in minutes): 45    FOTO:  Intake Score:  %  Survey Score 2:  %  Survey Score 3:  %    Precautions:       Subjective   Pt reports he is feeling fine and has no pain today. Did report he is continuing to have a difficult time seeing due to an infection in his eyes that he is taking medication for.  Pain reported as 0/10.      Objective            Treatment:  Therapeutic Exercise  TE 1: Nustep level 5.0 sprints setting 8 minutes  TE 2: Cybex knee flexion: 3x10 DL with 40# engaged  TE 3: Cybex leg press: 3x10 with 10 plates engaged  Therapeutic Activity  TA 1: x62, x87  feet ambulation with LBQC, SBA  TA 2: x 1 lap, ambulation weaving between (5) cones with LBQC, SBA  TA 3: Ambulating to diffrent stations around gym using LBQC    Time Entry(in minutes):  Therapeutic Activity Time Entry: 23  Therapeutic Exercise Time Entry: 22    Assessment & Plan   Assessment: Mr. Rosenbaum did well in therapy which continues to focus on LE strengthening and  improving ambulation with LBQC. He is showing improvements each session when ambulating with LBQC, demonstrating observed increased speed, and improved step length, and less LOB. He continues to have difficulty when performing turns. Pt continues to be a good candidate for OPPT  Evaluation/Treatment Tolerance: Patient tolerated treatment well    The patient will continue to benefit from skilled outpatient physical therapy in order to address the deficits listed in the problem list on the initial evaluation, provide patient and family education, and maximize the patients level of independence in the home and community environments.     The patient's spiritual, cultural, and educational needs were considered, and the patient is agreeable to the plan of care and goals.           Plan: Cont LE strenghtening, balance training. progress ambulation with LBQC as tolerated    Goals:   Active       LTG       Pt to improve TUG to </=21 seconds to demonstrate improved functional mobility       Start:  05/25/25    Expected End:  07/20/25            Pt to improve 5x STS to </=22 seconds to demonstrate improved functional strength        Start:  05/25/25    Expected End:  07/20/25            Pt to ambulate 0.7 m/s with SPC to demonstrate improved safety with community ambulation       Start:  05/25/25    Expected End:  07/20/25               STG       Pt to demonstrate compliance with HEP       Start:  05/25/25    Expected End:  06/22/25            Pt to improve R hip strength by 1/3 per MMT to demonstrate increased LE strengthening       Start:  05/25/25    Expected End:  06/22/25            Pt to improve TUG to </=24 seconds to demonstrate improved functional mobility       Start:  05/25/25    Expected End:  06/22/25                Alvarez Hernandez, PT

## 2025-06-19 ENCOUNTER — CLINICAL SUPPORT (OUTPATIENT)
Dept: REHABILITATION | Facility: HOSPITAL | Age: 80
End: 2025-06-19
Payer: MEDICARE

## 2025-06-19 DIAGNOSIS — Z74.09 DECREASED STRENGTH, ENDURANCE, AND MOBILITY: Primary | ICD-10-CM

## 2025-06-19 DIAGNOSIS — R68.89 DECREASED STRENGTH, ENDURANCE, AND MOBILITY: Primary | ICD-10-CM

## 2025-06-19 DIAGNOSIS — R53.1 DECREASED STRENGTH, ENDURANCE, AND MOBILITY: Primary | ICD-10-CM

## 2025-06-19 PROCEDURE — 97110 THERAPEUTIC EXERCISES: CPT | Mod: PN

## 2025-06-19 PROCEDURE — 97530 THERAPEUTIC ACTIVITIES: CPT | Mod: PN

## 2025-06-22 NOTE — PROGRESS NOTES
Outpatient Rehab    Physical Therapy Progress Note    Patient Name: Robi Donovan  MRN: [id number]  YOB: 1945  Encounter Date: 6/19/2025    Therapy Diagnosis:   Encounter Diagnosis   Name Primary?    Decreased strength, endurance, and mobility Yes     Physician: Asya Santamaria FNP    Physician Orders: Eval and Treat  Medical Diagnosis: Hemiparesis affecting right side as late effect of cerebrovascular accident  Compression fracture of L2 vertebra with routine healing  Frailty syndrome in geriatric patient  Surgical Diagnosis: Not applicable for this Episode   Surgical Date: Not applicable for this Episode  Days Since Last Surgery: Not applicable for this Episode    Visit # / Visits Authorized:  8 / 20  Insurance Authorization Period: 5/8/2025 to 8/10/2025  Date of Evaluation: 5/20/2025  Plan of Care Certification: 5/25/2025 to 7/20/2025      PT/PTA:     Number of PTA visits since last PT visit:   Time In: 1515   Time Out: 1600  Total Time (in minutes): 45   Total Billable Time (in minutes): 45    FOTO:  Intake Score:  %  Survey Score 2:  %  Survey Score 3:  %    Precautions:       Subjective   Pt reports that he is feeling good today and has no complaints.  Pain reported as 0/10.      Objective            Hip Strength - Planes of Motion   Right Strength Right Pain Left Strength Left  Pain   Flexion (L2) 4   5     Extension 5   5     ABduction 5   5     ADduction 4+   5     Internal Rotation           External Rotation               Knee Strength   Right Strength Right Pain Left Strength Left  Pain   Flexion (S2) 3+   5     Prone Flexion           Extension (L3) 5   5                   Fall Risk  Functional mobility test results suggest the patient is: At Risk for Falls  Timed Up & Go (TUG)  Time: 27 seconds  Observations: Slow tentative pace  An older adult who takes >=12 seconds to complete the TUG is at risk for falling.    Used Rollator  Sit to Stand Testing  The patient completed 5 sit to stand  transfers in 23 sec. Use of Ue                  Treatment:  Therapeutic Exercise  TE 1: Nustep level 5.5 sprints setting 8 minutes  TE 2: 3 x 8 cone taps, PT AAROM during last couple of reps due to LE feeling heavy  Therapeutic Activity  TA 1: Time used for objective meaurement testing  TA 2: 2 x 30 feet, ambulating obstacle course inlcuding stepping over hurdles, and navigating a curb, Min-Mod A    Time Entry(in minutes):  Therapeutic Activity Time Entry: 27  Therapeutic Exercise Time Entry: 18    Assessment & Plan   Assessment:  is showing good progress and putting forth good effort with PT. He scored similar on Timed Up and Go but improved quality of it compared to evaluation. However he continues to still be at an increased risk for falls. He is progressing fairly well with LBQC with alondra and coordination of ambulation showing improvement. He continues to deal with BL eye infections which is a mild limiting factor to progress. Rollator continues to best and safest option for ambulation. He is progressing to being safe and able to use LBQC for short distances (like Zoroastrianism) with no assistance. Pt continues to be a good candidate for OPPT  Evaluation/Treatment Tolerance: Patient tolerated treatment well    The patient will continue to benefit from skilled outpatient physical therapy in order to address the deficits listed in the problem list on the initial evaluation, provide patient and family education, and maximize the patients level of independence in the home and community environments.     The patient's spiritual, cultural, and educational needs were considered, and the patient is agreeable to the plan of care and goals.           Plan: Cont LE strengthening especially hip flexion and knee flexion, Progress dynamic balance  as tolerated    Goals:   Active       LTG       Pt to improve TUG to </=21 seconds to demonstrate improved functional mobility (Progressing)       Start:  05/25/25    Expected  End:  07/20/25            Pt to improve 5x STS to </=22 seconds to demonstrate improved functional strength  (Progressing)       Start:  05/25/25    Expected End:  07/20/25            Pt to ambulate 0.7 m/s with SPC to demonstrate improved safety with community ambulation       Start:  05/25/25    Expected End:  07/20/25               STG       Pt to demonstrate compliance with HEP (Progressing)       Start:  05/25/25    Expected End:  06/22/25            Pt to improve R hip strength by 1/3 per MMT to demonstrate increased LE strengthening (Met)       Start:  05/25/25    Expected End:  06/22/25    Resolved:  06/22/25         Pt to improve TUG to </=24 seconds to demonstrate improved functional mobility (Progressing)       Start:  05/25/25    Expected End:  06/22/25                Alvarez Hernandez, PT

## 2025-06-24 ENCOUNTER — CLINICAL SUPPORT (OUTPATIENT)
Dept: REHABILITATION | Facility: HOSPITAL | Age: 80
End: 2025-06-24
Payer: MEDICARE

## 2025-06-24 DIAGNOSIS — Z74.09 DECREASED STRENGTH, ENDURANCE, AND MOBILITY: Primary | ICD-10-CM

## 2025-06-24 DIAGNOSIS — R53.1 DECREASED STRENGTH, ENDURANCE, AND MOBILITY: Primary | ICD-10-CM

## 2025-06-24 DIAGNOSIS — R68.89 DECREASED STRENGTH, ENDURANCE, AND MOBILITY: Primary | ICD-10-CM

## 2025-06-24 PROCEDURE — 97110 THERAPEUTIC EXERCISES: CPT | Mod: PN

## 2025-06-24 PROCEDURE — 97530 THERAPEUTIC ACTIVITIES: CPT | Mod: PN

## 2025-06-25 NOTE — PROGRESS NOTES
"  Outpatient Rehab    Physical Therapy Visit    Patient Name: Robi Donovan  MRN: [id number]  YOB: 1945  Encounter Date: 6/24/2025    Therapy Diagnosis:   Encounter Diagnosis   Name Primary?    Decreased strength, endurance, and mobility Yes     Physician: Asya Santamaria FNP    Physician Orders: Eval and Treat  Medical Diagnosis: Hemiparesis affecting right side as late effect of cerebrovascular accident  Compression fracture of L2 vertebra with routine healing  Frailty syndrome in geriatric patient  Surgical Diagnosis: Not applicable for this Episode   Surgical Date: Not applicable for this Episode  Days Since Last Surgery: Not applicable for this Episode    Visit # / Visits Authorized:  9 / 20  Insurance Authorization Period: 5/8/2025 to 8/10/2025  Date of Evaluation: 5/20/2025  Plan of Care Certification: 5/25/2025 to 7/20/2025      PT/PTA:     Number of PTA visits since last PT visit:   Time In: 1515   Time Out: 1600  Total Time (in minutes): 45   Total Billable Time (in minutes): 45    FOTO:  Intake Score:  %  Survey Score 2:  %  Survey Score 3:  %    Precautions:       Subjective   Pt reports that he is feeling good today. No complaints of pain.         Objective            Treatment:  Therapeutic Exercise  TE 1: x6 mins, sci-fit recumbent stepper, level 6.5m peak setting  TE 2: seasted marching 4 x 5 #2 ankle weight, small breakbe  TE 3: 3 x 8 Cybex HS curls , 5 plates  Therapeutic Activity  TA 1: x 100" ambulation with LBQC, SBA  TA 2: x 20 reps step up first step of stairs, BUE support, PT stabilzing knee  TA 3: x 25ft ambulation stepping over straight canes    Time Entry(in minutes):  Therapeutic Activity Time Entry: 24  Therapeutic Exercise Time Entry: 21    Assessment & Plan   Assessment: Mr Rosenbaum tolerated treatment well. He demonstrate improved tolerance with ambulation with LBQC and activity throughout session requring fewer and shorter rest breaks. He continues to demonstrate poor motor " control of RLE. Pt continues to be a good candidate for OPPT  Evaluation/Treatment Tolerance: Patient tolerated treatment well    The patient will continue to benefit from skilled outpatient physical therapy in order to address the deficits listed in the problem list on the initial evaluation, provide patient and family education, and maximize the patients level of independence in the home and community environments.     The patient's spiritual, cultural, and educational needs were considered, and the patient is agreeable to the plan of care and goals.           Plan: Cont LE strengthening especially hip flexion and knee flexion, Progress dynamic balance  as tolerated    Goals:   Active       LTG       Pt to improve TUG to </=21 seconds to demonstrate improved functional mobility (Progressing)       Start:  05/25/25    Expected End:  07/20/25            Pt to improve 5x STS to </=22 seconds to demonstrate improved functional strength  (Progressing)       Start:  05/25/25    Expected End:  07/20/25            Pt to ambulate 0.7 m/s with SPC to demonstrate improved safety with community ambulation       Start:  05/25/25    Expected End:  07/20/25               STG       Pt to demonstrate compliance with HEP (Progressing)       Start:  05/25/25    Expected End:  06/22/25            Pt to improve R hip strength by 1/3 per MMT to demonstrate increased LE strengthening (Met)       Start:  05/25/25    Expected End:  06/22/25    Resolved:  06/22/25         Pt to improve TUG to </=24 seconds to demonstrate improved functional mobility (Progressing)       Start:  05/25/25    Expected End:  06/22/25                Alvarez Hernandez, PT

## 2025-06-26 ENCOUNTER — CLINICAL SUPPORT (OUTPATIENT)
Dept: REHABILITATION | Facility: HOSPITAL | Age: 80
End: 2025-06-26
Payer: MEDICARE

## 2025-06-26 DIAGNOSIS — R53.1 DECREASED STRENGTH, ENDURANCE, AND MOBILITY: Primary | ICD-10-CM

## 2025-06-26 DIAGNOSIS — R68.89 DECREASED STRENGTH, ENDURANCE, AND MOBILITY: Primary | ICD-10-CM

## 2025-06-26 DIAGNOSIS — Z74.09 DECREASED STRENGTH, ENDURANCE, AND MOBILITY: Primary | ICD-10-CM

## 2025-06-26 PROCEDURE — 97530 THERAPEUTIC ACTIVITIES: CPT | Mod: PN

## 2025-06-26 PROCEDURE — 97110 THERAPEUTIC EXERCISES: CPT | Mod: PN

## 2025-06-27 NOTE — PROGRESS NOTES
"  Outpatient Rehab    Physical Therapy Visit    Patient Name: Robi Donovan  MRN: [id number]  YOB: 1945  Encounter Date: 6/26/2025    Therapy Diagnosis:   Encounter Diagnosis   Name Primary?    Decreased strength, endurance, and mobility Yes     Physician: Asya Santamaria FNP    Physician Orders: Eval and Treat  Medical Diagnosis: Hemiparesis affecting right side as late effect of cerebrovascular accident  Compression fracture of L2 vertebra with routine healing  Frailty syndrome in geriatric patient  Surgical Diagnosis: Not applicable for this Episode   Surgical Date: Not applicable for this Episode  Days Since Last Surgery: Not applicable for this Episode    Visit # / Visits Authorized:  10 / 20  Insurance Authorization Period: 5/8/2025 to 8/10/2025  Date of Evaluation: 5/20/2025  Plan of Care Certification: 5/25/2025 to 7/20/2025      PT/PTA:     Number of PTA visits since last PT visit:   Time In: 1515   Time Out: 1600  Total Time (in minutes): 45   Total Billable Time (in minutes): 45    FOTO:  Intake Score:  %  Survey Score 2:  %  Survey Score 3:  %    Precautions:       Subjective   "Im ready as I'll ever be".  Pain reported as 0/10.      Objective            Treatment:  Therapeutic Exercise  TE 1: x8 mins, sci-fit recumbent stepper, level 6.5m peak setting  TE 2: 3 x 10 cybex leg press , 10 plates  TE 3: 2 x 10, alternating SKTC while on leg press with BLE extended, 3 plates  TE 4: 2 x 8, Cybex HS curl 5 plates, Single leg HS curl , R only 2 x 8  Therapeutic Activity  TA 1: x 43, x 10, x 10 ft ambulation with LBQC to different exercise stations around gym  TA 2: x 60 feet ambulation with LBQC, SBA  TA 3: 2 x 25 feet ambulation weaving between (4)cones, CGA    Time Entry(in minutes):  Therapeutic Activity Time Entry: 17  Therapeutic Exercise Time Entry: 28    Assessment & Plan   Assessment: Mr. Rosenbaum continues to put forth good effort with therapy. Session focused on strengthening of weaker " muscles of BLE. Pt tolerated progression of resistance well. Demonstrated slight difficulty with Single limb strenghtening. Pt continues to demonstrate improvement with ambulating with LBQC. Pt continues to be a good candidate for OPPT  Evaluation/Treatment Tolerance: Patient tolerated treatment well    The patient will continue to benefit from skilled outpatient physical therapy in order to address the deficits listed in the problem list on the initial evaluation, provide patient and family education, and maximize the patients level of independence in the home and community environments.     The patient's spiritual, cultural, and educational needs were considered, and the patient is agreeable to the plan of care and goals.           Plan: Cont LE strengthening especially hip flexion and knee flexion, Progress dynamic balance  as tolerated    Goals:   Active       LTG       Pt to improve TUG to </=21 seconds to demonstrate improved functional mobility (Progressing)       Start:  05/25/25    Expected End:  07/20/25            Pt to improve 5x STS to </=22 seconds to demonstrate improved functional strength  (Progressing)       Start:  05/25/25    Expected End:  07/20/25            Pt to ambulate 0.7 m/s with SPC to demonstrate improved safety with community ambulation       Start:  05/25/25    Expected End:  07/20/25               STG       Pt to demonstrate compliance with HEP (Progressing)       Start:  05/25/25    Expected End:  06/22/25            Pt to improve R hip strength by 1/3 per MMT to demonstrate increased LE strengthening (Met)       Start:  05/25/25    Expected End:  06/22/25    Resolved:  06/22/25         Pt to improve TUG to </=24 seconds to demonstrate improved functional mobility (Progressing)       Start:  05/25/25    Expected End:  06/22/25                Alvarez Hernandez, PT

## 2025-07-01 ENCOUNTER — CLINICAL SUPPORT (OUTPATIENT)
Dept: REHABILITATION | Facility: HOSPITAL | Age: 80
End: 2025-07-01
Payer: MEDICARE

## 2025-07-01 DIAGNOSIS — R53.1 DECREASED STRENGTH, ENDURANCE, AND MOBILITY: Primary | ICD-10-CM

## 2025-07-01 DIAGNOSIS — R68.89 DECREASED STRENGTH, ENDURANCE, AND MOBILITY: Primary | ICD-10-CM

## 2025-07-01 DIAGNOSIS — Z74.09 DECREASED STRENGTH, ENDURANCE, AND MOBILITY: Primary | ICD-10-CM

## 2025-07-01 PROCEDURE — 97110 THERAPEUTIC EXERCISES: CPT | Mod: PN,CQ

## 2025-07-01 PROCEDURE — 97530 THERAPEUTIC ACTIVITIES: CPT | Mod: PN,CQ

## 2025-07-03 ENCOUNTER — CLINICAL SUPPORT (OUTPATIENT)
Dept: REHABILITATION | Facility: HOSPITAL | Age: 80
End: 2025-07-03
Payer: MEDICARE

## 2025-07-03 DIAGNOSIS — R68.89 DECREASED STRENGTH, ENDURANCE, AND MOBILITY: Primary | ICD-10-CM

## 2025-07-03 DIAGNOSIS — R53.1 DECREASED STRENGTH, ENDURANCE, AND MOBILITY: Primary | ICD-10-CM

## 2025-07-03 DIAGNOSIS — Z74.09 DECREASED STRENGTH, ENDURANCE, AND MOBILITY: Primary | ICD-10-CM

## 2025-07-03 PROCEDURE — 97110 THERAPEUTIC EXERCISES: CPT | Mod: PN

## 2025-07-03 PROCEDURE — 97530 THERAPEUTIC ACTIVITIES: CPT | Mod: PN

## 2025-07-04 NOTE — PROGRESS NOTES
Outpatient Rehab    Physical Therapy Visit    Patient Name: Robi Donovan  MRN: [id number]  YOB: 1945  Encounter Date: 7/3/2025    Therapy Diagnosis:   Encounter Diagnosis   Name Primary?    Decreased strength, endurance, and mobility Yes     Physician: Asya Santamaria FNP    Physician Orders: Eval and Treat  Medical Diagnosis: Hemiparesis affecting right side as late effect of cerebrovascular accident  Compression fracture of L2 vertebra with routine healing  Frailty syndrome in geriatric patient  Surgical Diagnosis: Not applicable for this Episode   Surgical Date: Not applicable for this Episode  Days Since Last Surgery: Not applicable for this Episode    Visit # / Visits Authorized:  12 / 20  Insurance Authorization Period: 5/8/2025 to 8/10/2025  Date of Evaluation: 5/20/2025  Plan of Care Certification: 5/25/2025 to 7/20/2025      PT/PTA:     Number of PTA visits since last PT visit:   Time In: 1515   Time Out: 1600  Total Time (in minutes): 45   Total Billable Time (in minutes): 45    FOTO:  Intake Score:  %  Survey Score 2:  %  Survey Score 3:  %    Precautions:       Subjective   No complaints, feeling good.  Pain reported as 0/10. NA    Objective            Treatment:  Therapeutic Exercise  TE 1: x8 mins, sci-fit recumbent stepper, level 6.5m peak setting  TE 2: 2 x10 cybex leg press , 10 plates; 2 x 10 R only leg press, 5 plates  TE 3: 3 x 10 cybex leg extension, 5 plates plus #5  TE 4: 2 x 10 single leg knee  while Leading LE on first step of stairs, PT providing stability to RLE to prevent hyperextension  Therapeutic Activity  TA 1: x 118, ambulation around gym with LBQC, SBA  TA 2: x 126, ambulation around gym with LBQC, SBA    Time Entry(in minutes):  Therapeutic Activity Time Entry: 15  Therapeutic Exercise Time Entry: 30    Assessment & Plan   Assessment: Mr. Rosenbaum continues to do well with PT. He demonstrated slightly diminished activity tolerance which can be maybe attributed to  the increase of intensity of exercises. He continues to show poor motor control of R knee leading to frequent hyperextension. Mr Will is demonstrating improved coordination with LBQC with increasing distances. Pt continues to be a good candidate for OPPT  Evaluation/Treatment Tolerance: Patient tolerated treatment well    The patient will continue to benefit from skilled outpatient physical therapy in order to address the deficits listed in the problem list on the initial evaluation, provide patient and family education, and maximize the patients level of independence in the home and community environments.     The patient's spiritual, cultural, and educational needs were considered, and the patient is agreeable to the plan of care and goals.           Plan: Cont LE strengthening especially hip flexion and knee flexion, Limit ambulation with RW in clinic    Goals:   Active       LTG       Pt to improve TUG to </=21 seconds to demonstrate improved functional mobility (Progressing)       Start:  05/25/25    Expected End:  07/20/25            Pt to improve 5x STS to </=22 seconds to demonstrate improved functional strength  (Progressing)       Start:  05/25/25    Expected End:  07/20/25            Pt to ambulate 0.7 m/s with SPC to demonstrate improved safety with community ambulation       Start:  05/25/25    Expected End:  07/20/25               STG       Pt to demonstrate compliance with HEP (Progressing)       Start:  05/25/25    Expected End:  06/22/25            Pt to improve R hip strength by 1/3 per MMT to demonstrate increased LE strengthening (Met)       Start:  05/25/25    Expected End:  06/22/25    Resolved:  06/22/25         Pt to improve TUG to </=24 seconds to demonstrate improved functional mobility (Progressing)       Start:  05/25/25    Expected End:  06/22/25                Alvarez Hernandez, PT

## 2025-07-08 ENCOUNTER — CLINICAL SUPPORT (OUTPATIENT)
Dept: REHABILITATION | Facility: HOSPITAL | Age: 80
End: 2025-07-08
Payer: MEDICARE

## 2025-07-08 DIAGNOSIS — Z74.09 DECREASED STRENGTH, ENDURANCE, AND MOBILITY: Primary | ICD-10-CM

## 2025-07-08 DIAGNOSIS — R53.1 DECREASED STRENGTH, ENDURANCE, AND MOBILITY: Primary | ICD-10-CM

## 2025-07-08 DIAGNOSIS — R68.89 DECREASED STRENGTH, ENDURANCE, AND MOBILITY: Primary | ICD-10-CM

## 2025-07-08 PROCEDURE — 97110 THERAPEUTIC EXERCISES: CPT | Mod: PN,CQ

## 2025-07-08 PROCEDURE — 97530 THERAPEUTIC ACTIVITIES: CPT | Mod: PN,CQ

## 2025-07-08 NOTE — PROGRESS NOTES
Outpatient Rehab    Physical Therapy Visit    Patient Name: Robi Donovan  MRN: [id number]  YOB: 1945  Encounter Date: 7/8/2025    Therapy Diagnosis:   Encounter Diagnosis   Name Primary?    Decreased strength, endurance, and mobility Yes     Physician: Asya Santamaria FNP    Physician Orders: Eval and Treat  Medical Diagnosis: Hemiparesis affecting right side as late effect of cerebrovascular accident  Compression fracture of L2 vertebra with routine healing  Frailty syndrome in geriatric patient  Surgical Diagnosis: Not applicable for this Episode   Surgical Date: Not applicable for this Episode  Days Since Last Surgery: Not applicable for this Episode    Visit # / Visits Authorized:  13 / 20  Insurance Authorization Period: 5/8/2025 to 8/10/2025  Date of Evaluation: 5/20/2025  Plan of Care Certification: 5/25/2025 to 7/20/2025      PT/PTA: PTA   Number of PTA visits since last PT visit:1  Time In: 1515   Time Out: 1600  Total Time (in minutes): 45   Total Billable Time (in minutes): 45    FOTO:  Intake Score:  %  Survey Score 2:  %  Survey Score 3:  %    Precautions:       Subjective   No complaints, feeling good.  Pain reported as 0/10. NA    Objective            Treatment:  Therapeutic Exercise  TE 1: x8 mins, sci-fit recumbent stepper, level 6.5m peak setting  TE 2: Cybex leg press: 8 plates engaged, 2x10 DL; 2 x 10 R only leg press, 5 plates  TE 4: 2 x 10 single leg knee  while Leading LE on first step of stairs, PT providing stability to RLE to prevent hyperextension  TE 5: Cybex hamstring curl: 3x10 DL with 5 plates engaged  Therapeutic Activity  TA 1: 2 trials of 150 feet with LBQC, SBA *seated rest break in between  TA 2: x 65 feet ambulating from stations in clinic with LBQC, SBA  TA 3: 4 lengths x 25 feet ambulation weaving between (4)cones with LBQC, CGA    Time Entry(in minutes):  Therapeutic Activity Time Entry: 20  Therapeutic Exercise Time Entry: 25    Assessment & Plan    Assessment: Will arrived to session without complaints and and agreeable to treatment.  Improved tolerance of treatment as compared to previous session with patient able to use LBQC for entirety of session, for two 150 foot walking trials and between stations.  All ambulation completed safely and without loss of balance. Still challenged with decreasing right knee hyperextension with single leg activity despite heavy verbal and tactile cuing.  No adverse response to treatment. He continues to be a good candidate for OPPT.  Evaluation/Treatment Tolerance: Patient tolerated treatment well    The patient will continue to benefit from skilled outpatient physical therapy in order to address the deficits listed in the problem list on the initial evaluation, provide patient and family education, and maximize the patients level of independence in the home and community environments.     The patient's spiritual, cultural, and educational needs were considered, and the patient is agreeable to the plan of care and goals.           Plan: Cont LE strengthening especially hip flexion and knee flexion, Limit ambulation with RW in clinic    Goals:   Active       LTG       Pt to improve TUG to </=21 seconds to demonstrate improved functional mobility (Progressing)       Start:  05/25/25    Expected End:  07/20/25            Pt to improve 5x STS to </=22 seconds to demonstrate improved functional strength  (Progressing)       Start:  05/25/25    Expected End:  07/20/25            Pt to ambulate 0.7 m/s with SPC to demonstrate improved safety with community ambulation       Start:  05/25/25    Expected End:  07/20/25               STG       Pt to demonstrate compliance with HEP (Progressing)       Start:  05/25/25    Expected End:  06/22/25            Pt to improve R hip strength by 1/3 per MMT to demonstrate increased LE strengthening (Met)       Start:  05/25/25    Expected End:  06/22/25    Resolved:  06/22/25         Pt to  improve TUG to </=24 seconds to demonstrate improved functional mobility (Progressing)       Start:  05/25/25    Expected End:  06/22/25                Heidy Castillo, PTA

## 2025-07-10 ENCOUNTER — CLINICAL SUPPORT (OUTPATIENT)
Dept: REHABILITATION | Facility: HOSPITAL | Age: 80
End: 2025-07-10
Payer: MEDICARE

## 2025-07-10 DIAGNOSIS — R53.1 DECREASED STRENGTH, ENDURANCE, AND MOBILITY: Primary | ICD-10-CM

## 2025-07-10 DIAGNOSIS — R68.89 DECREASED STRENGTH, ENDURANCE, AND MOBILITY: Primary | ICD-10-CM

## 2025-07-10 DIAGNOSIS — Z74.09 DECREASED STRENGTH, ENDURANCE, AND MOBILITY: Primary | ICD-10-CM

## 2025-07-10 PROCEDURE — 97112 NEUROMUSCULAR REEDUCATION: CPT | Mod: PN

## 2025-07-10 PROCEDURE — 97530 THERAPEUTIC ACTIVITIES: CPT | Mod: PN

## 2025-07-14 NOTE — PROGRESS NOTES
"  Outpatient Rehab    Physical Therapy Visit    Patient Name: Robi Donovan  MRN: [id number]  YOB: 1945  Encounter Date: 7/10/2025    Therapy Diagnosis:   Encounter Diagnosis   Name Primary?    Decreased strength, endurance, and mobility Yes     Physician: Asya Santamaria FNP    Physician Orders: Eval and Treat  Medical Diagnosis: Hemiparesis affecting right side as late effect of cerebrovascular accident  Compression fracture of L2 vertebra with routine healing  Frailty syndrome in geriatric patient  Surgical Diagnosis: Not applicable for this Episode   Surgical Date: Not applicable for this Episode  Days Since Last Surgery: Not applicable for this Episode    Visit # / Visits Authorized:  14 / 20  Insurance Authorization Period: 5/8/2025 to 8/10/2025  Date of Evaluation: 5/20/2025  Plan of Care Certification: 5/25/2025 to 7/20/2025      PT/PTA:     Number of PTA visits since last PT visit:   Time In: 1515   Time Out: 1600  Total Time (in minutes): 45   Total Billable Time (in minutes): 45    FOTO:  Intake Score: Not applicable for this Episode%  Survey Score 2: Not applicable for this Episode%  Survey Score 3: Not applicable for this Episode%    Precautions:         Subjective   Pt doing well and reports of no complaints.  Pain reported as 0/10.      Objective            Treatment:  Balance/Neuromuscular Re-Education  NMR 1: 3 x 30" sec static balance with eyes, open  NMR 2: 3 x 30", static stance balance, eyes closed  Therapeutic Activity  TA 1: 4 x 15 "sideways ambulating weaving between (3) cones  TA 2: x 10 standing kicking green yoga ball to tech, ea  TA 3: x 310 feet ambulation with LBQC    Time Entry(in minutes):  Neuromuscular Re-Education Time Entry: 10  Therapeutic Activity Time Entry: 35    Assessment & Plan   Assessment: Mr. Rosenbaum tolerated todays session well demonstrating improved activity tolerance and ambulation with LBQC for long distances. He had mild difficulty with sideways " ambulating especially going to the right. Did well with balance activity but increased difficulty when attempting to decrease LONNIE. Pt continues to be a good candidate for OPPT  Evaluation/Treatment Tolerance: Patient tolerated treatment well    The patient will continue to benefit from skilled outpatient physical therapy in order to address the deficits listed in the problem list on the initial evaluation, provide patient and family education, and maximize the patients level of independence in the home and community environments.     The patient's spiritual, cultural, and educational needs were considered, and the patient is agreeable to the plan of care and goals.           Plan: Cont LE strengthening especially hip flexion and knee flexion, Limit ambulation with RW in clinic    Goals:   Active       LTG       Pt to improve TUG to </=21 seconds to demonstrate improved functional mobility (Progressing)       Start:  05/25/25    Expected End:  07/20/25            Pt to improve 5x STS to </=22 seconds to demonstrate improved functional strength  (Progressing)       Start:  05/25/25    Expected End:  07/20/25            Pt to ambulate 0.7 m/s with SPC to demonstrate improved safety with community ambulation       Start:  05/25/25    Expected End:  07/20/25               STG       Pt to demonstrate compliance with HEP (Progressing)       Start:  05/25/25    Expected End:  06/22/25            Pt to improve R hip strength by 1/3 per MMT to demonstrate increased LE strengthening (Met)       Start:  05/25/25    Expected End:  06/22/25    Resolved:  06/22/25         Pt to improve TUG to </=24 seconds to demonstrate improved functional mobility (Progressing)       Start:  05/25/25    Expected End:  06/22/25                Alvarez Hernandez, PT

## 2025-07-15 ENCOUNTER — CLINICAL SUPPORT (OUTPATIENT)
Dept: REHABILITATION | Facility: HOSPITAL | Age: 80
End: 2025-07-15
Payer: MEDICARE

## 2025-07-15 DIAGNOSIS — R68.89 DECREASED STRENGTH, ENDURANCE, AND MOBILITY: Primary | ICD-10-CM

## 2025-07-15 DIAGNOSIS — Z74.09 DECREASED STRENGTH, ENDURANCE, AND MOBILITY: Primary | ICD-10-CM

## 2025-07-15 DIAGNOSIS — R53.1 DECREASED STRENGTH, ENDURANCE, AND MOBILITY: Primary | ICD-10-CM

## 2025-07-15 PROCEDURE — 97530 THERAPEUTIC ACTIVITIES: CPT | Mod: PN

## 2025-07-15 NOTE — PROGRESS NOTES
Outpatient Rehab    Physical Therapy Discharge    Patient Name: Robi Donovan  MRN: [id number]  YOB: 1945  Encounter Date: 7/15/2025    Therapy Diagnosis:   Encounter Diagnosis   Name Primary?    Decreased strength, endurance, and mobility Yes     Physician: Asya Santamaria FNP    Physician Orders: Eval and Treat  Medical Diagnosis: Hemiparesis affecting right side as late effect of cerebrovascular accident  Compression fracture of L2 vertebra with routine healing  Frailty syndrome in geriatric patient  Surgical Diagnosis: Not applicable for this Episode   Surgical Date: Not applicable for this Episode  Days Since Last Surgery: Not applicable for this Episode    Visit # / Visits Authorized:  15 / 20  Insurance Authorization Period: 5/8/2025 to 8/10/2025  Date of Evaluation: 5/20/2025  Plan of Care Certification: 5/25/2025 to 7/20/2025      PT/PTA:     Number of PTA visits since last PT visit:   Time In: 1515   Time Out: 1600  Total Time (in minutes): 45   Total Billable Time (in minutes): 45    FOTO:  Intake Score: Not applicable for this Episode%  Survey Score 2: Not applicable for this Episode%  Survey Score 3: Not applicable for this Episode%    Precautions:       Subjective   Pt doing well and reports of no complaints.  Pain reported as 0/10.      Objective            Hip Strength - Planes of Motion   Right Strength Right Pain Left Strength Left  Pain   Flexion (L2) 4   5     Extension 5   5     ABduction 4+   5     ADduction 4+   5     Internal Rotation           External Rotation               Knee Strength   Right Strength Right Pain Left Strength Left  Pain   Flexion (S2) 4   5     Prone Flexion           Extension (L3) 4+   5                   Timed Up & Go (TUG)  Time: 23 seconds     An older adult who takes >=12 seconds to complete the TUG is at risk for falling.    W SPC  Sit to Stand Testing  The patient completed 5 sit to stand transfers in 22 sec.               Gait Analysis  Gait  Analysis Details  10MWT- 0.55 m/s         Treatment:  Therapeutic Exercise  TE 1: x8 mins, sci-fit recumbent stepper, level 6.5m peak setting  Therapeutic Activity  TA 1: Time used for objective nd subjective measurement testing  TA 2: Time used to discuss HEP discharge planning and safety awareness with use of AD during community mobility    Time Entry(in minutes):  Therapeutic Activity Time Entry: 45    Assessment & Plan   Assessment: Mr. Rosenbaum has showed great improvement since evaluation. He has shown much improvement with his RLE strength and much improved gait ability with LBQC and SPC while also demonstrating good balance with rollator. He also verbalized good safety awareness with community ambulation and understanding limits of his mobility with different assistive devices. Mr. Rosenbaum has demonstrated great effort and has reported improved ability with community ambulation. PT and patient agreed on discharge date today with pt being discharged from [facility]  Evaluation/Treatment Tolerance: Patient tolerated treatment well    The patient's spiritual, cultural, and educational needs were considered, and the patient is agreeable to the plan of care and goals.           Plan: Pt discharged from [facility]    Goals:   Active       LTG       Pt to improve TUG to </=21 seconds to demonstrate improved functional mobility (Unable to Meet)       Start:  05/25/25    Expected End:  07/20/25            Pt to improve 5x STS to </=22 seconds to demonstrate improved functional strength  (Met)       Start:  05/25/25    Expected End:  07/20/25    Resolved:  07/15/25         Pt to ambulate 0.7 m/s with SPC to demonstrate improved safety with community ambulation (Unable to Meet)       Start:  05/25/25    Expected End:  07/20/25              Resolved       STG       Pt to demonstrate compliance with HEP (Met)       Start:  05/25/25    Expected End:  06/22/25    Resolved:  07/15/25         Pt to improve R hip strength by 1/3 per MMT to  demonstrate increased LE strengthening (Met)       Start:  05/25/25    Expected End:  06/22/25    Resolved:  06/22/25         Pt to improve TUG to </=24 seconds to demonstrate improved functional mobility (Met)       Start:  05/25/25    Expected End:  06/22/25    Resolved:  07/15/25             Alvarez Hernandez, PT

## (undated) DEVICE — BIT DRILL REAM GPS 3.5MM

## (undated) DEVICE — BURR ROUND PRECISION 7.5MM

## (undated) DEVICE — SUT VICRYL+ 1 CT1 18IN

## (undated) DEVICE — SPHERE NDI PASSIVE

## (undated) DEVICE — DRAPE INCISE IOBAN 2 23X17IN

## (undated) DEVICE — Device

## (undated) DEVICE — DRAPE THREE-QTR REINF 53X77IN

## (undated) DEVICE — CORD BIPOLAR 12 FOOT

## (undated) DEVICE — DRAPE STERI-DRAPE 1000 17X11IN

## (undated) DEVICE — SEALER AQUAMANTYS 2.3 BIPOLAR

## (undated) DEVICE — STAPLER SKIN PROXIMATE WIDE

## (undated) DEVICE — DRAPE C-ARM ELAS CLIP 42X120IN

## (undated) DEVICE — KIT EVACUATOR 3-SPRING 1/8 DRN

## (undated) DEVICE — DRILL BIT SURG GPS HI SPD 4MM

## (undated) DEVICE — MARKER SKIN RULER STERILE

## (undated) DEVICE — SUT MONOCRYL 4.0 PS2 CP496G

## (undated) DEVICE — NDL SPINAL 18GX3.5 SPINOCAN

## (undated) DEVICE — DRAPE C-ARMOR EQUIPMENT COVER

## (undated) DEVICE — BLADE 4IN EDGE INSULATED

## (undated) DEVICE — DRESSING AQUACEL FOAM 4 X 12

## (undated) DEVICE — SPONGE COTTON TRAY 4X4IN

## (undated) DEVICE — DRESSING MEPILEX BORDER 4 X 4

## (undated) DEVICE — EXCELSIUS GPS, MONITOR DRAPE

## (undated) DEVICE — DRAPE ABDOMINAL TIBURON 14X11

## (undated) DEVICE — KIT SPINAL PATIENT CARE JACK

## (undated) DEVICE — SUT CTD VICRYL 2-0 CR/CT-2

## (undated) DEVICE — EXCELSIUS GPS, ARM DRAPE

## (undated) DEVICE — SUT STRATAFIX PDS PLUS VIO

## (undated) DEVICE — BUR BONE CUT MICRO TPS 3X3.8MM

## (undated) DEVICE — TRAY NEURO OMC

## (undated) DEVICE — DRESSING TRANS 4X4 TEGADERM

## (undated) DEVICE — HEMOSTAT SURGICEL 4X8IN

## (undated) DEVICE — SYS CLSR DERMABOND PRINEO 22CM

## (undated) DEVICE — DRAPE TOP 53X102IN

## (undated) DEVICE — TUBE FRAZIER 5MM 2FT SOFT TIP

## (undated) DEVICE — KIT SURGIFLO HEMOSTATIC MATRIX

## (undated) DEVICE — PENCIL ROCKER SWITCH 10FT CORD

## (undated) DEVICE — SPONGE LAP 4X18 PREWASHED

## (undated) DEVICE — DRESSING ABSRBNT ISLAND 3.6X8

## (undated) DEVICE — DRESSING AQUACEL FOAM 3 X 3

## (undated) DEVICE — DRESSING AQUACEL SACRAL 9 X 9

## (undated) DEVICE — DRAPE STERI INSTRUMENT 1018

## (undated) DEVICE — FRAZIER 18FR

## (undated) DEVICE — COVER BACK TBL HD 2-TIER 72IN

## (undated) DEVICE — ELECTRODE REM PLYHSV RETURN 9

## (undated) DEVICE — SYR IRRIGATION BULB STER 60ML

## (undated) DEVICE — TRAY CATH 1-LYR URIMTR 16FR

## (undated) DEVICE — BLADE MILL+ BONE MEDIUM DISP

## (undated) DEVICE — APPLICATOR CHLORAPREP ORN 26ML

## (undated) DEVICE — DRESSING AQUACEL FOAM 5 X 5

## (undated) DEVICE — SPONGE GAUZE 16PLY 4X4

## (undated) DEVICE — DRESSING SURGICAL 1/2X1/2